# Patient Record
Sex: MALE | Race: WHITE | NOT HISPANIC OR LATINO | Employment: OTHER | URBAN - METROPOLITAN AREA
[De-identification: names, ages, dates, MRNs, and addresses within clinical notes are randomized per-mention and may not be internally consistent; named-entity substitution may affect disease eponyms.]

---

## 2023-12-11 ASSESSMENT — REFRACTION_CURRENTRX
OD_DIAMETER: 11.0
OD_BASECURVE: 6.82

## 2024-01-08 ENCOUNTER — APPOINTMENT (OUTPATIENT)
Dept: CARDIOLOGY | Facility: CLINIC | Age: 84
End: 2024-01-08
Payer: MEDICARE

## 2024-01-12 PROBLEM — I25.810 CORONARY ARTERY DISEASE INVOLVING CORONARY BYPASS GRAFT OF NATIVE HEART: Status: ACTIVE | Noted: 2024-01-12

## 2024-01-12 PROBLEM — H02.002 ENTROPION OF RIGHT LOWER EYELID: Status: ACTIVE | Noted: 2024-01-12

## 2024-01-12 PROBLEM — Z96.1 PSEUDOPHAKIA OF BOTH EYES: Status: ACTIVE | Noted: 2024-01-12

## 2024-01-12 PROBLEM — H40.003 GLAUCOMA SUSPECT, BOTH EYES: Status: ACTIVE | Noted: 2024-01-12

## 2024-01-12 PROBLEM — R60.0 BILATERAL EDEMA OF LOWER EXTREMITY: Status: ACTIVE | Noted: 2024-01-12

## 2024-01-12 PROBLEM — Z94.7 S/P PKP (PENETRATING KERATOPLASTY): Status: ACTIVE | Noted: 2024-01-12

## 2024-01-12 PROBLEM — I12.9 HYPERTENSIVE KIDNEY DISEASE WITH CKD (CHRONIC KIDNEY DISEASE), STAGE 1-4 OR UNSPECIFIED CHRONIC KIDNEY DISEASE: Status: ACTIVE | Noted: 2024-01-12

## 2024-01-12 PROBLEM — E78.5 DYSLIPIDEMIA: Status: ACTIVE | Noted: 2024-01-12

## 2024-01-12 PROBLEM — H04.123 DRY EYES: Status: ACTIVE | Noted: 2024-01-12

## 2024-01-12 PROBLEM — I48.91 ATRIAL FIBRILLATION (MULTI): Status: ACTIVE | Noted: 2024-01-12

## 2024-01-12 PROBLEM — H40.1211 LOW TENSION GLAUCOMA OF RIGHT EYE, MILD STAGE: Status: ACTIVE | Noted: 2024-01-12

## 2024-01-12 PROBLEM — S05.00XA ABRASION OF CONJUNCTIVA: Status: ACTIVE | Noted: 2024-01-12

## 2024-01-12 PROBLEM — M19.90 ARTHRITIS: Status: ACTIVE | Noted: 2024-01-12

## 2024-01-12 PROBLEM — T86.8409 REJECTION OF CORNEAL GRAFT: Status: ACTIVE | Noted: 2024-01-12

## 2024-01-12 PROBLEM — G47.33 OBSTRUCTIVE SLEEP APNEA: Status: ACTIVE | Noted: 2024-01-12

## 2024-01-12 RX ORDER — BISOPROLOL FUMARATE 5 MG/1
5 TABLET, FILM COATED ORAL DAILY
COMMUNITY
Start: 2023-10-06

## 2024-01-12 RX ORDER — GABAPENTIN 100 MG/1
100 CAPSULE ORAL 3 TIMES DAILY
COMMUNITY
Start: 2023-10-26

## 2024-01-12 RX ORDER — METOPROLOL TARTRATE 25 MG/1
25 TABLET, FILM COATED ORAL 2 TIMES DAILY
COMMUNITY
Start: 2022-08-05 | End: 2024-01-15 | Stop reason: WASHOUT

## 2024-01-12 RX ORDER — HYDRALAZINE HYDROCHLORIDE 25 MG/1
25 TABLET, FILM COATED ORAL 2 TIMES DAILY
COMMUNITY
Start: 2016-11-21 | End: 2024-01-15 | Stop reason: ALTCHOICE

## 2024-01-12 RX ORDER — HYDRALAZINE HYDROCHLORIDE 50 MG/1
50 TABLET, FILM COATED ORAL
COMMUNITY
Start: 2023-11-12 | End: 2024-01-15 | Stop reason: DRUGHIGH

## 2024-01-12 RX ORDER — LANCETS 33 GAUGE
EACH MISCELLANEOUS
COMMUNITY
Start: 2023-05-24 | End: 2024-01-21 | Stop reason: ENTERED-IN-ERROR

## 2024-01-12 RX ORDER — GLIMEPIRIDE 4 MG/1
4 TABLET ORAL DAILY
COMMUNITY
Start: 2023-10-24 | End: 2024-01-25 | Stop reason: HOSPADM

## 2024-01-12 RX ORDER — METOLAZONE 5 MG/1
5 TABLET ORAL DAILY PRN
COMMUNITY
Start: 2023-10-03

## 2024-01-12 RX ORDER — GLIMEPIRIDE 2 MG/1
2 TABLET ORAL
COMMUNITY
End: 2024-01-15 | Stop reason: ALTCHOICE

## 2024-01-12 RX ORDER — TORSEMIDE 20 MG/1
20 TABLET ORAL DAILY
COMMUNITY
End: 2024-01-15 | Stop reason: DRUGHIGH

## 2024-01-12 RX ORDER — AMLODIPINE BESYLATE 10 MG/1
10 TABLET ORAL DAILY
COMMUNITY
End: 2024-01-15 | Stop reason: ALTCHOICE

## 2024-01-12 RX ORDER — DIGOXIN 125 MCG
125 TABLET ORAL DAILY
COMMUNITY

## 2024-01-12 RX ORDER — ROSUVASTATIN CALCIUM 20 MG/1
20 TABLET, COATED ORAL NIGHTLY
COMMUNITY
Start: 2023-08-21

## 2024-01-12 RX ORDER — LANCETS 30 GAUGE
EACH MISCELLANEOUS
COMMUNITY
Start: 2023-03-08 | End: 2024-01-21 | Stop reason: ENTERED-IN-ERROR

## 2024-01-12 RX ORDER — EMPAGLIFLOZIN 25 MG/1
25 TABLET, FILM COATED ORAL
COMMUNITY
Start: 2023-08-03 | End: 2024-01-15 | Stop reason: ALTCHOICE

## 2024-01-12 RX ORDER — PANTOPRAZOLE SODIUM 40 MG/1
40 TABLET, DELAYED RELEASE ORAL NIGHTLY
COMMUNITY

## 2024-01-12 RX ORDER — LATANOPROST 50 UG/ML
1 SOLUTION/ DROPS OPHTHALMIC NIGHTLY
COMMUNITY

## 2024-01-12 RX ORDER — SIMVASTATIN 20 MG/1
20 TABLET, FILM COATED ORAL DAILY
COMMUNITY
End: 2024-01-15 | Stop reason: WASHOUT

## 2024-01-12 RX ORDER — DOXYCYCLINE HYCLATE 100 MG
100 TABLET ORAL 2 TIMES DAILY
COMMUNITY
Start: 2023-09-12 | End: 2023-09-22

## 2024-01-12 RX ORDER — MUPIROCIN 20 MG/G
OINTMENT TOPICAL
COMMUNITY
Start: 2023-02-26 | End: 2024-01-15 | Stop reason: ALTCHOICE

## 2024-01-12 RX ORDER — CLINDAMYCIN PHOSPHATE 10 MG/G
GEL TOPICAL
COMMUNITY
Start: 2023-11-16 | End: 2024-01-15 | Stop reason: ALTCHOICE

## 2024-01-12 RX ORDER — BLOOD SUGAR DIAGNOSTIC
STRIP MISCELLANEOUS
COMMUNITY
Start: 2013-10-16 | End: 2024-01-21 | Stop reason: ENTERED-IN-ERROR

## 2024-01-12 RX ORDER — RIVAROXABAN 15 MG/1
15 TABLET, FILM COATED ORAL DAILY
COMMUNITY
End: 2024-01-21 | Stop reason: ENTERED-IN-ERROR

## 2024-01-15 ENCOUNTER — TELEMEDICINE (OUTPATIENT)
Dept: CARDIOLOGY | Facility: CLINIC | Age: 84
End: 2024-01-15
Payer: MEDICARE

## 2024-01-15 VITALS — HEIGHT: 70 IN | BODY MASS INDEX: 25.48 KG/M2 | WEIGHT: 178 LBS

## 2024-01-15 DIAGNOSIS — I48.91 ATRIAL FIBRILLATION, UNSPECIFIED TYPE (MULTI): Primary | ICD-10-CM

## 2024-01-15 DIAGNOSIS — I25.810 CORONARY ARTERY DISEASE INVOLVING CORONARY BYPASS GRAFT OF NATIVE HEART WITHOUT ANGINA PECTORIS: ICD-10-CM

## 2024-01-15 DIAGNOSIS — I10 ESSENTIAL HYPERTENSION, BENIGN: ICD-10-CM

## 2024-01-15 DIAGNOSIS — R06.09 DYSPNEA ON EXERTION: ICD-10-CM

## 2024-01-15 PROBLEM — I50.42 CHRONIC COMBINED SYSTOLIC AND DIASTOLIC HEART FAILURE (MULTI): Status: ACTIVE | Noted: 2024-01-15

## 2024-01-15 PROCEDURE — 99213 OFFICE O/P EST LOW 20 MIN: CPT | Performed by: INTERNAL MEDICINE

## 2024-01-15 RX ORDER — HYDRALAZINE HYDROCHLORIDE 50 MG/1
50 TABLET, FILM COATED ORAL 2 TIMES DAILY
Qty: 60 TABLET | Refills: 11 | Status: SHIPPED
Start: 2024-01-15 | End: 2024-02-19

## 2024-01-15 RX ORDER — TORSEMIDE 20 MG/1
60 TABLET ORAL DAILY
Qty: 90 TABLET | Refills: 11 | Status: SHIPPED
Start: 2024-01-15 | End: 2024-01-25 | Stop reason: HOSPADM

## 2024-01-15 ASSESSMENT — ENCOUNTER SYMPTOMS
OCCASIONAL FEELINGS OF UNSTEADINESS: 0
LOSS OF SENSATION IN FEET: 0
DEPRESSION: 0

## 2024-01-15 NOTE — PROGRESS NOTES
Chief Complaint:   No chief complaint on file.     History Of Present Illness:    Elgin Marin is a 83 y.o. male with history of CAD s/p CABG, chronic systolic heart failure, atrial fibrillation on Xarelto, dyslipidemia, hypertension, and shortness of breath being evaluated for routine follow-up.     Overall has been doing well. His BP's have been well controlled.     His BP's have been well controlled on bisoprolol and hydralazine. Systolics have been 100-120.     Leg swelling has been relatively well controlled. Still having problems with blistering of the skin. Has been using Xeroform patches. Still having seeping of the legs.     No chest pain or SOB.     Echocardiogram 3/18/2023: EF 40 to 45%. Moderate biatrial enlargement. Moderately elevated RVSP at 50 mmHg.     Catheterization 4/1/2022: Patent LIMA to LAD with collaterals to distal RCA. Occluded vein grafts to diagonal/vein graft to OM/vein graft to right PDA. LVEDP 14 mmHg.     Echocardiogram 4/1/2022: EF 50 to 55%. Moderate left atrial enlargement and mild to moderate right atrial enlargement. Aortic valve sclerosis without stenosis. RVSP 21 mmHg.     Lexiscan nuclear stress test 9/14/18 with no evidence of ischemia or scar. EF 62%.     PCI to the vein graft to RCA 9/25/15 with an integrity 2.75 x 26 mm drug-eluting stent.     Echocardiogram 9/17/15 demonstrating normal LV size and function, EF 60-65%. Normal RV size and function. Mild MR and IL. Mildly dilated IVC with normal respirophasic variation.      Past Medical History:  He has a past medical history of Hyperlipidemia, unspecified (11/14/2022), Other forms of dyspnea (08/09/2018), Personal history of other diseases of the circulatory system (12/08/2014), Personal history of other endocrine, nutritional and metabolic disease, Personal history of other specified conditions, Presence of intraocular lens (07/20/2019), Presence of intraocular lens (07/20/2019), and Unspecified atrial fibrillation  (CMS/[facility]) (11/14/2022).    Past Surgical History:  He has a past surgical history that includes Coronary angioplasty with stent (10/06/2015); Coronary artery bypass graft (10/06/2015); Other surgical history (09/09/2015); Cholecystectomy (09/09/2015); and Rotator cuff repair (09/09/2015).      Social History:  He has no history on file for tobacco use, alcohol use, and drug use.    Family History:  No family history on file.     Allergies:  Oxycodone, Oxycodone-aspirin, and Warfarin    Outpatient Medications:  Current Outpatient Medications   Medication Instructions    amLODIPine (NORVASC) 10 mg, oral, Daily    bisoprolol (ZEBETA) 5 mg, oral, Daily    clindamycin (Cleocin T) 1 % gel     Contour Test Strips strip USE AS DIRECTED.    digoxin (LANOXIN) 125 mcg, oral, Daily    gabapentin (NEURONTIN) 100 mg, oral, Nightly    glimepiride (AMARYL) 2 mg, oral, Take with food.    glimepiride (AMARYL) 4 mg, oral, Daily    hydrALAZINE (APRESOLINE) 25 mg, oral, 2 times daily    hydrALAZINE (APRESOLINE) 50 mg    Jardiance 25 mg, oral, Daily with breakfast    latanoprost (Xalatan) 0.005 % ophthalmic solution 1 drop, Both Eyes, Nightly    metOLazone (ZAROXOLYN) 5 mg    metoprolol tartrate (LOPRESSOR) 25 mg, oral, 2 times daily    mupirocin (Bactroban) 2 % ointment APPLY TO AFFECTED AREA 3 TIMES A DAY    OneTouch Delica Plus Lancet 30 gauge misc PLEASE SEE ATTACHED FOR DETAILED DIRECTIONS    OneTouch Ultra2 Meter misc USE TO TEST BLOOD SUGAR 3 TIMES DAILY. PLEASE FILL WITH ONE TOUCH BLUE METER.    pantoprazole (PROTONIX) 40 mg, oral, Daily before breakfast    prednisoLONE acetate (Pred-Forte) 1 % ophthalmic suspension USE ONE DROP ONCE/DAY IN RIGHT EYE. MUST MAKE APPOINTMENT    rosuvastatin (CRESTOR) 20 mg, oral, Nightly    simvastatin (ZOCOR) 20 mg, oral, Daily    torsemide (DEMADEX) 20 mg, oral, Daily    Xarelto 15 mg, oral, Daily       Last Recorded Vitals:  There were no vitals taken for this visit.   LASTWT(3):   Wt Readings  from Last 3 Encounters:   07/10/23 80.7 kg (178 lb)   03/30/23 83.9 kg (185 lb)   11/14/22 83.9 kg (185 lb)       Physical Exam:  Alert and oriented and in no apparent distress.  No obvious agitation.     Last Labs:  CBC -  Recent Labs     03/20/23  0542 03/19/23  0521 03/18/23  0739   WBC 8.3 8.3 8.3   HGB 11.0* 10.7* 11.6*   HCT 35.1* 34.9* 36.8*    177 188   MCV 91 92 91       CMP -  Recent Labs     03/21/23  0541 03/20/23  0542 03/19/23  0521   * 133* 134*   K 4.1 4.1 4.0   CL 94* 96* 99   CO2 29 29 27   ANIONGAP 13 12 12   BUN 61* 50* 43*   CREATININE 2.52* 2.43* 2.10*   MG 2.48* 2.29 2.24     Recent Labs     03/20/23  0542 03/19/23  0521 03/18/23  0739 03/17/23  1716 03/17/23  1147 04/04/22  0616 04/03/22  0735   ALBUMIN 3.6 3.6 4.1   < > 4.2 3.7 3.9   ALKPHOS  --   --   --   --  80 55 51   ALT  --   --   --   --  22 13 13   AST  --   --   --   --  42* 15 20   BILITOT  --   --   --   --  1.3* 1.9* 2.1*    < > = values in this interval not displayed.       LIPID PANEL -   Recent Labs     04/01/22  0516   CHOL 86   LDLF 29   HDL 41.0   TRIG 82       Recent Labs     03/21/23  0541 03/17/23  1147 09/01/22  1306 04/01/22  0516   * 435* 192*  --    HGBA1C  --   --   --  7.1*           Assessment/Plan   1) atrial fibrillation: Rate controlled with digoxin. Continue anticoagulation with Xarelto.     2) Leg swelling: multifactorial. Try using the metolazone 30 min prior to the torsemide on those days.    Told him that we can set up an appointment with him with Dr. Terrell but it is very difficult for the patient to get transportation.  I told him he can call the office and we can always make an appointment if he is interested.     3) hypertension: Continue his current medication.     4) coronary artery disease: No percutaneous nor surgical options for his severe coronary disease. Continue medical therapy.     5) acute on chronic combined systolic and diastolic heart failure: See the note above about  the metolazone.     6) follow-up: 3 to 6 months or sooner if needed    Total time 25 minutes      Lewis Amador MD

## 2024-01-21 ENCOUNTER — APPOINTMENT (OUTPATIENT)
Dept: RADIOLOGY | Facility: HOSPITAL | Age: 84
DRG: 603 | End: 2024-01-21
Payer: MEDICARE

## 2024-01-21 ENCOUNTER — APPOINTMENT (OUTPATIENT)
Dept: CARDIOLOGY | Facility: HOSPITAL | Age: 84
DRG: 603 | End: 2024-01-21
Payer: MEDICARE

## 2024-01-21 ENCOUNTER — HOSPITAL ENCOUNTER (INPATIENT)
Facility: HOSPITAL | Age: 84
LOS: 4 days | Discharge: HOME | DRG: 603 | End: 2024-01-25
Attending: EMERGENCY MEDICINE | Admitting: STUDENT IN AN ORGANIZED HEALTH CARE EDUCATION/TRAINING PROGRAM
Payer: MEDICARE

## 2024-01-21 DIAGNOSIS — L03.90 WOUND CELLULITIS: ICD-10-CM

## 2024-01-21 DIAGNOSIS — R60.0 BILATERAL EDEMA OF LOWER EXTREMITY: ICD-10-CM

## 2024-01-21 DIAGNOSIS — R73.9 HYPERGLYCEMIA: Primary | ICD-10-CM

## 2024-01-21 DIAGNOSIS — I10 ESSENTIAL HYPERTENSION, BENIGN: ICD-10-CM

## 2024-01-21 DIAGNOSIS — L03.119 CELLULITIS OF LOWER EXTREMITY, UNSPECIFIED LATERALITY: ICD-10-CM

## 2024-01-21 DIAGNOSIS — R79.89 TROPONIN LEVEL ELEVATED: ICD-10-CM

## 2024-01-21 DIAGNOSIS — I50.42 CHRONIC COMBINED SYSTOLIC AND DIASTOLIC HEART FAILURE (MULTI): ICD-10-CM

## 2024-01-21 DIAGNOSIS — R06.09 DYSPNEA ON EXERTION: ICD-10-CM

## 2024-01-21 LAB
ALBUMIN SERPL BCP-MCNC: 3.6 G/DL (ref 3.4–5)
ALP SERPL-CCNC: 128 U/L (ref 33–136)
ALT SERPL W P-5'-P-CCNC: 25 U/L (ref 10–52)
ANION GAP BLDV CALCULATED.4IONS-SCNC: 16 MMOL/L (ref 10–25)
ANION GAP SERPL CALC-SCNC: 18 MMOL/L (ref 10–20)
APPEARANCE UR: ABNORMAL
AST SERPL W P-5'-P-CCNC: 30 U/L (ref 9–39)
B-OH-BUTYR SERPL-SCNC: 0.21 MMOL/L (ref 0.02–0.27)
BACTERIA #/AREA URNS AUTO: ABNORMAL /HPF
BASE EXCESS BLDV CALC-SCNC: -4.7 MMOL/L (ref -2–3)
BASOPHILS # BLD AUTO: 0.03 X10*3/UL (ref 0–0.1)
BASOPHILS NFR BLD AUTO: 0.3 %
BILIRUB SERPL-MCNC: 1.2 MG/DL (ref 0–1.2)
BILIRUB UR STRIP.AUTO-MCNC: NEGATIVE MG/DL
BNP SERPL-MCNC: 652 PG/ML (ref 0–99)
BODY TEMPERATURE: ABNORMAL
BUN SERPL-MCNC: 103 MG/DL (ref 6–23)
CA-I BLDV-SCNC: 1.17 MMOL/L (ref 1.1–1.33)
CALCIUM SERPL-MCNC: 8.2 MG/DL (ref 8.6–10.3)
CARDIAC TROPONIN I PNL SERPL HS: 169 NG/L (ref 0–20)
CARDIAC TROPONIN I PNL SERPL HS: 180 NG/L (ref 0–20)
CARDIAC TROPONIN I PNL SERPL HS: 204 NG/L (ref 0–20)
CHLORIDE BLDV-SCNC: 92 MMOL/L (ref 98–107)
CHLORIDE SERPL-SCNC: 90 MMOL/L (ref 98–107)
CK SERPL-CCNC: 467 U/L (ref 0–325)
CO2 SERPL-SCNC: 20 MMOL/L (ref 21–32)
COLOR UR: YELLOW
CREAT SERPL-MCNC: 3.09 MG/DL (ref 0.5–1.3)
CRITICAL CALL TIME: 1328
CRITICAL CALLED BY: ABNORMAL
CRITICAL CALLED TO: ABNORMAL
CRITICAL NOTE: ABNORMAL
CRITICAL READ BACK: ABNORMAL
CRP SERPL-MCNC: 1.82 MG/DL
DIGOXIN SERPL-MCNC: <0.3 NG/ML (ref 0.8–?)
EGFRCR SERPLBLD CKD-EPI 2021: 19 ML/MIN/1.73M*2
EOSINOPHIL # BLD AUTO: 0.04 X10*3/UL (ref 0–0.4)
EOSINOPHIL NFR BLD AUTO: 0.4 %
ERYTHROCYTE [DISTWIDTH] IN BLOOD BY AUTOMATED COUNT: 14.2 % (ref 11.5–14.5)
ERYTHROCYTE [SEDIMENTATION RATE] IN BLOOD BY WESTERGREN METHOD: 33 MM/H (ref 0–20)
GLUCOSE BLD MANUAL STRIP-MCNC: 149 MG/DL (ref 74–99)
GLUCOSE BLD MANUAL STRIP-MCNC: 165 MG/DL (ref 74–99)
GLUCOSE BLD MANUAL STRIP-MCNC: 345 MG/DL (ref 74–99)
GLUCOSE BLDV-MCNC: 553 MG/DL (ref 74–99)
GLUCOSE SERPL-MCNC: 487 MG/DL (ref 74–99)
GLUCOSE UR STRIP.AUTO-MCNC: ABNORMAL MG/DL
HCO3 BLDV-SCNC: 18.8 MMOL/L (ref 22–26)
HCT VFR BLD AUTO: 31.7 % (ref 41–52)
HCT VFR BLD EST: 32 % (ref 41–52)
HGB BLD-MCNC: 10.3 G/DL (ref 13.5–17.5)
HGB BLDV-MCNC: 10.8 G/DL (ref 13.5–17.5)
HOLD SPECIMEN: NORMAL
HYALINE CASTS #/AREA URNS AUTO: ABNORMAL /LPF
IMM GRANULOCYTES # BLD AUTO: 0.05 X10*3/UL (ref 0–0.5)
IMM GRANULOCYTES NFR BLD AUTO: 0.6 % (ref 0–0.9)
INHALED O2 CONCENTRATION: 28 %
INR PPP: 2 (ref 0.9–1.1)
KETONES UR STRIP.AUTO-MCNC: NEGATIVE MG/DL
LACTATE BLDV-SCNC: 2 MMOL/L (ref 0.4–2)
LACTATE SERPL-SCNC: 1.6 MMOL/L (ref 0.4–2)
LEUKOCYTE ESTERASE UR QL STRIP.AUTO: ABNORMAL
LYMPHOCYTES # BLD AUTO: 0.55 X10*3/UL (ref 0.8–3)
LYMPHOCYTES NFR BLD AUTO: 6.2 %
MAGNESIUM SERPL-MCNC: 1.8 MG/DL (ref 1.6–2.4)
MCH RBC QN AUTO: 28 PG (ref 26–34)
MCHC RBC AUTO-ENTMCNC: 32.5 G/DL (ref 32–36)
MCV RBC AUTO: 86 FL (ref 80–100)
MONOCYTES # BLD AUTO: 0.67 X10*3/UL (ref 0.05–0.8)
MONOCYTES NFR BLD AUTO: 7.5 %
NEUTROPHILS # BLD AUTO: 7.59 X10*3/UL (ref 1.6–5.5)
NEUTROPHILS NFR BLD AUTO: 85 %
NITRITE UR QL STRIP.AUTO: NEGATIVE
NRBC BLD-RTO: 0 /100 WBCS (ref 0–0)
OXYHGB MFR BLDV: 68.3 % (ref 45–75)
PCO2 BLDV: 29 MM HG (ref 41–51)
PH BLDV: 7.42 PH (ref 7.33–7.43)
PH UR STRIP.AUTO: 5 [PH]
PLATELET # BLD AUTO: 172 X10*3/UL (ref 150–450)
PO2 BLDV: 43 MM HG (ref 35–45)
POTASSIUM BLDV-SCNC: 3.9 MMOL/L (ref 3.5–5.3)
POTASSIUM SERPL-SCNC: 3.7 MMOL/L (ref 3.5–5.3)
PROT SERPL-MCNC: 6.3 G/DL (ref 6.4–8.2)
PROT UR STRIP.AUTO-MCNC: NEGATIVE MG/DL
PROTHROMBIN TIME: 22.8 SECONDS (ref 9.8–12.8)
RBC # BLD AUTO: 3.68 X10*6/UL (ref 4.5–5.9)
RBC # UR STRIP.AUTO: ABNORMAL /UL
RBC #/AREA URNS AUTO: >20 /HPF
SAO2 % BLDV: 70 % (ref 45–75)
SODIUM BLDV-SCNC: 123 MMOL/L (ref 136–145)
SODIUM SERPL-SCNC: 124 MMOL/L (ref 136–145)
SP GR UR STRIP.AUTO: 1.01
UROBILINOGEN UR STRIP.AUTO-MCNC: <2 MG/DL
WBC # BLD AUTO: 8.9 X10*3/UL (ref 4.4–11.3)
WBC #/AREA URNS AUTO: ABNORMAL /HPF

## 2024-01-21 PROCEDURE — 85025 COMPLETE CBC W/AUTO DIFF WBC: CPT | Performed by: EMERGENCY MEDICINE

## 2024-01-21 PROCEDURE — 84132 ASSAY OF SERUM POTASSIUM: CPT | Performed by: EMERGENCY MEDICINE

## 2024-01-21 PROCEDURE — 85610 PROTHROMBIN TIME: CPT | Performed by: STUDENT IN AN ORGANIZED HEALTH CARE EDUCATION/TRAINING PROGRAM

## 2024-01-21 PROCEDURE — 96365 THER/PROPH/DIAG IV INF INIT: CPT

## 2024-01-21 PROCEDURE — 93005 ELECTROCARDIOGRAM TRACING: CPT

## 2024-01-21 PROCEDURE — 73620 X-RAY EXAM OF FOOT: CPT | Mod: 50

## 2024-01-21 PROCEDURE — 82010 KETONE BODYS QUAN: CPT | Performed by: STUDENT IN AN ORGANIZED HEALTH CARE EDUCATION/TRAINING PROGRAM

## 2024-01-21 PROCEDURE — 86140 C-REACTIVE PROTEIN: CPT

## 2024-01-21 PROCEDURE — 2500000002 HC RX 250 W HCPCS SELF ADMINISTERED DRUGS (ALT 637 FOR MEDICARE OP, ALT 636 FOR OP/ED)

## 2024-01-21 PROCEDURE — 36415 COLL VENOUS BLD VENIPUNCTURE: CPT | Performed by: STUDENT IN AN ORGANIZED HEALTH CARE EDUCATION/TRAINING PROGRAM

## 2024-01-21 PROCEDURE — 84484 ASSAY OF TROPONIN QUANT: CPT | Performed by: EMERGENCY MEDICINE

## 2024-01-21 PROCEDURE — 81001 URINALYSIS AUTO W/SCOPE: CPT | Performed by: EMERGENCY MEDICINE

## 2024-01-21 PROCEDURE — 2500000001 HC RX 250 WO HCPCS SELF ADMINISTERED DRUGS (ALT 637 FOR MEDICARE OP): Performed by: STUDENT IN AN ORGANIZED HEALTH CARE EDUCATION/TRAINING PROGRAM

## 2024-01-21 PROCEDURE — 2500000004 HC RX 250 GENERAL PHARMACY W/ HCPCS (ALT 636 FOR OP/ED): Performed by: STUDENT IN AN ORGANIZED HEALTH CARE EDUCATION/TRAINING PROGRAM

## 2024-01-21 PROCEDURE — 87040 BLOOD CULTURE FOR BACTERIA: CPT | Mod: STJLAB | Performed by: STUDENT IN AN ORGANIZED HEALTH CARE EDUCATION/TRAINING PROGRAM

## 2024-01-21 PROCEDURE — 84156 ASSAY OF PROTEIN URINE: CPT | Performed by: INTERNAL MEDICINE

## 2024-01-21 PROCEDURE — 96375 TX/PRO/DX INJ NEW DRUG ADDON: CPT

## 2024-01-21 PROCEDURE — 82947 ASSAY GLUCOSE BLOOD QUANT: CPT

## 2024-01-21 PROCEDURE — 83880 ASSAY OF NATRIURETIC PEPTIDE: CPT | Performed by: STUDENT IN AN ORGANIZED HEALTH CARE EDUCATION/TRAINING PROGRAM

## 2024-01-21 PROCEDURE — 84484 ASSAY OF TROPONIN QUANT: CPT | Performed by: STUDENT IN AN ORGANIZED HEALTH CARE EDUCATION/TRAINING PROGRAM

## 2024-01-21 PROCEDURE — 73590 X-RAY EXAM OF LOWER LEG: CPT | Mod: 50

## 2024-01-21 PROCEDURE — 87086 URINE CULTURE/COLONY COUNT: CPT | Mod: STJLAB | Performed by: INTERNAL MEDICINE

## 2024-01-21 PROCEDURE — 82570 ASSAY OF URINE CREATININE: CPT | Performed by: INTERNAL MEDICINE

## 2024-01-21 PROCEDURE — 2500000002 HC RX 250 W HCPCS SELF ADMINISTERED DRUGS (ALT 637 FOR MEDICARE OP, ALT 636 FOR OP/ED): Performed by: STUDENT IN AN ORGANIZED HEALTH CARE EDUCATION/TRAINING PROGRAM

## 2024-01-21 PROCEDURE — 73590 X-RAY EXAM OF LOWER LEG: CPT | Mod: BILATERAL PROCEDURE | Performed by: RADIOLOGY

## 2024-01-21 PROCEDURE — 83605 ASSAY OF LACTIC ACID: CPT | Performed by: EMERGENCY MEDICINE

## 2024-01-21 PROCEDURE — 1200000002 HC GENERAL ROOM WITH TELEMETRY DAILY

## 2024-01-21 PROCEDURE — 84132 ASSAY OF SERUM POTASSIUM: CPT | Performed by: STUDENT IN AN ORGANIZED HEALTH CARE EDUCATION/TRAINING PROGRAM

## 2024-01-21 PROCEDURE — 80162 ASSAY OF DIGOXIN TOTAL: CPT | Performed by: STUDENT IN AN ORGANIZED HEALTH CARE EDUCATION/TRAINING PROGRAM

## 2024-01-21 PROCEDURE — 99285 EMERGENCY DEPT VISIT HI MDM: CPT | Performed by: EMERGENCY MEDICINE

## 2024-01-21 PROCEDURE — 85652 RBC SED RATE AUTOMATED: CPT

## 2024-01-21 PROCEDURE — 82550 ASSAY OF CK (CPK): CPT

## 2024-01-21 PROCEDURE — 83735 ASSAY OF MAGNESIUM: CPT | Performed by: EMERGENCY MEDICINE

## 2024-01-21 PROCEDURE — 83605 ASSAY OF LACTIC ACID: CPT | Performed by: STUDENT IN AN ORGANIZED HEALTH CARE EDUCATION/TRAINING PROGRAM

## 2024-01-21 PROCEDURE — 2500000004 HC RX 250 GENERAL PHARMACY W/ HCPCS (ALT 636 FOR OP/ED)

## 2024-01-21 PROCEDURE — 2500000001 HC RX 250 WO HCPCS SELF ADMINISTERED DRUGS (ALT 637 FOR MEDICARE OP)

## 2024-01-21 RX ORDER — DOXYCYCLINE 100 MG/1
100 CAPSULE ORAL EVERY 12 HOURS SCHEDULED
Status: DISCONTINUED | OUTPATIENT
Start: 2024-01-21 | End: 2024-01-25 | Stop reason: HOSPADM

## 2024-01-21 RX ORDER — BISOPROLOL FUMARATE 5 MG/1
5 TABLET, FILM COATED ORAL DAILY
Status: DISCONTINUED | OUTPATIENT
Start: 2024-01-22 | End: 2024-01-25 | Stop reason: HOSPADM

## 2024-01-21 RX ORDER — VANCOMYCIN HYDROCHLORIDE 1 G/20ML
INJECTION, POWDER, LYOPHILIZED, FOR SOLUTION INTRAVENOUS DAILY PRN
Status: DISCONTINUED | OUTPATIENT
Start: 2024-01-21 | End: 2024-01-21

## 2024-01-21 RX ORDER — LATANOPROST 50 UG/ML
1 SOLUTION/ DROPS OPHTHALMIC NIGHTLY
Status: DISCONTINUED | OUTPATIENT
Start: 2024-01-21 | End: 2024-01-25 | Stop reason: HOSPADM

## 2024-01-21 RX ORDER — ROSUVASTATIN CALCIUM 20 MG/1
20 TABLET, COATED ORAL NIGHTLY
Status: DISCONTINUED | OUTPATIENT
Start: 2024-01-21 | End: 2024-01-25 | Stop reason: HOSPADM

## 2024-01-21 RX ORDER — INSULIN LISPRO 100 [IU]/ML
5 INJECTION, SOLUTION INTRAVENOUS; SUBCUTANEOUS ONCE
Status: COMPLETED | OUTPATIENT
Start: 2024-01-21 | End: 2024-01-21

## 2024-01-21 RX ORDER — GABAPENTIN 100 MG/1
100 CAPSULE ORAL ONCE
Status: COMPLETED | OUTPATIENT
Start: 2024-01-21 | End: 2024-01-21

## 2024-01-21 RX ORDER — PANTOPRAZOLE SODIUM 40 MG/1
40 TABLET, DELAYED RELEASE ORAL NIGHTLY
Status: DISCONTINUED | OUTPATIENT
Start: 2024-01-21 | End: 2024-01-25 | Stop reason: HOSPADM

## 2024-01-21 RX ORDER — DEXTROSE 50 % IN WATER (D50W) INTRAVENOUS SYRINGE
25
Status: DISCONTINUED | OUTPATIENT
Start: 2024-01-21 | End: 2024-01-25 | Stop reason: HOSPADM

## 2024-01-21 RX ORDER — DEXTROSE MONOHYDRATE 100 MG/ML
0.3 INJECTION, SOLUTION INTRAVENOUS ONCE AS NEEDED
Status: DISCONTINUED | OUTPATIENT
Start: 2024-01-21 | End: 2024-01-25 | Stop reason: HOSPADM

## 2024-01-21 RX ORDER — INSULIN LISPRO 100 [IU]/ML
0-10 INJECTION, SOLUTION INTRAVENOUS; SUBCUTANEOUS
Status: DISCONTINUED | OUTPATIENT
Start: 2024-01-21 | End: 2024-01-25 | Stop reason: HOSPADM

## 2024-01-21 RX ORDER — PREDNISOLONE ACETATE 10 MG/ML
1 SUSPENSION/ DROPS OPHTHALMIC DAILY
Status: DISCONTINUED | OUTPATIENT
Start: 2024-01-22 | End: 2024-01-25 | Stop reason: HOSPADM

## 2024-01-21 RX ORDER — NAPROXEN SODIUM 220 MG/1
324 TABLET, FILM COATED ORAL ONCE
Status: COMPLETED | OUTPATIENT
Start: 2024-01-21 | End: 2024-01-21

## 2024-01-21 RX ADMIN — LATANOPROST 1 DROP: 50 SOLUTION OPHTHALMIC at 22:28

## 2024-01-21 RX ADMIN — INSULIN LISPRO 5 UNITS: 100 INJECTION, SOLUTION INTRAVENOUS; SUBCUTANEOUS at 18:47

## 2024-01-21 RX ADMIN — ROSUVASTATIN CALCIUM 20 MG: 20 TABLET, FILM COATED ORAL at 22:27

## 2024-01-21 RX ADMIN — PIPERACILLIN SODIUM AND TAZOBACTAM SODIUM 4.5 G: 4; .5 INJECTION, SOLUTION INTRAVENOUS at 13:37

## 2024-01-21 RX ADMIN — PANTOPRAZOLE SODIUM 40 MG: 40 TABLET, DELAYED RELEASE ORAL at 22:27

## 2024-01-21 RX ADMIN — INSULIN HUMAN 10 UNITS: 100 INJECTION, SOLUTION PARENTERAL at 16:40

## 2024-01-21 RX ADMIN — PIPERACILLIN SODIUM AND TAZOBACTAM SODIUM 3.38 G: 3; .375 INJECTION, SOLUTION INTRAVENOUS at 22:28

## 2024-01-21 RX ADMIN — Medication 2 G: at 13:30

## 2024-01-21 RX ADMIN — DOXYCYCLINE HYCLATE 100 MG: 100 CAPSULE ORAL at 20:19

## 2024-01-21 RX ADMIN — GABAPENTIN 100 MG: 100 CAPSULE ORAL at 16:30

## 2024-01-21 RX ADMIN — ASPIRIN 81 MG 324 MG: 81 TABLET ORAL at 16:30

## 2024-01-21 SDOH — SOCIAL STABILITY: SOCIAL INSECURITY: ABUSE: ADULT

## 2024-01-21 SDOH — SOCIAL STABILITY: SOCIAL INSECURITY: ARE YOU OR HAVE YOU BEEN THREATENED OR ABUSED PHYSICALLY, EMOTIONALLY, OR SEXUALLY BY ANYONE?: NO

## 2024-01-21 SDOH — SOCIAL STABILITY: SOCIAL INSECURITY: HAVE YOU HAD THOUGHTS OF HARMING ANYONE ELSE?: NO

## 2024-01-21 SDOH — SOCIAL STABILITY: SOCIAL INSECURITY: DO YOU FEEL ANYONE HAS EXPLOITED OR TAKEN ADVANTAGE OF YOU FINANCIALLY OR OF YOUR PERSONAL PROPERTY?: NO

## 2024-01-21 SDOH — SOCIAL STABILITY: SOCIAL INSECURITY: DOES ANYONE TRY TO KEEP YOU FROM HAVING/CONTACTING OTHER FRIENDS OR DOING THINGS OUTSIDE YOUR HOME?: NO

## 2024-01-21 SDOH — SOCIAL STABILITY: SOCIAL INSECURITY: HAS ANYONE EVER THREATENED TO HURT YOUR FAMILY OR YOUR PETS?: NO

## 2024-01-21 SDOH — SOCIAL STABILITY: SOCIAL INSECURITY: WERE YOU ABLE TO COMPLETE ALL THE BEHAVIORAL HEALTH SCREENINGS?: YES

## 2024-01-21 SDOH — SOCIAL STABILITY: SOCIAL INSECURITY: DO YOU FEEL UNSAFE GOING BACK TO THE PLACE WHERE YOU ARE LIVING?: NO

## 2024-01-21 SDOH — SOCIAL STABILITY: SOCIAL INSECURITY: ARE THERE ANY APPARENT SIGNS OF INJURIES/BEHAVIORS THAT COULD BE RELATED TO ABUSE/NEGLECT?: NO

## 2024-01-21 ASSESSMENT — ACTIVITIES OF DAILY LIVING (ADL)
BATHING: INDEPENDENT
ASSISTIVE_DEVICE: WALKER
LACK_OF_TRANSPORTATION: PATIENT DECLINED
PATIENT'S MEMORY ADEQUATE TO SAFELY COMPLETE DAILY ACTIVITIES?: YES
HEARING - RIGHT EAR: FUNCTIONAL
ADEQUATE_TO_COMPLETE_ADL: YES
DRESSING YOURSELF: INDEPENDENT
JUDGMENT_ADEQUATE_SAFELY_COMPLETE_DAILY_ACTIVITIES: YES
FEEDING YOURSELF: INDEPENDENT
LACK_OF_TRANSPORTATION: PATIENT DECLINED
WALKS IN HOME: NEEDS ASSISTANCE
GROOMING: INDEPENDENT
HEARING - LEFT EAR: FUNCTIONAL
TOILETING: INDEPENDENT

## 2024-01-21 ASSESSMENT — COGNITIVE AND FUNCTIONAL STATUS - GENERAL
DAILY ACTIVITIY SCORE: 24
CLIMB 3 TO 5 STEPS WITH RAILING: A LITTLE
MOVING TO AND FROM BED TO CHAIR: A LITTLE
WALKING IN HOSPITAL ROOM: A LITTLE
MOBILITY SCORE: 20
MOVING TO AND FROM BED TO CHAIR: A LITTLE
PATIENT BASELINE BEDBOUND: NO
STANDING UP FROM CHAIR USING ARMS: A LITTLE
WALKING IN HOSPITAL ROOM: A LITTLE
CLIMB 3 TO 5 STEPS WITH RAILING: A LITTLE
MOBILITY SCORE: 20
STANDING UP FROM CHAIR USING ARMS: A LITTLE
DAILY ACTIVITIY SCORE: 24

## 2024-01-21 ASSESSMENT — COLUMBIA-SUICIDE SEVERITY RATING SCALE - C-SSRS
1. IN THE PAST MONTH, HAVE YOU WISHED YOU WERE DEAD OR WISHED YOU COULD GO TO SLEEP AND NOT WAKE UP?: NO
6. HAVE YOU EVER DONE ANYTHING, STARTED TO DO ANYTHING, OR PREPARED TO DO ANYTHING TO END YOUR LIFE?: NO
2. HAVE YOU ACTUALLY HAD ANY THOUGHTS OF KILLING YOURSELF?: NO

## 2024-01-21 ASSESSMENT — LIFESTYLE VARIABLES
HAVE YOU EVER FELT YOU SHOULD CUT DOWN ON YOUR DRINKING: NO
HAVE PEOPLE ANNOYED YOU BY CRITICIZING YOUR DRINKING: NO
AUDIT-C TOTAL SCORE: 0
HOW OFTEN DO YOU HAVE 6 OR MORE DRINKS ON ONE OCCASION: NEVER
HOW MANY STANDARD DRINKS CONTAINING ALCOHOL DO YOU HAVE ON A TYPICAL DAY: PATIENT DOES NOT DRINK
SKIP TO QUESTIONS 9-10: 1
REASON UNABLE TO ASSESS: NO
EVER HAD A DRINK FIRST THING IN THE MORNING TO STEADY YOUR NERVES TO GET RID OF A HANGOVER: NO
EVER FELT BAD OR GUILTY ABOUT YOUR DRINKING: NO
HOW OFTEN DO YOU HAVE A DRINK CONTAINING ALCOHOL: NEVER
AUDIT-C TOTAL SCORE: 0

## 2024-01-21 ASSESSMENT — PAIN SCALES - GENERAL
PAINLEVEL_OUTOF10: 3
PAINLEVEL_OUTOF10: 10 - WORST POSSIBLE PAIN
PAINLEVEL_OUTOF10: 10 - WORST POSSIBLE PAIN

## 2024-01-21 ASSESSMENT — PATIENT HEALTH QUESTIONNAIRE - PHQ9
2. FEELING DOWN, DEPRESSED OR HOPELESS: NOT AT ALL
SUM OF ALL RESPONSES TO PHQ9 QUESTIONS 1 & 2: 0
1. LITTLE INTEREST OR PLEASURE IN DOING THINGS: NOT AT ALL

## 2024-01-21 ASSESSMENT — PAIN DESCRIPTION - PAIN TYPE: TYPE: CHRONIC PAIN

## 2024-01-21 ASSESSMENT — ENCOUNTER SYMPTOMS
CHILLS: 1
VOMITING: 0
ABDOMINAL DISTENTION: 0
FATIGUE: 1
DIARRHEA: 0
FEVER: 1
ABDOMINAL PAIN: 0

## 2024-01-21 ASSESSMENT — PAIN DESCRIPTION - ONSET: ONSET: UNABLE TO TELL

## 2024-01-21 ASSESSMENT — PAIN DESCRIPTION - FREQUENCY: FREQUENCY: INTERMITTENT

## 2024-01-21 ASSESSMENT — PAIN - FUNCTIONAL ASSESSMENT: PAIN_FUNCTIONAL_ASSESSMENT: 0-10

## 2024-01-21 ASSESSMENT — PAIN DESCRIPTION - ORIENTATION: ORIENTATION: RIGHT;LEFT

## 2024-01-21 ASSESSMENT — PAIN DESCRIPTION - DESCRIPTORS: DESCRIPTORS: SHARP;BURNING

## 2024-01-21 ASSESSMENT — PAIN DESCRIPTION - PROGRESSION: CLINICAL_PROGRESSION: RAPIDLY WORSENING

## 2024-01-21 ASSESSMENT — PAIN DESCRIPTION - LOCATION: LOCATION: FOOT

## 2024-01-21 NOTE — ED NOTES
Pt has bilateral lower leg swelling with open sores with various stages of healing noted.  Open sore to the left heel.  Blood Glucose 429 via squad.      Marianna Seo RN  01/21/24 1400

## 2024-01-21 NOTE — PROGRESS NOTES
"Vancomycin Dosing by Pharmacy- INITIAL    Elgin Marin is a 83 y.o. year old male who Pharmacy has been consulted for vancomycin dosing for cellulitis, skin and soft tissue. Based on the patient's indication and renal status this patient will be dosed based on a goal trough/random level of 10-15.     Renal function is currently stable.    Visit Vitals  /66   Pulse 66   Temp 36.7 °C (98.1 °F) (Temporal)   Resp (!) 25        Lab Results   Component Value Date    CREATININE 3.09 (H) 01/21/2024    CREATININE 2.52 (H) 03/21/2023    CREATININE 2.43 (H) 03/20/2023    CREATININE 2.10 (H) 03/19/2023    CREATININE 1.78 (H) 03/18/2023        Patient weight is No results found for: \"PTWEIGHT\"    No results found for: \"CULTURE\"     No intake/output data recorded.  [unfilled]    Lab Results   Component Value Date    PATIENTTEMP  01/21/2024      Comment:      NOTE: Patient Results are Not Corrected for Temperature    PATIENTTEMP 37.0 04/01/2022          Assessment/Plan     Patient has already been given a loading dose of 2000 mg.  Will dose by levels due to patient's current renal function    Follow-up level will be ordered on 1/22 at 1300 unless clinically indicated sooner.  Will continue to monitor renal function daily while on vancomycin and order serum creatinine at least every 48 hours if not already ordered.  Follow for continued vancomycin needs, clinical response, and signs/symptoms of toxicity.       Kaitlyn Vivas, PharmD       "

## 2024-01-21 NOTE — ED PROVIDER NOTES
HPI   Chief Complaint   Patient presents with    Foot Pain       83-year-old male with past medical history significant for diabetes and hypertension who presents to the emergency department with bilateral lower extremity wounds that have not been healing.  Patient states that he had been getting wound care at home from home health for some time, however they stopped coming and since he ran out of materials he has been attempting to dress the legs himself by taping socks around them.  He states that this helps catch the fluid that is weeping from them.  However he states that his pain has been getting progressively worse to the point where he can now not walk on his left heel.  He is additionally concerned because his primary care provider wanted him to start insulin however the insurance company stated that they would not cover the insulin he was supposed to get.  Because of that he has just not been taking insulin and only taking his oral antihyperglycemic's.  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  Their son lives with them but works full-time and is unable to provide care as well.              Carol Coma Scale Score: 15                  Patient History   Past Medical History:   Diagnosis Date    Hyperlipidemia, unspecified 11/14/2022    Dyslipidemia    Other forms of dyspnea 08/09/2018    Dyspnea on exertion    Personal history of other diseases of the circulatory system 12/08/2014    History of hypertension    Personal history of other endocrine, nutritional and metabolic disease     History of diabetes mellitus    Personal history of other specified conditions     History of syncope    Presence of intraocular lens 07/20/2019    Pseudophakia of right eye    Presence  of intraocular lens 07/20/2019    Pseudophakia of left eye    Unspecified atrial fibrillation (CMS/[facility]) 11/14/2022    Atrial fibrillation     Past Surgical History:   Procedure Laterality Date    CHOLECYSTECTOMY  09/09/2015    Cholecystectomy    CORONARY ANGIOPLASTY WITH STENT PLACEMENT  10/06/2015    Cath Stent Placement    CORONARY ARTERY BYPASS GRAFT  10/06/2015    CABG    OTHER SURGICAL HISTORY  09/09/2015    Wrist Carpectomy    ROTATOR CUFF REPAIR  09/09/2015    Rotator Cuff Repair     No family history on file.  Social History     Tobacco Use    Smoking status: Never    Smokeless tobacco: Never   Substance Use Topics    Alcohol use: Never    Drug use: Never       Physical Exam   ED Triage Vitals [01/21/24 1301]   Temp Heart Rate Respirations BP   36.7 °C (98.1 °F) 81 (!) 22 131/63      Pulse Ox Temp Source Heart Rate Source Patient Position   99 % Temporal Monitor Lying      BP Location FiO2 (%)     Right arm --       Physical Exam  Vitals and nursing note reviewed.   Constitutional:       General: He is not in acute distress.     Appearance: He is well-developed.   HENT:      Head: Normocephalic and atraumatic.   Eyes:      Comments: Bilateral upper lid lag   Cardiovascular:      Rate and Rhythm: Normal rate and regular rhythm.      Heart sounds: No murmur heard.  Pulmonary:      Effort: Pulmonary effort is normal. No respiratory distress.      Breath sounds: Normal breath sounds.   Abdominal:      Palpations: Abdomen is soft.      Tenderness: There is no abdominal tenderness.   Musculoskeletal:         General: No swelling.      Cervical back: Neck supple.      Comments: Bilateral weeping edema with macerated erythema and wounds in various stages of healing extending from knees to ankles.   Skin:     General: Skin is warm and dry.      Capillary Refill: Capillary refill takes less than 2 seconds.   Neurological:      Mental Status: He is alert.   Psychiatric:         Mood and Affect: Mood normal.         ED  Course & MDM   ED Course as of 01/21/24 1903   Sun Jan 21, 2024   1330 Initial glucose is significantly elevated, over 500.  Patient is tachypneic.  Will send sepsis workup, initiate fluid bolus, and obtain VBG and beta hydroxybutyrate [AS]   1400 X-rays of bilateral lower extremities show no signs of gas in the tissues.  Patient covered with Zosyn and vancomycin [AS]   1430 Troponin level elevated x 2, given a dose of aspirin [AS]   1435 No significant acidosis, glucose is elevated, but no notable anion gap.  Beta hydroxybutyrate is within normal limits [AS]   1500 Glucose remains elevated after fluid bolus, above 300, given 10 units regular insulin subcutaneous.  Patient will be admitted at this time for further management of his hyperglycemia, bilateral lower extremity weeping cellulitis, and elevated troponin levels [AS]      ED Course User Index  [AS] Lizbeth Doherty DO         Diagnoses as of 01/21/24 1903   Hyperglycemia   Troponin level elevated   Cellulitis of lower extremity, unspecified laterality       Medical Decision Making  History obtained from: Patient    External records reviewed: I reviewed external records including outpatient, PCP records, and prior discharge summaries  I have reviewed this case with the ED attending physician, and the attending agrees with the plan. Patient or family was counselled regarding labs, imaging, likely diagnosis, and plan. All questions were answered.     Lizbeth Doherty DO  PGY-4, emergency medicine    The above documentation was completed with the use of speech recognition software. It may contain dictation errors secondary to limitations of the software.          Procedure  Procedures     Lizbeth Doherty DO  Resident  01/21/24 1903

## 2024-01-22 ENCOUNTER — APPOINTMENT (OUTPATIENT)
Dept: RADIOLOGY | Facility: HOSPITAL | Age: 84
DRG: 603 | End: 2024-01-22
Payer: MEDICARE

## 2024-01-22 LAB
ALBUMIN SERPL BCP-MCNC: 3.4 G/DL (ref 3.4–5)
ANION GAP SERPL CALC-SCNC: 14 MMOL/L (ref 10–20)
BUN SERPL-MCNC: 91 MG/DL (ref 6–23)
CALCIUM SERPL-MCNC: 8.7 MG/DL (ref 8.6–10.3)
CHLORIDE SERPL-SCNC: 99 MMOL/L (ref 98–107)
CO2 SERPL-SCNC: 22 MMOL/L (ref 21–32)
CREAT SERPL-MCNC: 2.81 MG/DL (ref 0.5–1.3)
CREAT UR-MCNC: 54.5 MG/DL (ref 20–370)
CREAT UR-MCNC: 54.5 MG/DL (ref 20–370)
EGFRCR SERPLBLD CKD-EPI 2021: 21 ML/MIN/1.73M*2
ERYTHROCYTE [DISTWIDTH] IN BLOOD BY AUTOMATED COUNT: 14.1 % (ref 11.5–14.5)
GLUCOSE BLD MANUAL STRIP-MCNC: 121 MG/DL (ref 74–99)
GLUCOSE BLD MANUAL STRIP-MCNC: 142 MG/DL (ref 74–99)
GLUCOSE BLD MANUAL STRIP-MCNC: 186 MG/DL (ref 74–99)
GLUCOSE BLD MANUAL STRIP-MCNC: 193 MG/DL (ref 74–99)
GLUCOSE BLD MANUAL STRIP-MCNC: 223 MG/DL (ref 74–99)
GLUCOSE BLD MANUAL STRIP-MCNC: 228 MG/DL (ref 74–99)
GLUCOSE SERPL-MCNC: 129 MG/DL (ref 74–99)
HCT VFR BLD AUTO: 31.7 % (ref 41–52)
HGB BLD-MCNC: 10.1 G/DL (ref 13.5–17.5)
HOLD SPECIMEN: NORMAL
MAGNESIUM SERPL-MCNC: 2.09 MG/DL (ref 1.6–2.4)
MCH RBC QN AUTO: 27.8 PG (ref 26–34)
MCHC RBC AUTO-ENTMCNC: 31.9 G/DL (ref 32–36)
MCV RBC AUTO: 87 FL (ref 80–100)
NRBC BLD-RTO: 0 /100 WBCS (ref 0–0)
PHOSPHATE SERPL-MCNC: 4.5 MG/DL (ref 2.5–4.9)
PLATELET # BLD AUTO: 160 X10*3/UL (ref 150–450)
POTASSIUM SERPL-SCNC: 3.5 MMOL/L (ref 3.5–5.3)
PROT UR-ACNC: 16 MG/DL (ref 5–25)
PROT/CREAT UR: 0.29 MG/MG CREAT (ref 0–0.17)
RBC # BLD AUTO: 3.63 X10*6/UL (ref 4.5–5.9)
SODIUM SERPL-SCNC: 131 MMOL/L (ref 136–145)
WBC # BLD AUTO: 8.8 X10*3/UL (ref 4.4–11.3)

## 2024-01-22 PROCEDURE — 2500000004 HC RX 250 GENERAL PHARMACY W/ HCPCS (ALT 636 FOR OP/ED): Performed by: STUDENT IN AN ORGANIZED HEALTH CARE EDUCATION/TRAINING PROGRAM

## 2024-01-22 PROCEDURE — 1200000002 HC GENERAL ROOM WITH TELEMETRY DAILY

## 2024-01-22 PROCEDURE — 99223 1ST HOSP IP/OBS HIGH 75: CPT | Performed by: STUDENT IN AN ORGANIZED HEALTH CARE EDUCATION/TRAINING PROGRAM

## 2024-01-22 PROCEDURE — 83735 ASSAY OF MAGNESIUM: CPT

## 2024-01-22 PROCEDURE — 80069 RENAL FUNCTION PANEL: CPT

## 2024-01-22 PROCEDURE — 2500000002 HC RX 250 W HCPCS SELF ADMINISTERED DRUGS (ALT 637 FOR MEDICARE OP, ALT 636 FOR OP/ED)

## 2024-01-22 PROCEDURE — 2500000001 HC RX 250 WO HCPCS SELF ADMINISTERED DRUGS (ALT 637 FOR MEDICARE OP)

## 2024-01-22 PROCEDURE — 82947 ASSAY GLUCOSE BLOOD QUANT: CPT

## 2024-01-22 PROCEDURE — 85027 COMPLETE CBC AUTOMATED: CPT

## 2024-01-22 PROCEDURE — 87205 SMEAR GRAM STAIN: CPT | Mod: STJLAB | Performed by: STUDENT IN AN ORGANIZED HEALTH CARE EDUCATION/TRAINING PROGRAM

## 2024-01-22 PROCEDURE — 71045 X-RAY EXAM CHEST 1 VIEW: CPT | Performed by: RADIOLOGY

## 2024-01-22 PROCEDURE — 36415 COLL VENOUS BLD VENIPUNCTURE: CPT

## 2024-01-22 PROCEDURE — 2500000001 HC RX 250 WO HCPCS SELF ADMINISTERED DRUGS (ALT 637 FOR MEDICARE OP): Performed by: STUDENT IN AN ORGANIZED HEALTH CARE EDUCATION/TRAINING PROGRAM

## 2024-01-22 PROCEDURE — 71045 X-RAY EXAM CHEST 1 VIEW: CPT

## 2024-01-22 PROCEDURE — 2500000004 HC RX 250 GENERAL PHARMACY W/ HCPCS (ALT 636 FOR OP/ED)

## 2024-01-22 RX ORDER — NAPROXEN SODIUM 220 MG/1
81 TABLET, FILM COATED ORAL DAILY
Status: DISCONTINUED | OUTPATIENT
Start: 2024-01-22 | End: 2024-01-25 | Stop reason: HOSPADM

## 2024-01-22 RX ORDER — HYDROMORPHONE HYDROCHLORIDE 1 MG/ML
0.6 INJECTION, SOLUTION INTRAMUSCULAR; INTRAVENOUS; SUBCUTANEOUS ONCE
Status: COMPLETED | OUTPATIENT
Start: 2024-01-22 | End: 2024-01-22

## 2024-01-22 RX ORDER — ASPIRIN 300 MG/1
150 SUPPOSITORY RECTAL DAILY
Status: DISCONTINUED | OUTPATIENT
Start: 2024-01-22 | End: 2024-01-25 | Stop reason: HOSPADM

## 2024-01-22 RX ORDER — HYDROCODONE BITARTRATE AND ACETAMINOPHEN 5; 325 MG/1; MG/1
1 TABLET ORAL EVERY 6 HOURS PRN
Status: DISCONTINUED | OUTPATIENT
Start: 2024-01-22 | End: 2024-01-25 | Stop reason: HOSPADM

## 2024-01-22 RX ORDER — ACETAMINOPHEN 325 MG/1
650 TABLET ORAL ONCE
Status: COMPLETED | OUTPATIENT
Start: 2024-01-22 | End: 2024-01-22

## 2024-01-22 RX ADMIN — HYDROCODONE BITARTRATE AND ACETAMINOPHEN 1 TABLET: 5; 325 TABLET ORAL at 12:03

## 2024-01-22 RX ADMIN — HYDROCODONE BITARTRATE AND ACETAMINOPHEN 1 TABLET: 5; 325 TABLET ORAL at 22:46

## 2024-01-22 RX ADMIN — DOXYCYCLINE HYCLATE 100 MG: 100 CAPSULE ORAL at 09:28

## 2024-01-22 RX ADMIN — DOXYCYCLINE HYCLATE 100 MG: 100 CAPSULE ORAL at 20:32

## 2024-01-22 RX ADMIN — BISOPROLOL FUMARATE 5 MG: 5 TABLET, FILM COATED ORAL at 09:28

## 2024-01-22 RX ADMIN — INSULIN LISPRO 2 UNITS: 100 INJECTION, SOLUTION INTRAVENOUS; SUBCUTANEOUS at 17:11

## 2024-01-22 RX ADMIN — PANTOPRAZOLE SODIUM 40 MG: 40 TABLET, DELAYED RELEASE ORAL at 20:32

## 2024-01-22 RX ADMIN — ASPIRIN 81 MG CHEWABLE TABLET 81 MG: 81 TABLET CHEWABLE at 17:11

## 2024-01-22 RX ADMIN — INSULIN LISPRO 4 UNITS: 100 INJECTION, SOLUTION INTRAVENOUS; SUBCUTANEOUS at 12:04

## 2024-01-22 RX ADMIN — PREDNISOLONE ACETATE 1 DROP: 10 SUSPENSION/ DROPS OPHTHALMIC at 20:33

## 2024-01-22 RX ADMIN — HYDROMORPHONE HYDROCHLORIDE 0.6 MG: 1 INJECTION, SOLUTION INTRAMUSCULAR; INTRAVENOUS; SUBCUTANEOUS at 18:07

## 2024-01-22 RX ADMIN — PIPERACILLIN SODIUM AND TAZOBACTAM SODIUM 3.38 G: 3; .375 INJECTION, SOLUTION INTRAVENOUS at 20:33

## 2024-01-22 RX ADMIN — ACETAMINOPHEN 650 MG: 325 TABLET ORAL at 09:28

## 2024-01-22 RX ADMIN — ROSUVASTATIN CALCIUM 20 MG: 20 TABLET, FILM COATED ORAL at 20:32

## 2024-01-22 RX ADMIN — LATANOPROST 1 DROP: 50 SOLUTION OPHTHALMIC at 20:32

## 2024-01-22 RX ADMIN — PIPERACILLIN SODIUM AND TAZOBACTAM SODIUM 3.38 G: 3; .375 INJECTION, SOLUTION INTRAVENOUS at 09:28

## 2024-01-22 RX ADMIN — RIVAROXABAN 15 MG: 15 TABLET, FILM COATED ORAL at 17:11

## 2024-01-22 ASSESSMENT — PAIN SCALES - GENERAL
PAINLEVEL_OUTOF10: 10 - WORST POSSIBLE PAIN
PAINLEVEL_OUTOF10: 5 - MODERATE PAIN
PAINLEVEL_OUTOF10: 10 - WORST POSSIBLE PAIN
PAINLEVEL_OUTOF10: 8
PAINLEVEL_OUTOF10: 3
PAINLEVEL_OUTOF10: 10 - WORST POSSIBLE PAIN

## 2024-01-22 ASSESSMENT — COGNITIVE AND FUNCTIONAL STATUS - GENERAL
TURNING FROM BACK TO SIDE WHILE IN FLAT BAD: A LITTLE
MOVING FROM LYING ON BACK TO SITTING ON SIDE OF FLAT BED WITH BEDRAILS: A LITTLE
MOVING TO AND FROM BED TO CHAIR: A LOT
MOVING TO AND FROM BED TO CHAIR: A LOT
WALKING IN HOSPITAL ROOM: A LOT
MOVING FROM LYING ON BACK TO SITTING ON SIDE OF FLAT BED WITH BEDRAILS: A LITTLE
MOBILITY SCORE: 14
DRESSING REGULAR UPPER BODY CLOTHING: A LITTLE
DRESSING REGULAR LOWER BODY CLOTHING: A LOT
TURNING FROM BACK TO SIDE WHILE IN FLAT BAD: A LITTLE
DRESSING REGULAR UPPER BODY CLOTHING: A LITTLE
CLIMB 3 TO 5 STEPS WITH RAILING: A LOT
DAILY ACTIVITIY SCORE: 16
STANDING UP FROM CHAIR USING ARMS: A LOT
PERSONAL GROOMING: A LITTLE
WALKING IN HOSPITAL ROOM: A LOT
STANDING UP FROM CHAIR USING ARMS: A LOT
DAILY ACTIVITIY SCORE: 16
HELP NEEDED FOR BATHING: A LOT
DRESSING REGULAR LOWER BODY CLOTHING: A LOT
MOBILITY SCORE: 14
TOILETING: A LOT
TOILETING: A LOT
PERSONAL GROOMING: A LITTLE
CLIMB 3 TO 5 STEPS WITH RAILING: A LOT
HELP NEEDED FOR BATHING: A LOT

## 2024-01-22 ASSESSMENT — ENCOUNTER SYMPTOMS: DIZZINESS: 0

## 2024-01-22 ASSESSMENT — PAIN - FUNCTIONAL ASSESSMENT
PAIN_FUNCTIONAL_ASSESSMENT: 0-10

## 2024-01-22 ASSESSMENT — PAIN DESCRIPTION - LOCATION: LOCATION: FOOT

## 2024-01-22 ASSESSMENT — PAIN DESCRIPTION - ORIENTATION: ORIENTATION: RIGHT

## 2024-01-22 NOTE — PROGRESS NOTES
Elgin Marin is a 83 y.o. male on day 1 of admission presenting with Hyperglycemia.      Subjective   Patient was seen and examined this morning. Patient reports pain in bilateral lower extremity, worse in the right leg. Patient endorses dyspnea with exertion, intermittent cough, dysuria, cramping in the upper and lower extremity. Denies cp, n/v, lightheadedness, HA, fever.        Objective     Last Recorded Vitals  /68 (BP Location: Left arm, Patient Position: Lying)   Pulse 73   Temp 37 °C (98.6 °F) (Temporal)   Resp 14   Wt 87.5 kg (192 lb 14.4 oz)   SpO2 98%   Intake/Output last 3 Shifts:    Intake/Output Summary (Last 24 hours) at 1/22/2024 1302  Last data filed at 1/22/2024 0931  Gross per 24 hour   Intake 400 ml   Output 1700 ml   Net -1300 ml       Admission Weight  Weight: 82.6 kg (182 lb) (01/21/24 1301)    Daily Weight  01/21/24 : 87.5 kg (192 lb 14.4 oz)    Image Results  ECG 12 lead  Atrial fibrillation  Right axis deviation  Septal infarct , age undetermined  ST & T wave abnormality, consider inferolateral ischemia or digitalis effect  Abnormal ECG  When compared with ECG of 19-MAR-2023 07:31,  Septal infarct is now Present  Inverted T waves have replaced nonspecific T wave abnormality in Inferior leads  Inverted T waves have replaced nonspecific T wave abnormality in Lateral leads  QT has lengthened  ECG 12 lead  Atrial fibrillation  Right axis deviation  Septal infarct (cited on or before 21-JAN-2024)  ST & T wave abnormality, consider inferolateral ischemia or digitalis effect  Abnormal ECG  When compared with ECG of 21-JAN-2024 14:44, (unconfirmed)  No significant change was found  ECG 12 lead  Atrial fibrillation  Right axis deviation  Septal infarct (cited on or before 21-JAN-2024)  ST & T wave abnormality, consider inferolateral ischemia or digitalis effect  Abnormal ECG  When compared with ECG of 21-JAN-2024 13:06, (unconfirmed)  No significant change was found  XR chest 1  view  Narrative: Interpreted By:  Toño Torres,   STUDY:  XR CHEST 1 VIEW;  1/22/2024 7:50 am      INDICATION:  Signs/Symptoms:evaluate for fluid overload.      COMPARISON:  Chest radiograph dated 03/17/2023.      ACCESSION NUMBER(S):  [id number]      ORDERING CLINICIAN:  NATHAILA LI      FINDINGS:  AP radiograph of the chest was provided.      DEVICES:  Intact appearing sternotomy wires. Suture anchors within the right  humerus.      CARDIOMEDIASTINAL SILHOUETTE:  Cardiomediastinal silhouette is stable in size and configuration.  Aortic atherosclerotic calcification.      LUNGS:  No focal consolidative airspace opacity. Mild reticular parenchymal  opacity within the periphery of the lower lung zones is nonspecific.  No pneumothorax. Minimal blunting of the costophrenic sulci with no  large pleural effusion.      ABDOMEN:  No remarkable upper abdominal findings.      BONES:  No acute osseous changes.      Impression: Stable cardiac postsurgical change with no focal parenchymal  consolidative opacity. Mild lower lung zone parenchymal opacity could  reflect scarring, atelectasis, or trace edema.      MACRO:  None      Signed by: Toño Torres 1/22/2024 8:00 AM  Dictation workstation:   [id number]      Physical Exam  Constitutional:       Appearance: Normal appearance.   HENT:      Head: Normocephalic and atraumatic.   Eyes:      Extraocular Movements: Extraocular movements intact.      Pupils: Pupils are equal, round, and reactive to light.   Cardiovascular:      Rate and Rhythm: Normal rate and regular rhythm.   Pulmonary:      Effort: Pulmonary effort is normal. No respiratory distress.      Breath sounds: Normal breath sounds. No wheezing.   Chest:      Chest wall: No tenderness.   Abdominal:      General: Abdomen is flat.      Palpations: Abdomen is soft.   Musculoskeletal:         General: Normal range of motion.      Cervical back: Normal range of motion and neck supple.      Right lower leg: Edema  present.      Left lower leg: Edema present.      Comments: Bilateral weeping edema with erythema and wounds in various stages. Lesion extending from knee to ankle looks fibrotic and brown   Skin:     General: Skin is warm and dry.      Findings: Lesion present.      Comments: Lower extremity lesions. Wet, excudative lesion with necrotic eschar   Neurological:      General: No focal deficit present.      Mental Status: He is alert and oriented to person, place, and time.   Psychiatric:         Mood and Affect: Mood normal.         Relevant Results  Scheduled medications  bisoprolol, 5 mg, oral, Daily  doxycylcine, 100 mg, oral, q12h BRE  insulin lispro, 0-10 Units, subcutaneous, TID with meals  latanoprost, 1 drop, Both Eyes, Nightly  pantoprazole, 40 mg, oral, Nightly  piperacillin-tazobactam, 3.375 g, intravenous, q12h  prednisoLONE acetate, 1 drop, Both Eyes, Daily  rivaroxaban, 15 mg, oral, Daily with evening meal  rosuvastatin, 20 mg, oral, Nightly      Continuous medications     PRN medications  PRN medications: dextrose 10 % in water (D10W), dextrose, glucagon, HYDROcodone-acetaminophen    Results for orders placed or performed during the hospital encounter of 01/21/24 (from the past 24 hour(s))   Green Top   Result Value Ref Range    Extra Tube Hold for add-ons.    Lavender Top   Result Value Ref Range    Extra Tube Hold for add-ons.    CBC and Auto Differential   Result Value Ref Range    WBC 8.9 4.4 - 11.3 x10*3/uL    nRBC 0.0 0.0 - 0.0 /100 WBCs    RBC 3.68 (L) 4.50 - 5.90 x10*6/uL    Hemoglobin 10.3 (L) 13.5 - 17.5 g/dL    Hematocrit 31.7 (L) 41.0 - 52.0 %    MCV 86 80 - 100 fL    MCH 28.0 26.0 - 34.0 pg    MCHC 32.5 32.0 - 36.0 g/dL    RDW 14.2 11.5 - 14.5 %    Platelets 172 150 - 450 x10*3/uL    Neutrophils % 85.0 40.0 - 80.0 %    Immature Granulocytes %, Automated 0.6 0.0 - 0.9 %    Lymphocytes % 6.2 13.0 - 44.0 %    Monocytes % 7.5 2.0 - 10.0 %    Eosinophils % 0.4 0.0 - 6.0 %    Basophils % 0.3 0.0  - 2.0 %    Neutrophils Absolute 7.59 (H) 1.60 - 5.50 x10*3/uL    Immature Granulocytes Absolute, Automated 0.05 0.00 - 0.50 x10*3/uL    Lymphocytes Absolute 0.55 (L) 0.80 - 3.00 x10*3/uL    Monocytes Absolute 0.67 0.05 - 0.80 x10*3/uL    Eosinophils Absolute 0.04 0.00 - 0.40 x10*3/uL    Basophils Absolute 0.03 0.00 - 0.10 x10*3/uL   Comprehensive Metabolic Panel   Result Value Ref Range    Glucose 487 (HH) 74 - 99 mg/dL    Sodium 124 (L) 136 - 145 mmol/L    Potassium 3.7 3.5 - 5.3 mmol/L    Chloride 90 (L) 98 - 107 mmol/L    Bicarbonate 20 (L) 21 - 32 mmol/L    Anion Gap 18 10 - 20 mmol/L    Urea Nitrogen 103 (HH) 6 - 23 mg/dL    Creatinine 3.09 (H) 0.50 - 1.30 mg/dL    eGFR 19 (L) >60 mL/min/1.73m*2    Calcium 8.2 (L) 8.6 - 10.3 mg/dL    Albumin 3.6 3.4 - 5.0 g/dL    Alkaline Phosphatase 128 33 - 136 U/L    Total Protein 6.3 (L) 6.4 - 8.2 g/dL    AST 30 9 - 39 U/L    Bilirubin, Total 1.2 0.0 - 1.2 mg/dL    ALT 25 10 - 52 U/L   Magnesium   Result Value Ref Range    Magnesium 1.80 1.60 - 2.40 mg/dL   Troponin I, High Sensitivity, Initial   Result Value Ref Range    Troponin I, High Sensitivity 169 (HH) 0 - 20 ng/L   Lactate   Result Value Ref Range    Lactate 1.6 0.4 - 2.0 mmol/L   Beta Hydroxybutyrate   Result Value Ref Range    Beta-Hydroxybutyrate 0.21 0.02 - 0.27 mmol/L   B-type natriuretic peptide   Result Value Ref Range     (H) 0 - 99 pg/mL   BLOOD GAS VENOUS FULL PANEL   Result Value Ref Range    POCT pH, Venous 7.42 7.33 - 7.43 pH    POCT pCO2, Venous 29 (L) 41 - 51 mm Hg    POCT pO2, Venous 43 35 - 45 mm Hg    POCT SO2, Venous 70 45 - 75 %    POCT Oxy Hemoglobin, Venous 68.3 45.0 - 75.0 %    POCT Hematocrit Calculated, Venous 32.0 (L) 41.0 - 52.0 %    POCT Sodium, Venous 123 (L) 136 - 145 mmol/L    POCT Potassium, Venous 3.9 3.5 - 5.3 mmol/L    POCT Chloride, Venous 92 (L) 98 - 107 mmol/L    POCT Ionized Calicum, Venous 1.17 1.10 - 1.33 mmol/L    POCT Glucose, Venous 553 (HH) 74 - 99 mg/dL    POCT  Lactate, Venous 2.0 0.4 - 2.0 mmol/L    POCT Base Excess, Venous -4.7 (L) -2.0 - 3.0 mmol/L    POCT HCO3 Calculated, Venous 18.8 (L) 22.0 - 26.0 mmol/L    POCT Hemoglobin, Venous 10.8 (L) 13.5 - 17.5 g/dL    POCT Anion Gap, Venous 16.0 10.0 - 25.0 mmol/L    Patient Temperature      FiO2 28 %    Critical Called By DENIS VIVEROS RRT     Critical Called To DR. OWEN     Critical Call Time 1328.0000     Critical Read Back Y     Critical Note CRITICAL    Blood Culture    Specimen: Peripheral Venipuncture; Blood culture   Result Value Ref Range    Blood Culture Loaded on Instrument - Culture in progress    Blood Culture    Specimen: Peripheral Venipuncture; Blood culture   Result Value Ref Range    Blood Culture Loaded on Instrument - Culture in progress    Protime-INR   Result Value Ref Range    Protime 22.8 (H) 9.8 - 12.8 seconds    INR 2.0 (H) 0.9 - 1.1   ECG 12 lead   Result Value Ref Range    Ventricular Rate 84 BPM    Atrial Rate 357 BPM    QRS Duration 102 ms    QT Interval 400 ms    QTC Calculation(Bazett) 472 ms    R Axis 112 degrees    T Axis 253 degrees    QRS Count 13 beats    Q Onset 212 ms    T Offset 412 ms    QTC Fredericia 447 ms   Troponin I, High Sensitivity   Result Value Ref Range    Troponin I, High Sensitivity 180 (HH) 0 - 20 ng/L   ECG 12 lead   Result Value Ref Range    Ventricular Rate 67 BPM    Atrial Rate 75 BPM    QRS Duration 96 ms    QT Interval 404 ms    QTC Calculation(Bazett) 426 ms    R Axis 111 degrees    T Axis 242 degrees    QRS Count 11 beats    Q Onset 224 ms    T Offset 426 ms    QTC Fredericia 419 ms   Troponin I, High Sensitivity   Result Value Ref Range    Troponin I, High Sensitivity 204 (HH) 0 - 20 ng/L   C-reactive protein   Result Value Ref Range    C-Reactive Protein 1.82 (H) <1.00 mg/dL   POCT GLUCOSE   Result Value Ref Range    POCT Glucose 345 (H) 74 - 99 mg/dL   Urine Gray Tube   Result Value Ref Range    Extra Tube Hold for add-ons.    Urinalysis with Reflex Microscopic    Result Value Ref Range    Color, Urine Yellow Straw, Yellow    Appearance, Urine Hazy (N) Clear    Specific Gravity, Urine 1.014 1.005 - 1.035    pH, Urine 5.0 5.0, 5.5, 6.0, 6.5, 7.0, 7.5, 8.0    Protein, Urine NEGATIVE NEGATIVE mg/dL    Glucose, Urine >=500 (3+) (A) NEGATIVE mg/dL    Blood, Urine MODERATE (2+) (A) NEGATIVE    Ketones, Urine NEGATIVE NEGATIVE mg/dL    Bilirubin, Urine NEGATIVE NEGATIVE    Urobilinogen, Urine <2.0 <2.0 mg/dL    Nitrite, Urine NEGATIVE NEGATIVE    Leukocyte Esterase, Urine LARGE (3+) (A) NEGATIVE   Microscopic Only, Urine   Result Value Ref Range    WBC, Urine 21-50 (A) 1-5, NONE /HPF    RBC, Urine >20 (A) NONE, 1-2, 3-5 /HPF    Bacteria, Urine 1+ (A) NONE SEEN /HPF    Hyaline Casts, Urine OCCASIONAL (A) NONE /LPF   ECG 12 lead   Result Value Ref Range    Ventricular Rate 60 BPM    Atrial Rate 63 BPM    QRS Duration 104 ms    QT Interval 434 ms    QTC Calculation(Bazett) 434 ms    R Axis 110 degrees    T Axis 203 degrees    QRS Count 10 beats    Q Onset 225 ms    T Offset 442 ms    QTC Fredericia 434 ms   POCT GLUCOSE   Result Value Ref Range    POCT Glucose 149 (H) 74 - 99 mg/dL   PST Top   Result Value Ref Range    Extra Tube Hold for add-ons.    Digoxin   Result Value Ref Range    Digoxin  <0.30 (L) 0.80 - <2.00 ng/mL   Sedimentation rate, automated   Result Value Ref Range    Sedimentation Rate 33 (H) 0 - 20 mm/h   Creatine Kinase   Result Value Ref Range    Creatine Kinase 467 (H) 0 - 325 U/L   POCT GLUCOSE   Result Value Ref Range    POCT Glucose 165 (H) 74 - 99 mg/dL   POCT GLUCOSE   Result Value Ref Range    POCT Glucose 142 (H) 74 - 99 mg/dL   Magnesium   Result Value Ref Range    Magnesium 2.09 1.60 - 2.40 mg/dL   Renal Function Panel   Result Value Ref Range    Glucose 129 (H) 74 - 99 mg/dL    Sodium 131 (L) 136 - 145 mmol/L    Potassium 3.5 3.5 - 5.3 mmol/L    Chloride 99 98 - 107 mmol/L    Bicarbonate 22 21 - 32 mmol/L    Anion Gap 14 10 - 20 mmol/L    Urea Nitrogen 91  (HH) 6 - 23 mg/dL    Creatinine 2.81 (H) 0.50 - 1.30 mg/dL    eGFR 21 (L) >60 mL/min/1.73m*2    Calcium 8.7 8.6 - 10.3 mg/dL    Phosphorus 4.5 2.5 - 4.9 mg/dL    Albumin 3.4 3.4 - 5.0 g/dL   CBC   Result Value Ref Range    WBC 8.8 4.4 - 11.3 x10*3/uL    nRBC 0.0 0.0 - 0.0 /100 WBCs    RBC 3.63 (L) 4.50 - 5.90 x10*6/uL    Hemoglobin 10.1 (L) 13.5 - 17.5 g/dL    Hematocrit 31.7 (L) 41.0 - 52.0 %    MCV 87 80 - 100 fL    MCH 27.8 26.0 - 34.0 pg    MCHC 31.9 (L) 32.0 - 36.0 g/dL    RDW 14.1 11.5 - 14.5 %    Platelets 160 150 - 450 x10*3/uL   POCT GLUCOSE   Result Value Ref Range    POCT Glucose 121 (H) 74 - 99 mg/dL   POCT GLUCOSE   Result Value Ref Range    POCT Glucose 228 (H) 74 - 99 mg/dL     ECG 12 lead    Result Date: 1/22/2024  Atrial fibrillation Right axis deviation Septal infarct , age undetermined ST & T wave abnormality, consider inferolateral ischemia or digitalis effect Abnormal ECG When compared with ECG of 19-MAR-2023 07:31, Septal infarct is now Present Inverted T waves have replaced nonspecific T wave abnormality in Inferior leads Inverted T waves have replaced nonspecific T wave abnormality in Lateral leads QT has lengthened    ECG 12 lead    Result Date: 1/22/2024  Atrial fibrillation Right axis deviation Septal infarct (cited on or before 21-JAN-2024) ST & T wave abnormality, consider inferolateral ischemia or digitalis effect Abnormal ECG When compared with ECG of 21-JAN-2024 14:44, (unconfirmed) No significant change was found    ECG 12 lead    Result Date: 1/22/2024  Atrial fibrillation Right axis deviation Septal infarct (cited on or before 21-JAN-2024) ST & T wave abnormality, consider inferolateral ischemia or digitalis effect Abnormal ECG When compared with ECG of 21-JAN-2024 13:06, (unconfirmed) No significant change was found    XR chest 1 view    Result Date: 1/22/2024  Interpreted By:  Toño Torres, STUDY: XR CHEST 1 VIEW;  1/22/2024 7:50 am   INDICATION: Signs/Symptoms:evaluate for  fluid overload.   COMPARISON: Chest radiograph dated 03/17/2023.   ACCESSION NUMBER(S): [id number]   ORDERING CLINICIAN: NATHALIA LI   FINDINGS: AP radiograph of the chest was provided.   DEVICES: Intact appearing sternotomy wires. Suture anchors within the right humerus.   CARDIOMEDIASTINAL SILHOUETTE: Cardiomediastinal silhouette is stable in size and configuration. Aortic atherosclerotic calcification.   LUNGS: No focal consolidative airspace opacity. Mild reticular parenchymal opacity within the periphery of the lower lung zones is nonspecific. No pneumothorax. Minimal blunting of the costophrenic sulci with no large pleural effusion.   ABDOMEN: No remarkable upper abdominal findings.   BONES: No acute osseous changes.       Stable cardiac postsurgical change with no focal parenchymal consolidative opacity. Mild lower lung zone parenchymal opacity could reflect scarring, atelectasis, or trace edema.   MACRO: None   Signed by: Toño Torres 1/22/2024 8:00 AM Dictation workstation:   [id number]    XR tibia fibula bilateral 2 views    Result Date: 1/21/2024  Interpreted By:  Liliana Denny, STUDY: XR TIBIA FIBULA BILATERAL 2 VIEWS;  1/21/2024 2:17 pm   INDICATION: Signs/Symptoms:assess for gas in tissues.   COMPARISON: None.   ACCESSION NUMBER(S): [id number]   ORDERING CLINICIAN: YVES HUTCHISON   FINDINGS: Multiple views of the left tibia and fibula are obtained. Degenerative changes of the visualized left knee. Vascular calcification. No definite acute fracture-dislocation. Soft tissue swelling. No definite soft tissue gas..       No acute fracture or dislocation. Soft tissue swelling. No definite soft tissue gas. Follow-up MRI to further evaluate for soft tissue abnormalities or underlying osteomyelitis if these remain clinical concern.     Signed by: Liliana Denny 1/21/2024 3:16 PM Dictation workstation:   [id number]    XR foot 1-2 views bilateral    Result Date: 1/21/2024  Interpreted By:  Liliana Denny,  STUDY: XR FOOT 1-2 VIEWS BILATERAL;  1/21/2024 2:17 pm   INDICATION: Signs/Symptoms:assess for gas in tissues.   COMPARISON: None.   ACCESSION NUMBER(S): [id number]   ORDERING CLINICIAN: YVES HUTCHISON   FINDINGS: Multiple views the right foot are obtained. Apparent osteopenia. Degenerative changes of the 1st MTP joint and multiple D IP and PIP joints. Vascular calcification. No evidence of a soft tissue gas. Posterior calcaneal spur. Soft tissue swelling.       No acute fracture or dislocation. Osteopenia and degenerative changes. No definite soft tissue gas. For better evaluation of soft tissue abnormalities or osteomyelitis further evaluation with MRI is recommended.     Signed by: Liliana Denny 1/21/2024 3:14 PM Dictation workstation:   [id number]       Assessment/Plan      This is a 83M with PMHx of HFrEF, atrial fibrillation on Xarelto, HTN, CAD s/p CABG x6 and PCI with stents x3, chronic venous stasis, CKD stage IV, T2DM who presents with cellulitis and hyperglycemia. HDS. Patient's hyperglycemia has improved s/p 10 units regular insulin in the ED. Podiatry and nephrology has been consulted for cellulitis and ISIAH, respectively. Patient is admitted for management of recurrent lower extremity cellulitis.     Principal Problem:    Hyperglycemia    #LE cellulitis  #Lower extremity ulcer  Possible differential for LE ulcer include venous ulcer and arterial ulcer. Considering patient's history of chronic venous stasis along with the physical exam, venous ulcer is more likely etiology On the physical exam, peripheral pulses were palpated, temperature of the foot was warm. Also skin changes revealing fibrotic brown lower extremity suggests stasis dermatitis with lipodermatosclerosis due to venous stasis. However, concurrent arterial etiology is possible considering ulcer has necrotic eschar. Podiatry is consulted.  - Patient does not meet SIRS criteria nor have systemic symptoms  - s/p Zosyn, vancomycin in ED for  empiric treatment  - Switched abx to to doxycycline and Zosyn x 5d   - Pain magement Norco PRN  - ESR 33, CRP 1.8  - Blood culture  - Wound culture  - Consult podiatry  - Consult wound care      #Troponemia 2/2 to CKD vs NSTEMI vs HFrEF exacerbation  #HFrEF with EF 40-45% (echo 3/18/2023)  #Hx CAD s/p CABG x6 and PCI with stents x3  Patient's volume status does not suggest hypervolemia. On the physical exam, there was no increased JVD or crackles on lung sounds. It is most likely that troponemia is due to protein retention from acute kidney failure.  - Troponin 169 -> 180 -> 204  - x-ray: no focal parenchymal consolidative opacities. Mild lower lung zone parenchymal opacity could reflect scarring, atelectasis, or trace edema  - Continue to trend troponin  - Low sodium diet  - Telemetry    #ISIAH (resolving)  #CKD stage IV  Possible etiology for ISIAH includes dehydration, heart failure exacerbation, vascular occlusion. Intrinsic cause, such as glomerulonephritis and ATN is unlikely without glucose in the UA and no recent Abx and NSAID use. Prerenal cause due to dehydration is more likely etiology considering hyaline cast on UA. Patient's creatinine has been progressively worsening over the past two years, possibly due to CKD. Nephrology is consulted.  - Cr 2.81, eGFR 21  - Continue to trend Cr  - Consult nephrology    #T2DM  - Glucose 129  - On glimepiride at home  - Inpatient insulin sliding scale with hypoglycemia protocol    #Afib  #HTN  - Hold digoxin, torsemide, metolazone  - Continue Xarelto, bisoprolol    Diet: Low-sodium diet  DVT Prophylaxis: Xarelto  Code status: Full code     Mary Pires, M3    Resident addendum:   An 83 y.o. male with past medical history of DMII(A1c of 9 was planned to start insulin but has not started it yet), hypertension, CAD s/p CABG, S/p cardiac cath in4/22 showed patent LIMA to LAD, occluded VG to diagonal occluded VG-OM and VG-or PDA, was medically managed, A-fib on Xarelto,  CKD(baseline creatinine 2.8) chronic lower limb wounds, presented to ED with worsening bilateral lower limb pain that has been worsening over the last 4 days prior to admission, associated with subjective fever and chills associated with general fatigue.   He has long hx of recurrent lower extremity wounds that had previously healed and recurred around 4 months prior to admission   On admission, patient was hemodynamically stable and afebrile,  O2 level was noted to be on low 90s darted on nasal.  WBC normal, CRP and ESR are mildly elevated x-ray showed no evidence of  gas formation, BG was noted to be high with no evidence of acidosis, and was elevated but no evidence of acute EKG changes. cr was increased from his baseline  And was admitted to the floor started on Zosyn and vancomycin subsequently switched to doxycycline given his impaired kidney function.  This morning patient continued to have lower extremity pain, remained afebrile. He denies chest pain shortness of breath orthopnea ,PND's, he started on Norco for pain management.  Nephrology and podiatry are consulted.  Diuretics are being held for the concern of overdiuresis and prerenal ISIAH, which has improved from admission.  Wound care is consulted, wound and blood cultures are pending  Regarding DM, has been started on sliding scale with improvement of BG in 100s .he educated that he will be discharged on insulin.  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    Assessment & plan cast with attending physician

## 2024-01-22 NOTE — CONSULTS
Reason For Consult  ISIAH on CKD     History Of Present Illness  83-year-old male with past medical history significant for diabetes and hypertension who presents to the emergency department with bilateral lower extremity wounds that have not been healing.  Patient states that he had been getting wound care at home from home health for some time, however they stopped coming and since he ran out of materials he has been attempting to dress the legs himself by taping socks around them.  He states that this helps catch the fluid that is weeping from them.  However he states that his pain has been getting progressively worse to the point where he can now not walk on his left heel.  He is additionally concerned because his primary care provider wanted him to start insulin however the insurance company stated that they would not cover the insulin he was supposed to get.  Because of that he has just not been taking insulin and only taking his oral antihyperglycemic's.  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  Their son lives with them but works full-time and is unable to provide care as well.     He notes dysuria but has good UOP.  He is aware of his CKD but does not follow with a nephrologist.  I did see him at [facility] last year.  He notes he does not want dialysis and he said this even though I didn't ask him.  His baseline Creatinine under Care Everywhere has been 2.8-2.9 recently.        Past Medical History  He has a past medical history of Hyperlipidemia, unspecified (11/14/2022), Other forms of dyspnea (08/09/2018), Personal history of other diseases of the circulatory system (12/08/2014), Personal history of other endocrine, nutritional and metabolic disease, Personal history of  "other specified conditions, Presence of intraocular lens (07/20/2019), Presence of intraocular lens (07/20/2019), and Unspecified atrial fibrillation (CMS/HCC) (11/14/2022).    Surgical History  He has a past surgical history that includes Coronary angioplasty with stent (10/06/2015); Coronary artery bypass graft (10/06/2015); Other surgical history (09/09/2015); Cholecystectomy (09/09/2015); and Rotator cuff repair (09/09/2015).     Social History  He reports that he has never smoked. He has never used smokeless tobacco. He reports that he does not drink alcohol and does not use drugs.    Family History  No family history on file.     Allergies  Metoprolol, Oxycodone-aspirin, Sulfa (sulfonamide antibiotics), Warfarin, and Lisinopril    Review of Systems  As per HPI     Physical Exam  NAD  NCAT  MMM  Supple  RRR   CTA B  Soft, nt  +LE edema     Last Recorded Vitals  Blood pressure 116/68, pulse 73, temperature 37 °C (98.6 °F), temperature source Temporal, resp. rate 14, height 1.803 m (5' 11\"), weight 87.5 kg (192 lb 14.4 oz), SpO2 98 %.    Relevant Results  Results for orders placed or performed during the hospital encounter of 01/21/24 (from the past 96 hour(s))   Green Top   Result Value Ref Range    Extra Tube Hold for add-ons.    Lavender Top   Result Value Ref Range    Extra Tube Hold for add-ons.    CBC and Auto Differential   Result Value Ref Range    WBC 8.9 4.4 - 11.3 x10*3/uL    nRBC 0.0 0.0 - 0.0 /100 WBCs    RBC 3.68 (L) 4.50 - 5.90 x10*6/uL    Hemoglobin 10.3 (L) 13.5 - 17.5 g/dL    Hematocrit 31.7 (L) 41.0 - 52.0 %    MCV 86 80 - 100 fL    MCH 28.0 26.0 - 34.0 pg    MCHC 32.5 32.0 - 36.0 g/dL    RDW 14.2 11.5 - 14.5 %    Platelets 172 150 - 450 x10*3/uL    Neutrophils % 85.0 40.0 - 80.0 %    Immature Granulocytes %, Automated 0.6 0.0 - 0.9 %    Lymphocytes % 6.2 13.0 - 44.0 %    Monocytes % 7.5 2.0 - 10.0 %    Eosinophils % 0.4 0.0 - 6.0 %    Basophils % 0.3 0.0 - 2.0 %    Neutrophils Absolute 7.59 " (H) 1.60 - 5.50 x10*3/uL    Immature Granulocytes Absolute, Automated 0.05 0.00 - 0.50 x10*3/uL    Lymphocytes Absolute 0.55 (L) 0.80 - 3.00 x10*3/uL    Monocytes Absolute 0.67 0.05 - 0.80 x10*3/uL    Eosinophils Absolute 0.04 0.00 - 0.40 x10*3/uL    Basophils Absolute 0.03 0.00 - 0.10 x10*3/uL   Comprehensive Metabolic Panel   Result Value Ref Range    Glucose 487 (HH) 74 - 99 mg/dL    Sodium 124 (L) 136 - 145 mmol/L    Potassium 3.7 3.5 - 5.3 mmol/L    Chloride 90 (L) 98 - 107 mmol/L    Bicarbonate 20 (L) 21 - 32 mmol/L    Anion Gap 18 10 - 20 mmol/L    Urea Nitrogen 103 (HH) 6 - 23 mg/dL    Creatinine 3.09 (H) 0.50 - 1.30 mg/dL    eGFR 19 (L) >60 mL/min/1.73m*2    Calcium 8.2 (L) 8.6 - 10.3 mg/dL    Albumin 3.6 3.4 - 5.0 g/dL    Alkaline Phosphatase 128 33 - 136 U/L    Total Protein 6.3 (L) 6.4 - 8.2 g/dL    AST 30 9 - 39 U/L    Bilirubin, Total 1.2 0.0 - 1.2 mg/dL    ALT 25 10 - 52 U/L   Magnesium   Result Value Ref Range    Magnesium 1.80 1.60 - 2.40 mg/dL   Troponin I, High Sensitivity, Initial   Result Value Ref Range    Troponin I, High Sensitivity 169 (HH) 0 - 20 ng/L   Lactate   Result Value Ref Range    Lactate 1.6 0.4 - 2.0 mmol/L   Beta Hydroxybutyrate   Result Value Ref Range    Beta-Hydroxybutyrate 0.21 0.02 - 0.27 mmol/L   B-type natriuretic peptide   Result Value Ref Range     (H) 0 - 99 pg/mL   BLOOD GAS VENOUS FULL PANEL   Result Value Ref Range    POCT pH, Venous 7.42 7.33 - 7.43 pH    POCT pCO2, Venous 29 (L) 41 - 51 mm Hg    POCT pO2, Venous 43 35 - 45 mm Hg    POCT SO2, Venous 70 45 - 75 %    POCT Oxy Hemoglobin, Venous 68.3 45.0 - 75.0 %    POCT Hematocrit Calculated, Venous 32.0 (L) 41.0 - 52.0 %    POCT Sodium, Venous 123 (L) 136 - 145 mmol/L    POCT Potassium, Venous 3.9 3.5 - 5.3 mmol/L    POCT Chloride, Venous 92 (L) 98 - 107 mmol/L    POCT Ionized Calicum, Venous 1.17 1.10 - 1.33 mmol/L    POCT Glucose, Venous 553 (HH) 74 - 99 mg/dL    POCT Lactate, Venous 2.0 0.4 - 2.0 mmol/L     POCT Base Excess, Venous -4.7 (L) -2.0 - 3.0 mmol/L    POCT HCO3 Calculated, Venous 18.8 (L) 22.0 - 26.0 mmol/L    POCT Hemoglobin, Venous 10.8 (L) 13.5 - 17.5 g/dL    POCT Anion Gap, Venous 16.0 10.0 - 25.0 mmol/L    Patient Temperature      FiO2 28 %    Critical Called By DENIS VIVEROS RRT     Critical Called To DR. OWEN     Critical Call Time 1328.0000     Critical Read Back Y     Critical Note CRITICAL    Blood Culture    Specimen: Peripheral Venipuncture; Blood culture   Result Value Ref Range    Blood Culture Loaded on Instrument - Culture in progress    Blood Culture    Specimen: Peripheral Venipuncture; Blood culture   Result Value Ref Range    Blood Culture Loaded on Instrument - Culture in progress    Protime-INR   Result Value Ref Range    Protime 22.8 (H) 9.8 - 12.8 seconds    INR 2.0 (H) 0.9 - 1.1   ECG 12 lead   Result Value Ref Range    Ventricular Rate 84 BPM    Atrial Rate 357 BPM    QRS Duration 102 ms    QT Interval 400 ms    QTC Calculation(Bazett) 472 ms    R Axis 112 degrees    T Axis 253 degrees    QRS Count 13 beats    Q Onset 212 ms    T Offset 412 ms    QTC Fredericia 447 ms   Troponin I, High Sensitivity   Result Value Ref Range    Troponin I, High Sensitivity 180 (HH) 0 - 20 ng/L   ECG 12 lead   Result Value Ref Range    Ventricular Rate 67 BPM    Atrial Rate 75 BPM    QRS Duration 96 ms    QT Interval 404 ms    QTC Calculation(Bazett) 426 ms    R Axis 111 degrees    T Axis 242 degrees    QRS Count 11 beats    Q Onset 224 ms    T Offset 426 ms    QTC Fredericia 419 ms   Troponin I, High Sensitivity   Result Value Ref Range    Troponin I, High Sensitivity 204 (HH) 0 - 20 ng/L   C-reactive protein   Result Value Ref Range    C-Reactive Protein 1.82 (H) <1.00 mg/dL   POCT GLUCOSE   Result Value Ref Range    POCT Glucose 345 (H) 74 - 99 mg/dL   Urine Gray Tube   Result Value Ref Range    Extra Tube Hold for add-ons.    Urinalysis with Reflex Microscopic   Result Value Ref Range    Color,  Urine Yellow Straw, Yellow    Appearance, Urine Hazy (N) Clear    Specific Gravity, Urine 1.014 1.005 - 1.035    pH, Urine 5.0 5.0, 5.5, 6.0, 6.5, 7.0, 7.5, 8.0    Protein, Urine NEGATIVE NEGATIVE mg/dL    Glucose, Urine >=500 (3+) (A) NEGATIVE mg/dL    Blood, Urine MODERATE (2+) (A) NEGATIVE    Ketones, Urine NEGATIVE NEGATIVE mg/dL    Bilirubin, Urine NEGATIVE NEGATIVE    Urobilinogen, Urine <2.0 <2.0 mg/dL    Nitrite, Urine NEGATIVE NEGATIVE    Leukocyte Esterase, Urine LARGE (3+) (A) NEGATIVE   Microscopic Only, Urine   Result Value Ref Range    WBC, Urine 21-50 (A) 1-5, NONE /HPF    RBC, Urine >20 (A) NONE, 1-2, 3-5 /HPF    Bacteria, Urine 1+ (A) NONE SEEN /HPF    Hyaline Casts, Urine OCCASIONAL (A) NONE /LPF   ECG 12 lead   Result Value Ref Range    Ventricular Rate 60 BPM    Atrial Rate 63 BPM    QRS Duration 104 ms    QT Interval 434 ms    QTC Calculation(Bazett) 434 ms    R Axis 110 degrees    T Axis 203 degrees    QRS Count 10 beats    Q Onset 225 ms    T Offset 442 ms    QTC Fredericia 434 ms   POCT GLUCOSE   Result Value Ref Range    POCT Glucose 149 (H) 74 - 99 mg/dL   PST Top   Result Value Ref Range    Extra Tube Hold for add-ons.    Digoxin   Result Value Ref Range    Digoxin  <0.30 (L) 0.80 - <2.00 ng/mL   Sedimentation rate, automated   Result Value Ref Range    Sedimentation Rate 33 (H) 0 - 20 mm/h   Creatine Kinase   Result Value Ref Range    Creatine Kinase 467 (H) 0 - 325 U/L   POCT GLUCOSE   Result Value Ref Range    POCT Glucose 165 (H) 74 - 99 mg/dL   POCT GLUCOSE   Result Value Ref Range    POCT Glucose 142 (H) 74 - 99 mg/dL   Magnesium   Result Value Ref Range    Magnesium 2.09 1.60 - 2.40 mg/dL   Renal Function Panel   Result Value Ref Range    Glucose 129 (H) 74 - 99 mg/dL    Sodium 131 (L) 136 - 145 mmol/L    Potassium 3.5 3.5 - 5.3 mmol/L    Chloride 99 98 - 107 mmol/L    Bicarbonate 22 21 - 32 mmol/L    Anion Gap 14 10 - 20 mmol/L    Urea Nitrogen 91 (HH) 6 - 23 mg/dL    Creatinine  2.81 (H) 0.50 - 1.30 mg/dL    eGFR 21 (L) >60 mL/min/1.73m*2    Calcium 8.7 8.6 - 10.3 mg/dL    Phosphorus 4.5 2.5 - 4.9 mg/dL    Albumin 3.4 3.4 - 5.0 g/dL   CBC   Result Value Ref Range    WBC 8.8 4.4 - 11.3 x10*3/uL    nRBC 0.0 0.0 - 0.0 /100 WBCs    RBC 3.63 (L) 4.50 - 5.90 x10*6/uL    Hemoglobin 10.1 (L) 13.5 - 17.5 g/dL    Hematocrit 31.7 (L) 41.0 - 52.0 %    MCV 87 80 - 100 fL    MCH 27.8 26.0 - 34.0 pg    MCHC 31.9 (L) 32.0 - 36.0 g/dL    RDW 14.1 11.5 - 14.5 %    Platelets 160 150 - 450 x10*3/uL   POCT GLUCOSE   Result Value Ref Range    POCT Glucose 121 (H) 74 - 99 mg/dL   POCT GLUCOSE   Result Value Ref Range    POCT Glucose 228 (H) 74 - 99 mg/dL      Scheduled medications  bisoprolol, 5 mg, oral, Daily  doxycylcine, 100 mg, oral, q12h BRE  insulin lispro, 0-10 Units, subcutaneous, TID with meals  latanoprost, 1 drop, Both Eyes, Nightly  pantoprazole, 40 mg, oral, Nightly  piperacillin-tazobactam, 3.375 g, intravenous, q12h  prednisoLONE acetate, 1 drop, Both Eyes, Daily  rivaroxaban, 15 mg, oral, Daily with evening meal  rosuvastatin, 20 mg, oral, Nightly      Continuous medications     PRN medications  PRN medications: dextrose 10 % in water (D10W), dextrose, glucagon, HYDROcodone-acetaminophen       Assessment/Plan     ISIAH on CKD 4  His renal function is improving today    Renal US under Care Everywhere shows no hydronephrosis    Check spot protein/Cr ratio    Check Vanco levels if he remains on Vanco  He can continue th PPI  BP is well controlled    Clear to restart Torsemide 60mg daily from Nephrology viewpoint    Daily labs    Dysuria  Check urine culture      Will Her MD

## 2024-01-22 NOTE — CONSULTS
Consults   Reason For Consult  BLE wounds    History Of Present Illness  Elgin Marin is a 83 y.o. male who presented to [facility] ED on 1/21/2024 before BLE wounds.  Patient states that he was previously treated by [facility], who would come to the house several times per week.  Patient states that the wounds had healed, so [facility] stopped coming to the house.  Patient states that he was treating the wounds on his own with materials that he had left, but then ran out of dressing supplies.  Patient states that the wounds would typically heal and reopen, but he is having difficulty healing the wounds at this time.  Patient states that the wounds typically start as blisters, which developed into wounds that drained clear/yellow fluid.  Patient states that he noted that the legs were becoming more swollen and red over the last several days.  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  Patient states that he does have help from his son, who lives with him.  Patient denies any current constitutional symptoms, and has no other complaints at this time.    Past Medical History  He has a past medical history of Hyperlipidemia, unspecified (11/14/2022), Other forms of dyspnea (08/09/2018), Personal history of other diseases of the circulatory system (12/08/2014), Personal history of other endocrine, nutritional and metabolic disease, Personal history of other specified conditions, Presence of intraocular lens (07/20/2019), Presence of intraocular lens (07/20/2019), and Unspecified atrial fibrillation (CMS/HCC) (11/14/2022).    Surgical History  He has a past surgical history that includes Coronary angioplasty with stent (10/06/2015); Coronary artery bypass graft (10/06/2015); Other surgical history (09/09/2015); Cholecystectomy (09/09/2015); and Rotator cuff repair (09/09/2015).     Social History  He reports that he has never smoked. He has never used  smokeless tobacco. He reports that he does not drink alcohol and does not use drugs.    Family History  No family history on file.     Allergies  Metoprolol, Oxycodone-aspirin, Sulfa (sulfonamide antibiotics), Warfarin, and Lisinopril    Review of Systems  Review of Systems      Physical Exam  Vascular: DP and PT pulses  palpable 2/4 bilaterally.  CFT under 5 seconds when tested at distal digits.  Skin temp warm to warm from proximal to distal.  +2 pitting edema noted to BLE    Neuro: Protective sensation diminished, light tough sensation intact.  No Tinel's or Valleix's signs elicited.      Derm: Venous stasis dermatitis with hemosiderin depositing noted to BLE.  Full-thickness ulceration noted to right medial and lateral lower leg to subcutaneous tissue.  No exposed fascia or bone noted.  Mild serosanguineous drainage, no maceration.  Medial wound base is nearly 100% fibrotic.  Lateral wound base is 70% granular, 30% fibrotic.  Diffuse stable eschars noted to BLE with minimal drainage.  Erythema noted to BLE, right more pronounced.  No proximal streaking, calor, purulence, undermining, or tunneling noted to any wound at this time.  No subcutaneous nodules.  Interdigital spaces are CDI.    Musc: No gross deformities noted.  POP noted to wounds.  No POP noted to any other area of the foot/ankle.     Medications  Scheduled medications  aspirin, 81 mg, oral, Daily   Or  aspirin, 150 mg, rectal, Daily  bisoprolol, 5 mg, oral, Daily  doxycylcine, 100 mg, oral, q12h BRE  HYDROmorphone, 0.6 mg, intravenous, Once  insulin lispro, 0-10 Units, subcutaneous, TID with meals  latanoprost, 1 drop, Both Eyes, Nightly  pantoprazole, 40 mg, oral, Nightly  piperacillin-tazobactam, 3.375 g, intravenous, q12h  prednisoLONE acetate, 1 drop, Both Eyes, Daily  rivaroxaban, 15 mg, oral, Daily with evening meal  rosuvastatin, 20 mg, oral, Nightly      Continuous medications     PRN medications  PRN medications: dextrose 10 % in water  "(D10W), dextrose, glucagon, HYDROcodone-acetaminophen     Last Recorded Vitals  Blood pressure 112/61, pulse 67, temperature 37.3 °C (99.1 °F), resp. rate 18, height 1.803 m (5' 11\"), weight 87.5 kg (192 lb 14.4 oz), SpO2 95 %.    Relevant Results  Results for orders placed or performed during the hospital encounter of 01/21/24 (from the past 24 hour(s))   POCT GLUCOSE   Result Value Ref Range    POCT Glucose 149 (H) 74 - 99 mg/dL   PST Top   Result Value Ref Range    Extra Tube Hold for add-ons.    Digoxin   Result Value Ref Range    Digoxin  <0.30 (L) 0.80 - <2.00 ng/mL   Sedimentation rate, automated   Result Value Ref Range    Sedimentation Rate 33 (H) 0 - 20 mm/h   Creatine Kinase   Result Value Ref Range    Creatine Kinase 467 (H) 0 - 325 U/L   POCT GLUCOSE   Result Value Ref Range    POCT Glucose 165 (H) 74 - 99 mg/dL   POCT GLUCOSE   Result Value Ref Range    POCT Glucose 142 (H) 74 - 99 mg/dL   Magnesium   Result Value Ref Range    Magnesium 2.09 1.60 - 2.40 mg/dL   Renal Function Panel   Result Value Ref Range    Glucose 129 (H) 74 - 99 mg/dL    Sodium 131 (L) 136 - 145 mmol/L    Potassium 3.5 3.5 - 5.3 mmol/L    Chloride 99 98 - 107 mmol/L    Bicarbonate 22 21 - 32 mmol/L    Anion Gap 14 10 - 20 mmol/L    Urea Nitrogen 91 (HH) 6 - 23 mg/dL    Creatinine 2.81 (H) 0.50 - 1.30 mg/dL    eGFR 21 (L) >60 mL/min/1.73m*2    Calcium 8.7 8.6 - 10.3 mg/dL    Phosphorus 4.5 2.5 - 4.9 mg/dL    Albumin 3.4 3.4 - 5.0 g/dL   CBC   Result Value Ref Range    WBC 8.8 4.4 - 11.3 x10*3/uL    nRBC 0.0 0.0 - 0.0 /100 WBCs    RBC 3.63 (L) 4.50 - 5.90 x10*6/uL    Hemoglobin 10.1 (L) 13.5 - 17.5 g/dL    Hematocrit 31.7 (L) 41.0 - 52.0 %    MCV 87 80 - 100 fL    MCH 27.8 26.0 - 34.0 pg    MCHC 31.9 (L) 32.0 - 36.0 g/dL    RDW 14.1 11.5 - 14.5 %    Platelets 160 150 - 450 x10*3/uL   POCT GLUCOSE   Result Value Ref Range    POCT Glucose 121 (H) 74 - 99 mg/dL   POCT GLUCOSE   Result Value Ref Range    POCT Glucose 228 (H) 74 - 99 " mg/dL   POCT GLUCOSE   Result Value Ref Range    POCT Glucose 193 (H) 74 - 99 mg/dL      ECG 12 lead    Result Date: 1/22/2024  Atrial fibrillation Right axis deviation Septal infarct , age undetermined ST & T wave abnormality, consider inferolateral ischemia or digitalis effect Abnormal ECG When compared with ECG of 19-MAR-2023 07:31, Septal infarct is now Present Inverted T waves have replaced nonspecific T wave abnormality in Inferior leads Inverted T waves have replaced nonspecific T wave abnormality in Lateral leads QT has lengthened    ECG 12 lead    Result Date: 1/22/2024  Atrial fibrillation Right axis deviation Septal infarct (cited on or before 21-JAN-2024) ST & T wave abnormality, consider inferolateral ischemia or digitalis effect Abnormal ECG When compared with ECG of 21-JAN-2024 14:44, (unconfirmed) No significant change was found    ECG 12 lead    Result Date: 1/22/2024  Atrial fibrillation Right axis deviation Septal infarct (cited on or before 21-JAN-2024) ST & T wave abnormality, consider inferolateral ischemia or digitalis effect Abnormal ECG When compared with ECG of 21-JAN-2024 13:06, (unconfirmed) No significant change was found    XR chest 1 view    Result Date: 1/22/2024  Interpreted By:  Toño Torres, STUDY: XR CHEST 1 VIEW;  1/22/2024 7:50 am   INDICATION: Signs/Symptoms:evaluate for fluid overload.   COMPARISON: Chest radiograph dated 03/17/2023.   ACCESSION NUMBER(S): [id number]   ORDERING CLINICIAN: NATHALIA LI   FINDINGS: AP radiograph of the chest was provided.   DEVICES: Intact appearing sternotomy wires. Suture anchors within the right humerus.   CARDIOMEDIASTINAL SILHOUETTE: Cardiomediastinal silhouette is stable in size and configuration. Aortic atherosclerotic calcification.   LUNGS: No focal consolidative airspace opacity. Mild reticular parenchymal opacity within the periphery of the lower lung zones is nonspecific. No pneumothorax. Minimal blunting of the costophrenic sulci  with no large pleural effusion.   ABDOMEN: No remarkable upper abdominal findings.   BONES: No acute osseous changes.       Stable cardiac postsurgical change with no focal parenchymal consolidative opacity. Mild lower lung zone parenchymal opacity could reflect scarring, atelectasis, or trace edema.   MACRO: None   Signed by: Toño Torres 1/22/2024 8:00 AM Dictation workstation:   [id number]    XR tibia fibula bilateral 2 views    Result Date: 1/21/2024  Interpreted By:  Liliana Denny, STUDY: XR TIBIA FIBULA BILATERAL 2 VIEWS;  1/21/2024 2:17 pm   INDICATION: Signs/Symptoms:assess for gas in tissues.   COMPARISON: None.   ACCESSION NUMBER(S): [id number]   ORDERING CLINICIAN: YVES HUTCHISON   FINDINGS: Multiple views of the left tibia and fibula are obtained. Degenerative changes of the visualized left knee. Vascular calcification. No definite acute fracture-dislocation. Soft tissue swelling. No definite soft tissue gas..       No acute fracture or dislocation. Soft tissue swelling. No definite soft tissue gas. Follow-up MRI to further evaluate for soft tissue abnormalities or underlying osteomyelitis if these remain clinical concern.     Signed by: Liliana Denny 1/21/2024 3:16 PM Dictation workstation:   [id number]    XR foot 1-2 views bilateral    Result Date: 1/21/2024  Interpreted By:  Liliana Denny, STUDY: XR FOOT 1-2 VIEWS BILATERAL;  1/21/2024 2:17 pm   INDICATION: Signs/Symptoms:assess for gas in tissues.   COMPARISON: None.   ACCESSION NUMBER(S): [id number]   ORDERING CLINICIAN: YVES HUTCHISON   FINDINGS: Multiple views the right foot are obtained. Apparent osteopenia. Degenerative changes of the 1st MTP joint and multiple D IP and PIP joints. Vascular calcification. No evidence of a soft tissue gas. Posterior calcaneal spur. Soft tissue swelling.       No acute fracture or dislocation. Osteopenia and degenerative changes. No definite soft tissue gas. For better evaluation of soft tissue abnormalities or  osteomyelitis further evaluation with MRI is recommended.     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  All findings discussed to patient to their satisfaction and understanding.  - Reviewed labs and chart.  Wound culture obtained from right lateral lower leg wound, pending.  No gas or OM on radiographs, low suspicion of abscess or OM.  No surgical intervention planned at this time.  - Systemic conditions managed by primary team.  -Erythema to BLE likely a component of cellulitis with venous stasis dermatitis.  - Performed dressing change consisting of Xeroform to subcutaneous wounds, Betadine paint, 4 x 4 gauze, Kerlix, and 6 inch Ace to BLE.  Dressings to be changed every 1 to 2 days by podiatry.  - HgA1c noted to be 9.7% yesterday.  - Will continue to follow inpatient.  Please contact podiatry with any acute questions/concerns.    - Thank you for the consult.     Cheikh Hidalgo DPM  Podiatric Surgery  Doc Halo/Corona DOMINGUEZ spent >30 minutes in the professional and overall care of this patient.      Cheikh Hidalgo DPM

## 2024-01-22 NOTE — CONSULTS
Wound Care Consult     Visit Date: 1/22/2024      Patient Name: Elgin Marin         MRN: [id number]           YOB: 1940     Reason for Consult: Venous ulcer RLE        Wound History: Present on admission     Pertinent Labs:   Albumin   Date Value Ref Range Status   01/22/2024 3.4 3.4 - 5.0 g/dL Final       Wound Assessment: See below       Wound Team Summary Assessment: Saw patient earlier. He was in extreme pain from both legs. The pain medication he was given had not yet taken effect. I did not hemosiderin staining both lower extremities, plus redness and warmth. LLE has several scabbed areas. RLE had a wound that extended from medial aspect around calf to lateral aspect, measured 9.0 x 16.5 x 0.1 cm. Medial aspect of wound, dry,with a few very small opened areas on edge that oozed serosanguinous fluid. Back of wound moist, opened. Was planning to wrap RLE but patient's lunch arrived before I returned from getting supplies. Patient asked me to wait. By the time I returned, Dr Hidalgo had seen patient.     Wound Team Plan: Will defer treatment to Dr Hidalgo.     Kim Caro, HALI  1/22/2024  3:12 PM

## 2024-01-22 NOTE — CARE PLAN
The patient's goals for the shift include  remaining comfortable throughout shift.    The clinical goals for the shift include See POC      Problem: Pain  Goal: Takes deep breaths with improved pain control throughout the shift  Outcome: Progressing  Goal: Turns in bed with improved pain control throughout the shift  Outcome: Progressing  Goal: Walks with improved pain control throughout the shift  Outcome: Progressing  Goal: Performs ADL's with improved pain control throughout shift  Outcome: Progressing  Goal: Participates in PT with improved pain control throughout the shift  Outcome: Progressing  Goal: Free from opioid side effects throughout the shift  Outcome: Progressing  Goal: Free from acute confusion related to pain meds throughout the shift  Outcome: Progressing     Problem: Pain - Adult  Goal: Verbalizes/displays adequate comfort level or baseline comfort level  Outcome: Progressing     Problem: Safety - Adult  Goal: Free from fall injury  Outcome: Progressing     Problem: Discharge Planning  Goal: Discharge to home or other facility with appropriate resources  Outcome: Progressing     Problem: Chronic Conditions and Co-morbidities  Goal: Patient's chronic conditions and co-morbidity symptoms are monitored and maintained or improved  Outcome: Progressing     Problem: Skin  Goal: Decreased wound size/increased tissue granulation at next dressing change  Outcome: Progressing  Flowsheets (Taken 1/22/2024 0449)  Decreased wound size/increased tissue granulation at next dressing change: Promote sleep for wound healing  Goal: Participates in plan/prevention/treatment measures  Outcome: Progressing  Flowsheets (Taken 1/22/2024 0449)  Participates in plan/prevention/treatment measures: Elevate heels  Goal: Prevent/manage excess moisture  Outcome: Progressing  Flowsheets (Taken 1/22/2024 0449)  Prevent/manage excess moisture: Moisturize dry skin  Goal: Prevent/minimize sheer/friction injuries  Outcome:  Progressing  Flowsheets (Taken 1/22/2024 0449)  Prevent/minimize sheer/friction injuries: Use pull sheet  Goal: Promote/optimize nutrition  Outcome: Progressing  Flowsheets (Taken 1/22/2024 0449)  Promote/optimize nutrition: Consume > 50% meals/supplements  Goal: Promote skin healing  Outcome: Progressing  Flowsheets (Taken 1/22/2024 0449)  Promote skin healing: Turn/reposition every 2 hours/use positioning/transfer devices

## 2024-01-22 NOTE — PROGRESS NOTES
Vancomycin Dosing by Pharmacy- Cessation of Therapy    Consult to pharmacy for vancomycin dosing has been discontinued by the prescriber, pharmacy will sign off at this time.    Please call pharmacy if there are further questions or re-enter a consult if vancomycin is resumed.     Kaitlyn Vivas, FadumoD

## 2024-01-22 NOTE — NURSING NOTE
Pt had uneventful night with no acute changes. This nurse talked to lab and RT about venous blood gas and they both said they do not draw them. Notified provider who said he would talk to the day team. Pt is in bed with call light in reach and alarm on.

## 2024-01-22 NOTE — NURSING NOTE
0945- Patient is having a lot of pain and agreed to take the tylenol but says that its doesn't do anything. Dr Cardenas notified.    1045- Patient still complaining of 11/10 pain and Dr Cardenas notified again.    1245- Inquired about patient needing PT/OT.    1700- Patient complaining of 10/10 pain again. He says the norco didn't really help. Dr Cardenas notified.     EOS- Bilateral legs wounds dressed by Dr Hidalgo. Pain medication given throughout the shift with attempt to help the pain. IV antibiotics continued. Safety maintained and call light within reach.

## 2024-01-23 LAB
ALBUMIN SERPL BCP-MCNC: 3.4 G/DL (ref 3.4–5)
ANION GAP SERPL CALC-SCNC: 12 MMOL/L (ref 10–20)
BUN SERPL-MCNC: 87 MG/DL (ref 6–23)
CALCIUM SERPL-MCNC: 8.5 MG/DL (ref 8.6–10.3)
CARDIAC TROPONIN I PNL SERPL HS: 142 NG/L (ref 0–20)
CHLORIDE SERPL-SCNC: 96 MMOL/L (ref 98–107)
CO2 SERPL-SCNC: 24 MMOL/L (ref 21–32)
CREAT SERPL-MCNC: 2.93 MG/DL (ref 0.5–1.3)
EGFRCR SERPLBLD CKD-EPI 2021: 20 ML/MIN/1.73M*2
ERYTHROCYTE [DISTWIDTH] IN BLOOD BY AUTOMATED COUNT: 14.1 % (ref 11.5–14.5)
GLUCOSE BLD MANUAL STRIP-MCNC: 179 MG/DL (ref 74–99)
GLUCOSE BLD MANUAL STRIP-MCNC: 189 MG/DL (ref 74–99)
GLUCOSE BLD MANUAL STRIP-MCNC: 229 MG/DL (ref 74–99)
GLUCOSE BLD MANUAL STRIP-MCNC: 248 MG/DL (ref 74–99)
GLUCOSE BLD MANUAL STRIP-MCNC: 260 MG/DL (ref 74–99)
GLUCOSE BLD MANUAL STRIP-MCNC: 288 MG/DL (ref 74–99)
GLUCOSE SERPL-MCNC: 185 MG/DL (ref 74–99)
HCT VFR BLD AUTO: 32.1 % (ref 41–52)
HGB BLD-MCNC: 10 G/DL (ref 13.5–17.5)
MAGNESIUM SERPL-MCNC: 2.08 MG/DL (ref 1.6–2.4)
MCH RBC QN AUTO: 27.6 PG (ref 26–34)
MCHC RBC AUTO-ENTMCNC: 31.2 G/DL (ref 32–36)
MCV RBC AUTO: 89 FL (ref 80–100)
NRBC BLD-RTO: 0 /100 WBCS (ref 0–0)
PHOSPHATE SERPL-MCNC: 4 MG/DL (ref 2.5–4.9)
PLATELET # BLD AUTO: 159 X10*3/UL (ref 150–450)
POTASSIUM SERPL-SCNC: 4 MMOL/L (ref 3.5–5.3)
RBC # BLD AUTO: 3.62 X10*6/UL (ref 4.5–5.9)
SODIUM SERPL-SCNC: 128 MMOL/L (ref 136–145)
WBC # BLD AUTO: 9.3 X10*3/UL (ref 4.4–11.3)

## 2024-01-23 PROCEDURE — 97530 THERAPEUTIC ACTIVITIES: CPT | Mod: GO

## 2024-01-23 PROCEDURE — 2500000002 HC RX 250 W HCPCS SELF ADMINISTERED DRUGS (ALT 637 FOR MEDICARE OP, ALT 636 FOR OP/ED)

## 2024-01-23 PROCEDURE — 2500000004 HC RX 250 GENERAL PHARMACY W/ HCPCS (ALT 636 FOR OP/ED)

## 2024-01-23 PROCEDURE — 97161 PT EVAL LOW COMPLEX 20 MIN: CPT | Mod: GP

## 2024-01-23 PROCEDURE — 84484 ASSAY OF TROPONIN QUANT: CPT

## 2024-01-23 PROCEDURE — 83735 ASSAY OF MAGNESIUM: CPT

## 2024-01-23 PROCEDURE — 2500000001 HC RX 250 WO HCPCS SELF ADMINISTERED DRUGS (ALT 637 FOR MEDICARE OP): Performed by: INTERNAL MEDICINE

## 2024-01-23 PROCEDURE — 1200000002 HC GENERAL ROOM WITH TELEMETRY DAILY

## 2024-01-23 PROCEDURE — 36415 COLL VENOUS BLD VENIPUNCTURE: CPT

## 2024-01-23 PROCEDURE — 85027 COMPLETE CBC AUTOMATED: CPT

## 2024-01-23 PROCEDURE — 2500000001 HC RX 250 WO HCPCS SELF ADMINISTERED DRUGS (ALT 637 FOR MEDICARE OP)

## 2024-01-23 PROCEDURE — 97165 OT EVAL LOW COMPLEX 30 MIN: CPT | Mod: GO

## 2024-01-23 PROCEDURE — 82947 ASSAY GLUCOSE BLOOD QUANT: CPT

## 2024-01-23 PROCEDURE — 97116 GAIT TRAINING THERAPY: CPT | Mod: GP

## 2024-01-23 PROCEDURE — 80069 RENAL FUNCTION PANEL: CPT

## 2024-01-23 PROCEDURE — 2500000001 HC RX 250 WO HCPCS SELF ADMINISTERED DRUGS (ALT 637 FOR MEDICARE OP): Performed by: STUDENT IN AN ORGANIZED HEALTH CARE EDUCATION/TRAINING PROGRAM

## 2024-01-23 PROCEDURE — 99233 SBSQ HOSP IP/OBS HIGH 50: CPT | Performed by: STUDENT IN AN ORGANIZED HEALTH CARE EDUCATION/TRAINING PROGRAM

## 2024-01-23 RX ORDER — TORSEMIDE 20 MG/1
60 TABLET ORAL DAILY
Status: DISCONTINUED | OUTPATIENT
Start: 2024-01-23 | End: 2024-01-23

## 2024-01-23 RX ORDER — DOXYCYCLINE 100 MG/1
100 CAPSULE ORAL EVERY 12 HOURS SCHEDULED
Qty: 14 CAPSULE | Refills: 0 | Status: SHIPPED | OUTPATIENT
Start: 2024-01-23 | End: 2024-01-30

## 2024-01-23 RX ORDER — TORSEMIDE 20 MG/1
40 TABLET ORAL DAILY
Status: DISCONTINUED | OUTPATIENT
Start: 2024-01-24 | End: 2024-01-25 | Stop reason: HOSPADM

## 2024-01-23 RX ADMIN — RIVAROXABAN 15 MG: 15 TABLET, FILM COATED ORAL at 17:27

## 2024-01-23 RX ADMIN — INSULIN LISPRO 4 UNITS: 100 INJECTION, SOLUTION INTRAVENOUS; SUBCUTANEOUS at 17:28

## 2024-01-23 RX ADMIN — PANTOPRAZOLE SODIUM 40 MG: 40 TABLET, DELAYED RELEASE ORAL at 21:07

## 2024-01-23 RX ADMIN — DOXYCYCLINE HYCLATE 100 MG: 100 CAPSULE ORAL at 08:33

## 2024-01-23 RX ADMIN — PREDNISOLONE ACETATE 1 DROP: 10 SUSPENSION/ DROPS OPHTHALMIC at 21:07

## 2024-01-23 RX ADMIN — PIPERACILLIN SODIUM AND TAZOBACTAM SODIUM 3.38 G: 3; .375 INJECTION, SOLUTION INTRAVENOUS at 08:36

## 2024-01-23 RX ADMIN — TORSEMIDE 60 MG: 20 TABLET ORAL at 10:59

## 2024-01-23 RX ADMIN — ROSUVASTATIN CALCIUM 20 MG: 20 TABLET, FILM COATED ORAL at 21:07

## 2024-01-23 RX ADMIN — DOXYCYCLINE HYCLATE 100 MG: 100 CAPSULE ORAL at 21:07

## 2024-01-23 RX ADMIN — PIPERACILLIN SODIUM AND TAZOBACTAM SODIUM 3.38 G: 3; .375 INJECTION, SOLUTION INTRAVENOUS at 21:07

## 2024-01-23 RX ADMIN — BISOPROLOL FUMARATE 5 MG: 5 TABLET, FILM COATED ORAL at 08:33

## 2024-01-23 RX ADMIN — ASPIRIN 81 MG CHEWABLE TABLET 81 MG: 81 TABLET CHEWABLE at 08:33

## 2024-01-23 RX ADMIN — INSULIN LISPRO 4 UNITS: 100 INJECTION, SOLUTION INTRAVENOUS; SUBCUTANEOUS at 08:34

## 2024-01-23 RX ADMIN — LATANOPROST 1 DROP: 50 SOLUTION OPHTHALMIC at 21:07

## 2024-01-23 SDOH — ECONOMIC STABILITY: INCOME INSECURITY: IN THE PAST 12 MONTHS, HAS THE ELECTRIC, GAS, OIL, OR WATER COMPANY THREATENED TO SHUT OFF SERVICE IN YOUR HOME?: NO

## 2024-01-23 SDOH — ECONOMIC STABILITY: INCOME INSECURITY: HOW HARD IS IT FOR YOU TO PAY FOR THE VERY BASICS LIKE FOOD, HOUSING, MEDICAL CARE, AND HEATING?: SOMEWHAT HARD

## 2024-01-23 SDOH — ECONOMIC STABILITY: FOOD INSECURITY: WITHIN THE PAST 12 MONTHS, YOU WORRIED THAT YOUR FOOD WOULD RUN OUT BEFORE YOU GOT MONEY TO BUY MORE.: SOMETIMES TRUE

## 2024-01-23 SDOH — ECONOMIC STABILITY: TRANSPORTATION INSECURITY
IN THE PAST 12 MONTHS, HAS THE LACK OF TRANSPORTATION KEPT YOU FROM MEDICAL APPOINTMENTS OR FROM GETTING MEDICATIONS?: YES

## 2024-01-23 ASSESSMENT — COGNITIVE AND FUNCTIONAL STATUS - GENERAL
TURNING FROM BACK TO SIDE WHILE IN FLAT BAD: A LITTLE
STANDING UP FROM CHAIR USING ARMS: A LOT
DAILY ACTIVITIY SCORE: 13
PERSONAL GROOMING: A LITTLE
MOVING TO AND FROM BED TO CHAIR: A LOT
MOVING TO AND FROM BED TO CHAIR: A LOT
MOVING FROM LYING ON BACK TO SITTING ON SIDE OF FLAT BED WITH BEDRAILS: A LITTLE
WALKING IN HOSPITAL ROOM: A LOT
DAILY ACTIVITIY SCORE: 13
STANDING UP FROM CHAIR USING ARMS: A LOT
STANDING UP FROM CHAIR USING ARMS: A LOT
MOVING TO AND FROM BED TO CHAIR: A LITTLE
DRESSING REGULAR LOWER BODY CLOTHING: TOTAL
DRESSING REGULAR LOWER BODY CLOTHING: TOTAL
TURNING FROM BACK TO SIDE WHILE IN FLAT BAD: A LOT
PERSONAL GROOMING: A LITTLE
CLIMB 3 TO 5 STEPS WITH RAILING: A LOT
WALKING IN HOSPITAL ROOM: A LOT
TOILETING: TOTAL
TURNING FROM BACK TO SIDE WHILE IN FLAT BAD: A LOT
MOBILITY SCORE: 13
CLIMB 3 TO 5 STEPS WITH RAILING: A LOT
DRESSING REGULAR UPPER BODY CLOTHING: A LOT
DRESSING REGULAR UPPER BODY CLOTHING: A LOT
MOBILITY SCORE: 13
MOVING FROM LYING ON BACK TO SITTING ON SIDE OF FLAT BED WITH BEDRAILS: A LITTLE
HELP NEEDED FOR BATHING: A LOT
HELP NEEDED FOR BATHING: A LOT
CLIMB 3 TO 5 STEPS WITH RAILING: A LOT
MOBILITY SCORE: 15
MOVING FROM LYING ON BACK TO SITTING ON SIDE OF FLAT BED WITH BEDRAILS: A LITTLE
WALKING IN HOSPITAL ROOM: A LOT
TOILETING: TOTAL

## 2024-01-23 ASSESSMENT — PAIN SCALES - GENERAL
PAINLEVEL_OUTOF10: 7
PAINLEVEL_OUTOF10: 5 - MODERATE PAIN
PAINLEVEL_OUTOF10: 6
PAINLEVEL_OUTOF10: 7

## 2024-01-23 ASSESSMENT — PAIN - FUNCTIONAL ASSESSMENT
PAIN_FUNCTIONAL_ASSESSMENT: 0-10

## 2024-01-23 ASSESSMENT — ACTIVITIES OF DAILY LIVING (ADL)
ADL_ASSISTANCE: INDEPENDENT
BATHING_ASSISTANCE: MODERATE

## 2024-01-23 NOTE — PROGRESS NOTES
Occupational Therapy    Evaluation    Patient Name: Elgin Marin  MRN: [id number]  Today's Date: 1/23/2024  Time Calculation  Start Time: 1339  Stop Time: 1401  Time Calculation (min): 22 min    Assessment  IP OT Assessment  End of Session Communication: Bedside nurse  End of Session Patient Position: Up in chair, Alarm on (all needs in reach)  Plan:  Treatment Interventions: ADL retraining, Functional transfer training, UE strengthening/ROM, Endurance training, Cognitive reorientation, Patient/family training, Equipment evaluation/education, Compensatory technique education  OT Frequency: 3 times per week  OT Discharge Recommendations: Moderate intensity level of continued care  OT - OK to Discharge: Yes (to next level of care when medically appropriate)    Subjective   Current Problem:  1. Hyperglycemia        2. Troponin level elevated        3. Cellulitis of lower extremity, unspecified laterality  doxycycline (Vibramycin) 100 mg capsule      4. Essential hypertension, benign  torsemide 40 mg tablet      5. Dyspnea on exertion  doxycycline (Vibramycin) 100 mg capsule    Referral to Home Health      6. Wound cellulitis  Referral to Home Health        General:  General  Reason for Referral: ADL  Referred By: Ronald  Past Medical History Relevant to Rehab: Includes: Chronic bilat. LE wounds, dyslipidemia, HTN, CKD, DM, CAD, Afib, AMBRIZ, syncope, cholecystectomy, angioplasty and stent, CABG, wrist carpectomy, RCR, on Xarelto, pulmonary HTN  General Comment: Pt admitted 1/21/24 for worsening bilateral lower limb pain, associated with subjective fever and chills, and difficulty with ambulation.  Precautions:  Hearing/Visual Limitations: Hard of hearing  Medical Precautions: Fall precautions (bilat. LE wounds)  Vital Signs:     Pain:  Pain Assessment  Pain Assessment: 0-10  Pain Score: 7  Pain Type:  (Bilat. LEs, RLE worse than LLE and chronic back pain)    Objective   Cognition:  Overall Cognitive Status:   (Difficult to keep on task, decreased insight, states that he cannot manage stairs, but that he plans to go home.)  Problem Solving: Exceptions to WFL  Safety/Judgement: Exceptions to WFL           Home Living:  Home Living Comments: Lives in an apartment with his wife and son.  Pt. and son are caretakers for patient's wife who is bedbound. Pt. states that son also assists to care for him.  13 steps with handrail to enter, then 1 floor set up.  Ambulates with a rollator. Independent with ADLs with increasing difficulty.  Pt. and son share IADLs and caretaking for pt. wife.  ~ 6 falls over past year.  No car.  Relies on senior transportation for appointments, but they often do not show up, and pt. ends up missing appointments.  Tub/shower with grab bar and seat.   Prior Function:     IADL History:     ADL:  Eating Assistance: Independent  Grooming Assistance: Minimal  Bathing Assistance: Moderate  UE Dressing Assistance: Moderate  LE Dressing Assistance: Total  Toileting Assistance with Device: Total  Activity Tolerance:     Bed Mobility/Transfers:      Transfers  Transfer:  (2 max assist sit to stand at chair and stand to sit.  FWW.  Verbal and tactile cues for hand placements as well as trunk and body mechanics.  Assist with boost, steadying and descent.  Cues for placing hand properly on FWW once up.)      Ambulation/Gait Training:  Ambulation/Gait Training  Ambulation/Gait Training Performed:  (~ 6 steps forward and 6 steps back with FWW and 2 mod assist.  Assist for balance, cues for safety.  Pt. states that he falls over backwards anytime he backs up at home.)  Sitting Balance:  Static Sitting Balance  Static Sitting-Comment/Number of Minutes: Fair  Dynamic Sitting Balance  Dynamic Sitting-Comments: Fair (-)  Standing Balance:  Static Standing Balance  Static Standing-Comment/Number of Minutes: Poor  Dynamic Standing Balance  Dynamic Standing-Comments: Poor       Extremities: RUE   RUE :  (R shoulder active  flexion ~ 120 degrees, distal RUE AROM WFL.  Strength 4/5 within available AROM.) and LUE   LUE:  (L shoulder active flexion ~ 60 degrees, distal LUE AROM WFL.  Strength 4/5 within available AROM.)      Outcome Measures: [facility] Daily Activity  Putting on and taking off regular lower body clothing: Total  Bathing (including washing, rinsing, drying): A lot  Putting on and taking off regular upper body clothing: A lot  Toileting, which includes using toilet, bedpan or urinal: Total  Taking care of personal grooming such as brushing teeth: A little  Eating Meals: None  Daily Activity - Total Score: 13      Education Documentation  ADL Training, taught by Dede Garcia OT at 1/23/2024  5:57 PM.  Learner: Patient  Readiness: Acceptance  Method: Explanation  Response: Verbalizes Understanding    Education Comments  No comments found.      Goals:   Encounter Problems       Encounter Problems (Active)       OT Goals       Min assist sit/stand, bed/chair/commode with FWW.         Start:  01/23/24    Expected End:  02/06/24            Fair (-) dynamic standing balance for ADL.        Start:  01/23/24    Expected End:  02/06/24            Min assist ADL functional mobility with FWW.        Start:  01/23/24    Expected End:  02/06/24            Mod assist LB dressing.        Start:  01/23/24    Expected End:  02/06/24            Min assist toileting.        Start:  01/23/24    Expected End:  02/06/24

## 2024-01-23 NOTE — DISCHARGE INSTRUCTIONS
Please call within 1 to 2 days of discharge to schedule a follow-up appointment with your primary care physician to discuss this hospital visit.     You have a change to one of your medications.  Your torsemide has been increased to 40 mg per day.      You have also been prescribed an antibiotic, doxycycline.  You had 3 days of this medication in the hospital and you can  the remaining 7 days from your pharmacy.    You have been referred to home health and wound care.    Thank you for allowing us to participate in your care.  Dede Macdonald D.O.  PGY-1, Internal Medicine   - [facility] [place]

## 2024-01-23 NOTE — PROGRESS NOTES
01/23/24 0926   Discharge Planning   Living Arrangements Spouse/significant other;Children   Support Systems Children   Assistance Needed Walker; wound care; financial resources; transportation   Type of Residence Private residence   Number of Stairs Within Residence 13   Home or Post Acute Services In home services   Type of Home Care Services Home nursing visits;Home OT;Home PT   Patient expects to be discharged to: Home with [facility]   Does the patient need discharge transport arranged? Yes   RoundTrip coordination needed? Yes   Financial Resource Strain   How hard is it for you to pay for the very basics like food, housing, medical care, and heating? Somewhat   Transportation Needs   In the past 12 months, has lack of transportation kept you from medical appointments or from getting medications? yes   Patient Choice   Patient / Family choosing to utilize agency / facility established prior to hospitalization Yes     Met with patient at bedside. Patient lives at home with his wife and son in an apartment in [place]. Patient states that his son is the caretaker for him and his wife. Patient uses a walker at all times and is independent with ADLs, but has been having difficulty getting his foot over the edge of the bathtub and states his landlord refuses to put in a walk-in shower. Pt states there are 13 stairs up to his apartment and no elevator. Pt's son helps him on the stairs. Patient was active with [facility] in the past and prefers the same agency again. Patient declines going anywhere for rehab if recommended by therapy and states he needs to get home when he is medically ready for d/c. If [facility] declines referral, patient is agreeable to have referral sent to other [facility] agencies. PCP is Olman Nugent with [facility]. Patient states since [facility] agency stopped coming, he has run out of wound care supplies and he has difficulty finding them at [facility]. Patient states he sometimes orders his wound care supplies  online but that they are expensive. Patient states he had to get rid of the family car because they could not afford it, so transportation to doctor's appointments is difficult for him. He says he gets transport through his insurance company, but often times the ride never shows up so he has many missed appointments. Patient is concerned about his living situation as his rent increased from $600/month to $900 a month and his landlord is selling the property in May and patient will not have anywhere to live. Patient also expresses concern regarding the cost of food. Patient agreeable to having SW see him to provide resources for his financial, living, and transportation concerns.  notified.     1005- [facility] unable to accept. Additional [facility] referrals sent. Pending acceptance. Notified physician that pt will need [facility] orders at d/c and that patient is agreeable to the  [facility] program.     1123- [facility] agency is able to accept. Will need to send final orders.     4220- Notified by therapy team that they recommend moderate intensity rehab for patient at d/c. This TCC did discuss possible placement with patient earlier today and pt adamantly declined and stated that he prefers to return home. Updated  who is planning to see patient tomorrow to also discuss SNF with patient again, as therapy is concerned about how patient will get up the 13 steps to his apartment.

## 2024-01-23 NOTE — NURSING NOTE
Pt had uneventful night with no acute changes in condition. Pt is in bed with alarm on and call light in reach.

## 2024-01-23 NOTE — PROGRESS NOTES
Physical Therapy    Physical Therapy Evaluation    Patient Name: Elgin Marin  MRN: [id number]  Today's Date: 1/23/2024   Time Calculation  Start Time: 1338  Stop Time: 1400  Time Calculation (min): 22 min    Assessment/Plan   PT Assessment  PT Assessment Results: Decreased strength, Decreased range of motion, Decreased endurance, Impaired balance, Decreased mobility, Decreased safety awareness, Pain, Impaired judgement, Impaired hearing  Rehab Prognosis: Good  Evaluation/Treatment Tolerance: Patient limited by fatigue  Medical Staff Made Aware: Yes  End of Session Communication: Bedside nurse  Assessment Comment: Pt presents today below baseline level of function and requires continued PT during hospital stay. Pt requires 24 hour physical assist for all mobility to prevent falls. Pt is unsafe to navigate home environment at this time with available support and assist. Pt requires PT at a moderate intensity level at discharge to maximize functional mobility and safety.    End of Session Patient Position: Up in chair, Alarm on  IP OR SWING BED PT PLAN  Inpatient or Swing Bed: Inpatient  PT Plan  Treatment/Interventions: Bed mobility, Transfer training, Gait training, Balance training, Neuromuscular re-education, Strengthening, Endurance training, Range of motion, Therapeutic exercise, Therapeutic activity, Home exercise program  PT Plan: Skilled PT  PT Frequency: 3 times per week  PT Discharge Recommendations: Moderate intensity level of continued care  Equipment Recommended upon Discharge: Wheeled walker  PT Recommended Transfer Status: Assist x2 (FWW, gait belt)  PT - OK to Discharge: Yes (To next level of care when cleared by medical team   )    Subjective         General Visit Information:  General  Reason for Referral: impaired mobility  Referred By: Brad Cardenas DO  Past Medical History Relevant to Rehab: Pt admitted 1/21/24 for worsening bilateral lower limb pain associated with subjective fever and  ajda. PMH: DMII, HTN, CAD s/p CABG, s/p cardiac cath 4/22, Afib on Xarelto, CKD, chronic lower limb wounds  Family/Caregiver Present: No  Co-Treatment: OT  Co-Treatment Reason: for safety  Prior to Session Communication: Bedside nurse  Patient Position Received: Bed, 3 rail up, Alarm on  Preferred Learning Style: verbal  General Comment: Pt agreeable to PT, nursing cleared for treatment.    Home Living:  Home Living  Type of Home: Apartment  Lives With: Spouse (son)  Home Adaptive Equipment: Wheelchair-manual (rollator)  Home Layout: One level  Home Access: Stairs to enter with rails  Entrance Stairs-Number of Steps: 13  Bathroom Shower/Tub: Tub/shower unit  Bathroom Equipment: Shower chair with back  Home Living Comments: wife is bedridden, son takes care of wife, pt assists wife with changing depends    Prior Level of Function:  Prior Function Per Pt/Caregiver Report  ADL Assistance: Independent  Homemaking Assistance:  (son completes)  Ambulatory Assistance:  (mod I with rollator, son assists pt with stair climbing)  Prior Function Comments: reports about 6 falls in the past 6 months    Precautions:  Precautions  Hearing/Visual Limitations: [place]  Medical Precautions: Fall precautions    Vital Signs:     Objective     Pain:  Pain Assessment  Pain Assessment: 0-10  Pain Score: 7  Pain Type: Acute pain (chronic back pain)  Pain Location:  (right LE and back)    Cognition:  Cognition  Safety/Judgement:  (impaired)  Insight: Moderate    General Assessments:      Activity Tolerance  Endurance: Decreased tolerance for upright activites                 Static Sitting Balance  Static Sitting-Comment/Number of Minutes: good  Dynamic Sitting Balance  Dynamic Sitting-Comments: good  Static Standing Balance  Static Standing-Comment/Number of Minutes: fair  Dynamic Standing Balance  Dynamic Standing-Comments: poor    Functional Assessments:     Bed Mobility  Bed Mobility: No  Transfers  Transfer: Yes  Transfer 1  Transfer  From 1: Sit to  Transfer to 1: Stand  Transfer Device 1: Walker  Transfer Level of Assistance 1: Maximum assistance (x 2)  Trials/Comments 1: Pt with kyphotic posture in standing, retropulsive  Transfers 2  Transfer From 2: Stand to  Transfer to 2: Sit  Transfer Device 2: Walker  Transfer Level of Assistance 2: Maximum assistance (x 2)  Ambulation/Gait Training  Ambulation/Gait Training Performed: Yes  Ambulation/Gait Training 1  Device 1: Rolling walker  Gait Support Devices: Gait belt  Assistance 1: Moderate assistance (x 2)  Quality of Gait 1: Diminished heel strike, Decreased step length, Inconsistent stride length, Shuffling gait, Forward flexed posture (increased bilateral knee flexion throughout)  Comments/Distance (ft) 1: 5 feet of forward and retro steps        Treatment    Cues for safe hand placement with use of FWW for sit<>stand. Cues for anterior weight shift and upright gaze in standing. Instruction in safe use of FWW for ambulation.     Extremity/Trunk Assessments:        RLE   RLE : Exceptions to WFL  AROM RLE (degrees)  RLE AROM Comment: 75%  Strength RLE  RLE Overall Strength: Greater than or equal to 3/5 as evidenced by functional mobility  LLE   LLE : Exceptions to WFL  AROM LLE (degrees)  LLE AROM Comment: 75%  Strength LLE  LLE Overall Strength: Greater than or equal to 3/5 as evidenced by functional mobility    Outcome Measures:  [facility] Basic Mobility  Turning from your back to your side while in a flat bed without using bedrails: A little  Moving from lying on your back to sitting on the side of a flat bed without using bedrails: A lot  Moving to and from bed to chair (including a wheelchair): A lot  Standing up from a chair using your arms (e.g. wheelchair or bedside chair): A lot  To walk in hospital room: A lot  Climbing 3-5 steps with railing: A lot  Basic Mobility - Total Score: 13                            Goals:  Encounter Problems       Encounter Problems (Active)       PT Problem        Pt will perform bed mobility with min A.         Start:  01/23/24    Expected End:  02/06/24            Pt will complete sit <> stand and bed <> chair transfers with mod A.         Start:  01/23/24    Expected End:  02/06/24            Pt will ambulate 50 feet min A using FWW with no significant gait deviations.         Start:  01/23/24    Expected End:  02/06/24            Pt will progress to completing 3 x 20 supine/seated exercises in order to increase strength and improve gait mechanics.         Start:  01/23/24    Expected End:  02/06/24                 Education Documentation  Precautions, taught by Estela Contreras, PT at 1/23/2024  4:03 PM.  Learner: Patient  Readiness: Acceptance  Method: Explanation  Response: Needs Reinforcement    Body Mechanics, taught by Estela Contreras PT at 1/23/2024  4:03 PM.  Learner: Patient  Readiness: Acceptance  Method: Explanation  Response: Needs Reinforcement    Mobility Training, taught by Estela Contreras PT at 1/23/2024  4:03 PM.  Learner: Patient  Readiness: Acceptance  Method: Explanation  Response: Needs Reinforcement    Education Comments  No comments found.

## 2024-01-23 NOTE — PROGRESS NOTES
Elgin Marin is a 83 y.o. male on day 2 of admission presenting with Hyperglycemia.      Subjective   He had a nosebleed overnight       Objective     Last Recorded Vitals  /57 (BP Location: Left arm, Patient Position: Lying)   Pulse 70   Temp 37 °C (98.6 °F) (Tympanic)   Resp 16   Wt 87.5 kg (192 lb 14.4 oz)   SpO2 98%   Intake/Output last 3 Shifts:    Intake/Output Summary (Last 24 hours) at 1/23/2024 1014  Last data filed at 1/23/2024 0900  Gross per 24 hour   Intake 1080 ml   Output 701 ml   Net 379 ml       Admission Weight  Weight: 82.6 kg (182 lb) (01/21/24 1301)    Daily Weight  01/21/24 : 87.5 kg (192 lb 14.4 oz)    NAD  CTA B  + LE edema    Image Results  ECG 12 lead  Atrial fibrillation  Right axis deviation  Septal infarct , age undetermined  ST & T wave abnormality, consider inferolateral ischemia or digitalis effect  Abnormal ECG  When compared with ECG of 19-MAR-2023 07:31,  Septal infarct is now Present  Inverted T waves have replaced nonspecific T wave abnormality in Inferior leads  Inverted T waves have replaced nonspecific T wave abnormality in Lateral leads  QT has lengthened  ECG 12 lead  Atrial fibrillation  Right axis deviation  Septal infarct (cited on or before 21-JAN-2024)  ST & T wave abnormality, consider inferolateral ischemia or digitalis effect  Abnormal ECG  When compared with ECG of 21-JAN-2024 14:44, (unconfirmed)  No significant change was found  ECG 12 lead  Atrial fibrillation  Right axis deviation  Septal infarct (cited on or before 21-JAN-2024)  ST & T wave abnormality, consider inferolateral ischemia or digitalis effect  Abnormal ECG  When compared with ECG of 21-JAN-2024 13:06, (unconfirmed)  No significant change was found  XR chest 1 view  Narrative: Interpreted By:  Toño Torres,   STUDY:  XR CHEST 1 VIEW;  1/22/2024 7:50 am      INDICATION:  Signs/Symptoms:evaluate for fluid overload.      COMPARISON:  Chest radiograph dated 03/17/2023.      ACCESSION  NUMBER(S):  [id number]      ORDERING CLINICIAN:  NATHALIA LI      FINDINGS:  AP radiograph of the chest was provided.      DEVICES:  Intact appearing sternotomy wires. Suture anchors within the right  humerus.      CARDIOMEDIASTINAL SILHOUETTE:  Cardiomediastinal silhouette is stable in size and configuration.  Aortic atherosclerotic calcification.      LUNGS:  No focal consolidative airspace opacity. Mild reticular parenchymal  opacity within the periphery of the lower lung zones is nonspecific.  No pneumothorax. Minimal blunting of the costophrenic sulci with no  large pleural effusion.      ABDOMEN:  No remarkable upper abdominal findings.      BONES:  No acute osseous changes.      Impression: Stable cardiac postsurgical change with no focal parenchymal  consolidative opacity. Mild lower lung zone parenchymal opacity could  reflect scarring, atelectasis, or trace edema.      MACRO:  None      Signed by: Toño Torres 1/22/2024 8:00 AM  Dictation workstation:   [id number]      Physical Exam    Relevant Results  Scheduled medications  aspirin, 81 mg, oral, Daily   Or  aspirin, 150 mg, rectal, Daily  bisoprolol, 5 mg, oral, Daily  doxycylcine, 100 mg, oral, q12h BRE  insulin lispro, 0-10 Units, subcutaneous, TID with meals  latanoprost, 1 drop, Both Eyes, Nightly  pantoprazole, 40 mg, oral, Nightly  piperacillin-tazobactam, 3.375 g, intravenous, q12h  prednisoLONE acetate, 1 drop, Both Eyes, Daily  rivaroxaban, 15 mg, oral, Daily with evening meal  rosuvastatin, 20 mg, oral, Nightly      Continuous medications     PRN medications  PRN medications: dextrose 10 % in water (D10W), dextrose, glucagon, HYDROcodone-acetaminophen  Results for orders placed or performed during the hospital encounter of 01/21/24 (from the past 24 hour(s))   POCT GLUCOSE   Result Value Ref Range    POCT Glucose 228 (H) 74 - 99 mg/dL   Tissue/Wound Culture/Smear    Specimen: Wound/Tissue; Tissue/Biopsy   Result Value Ref Range     Tissue/Wound Culture/Smear No growth to date    POCT GLUCOSE   Result Value Ref Range    POCT Glucose 193 (H) 74 - 99 mg/dL   POCT GLUCOSE   Result Value Ref Range    POCT Glucose 223 (H) 74 - 99 mg/dL   POCT GLUCOSE   Result Value Ref Range    POCT Glucose 186 (H) 74 - 99 mg/dL   POCT GLUCOSE   Result Value Ref Range    POCT Glucose 189 (H) 74 - 99 mg/dL   CBC   Result Value Ref Range    WBC 9.3 4.4 - 11.3 x10*3/uL    nRBC 0.0 0.0 - 0.0 /100 WBCs    RBC 3.62 (L) 4.50 - 5.90 x10*6/uL    Hemoglobin 10.0 (L) 13.5 - 17.5 g/dL    Hematocrit 32.1 (L) 41.0 - 52.0 %    MCV 89 80 - 100 fL    MCH 27.6 26.0 - 34.0 pg    MCHC 31.2 (L) 32.0 - 36.0 g/dL    RDW 14.1 11.5 - 14.5 %    Platelets 159 150 - 450 x10*3/uL   Magnesium   Result Value Ref Range    Magnesium 2.08 1.60 - 2.40 mg/dL   Renal Function Panel   Result Value Ref Range    Glucose 185 (H) 74 - 99 mg/dL    Sodium 128 (L) 136 - 145 mmol/L    Potassium 4.0 3.5 - 5.3 mmol/L    Chloride 96 (L) 98 - 107 mmol/L    Bicarbonate 24 21 - 32 mmol/L    Anion Gap 12 10 - 20 mmol/L    Urea Nitrogen 87 (H) 6 - 23 mg/dL    Creatinine 2.93 (H) 0.50 - 1.30 mg/dL    eGFR 20 (L) >60 mL/min/1.73m*2    Calcium 8.5 (L) 8.6 - 10.3 mg/dL    Phosphorus 4.0 2.5 - 4.9 mg/dL    Albumin 3.4 3.4 - 5.0 g/dL   Troponin I, High Sensitivity   Result Value Ref Range    Troponin I, High Sensitivity 142 (HH) 0 - 20 ng/L   POCT GLUCOSE   Result Value Ref Range    POCT Glucose 229 (H) 74 - 99 mg/dL         Assessment/Plan                  Principal Problem:    Hyperglycemia    ISIAH on CKD 4  His renal function is stable overall     Renal US under Care Everywhere shows no hydronephrosis     He has minimal proteinuria     Check Vanco levels if he remains on Vanco  He can continue the PPI  BP is well controlled     Will restart Torsemide 60mg daily from Nephrology viewpoint     Daily labs     Dysuria  Check urine culture - pending              Will Her MD

## 2024-01-23 NOTE — NURSING NOTE
End of shift note -   Pt stable this shift. He was able to sit in the chair for most of the day. Pt seen by podiatry and leg wounds redressed by doctor. Pt tolerated well. Pt with decent appetite and able to voice needs / wants. Discharge order is in. Pt is unable to safely get home today d/t stairs etc. Will pass along to next shift to give planners a heads up tomorrow.

## 2024-01-23 NOTE — PROGRESS NOTES
"Elgin Marin is a 83 y.o. male on day 2 of admission presenting with Hyperglycemia.    Subjective   Pt examined and evaluated at bedside, found resting comfortably.  Pt states that their pain is well controlled, denies any constitutional symptoms, and has no other complaints at this time.        Objective     Vascular: DP and PT pulses  palpable 2/4 bilaterally.  CFT under 5 seconds when tested at distal digits.  Skin temp warm to warm from proximal to distal.  +2 pitting edema noted to BLE     Neuro: Protective sensation diminished, light tough sensation intact.  No Tinel's or Valleix's signs elicited.       Derm: Venous stasis dermatitis with hemosiderin depositing noted to BLE.  Full-thickness ulceration noted to right medial and lateral lower leg to subcutaneous tissue.  No exposed fascia or bone noted.  Mild serosanguineous drainage, no maceration.  Medial wound base is nearly 70% granular, 30% fibrotic.  Lateral wound base is 80% granular, 20% fibrotic.  Diffuse stable eschars noted to BLE with minimal drainage.  Erythema noted to BLE, right more pronounced.  No proximal streaking, calor, purulence, undermining, or tunneling noted to any wound at this time.  No subcutaneous nodules.  Interdigital spaces are CDI.     Musc: No gross deformities noted.  POP noted to wounds.  No POP noted to any other area of the foot/ankle.       Last Recorded Vitals  Blood pressure 115/57, pulse 70, temperature 37 °C (98.6 °F), temperature source Tympanic, resp. rate 16, height 1.803 m (5' 11\"), weight 87.5 kg (192 lb 14.4 oz), SpO2 98 %.  Intake/Output last 3 Shifts:  I/O last 3 completed shifts:  In: 1310 (15 mL/kg) [P.O.:1260; IV Piggyback:50]  Out: 2401 (27.4 mL/kg) [Urine:2401 (0.8 mL/kg/hr)]  Weight: 87.5 kg     Relevant Results  Results for orders placed or performed during the hospital encounter of 01/21/24 (from the past 24 hour(s))   Tissue/Wound Culture/Smear    Specimen: Wound/Tissue; Tissue/Biopsy   Result Value " Ref Range    Tissue/Wound Culture/Smear No growth to date    POCT GLUCOSE   Result Value Ref Range    POCT Glucose 193 (H) 74 - 99 mg/dL   POCT GLUCOSE   Result Value Ref Range    POCT Glucose 223 (H) 74 - 99 mg/dL   POCT GLUCOSE   Result Value Ref Range    POCT Glucose 186 (H) 74 - 99 mg/dL   POCT GLUCOSE   Result Value Ref Range    POCT Glucose 189 (H) 74 - 99 mg/dL   CBC   Result Value Ref Range    WBC 9.3 4.4 - 11.3 x10*3/uL    nRBC 0.0 0.0 - 0.0 /100 WBCs    RBC 3.62 (L) 4.50 - 5.90 x10*6/uL    Hemoglobin 10.0 (L) 13.5 - 17.5 g/dL    Hematocrit 32.1 (L) 41.0 - 52.0 %    MCV 89 80 - 100 fL    MCH 27.6 26.0 - 34.0 pg    MCHC 31.2 (L) 32.0 - 36.0 g/dL    RDW 14.1 11.5 - 14.5 %    Platelets 159 150 - 450 x10*3/uL   Magnesium   Result Value Ref Range    Magnesium 2.08 1.60 - 2.40 mg/dL   Renal Function Panel   Result Value Ref Range    Glucose 185 (H) 74 - 99 mg/dL    Sodium 128 (L) 136 - 145 mmol/L    Potassium 4.0 3.5 - 5.3 mmol/L    Chloride 96 (L) 98 - 107 mmol/L    Bicarbonate 24 21 - 32 mmol/L    Anion Gap 12 10 - 20 mmol/L    Urea Nitrogen 87 (H) 6 - 23 mg/dL    Creatinine 2.93 (H) 0.50 - 1.30 mg/dL    eGFR 20 (L) >60 mL/min/1.73m*2    Calcium 8.5 (L) 8.6 - 10.3 mg/dL    Phosphorus 4.0 2.5 - 4.9 mg/dL    Albumin 3.4 3.4 - 5.0 g/dL   Troponin I, High Sensitivity   Result Value Ref Range    Troponin I, High Sensitivity 142 (HH) 0 - 20 ng/L   POCT GLUCOSE   Result Value Ref Range    POCT Glucose 229 (H) 74 - 99 mg/dL   POCT GLUCOSE   Result Value Ref Range    POCT Glucose 179 (H) 74 - 99 mg/dL                            Assessment/Plan   Assessment:  - Cellulitis, BLE  - 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  All findings discussed to patient to their satisfaction and understanding.  - Reviewed labs and chart.  Wound culture obtained from right lateral lower leg wound, pending.  No gas or OM on  radiographs, low suspicion of abscess or OM.  No surgical intervention planned at this time.  - Systemic conditions managed by primary team.  -Erythema to BLE likely a component of cellulitis with venous stasis dermatitis.  - Patient recently incontinent with urine noted to proximal aspect of BLE dressings.  Purewick external catheter in place, however, it is leaking.  Nursing staff notified.  - Performed dressing change consisting of Aquacel Ag to subcutaneous wounds, Betadine paint, 4 x 4 gauze, Kerlix, and 6 inch Ace to BLE.  Dressings to be changed every 1 to 2 days by podiatry.  - HgA1c noted to be 9.7% on 1/21/24.  - No contraindication to discharge from podiatry standpoint.  Pt to follow up with established wound care provider (PCP, Dr. Nugent) upon discharge per patient request.  Will continue to follow inpatient.  Please contact podiatry with any acute questions/concerns.         Cheikh Hidalgo DPM  Podiatric Surgery  Doc Halo/Corona DOMINGUEZ spent >30 minutes in the professional and overall care of this patient.        Cheikh Hidalgo DPM

## 2024-01-23 NOTE — CARE PLAN
The patient's goals for the shift include  remaining comfortable throughout shift.    The clinical goals for the shift include See POC      Problem: Pain  Goal: Takes deep breaths with improved pain control throughout the shift  Outcome: Progressing  Goal: Turns in bed with improved pain control throughout the shift  Outcome: Progressing  Goal: Walks with improved pain control throughout the shift  Outcome: Progressing  Goal: Performs ADL's with improved pain control throughout shift  Outcome: Progressing  Goal: Participates in PT with improved pain control throughout the shift  Outcome: Progressing  Goal: Free from opioid side effects throughout the shift  Outcome: Progressing  Goal: Free from acute confusion related to pain meds throughout the shift  Outcome: Progressing     Problem: Pain - Adult  Goal: Verbalizes/displays adequate comfort level or baseline comfort level  Outcome: Progressing     Problem: Safety - Adult  Goal: Free from fall injury  Outcome: Progressing     Problem: Discharge Planning  Goal: Discharge to home or other facility with appropriate resources  Outcome: Progressing     Problem: Chronic Conditions and Co-morbidities  Goal: Patient's chronic conditions and co-morbidity symptoms are monitored and maintained or improved  Outcome: Progressing     Problem: Skin  Goal: Decreased wound size/increased tissue granulation at next dressing change  Outcome: Progressing  Flowsheets (Taken 1/22/2024 2225)  Decreased wound size/increased tissue granulation at next dressing change: Promote sleep for wound healing  Goal: Participates in plan/prevention/treatment measures  Outcome: Progressing  Flowsheets (Taken 1/22/2024 2225)  Participates in plan/prevention/treatment measures: Elevate heels  Goal: Prevent/manage excess moisture  Outcome: Progressing  Flowsheets (Taken 1/22/2024 2225)  Prevent/manage excess moisture:   Moisturize dry skin   Cleanse incontinence/protect with barrier cream  Goal:  Prevent/minimize sheer/friction injuries  Outcome: Progressing  Flowsheets (Taken 1/22/2024 2225)  Prevent/minimize sheer/friction injuries: Use pull sheet  Goal: Promote/optimize nutrition  Outcome: Progressing  Flowsheets (Taken 1/22/2024 2225)  Promote/optimize nutrition: Consume > 50% meals/supplements  Goal: Promote skin healing  Outcome: Progressing  Flowsheets (Taken 1/22/2024 2225)  Promote skin healing: Turn/reposition every 2 hours/use positioning/transfer devices

## 2024-01-23 NOTE — CARE PLAN
The patient's goals for the shift include      The clinical goals for the shift include Pt will receive IV ATB per orders thru end of shift 1/23/24    Pt met goals    No

## 2024-01-23 NOTE — HOSPITAL COURSE
Elgin Marin is a 83 y.o. male with PMHx of HFrEF, Afib on Xarelto, HTN, CAD, chronic venous stasis, CKD stage IV, T2DM, who presents from home with worsening bilateral lower extremity edema over last 4 days and hyperglycemia.     At presentation to the ED, he was hemodynamically stable with elevated creatinine of 3.09 (baseline 1.9), CK of 467, glucose of 487, elevated troponin ~200, EKG showed A fib with a rate of 60.  Urinalysis showed 3+ glucose, 2+ blood, 3+ leukocyte esterase. X-ray of bilateral lower extremity showed no gas or osteomyelitis. He was admitted to the hospital for treatment of cellulitis secondary to chronic venous stasis, management of hyperglycemia and to trend troponin.     During admission, patient's blood glucose improved after 10 units of regular insulin in ED. Patient was placed on Zosyn and doxycycline for treatment of cellulitis. Troponin downtrended and the patient had no complaints of chest pain. Podiatry was consulted for cellulitis and wound culture was obtained, no surgical intervention was recommended.  Nephrology was consulted for ISIAH on CKD stage IV. Renal function is stable overall and renal US showed no hydronephrosis. Urine culture shows no growth at 24 hours and can be followed outpatient with his PCP. Creatinine continued to improve over the course of admission.     Patient will be discharged with doxycycline x 10 days and adjusted diuretic regimen of torsemide 40mg with home health and wound care.

## 2024-01-23 NOTE — DISCHARGE SUMMARY
"Discharge Diagnosis  Hyperglycemia    Issues Requiring Follow-Up  PCP follow-up for posthospital visit.    Medication change: Increase losartan to 40 mg daily    Complete a 10-day course of doxycycline, 3 days completed inpatient 7-day course to be picked up from pharmacy on file    A referral to home health/wound care has been placed.    Discharge Meds     Your medication list        START taking these medications        Instructions Last Dose Given Next Dose Due   doxycycline 100 mg capsule  Commonly known as: Vibramycin      Take 1 capsule (100 mg) by mouth every 12 hours for 7 days. Take with at least 8 ounces (large glass) of water, do not lie down for 30 minutes after. This will complete a 10 day course. You received 3 days of this antibiotic while in the hospital.             HumuLIN N NPH Insulin KwikPen 100 unit/mL (3 mL) injection  Generic drug: insulin NPH (Isophane)      Inject 4 Units under the skin 2 times a day. Inject 1-2 times per day as directed.       pen needle, diabetic 31 gauge x 5/16\" needle      Use to inject insulin 1 to 4 times daily as directed              CHANGE how you take these medications        Instructions Last Dose Given Next Dose Due   HYDROcodone-acetaminophen 5-325 mg tablet  Commonly known as: Norco      Take 1 tablet by mouth every 6 hours if needed for moderate pain (4 - 6) for up to 5 days.       torsemide 40 mg tablet  What changed:   medication strength  how much to take      Take 40 mg by mouth once daily. Do not start before January 24, 2024.              CONTINUE taking these medications        Instructions Last Dose Given Next Dose Due   bisoprolol 5 mg tablet  Commonly known as: Zebeta           digoxin 125 MCG tablet  Commonly known as: Lanoxin           gabapentin 100 mg capsule  Commonly known as: Neurontin           hydrALAZINE 50 mg tablet  Commonly known as: Apresoline      Take 1 tablet (50 mg) by mouth 2 times a day.       latanoprost 0.005 % ophthalmic " "solution  Commonly known as: Xalatan           metOLazone 5 mg tablet  Commonly known as: Zaroxolyn           pantoprazole 40 mg EC tablet  Commonly known as: ProtoNix           prednisoLONE acetate 1 % ophthalmic suspension  Commonly known as: Pred-Forte      USE ONE DROP ONCE/DAY IN RIGHT EYE. MUST MAKE APPOINTMENT       rosuvastatin 20 mg tablet  Commonly known as: Crestor           rivaroxaban 15 mg tablet  Commonly known as: Xarelto                  STOP taking these medications      glimepiride 4 mg tablet  Commonly known as: Amaryl                  Where to Get Your Medications        These medications were sent to [facility] [address] [facility] - [place], [place] - [address]  [address], [address] [facility] [address]      Phone: [phone]   doxycycline 100 mg capsule  torsemide 40 mg tablet       These medications were sent to [facility]  [address], [address], [place] [facility] [address]      Hours: 8:30 AM to 5 PM Mon-Fri, 9 AM to 1 PM Sat Phone: [phone]   doxycycline 100 mg capsule  HumuLIN N NPH Insulin KwikPen 100 unit/mL (3 mL) injection  HYDROcodone-acetaminophen 5-325 mg tablet  pen needle, diabetic 31 gauge x 5/16\" needle         Test Results Pending At Discharge  Pending Labs       Order Current Status    Blood Culture Preliminary result    Blood Culture Preliminary result    Urine culture Preliminary result            Hospital Course  Elgin Marin is a 83 y.o. male with PMHx of HFrEF, Afib on Xarelto, HTN, CAD, chronic venous stasis, CKD stage IV, T2DM, who presents from home with worsening bilateral lower extremity edema over last 4 days and hyperglycemia.     At presentation to the ED, he was hemodynamically stable with elevated creatinine of 3.09 (baseline 1.9), CK of 467, glucose of 487, elevated troponin ~200, EKG showed A fib with a rate of 60.  Urinalysis showed 3+ glucose, 2+ blood, 3+ leukocyte esterase. X-ray of bilateral lower extremity showed no gas or osteomyelitis. He " was admitted to the hospital for treatment of cellulitis secondary to chronic venous stasis, management of hyperglycemia and to trend troponin.     During admission, patient's blood glucose improved after 10 units of regular insulin in ED. Patient was placed on Zosyn and doxycycline for treatment of cellulitis. Troponin downtrended and the patient had no complaints of chest pain. Podiatry was consulted for cellulitis and wound culture was obtained, no surgical intervention was recommended.  Nephrology was consulted for ISIAH on CKD stage IV. Renal function is stable overall and renal US showed no hydronephrosis. Creatinine continued to improve over the course of admission after holding home diuretic for 48 hours.    Hyperglycemia improved with insulin while he was in inpatient, due to poor control, CKD and the fact that he is taking a sulfonylurea he is at high risk for swings in his blood glucose and he was started on NPH insulin which was covered by his insurance and his home sulfonylurea was discontinued on discharge.    Discharged with doxycycline x 10 days and adjusted diuretic regimen of torsemide 40mg decreased from 60 daily to a SNF for rehab and wound care.    Pertinent Physical Exam At Time of Discharge  Physical Exam  Constitutional:       General: He is not in acute distress.     Appearance: He is normal weight. He is not ill-appearing or toxic-appearing.   HENT:      Mouth/Throat:      Mouth: Mucous membranes are moist.      Pharynx: Oropharynx is clear.   Eyes:      Conjunctiva/sclera: Conjunctivae normal.   Cardiovascular:      Rate and Rhythm: Normal rate. Rhythm irregular.   Pulmonary:      Effort: Pulmonary effort is normal.      Breath sounds: Normal breath sounds.   Abdominal:      General: Bowel sounds are normal. There is no distension.      Tenderness: There is no abdominal tenderness. There is no guarding.   Skin:     General: Skin is warm and dry.      Comments: Lower extremities were bandaged  and clean dry dressing during exam.   Neurological:      General: No focal deficit present.      Mental Status: He is alert.   Psychiatric:         Mood and Affect: Mood normal.         Behavior: Behavior normal.         Thought Content: Thought content normal.         Judgment: Judgment normal.         Outpatient Follow-Up  No future appointments.      Brad Cardenas DO    -------------------------  Attending Addendum  -------------------------    Seen and examined with resident physicians. Labs and imaging personally reviewed. Pt medically/clinically stable for discharge SNF. My updates to plan made directly to above resident documentation. Case discussed with case management, residents and patient.    DC Time: > 30 minutes    I saw and evaluated the patient. I personally obtained the key and critical portions of the history and physical exam or was physically present for key and critical portions performed by the resident/fellow. I reviewed the resident/fellow's documentation and discussed the patient with the resident/fellow. I agree with the resident/fellow's medical decision making as documented in the note.    Abdirahman Cardenas DO

## 2024-01-24 LAB
ALBUMIN SERPL BCP-MCNC: 3.5 G/DL (ref 3.4–5)
ANION GAP SERPL CALC-SCNC: 14 MMOL/L (ref 10–20)
BACTERIA SPEC CULT: NORMAL
BUN SERPL-MCNC: 81 MG/DL (ref 6–23)
CALCIUM SERPL-MCNC: 9 MG/DL (ref 8.6–10.3)
CHLORIDE SERPL-SCNC: 96 MMOL/L (ref 98–107)
CO2 SERPL-SCNC: 24 MMOL/L (ref 21–32)
CREAT SERPL-MCNC: 2.64 MG/DL (ref 0.5–1.3)
EGFRCR SERPLBLD CKD-EPI 2021: 23 ML/MIN/1.73M*2
GLUCOSE BLD MANUAL STRIP-MCNC: 175 MG/DL (ref 74–99)
GLUCOSE BLD MANUAL STRIP-MCNC: 249 MG/DL (ref 74–99)
GLUCOSE BLD MANUAL STRIP-MCNC: 270 MG/DL (ref 74–99)
GLUCOSE BLD MANUAL STRIP-MCNC: 301 MG/DL (ref 74–99)
GLUCOSE BLD MANUAL STRIP-MCNC: 317 MG/DL (ref 74–99)
GLUCOSE SERPL-MCNC: 232 MG/DL (ref 74–99)
GRAM STN SPEC: NORMAL
GRAM STN SPEC: NORMAL
HOLD SPECIMEN: NORMAL
MAGNESIUM SERPL-MCNC: 1.92 MG/DL (ref 1.6–2.4)
PHOSPHATE SERPL-MCNC: 3.9 MG/DL (ref 2.5–4.9)
POTASSIUM SERPL-SCNC: 3.7 MMOL/L (ref 3.5–5.3)
SODIUM SERPL-SCNC: 130 MMOL/L (ref 136–145)

## 2024-01-24 PROCEDURE — 83735 ASSAY OF MAGNESIUM: CPT | Performed by: STUDENT IN AN ORGANIZED HEALTH CARE EDUCATION/TRAINING PROGRAM

## 2024-01-24 PROCEDURE — 1200000002 HC GENERAL ROOM WITH TELEMETRY DAILY

## 2024-01-24 PROCEDURE — 2500000001 HC RX 250 WO HCPCS SELF ADMINISTERED DRUGS (ALT 637 FOR MEDICARE OP)

## 2024-01-24 PROCEDURE — 80069 RENAL FUNCTION PANEL: CPT | Performed by: STUDENT IN AN ORGANIZED HEALTH CARE EDUCATION/TRAINING PROGRAM

## 2024-01-24 PROCEDURE — 82947 ASSAY GLUCOSE BLOOD QUANT: CPT

## 2024-01-24 PROCEDURE — 36415 COLL VENOUS BLD VENIPUNCTURE: CPT | Performed by: STUDENT IN AN ORGANIZED HEALTH CARE EDUCATION/TRAINING PROGRAM

## 2024-01-24 PROCEDURE — 2500000002 HC RX 250 W HCPCS SELF ADMINISTERED DRUGS (ALT 637 FOR MEDICARE OP, ALT 636 FOR OP/ED)

## 2024-01-24 PROCEDURE — 2500000004 HC RX 250 GENERAL PHARMACY W/ HCPCS (ALT 636 FOR OP/ED)

## 2024-01-24 PROCEDURE — 99239 HOSP IP/OBS DSCHRG MGMT >30: CPT | Performed by: STUDENT IN AN ORGANIZED HEALTH CARE EDUCATION/TRAINING PROGRAM

## 2024-01-24 PROCEDURE — 2500000001 HC RX 250 WO HCPCS SELF ADMINISTERED DRUGS (ALT 637 FOR MEDICARE OP): Performed by: STUDENT IN AN ORGANIZED HEALTH CARE EDUCATION/TRAINING PROGRAM

## 2024-01-24 RX ORDER — HYDROCODONE BITARTRATE AND ACETAMINOPHEN 5; 325 MG/1; MG/1
1 TABLET ORAL EVERY 6 HOURS PRN
Qty: 20 TABLET | Refills: 0 | Status: SHIPPED | OUTPATIENT
Start: 2024-01-24 | End: 2024-01-29

## 2024-01-24 RX ORDER — PEN NEEDLE, DIABETIC 30 GX3/16"
NEEDLE, DISPOSABLE MISCELLANEOUS
Qty: 100 EACH | Refills: 1 | Status: SHIPPED | OUTPATIENT
Start: 2024-01-24

## 2024-01-24 RX ORDER — HYDROCODONE BITARTRATE AND ACETAMINOPHEN 5; 325 MG/1; MG/1
1 TABLET ORAL ONCE
Status: COMPLETED | OUTPATIENT
Start: 2024-01-24 | End: 2024-01-24

## 2024-01-24 RX ORDER — DOXYCYCLINE 100 MG/1
100 CAPSULE ORAL 2 TIMES DAILY
Qty: 14 CAPSULE | Refills: 0 | Status: SHIPPED | OUTPATIENT
Start: 2024-01-24 | End: 2024-01-31

## 2024-01-24 RX ORDER — HUMAN INSULIN 100 [IU]/ML
4 INJECTION, SUSPENSION SUBCUTANEOUS 2 TIMES DAILY
Qty: 15 ML | Refills: 1 | Status: SHIPPED | OUTPATIENT
Start: 2024-01-24

## 2024-01-24 RX ADMIN — PIPERACILLIN SODIUM AND TAZOBACTAM SODIUM 3.38 G: 3; .375 INJECTION, SOLUTION INTRAVENOUS at 09:22

## 2024-01-24 RX ADMIN — HYDROCODONE BITARTRATE AND ACETAMINOPHEN 1 TABLET: 5; 325 TABLET ORAL at 02:27

## 2024-01-24 RX ADMIN — PANTOPRAZOLE SODIUM 40 MG: 40 TABLET, DELAYED RELEASE ORAL at 21:33

## 2024-01-24 RX ADMIN — DOXYCYCLINE HYCLATE 100 MG: 100 CAPSULE ORAL at 21:33

## 2024-01-24 RX ADMIN — ASPIRIN 81 MG CHEWABLE TABLET 81 MG: 81 TABLET CHEWABLE at 09:21

## 2024-01-24 RX ADMIN — RIVAROXABAN 15 MG: 15 TABLET, FILM COATED ORAL at 16:48

## 2024-01-24 RX ADMIN — LATANOPROST 1 DROP: 50 SOLUTION OPHTHALMIC at 21:34

## 2024-01-24 RX ADMIN — TORSEMIDE 40 MG: 20 TABLET ORAL at 09:21

## 2024-01-24 RX ADMIN — INSULIN LISPRO 2 UNITS: 100 INJECTION, SOLUTION INTRAVENOUS; SUBCUTANEOUS at 16:48

## 2024-01-24 RX ADMIN — HYDROCODONE BITARTRATE AND ACETAMINOPHEN 1 TABLET: 5; 325 TABLET ORAL at 09:21

## 2024-01-24 RX ADMIN — PREDNISOLONE ACETATE 1 DROP: 10 SUSPENSION/ DROPS OPHTHALMIC at 21:34

## 2024-01-24 RX ADMIN — PIPERACILLIN SODIUM AND TAZOBACTAM SODIUM 3.38 G: 3; .375 INJECTION, SOLUTION INTRAVENOUS at 21:33

## 2024-01-24 RX ADMIN — INSULIN LISPRO 4 UNITS: 100 INJECTION, SOLUTION INTRAVENOUS; SUBCUTANEOUS at 09:22

## 2024-01-24 RX ADMIN — DOXYCYCLINE HYCLATE 100 MG: 100 CAPSULE ORAL at 09:21

## 2024-01-24 RX ADMIN — ROSUVASTATIN CALCIUM 20 MG: 20 TABLET, FILM COATED ORAL at 21:33

## 2024-01-24 RX ADMIN — INSULIN LISPRO 8 UNITS: 100 INJECTION, SOLUTION INTRAVENOUS; SUBCUTANEOUS at 12:23

## 2024-01-24 RX ADMIN — BISOPROLOL FUMARATE 5 MG: 5 TABLET, FILM COATED ORAL at 09:21

## 2024-01-24 ASSESSMENT — COGNITIVE AND FUNCTIONAL STATUS - GENERAL
CLIMB 3 TO 5 STEPS WITH RAILING: A LOT
STANDING UP FROM CHAIR USING ARMS: A LOT
WALKING IN HOSPITAL ROOM: A LOT
MOVING FROM LYING ON BACK TO SITTING ON SIDE OF FLAT BED WITH BEDRAILS: A LITTLE
DRESSING REGULAR UPPER BODY CLOTHING: A LITTLE
MOVING TO AND FROM BED TO CHAIR: A LOT
TOILETING: A LOT
DAILY ACTIVITIY SCORE: 14
STANDING UP FROM CHAIR USING ARMS: A LOT
WALKING IN HOSPITAL ROOM: A LOT
HELP NEEDED FOR BATHING: A LOT
PERSONAL GROOMING: A LITTLE
MOBILITY SCORE: 13
EATING MEALS: A LITTLE
MOVING FROM LYING ON BACK TO SITTING ON SIDE OF FLAT BED WITH BEDRAILS: A LITTLE
TOILETING: A LOT
HELP NEEDED FOR BATHING: A LOT
PERSONAL GROOMING: A LOT
DRESSING REGULAR UPPER BODY CLOTHING: A LITTLE
MOBILITY SCORE: 13
DRESSING REGULAR LOWER BODY CLOTHING: A LOT
MOVING TO AND FROM BED TO CHAIR: A LOT
DAILY ACTIVITIY SCORE: 16
TURNING FROM BACK TO SIDE WHILE IN FLAT BAD: A LOT
CLIMB 3 TO 5 STEPS WITH RAILING: A LOT
DRESSING REGULAR LOWER BODY CLOTHING: A LOT
TURNING FROM BACK TO SIDE WHILE IN FLAT BAD: A LOT

## 2024-01-24 ASSESSMENT — PAIN - FUNCTIONAL ASSESSMENT
PAIN_FUNCTIONAL_ASSESSMENT: 0-10

## 2024-01-24 ASSESSMENT — PAIN SCALES - GENERAL
PAINLEVEL_OUTOF10: 6
PAINLEVEL_OUTOF10: 4
PAINLEVEL_OUTOF10: 8
PAINLEVEL_OUTOF10: 6

## 2024-01-24 ASSESSMENT — PAIN DESCRIPTION - ORIENTATION: ORIENTATION: RIGHT

## 2024-01-24 ASSESSMENT — PAIN DESCRIPTION - LOCATION: LOCATION: FOOT

## 2024-01-24 ASSESSMENT — PAIN DESCRIPTION - DESCRIPTORS: DESCRIPTORS: CRAMPING;SHARP

## 2024-01-24 NOTE — PROGRESS NOTES
Spoke with the pt at length regarding home situation and services available. Provided phone numbers for [employer], [employer], [employer] and the [facility] . Also, gave the pt a list of subsidized housing apartment buildings. He states that he has that list but the waiting list for that housing is years long. Discussed snf placement for short term rehab. He will consider it and [facility] and [facility]. Referrals sent. [facility] declined. [facility] accepted and [facility] can accept but is trying to confirm his insurance. Discussed this with the pt and he chose [facility] as it is closer to home. [facility] for [facility].     [facility] and can accept. [facility] was started and received. Arrangements have been made for pt transfer today at 6:30pm. Advised the pt and he agrees. [facility] was sent and 7000 completed by the DSC.    Jaxon Banks

## 2024-01-24 NOTE — PROGRESS NOTES
Elgin Marin is a 83 y.o. male on day 2 of admission presenting with Hyperglycemia.      Subjective     This is a 83-year-old male with poorly controlled insulin-dependent diabetes on sulfonylurea, primary hypertension, CAD status post CABG and cardiac cath, atrial fibrillation on Xarelto, CKD with a baseline creatinine around 1.9, chronic venous stasis with chronic lower extremity wounds who presents from home with worsening lower extremity wounds over the last 4 days.  In the emergency room he was hemodynamically stable and afebrile, no leukocytosis on laboratory analysis, elevated creatinine of 3.09 above his baseline of around 1.9 as well as hyperglycemic with a blood sugar of 487, slightly elevated troponins around 200 and flat without ischemic EKG changes consistent with his renal failure rather than myocardial infarction as the patient is denying chest pain.  Urinalysis with 3+ glucose, 2+ blood, 3+ leukocyte Estrace without protein and occasional hyaline casts.  ESR and CRP slightly elevated, CK slightly elevated, tib-fib x-rays as well as foot x-rays was shot soft tissue swelling but no evidence of gas in the tissue.  He was admitted from the emergency room in stable condition on vancomycin and Zosyn for treatment of cellulitis and renal failure.     No acute events overnight, remained hemodynamically stable and afebrile.  No complaints this morning, he reports his lower extremity pain has markedly improved.  On my exam  in the morning the patient is lying comfortably in bed, on exam he does have lipodermatosclerosis, wounds are now covered with bandages placed by podiatry. He is on room air, pedal pulses are palpable on exam, he does have significant edema in his feet, he is warm to the touch and well-perfused.     Lower extremity edema/chronic venous stasis/lipodermatosclerosis  Lower extremity wounds with concern for infection/soft tissue infection  Acute kidney injury on CKD 3  Initially on  vancomycin and Zosyn which has been switched to doxycycline and Zosyn considering his renal failure and high risk for renal dysfunction to cover for both MRSA as well as Pseudomonas considering his immunocompromise state secondary to poorly controlled diabetes.  Podiatry consulted for management of wounds as well as wound care nurse.  Will defer to podiatry need for vascular studies, does not appear he has significant arterial disease as his exam is more consistent with venous insufficiency and podiatry has determined there is no need for further imaging/studies nor need for surgical intervention rather simply wound care and antibiotics.  Creatinine at baseline this morning, evaluated by nephrology, will begin home torsemide at a slightly lower dose of 40 mg daily from 60.  May need adjustment of his outpatient diuretic regimen by his PCP as I believe his increasing doses of diuretics have been made in an effort to control his lower extremity edema which has been unsuccessful and only leading to continued elevations in his creatinine.     Poorly controlled diabetes complicated by hyperglycemia  A1c 9.7%.  Currently on sulfonylureas at home, will discuss with pharmacy what his insurance may cover as far as insulin and possibly other adjunctive medications given his medical comorbidities.  Currently on insulin sliding scale and Lantus 10 units at night, will follow his blood sugars closely and adjust medications as needed.      Nonischemic troponin elevation  Troponin slightly elevated however flat without evidence of ischemia on EKG patient was denying chest pain.  Likely slight elevation in troponins due to metabolic demands none setting of known CAD in conjunction with renal failure which decreases clearance.  Since admission he has continued to deny chest pain and has remained hemodynamically stable.    #T2DM  - Glucose 129  - On glimepiride at home  - Inpatient insulin sliding scale with hypoglycemia protocol      #Afib  #HTN  - Hold digoxin, metolazone  - Continue Xarelto, bisoprolol    Disposition: Initially plan for discharge home today with home health care however provide safe transportation, expect the patient to be discharged early tomorrow morning at this renal function and hemodynamics remained stable to continue home health care with wound care.     Case discussed with case management, residents and patient.     Complexity: High     Total visit time = > 50 minutes; more than 50% spent counseling/coordinating care     I saw and evaluated the patient. I personally obtained the key and critical portions of the history and physical exam or was physically present for key and critical portions performed by the resident/fellow. I reviewed the resident/fellow's documentation and discussed the patient with the resident/fellow. I agree with the resident/fellow's medical decision making as documented in the note.     Abdirahman Cardenas, DO       Objective     Last Recorded Vitals  /69   Pulse 66   Temp 36.7 °C (98.1 °F) (Temporal)   Resp 20   Wt 87.5 kg (192 lb 14.4 oz)   SpO2 100%   Intake/Output last 3 Shifts:    Intake/Output Summary (Last 24 hours) at 1/23/2024 2047  Last data filed at 1/23/2024 1700  Gross per 24 hour   Intake 720 ml   Output 2400 ml   Net -1680 ml       Admission Weight  Weight: 82.6 kg (182 lb) (01/21/24 1301)    Daily Weight  01/21/24 : 87.5 kg (192 lb 14.4 oz)    Image Results  ECG 12 lead  Atrial fibrillation  Right axis deviation  Septal infarct , age undetermined  ST & T wave abnormality, consider inferolateral ischemia or digitalis effect  Abnormal ECG  When compared with ECG of 19-MAR-2023 07:31,  Septal infarct is now Present  Inverted T waves have replaced nonspecific T wave abnormality in Inferior leads  Inverted T waves have replaced nonspecific T wave abnormality in Lateral leads  QT has lengthened  ECG 12 lead  Atrial fibrillation  Right axis deviation  Septal infarct (cited on or  before 21-JAN-2024)  ST & T wave abnormality, consider inferolateral ischemia or digitalis effect  Abnormal ECG  When compared with ECG of 21-JAN-2024 14:44, (unconfirmed)  No significant change was found  ECG 12 lead  Atrial fibrillation  Right axis deviation  Septal infarct (cited on or before 21-JAN-2024)  ST & T wave abnormality, consider inferolateral ischemia or digitalis effect  Abnormal ECG  When compared with ECG of 21-JAN-2024 13:06, (unconfirmed)  No significant change was found  XR chest 1 view  Narrative: Interpreted By:  Toño Torres,   STUDY:  XR CHEST 1 VIEW;  1/22/2024 7:50 am      INDICATION:  Signs/Symptoms:evaluate for fluid overload.      COMPARISON:  Chest radiograph dated 03/17/2023.      ACCESSION NUMBER(S):  [id number]      ORDERING CLINICIAN:  NATHALIA LI      FINDINGS:  AP radiograph of the chest was provided.      DEVICES:  Intact appearing sternotomy wires. Suture anchors within the right  humerus.      CARDIOMEDIASTINAL SILHOUETTE:  Cardiomediastinal silhouette is stable in size and configuration.  Aortic atherosclerotic calcification.      LUNGS:  No focal consolidative airspace opacity. Mild reticular parenchymal  opacity within the periphery of the lower lung zones is nonspecific.  No pneumothorax. Minimal blunting of the costophrenic sulci with no  large pleural effusion.      ABDOMEN:  No remarkable upper abdominal findings.      BONES:  No acute osseous changes.      Impression: Stable cardiac postsurgical change with no focal parenchymal  consolidative opacity. Mild lower lung zone parenchymal opacity could  reflect scarring, atelectasis, or trace edema.      MACRO:  None      Signed by: Toño Torres 1/22/2024 8:00 AM  Dictation workstation:   [id number]      Physical Exam    Relevant Results                 Brad Cardenas DO

## 2024-01-24 NOTE — CARE PLAN
The patient's goals for the shift include  remaining comfortable throughout shift.    The clinical goals for the shift include See POC      Problem: Pain  Goal: Takes deep breaths with improved pain control throughout the shift  Outcome: Progressing  Goal: Turns in bed with improved pain control throughout the shift  Outcome: Progressing  Goal: Walks with improved pain control throughout the shift  Outcome: Progressing  Goal: Performs ADL's with improved pain control throughout shift  Outcome: Progressing  Goal: Participates in PT with improved pain control throughout the shift  Outcome: Progressing  Goal: Free from opioid side effects throughout the shift  Outcome: Progressing  Goal: Free from acute confusion related to pain meds throughout the shift  Outcome: Progressing     Problem: Safety - Adult  Goal: Free from fall injury  Outcome: Progressing     Problem: Discharge Planning  Goal: Discharge to home or other facility with appropriate resources  Outcome: Progressing     Problem: Chronic Conditions and Co-morbidities  Goal: Patient's chronic conditions and co-morbidity symptoms are monitored and maintained or improved  Outcome: Progressing     Problem: Skin  Goal: Decreased wound size/increased tissue granulation at next dressing change  Outcome: Progressing  Goal: Participates in plan/prevention/treatment measures  Outcome: Progressing  Goal: Prevent/manage excess moisture  Outcome: Progressing  Goal: Prevent/minimize sheer/friction injuries  Outcome: Progressing  Goal: Promote/optimize nutrition  Outcome: Progressing  Goal: Promote skin healing  Outcome: Progressing

## 2024-01-24 NOTE — NURSING NOTE
1600- Patient was cleaning lens when dinner tray was brought in. Patient said okay for dietary to set dinner tray on tray table and the patient lost his lens on the floor/blanket/tray table. 5 different nurses went in and look on table/blankets/floor/under chair/on patient and were unable to find it.    1835- No acute changes throughout the shift. Pain medication given once throughout the shift with decrease in pain. Patient up in chair throughout the shift. IV antibiotics given. Report called to Isabelle at [facility]. Safety maintained and call light within reach.

## 2024-01-24 NOTE — PROGRESS NOTES
Elgin Marin is a 83 y.o. male on day 3 of admission presenting with Hyperglycemia.      Subjective   Still has dysuria       Objective     Last Recorded Vitals  /66   Pulse 77   Temp 36.4 °C (97.5 °F)   Resp 18   Wt 87.5 kg (192 lb 14.4 oz)   SpO2 100%   Intake/Output last 3 Shifts:    Intake/Output Summary (Last 24 hours) at 1/24/2024 1340  Last data filed at 1/24/2024 0826  Gross per 24 hour   Intake 480 ml   Output 2650 ml   Net -2170 ml       Admission Weight  Weight: 82.6 kg (182 lb) (01/21/24 1301)    Daily Weight  01/21/24 : 87.5 kg (192 lb 14.4 oz)    NAD  CTA B  + LE edema    Image Results  ECG 12 lead  Atrial fibrillation  Right axis deviation  Septal infarct , age undetermined  ST & T wave abnormality, consider inferolateral ischemia or digitalis effect  Abnormal ECG  When compared with ECG of 19-MAR-2023 07:31,  Septal infarct is now Present  Inverted T waves have replaced nonspecific T wave abnormality in Inferior leads  Inverted T waves have replaced nonspecific T wave abnormality in Lateral leads  QT has lengthened  ECG 12 lead  Atrial fibrillation  Right axis deviation  Septal infarct (cited on or before 21-JAN-2024)  ST & T wave abnormality, consider inferolateral ischemia or digitalis effect  Abnormal ECG  When compared with ECG of 21-JAN-2024 14:44, (unconfirmed)  No significant change was found  ECG 12 lead  Atrial fibrillation  Right axis deviation  Septal infarct (cited on or before 21-JAN-2024)  ST & T wave abnormality, consider inferolateral ischemia or digitalis effect  Abnormal ECG  When compared with ECG of 21-JAN-2024 13:06, (unconfirmed)  No significant change was found  XR chest 1 view  Narrative: Interpreted By:  Toño Torres,   STUDY:  XR CHEST 1 VIEW;  1/22/2024 7:50 am      INDICATION:  Signs/Symptoms:evaluate for fluid overload.      COMPARISON:  Chest radiograph dated 03/17/2023.      ACCESSION NUMBER(S):  [id number]      ORDERING CLINICIAN:  NATHALIA LI       FINDINGS:  AP radiograph of the chest was provided.      DEVICES:  Intact appearing sternotomy wires. Suture anchors within the right  humerus.      CARDIOMEDIASTINAL SILHOUETTE:  Cardiomediastinal silhouette is stable in size and configuration.  Aortic atherosclerotic calcification.      LUNGS:  No focal consolidative airspace opacity. Mild reticular parenchymal  opacity within the periphery of the lower lung zones is nonspecific.  No pneumothorax. Minimal blunting of the costophrenic sulci with no  large pleural effusion.      ABDOMEN:  No remarkable upper abdominal findings.      BONES:  No acute osseous changes.      Impression: Stable cardiac postsurgical change with no focal parenchymal  consolidative opacity. Mild lower lung zone parenchymal opacity could  reflect scarring, atelectasis, or trace edema.      MACRO:  None      Signed by: Toño Torres 1/22/2024 8:00 AM  Dictation workstation:   [id number]      Physical Exam    Relevant Results             Scheduled medications  aspirin, 81 mg, oral, Daily   Or  aspirin, 150 mg, rectal, Daily  bisoprolol, 5 mg, oral, Daily  doxycylcine, 100 mg, oral, q12h BRE  insulin lispro, 0-10 Units, subcutaneous, TID with meals  latanoprost, 1 drop, Both Eyes, Nightly  pantoprazole, 40 mg, oral, Nightly  piperacillin-tazobactam, 3.375 g, intravenous, q12h  prednisoLONE acetate, 1 drop, Both Eyes, Daily  rivaroxaban, 15 mg, oral, Daily with evening meal  rosuvastatin, 20 mg, oral, Nightly  torsemide, 40 mg, oral, Daily      Continuous medications     PRN medications  PRN medications: dextrose 10 % in water (D10W), dextrose, glucagon, HYDROcodone-acetaminophen  Results for orders placed or performed during the hospital encounter of 01/21/24 (from the past 24 hour(s))   POCT GLUCOSE   Result Value Ref Range    POCT Glucose 248 (H) 74 - 99 mg/dL   POCT GLUCOSE   Result Value Ref Range    POCT Glucose 288 (H) 74 - 99 mg/dL   POCT GLUCOSE   Result Value Ref Range    POCT  Glucose 260 (H) 74 - 99 mg/dL   POCT GLUCOSE   Result Value Ref Range    POCT Glucose 270 (H) 74 - 99 mg/dL   Renal Function Panel   Result Value Ref Range    Glucose 232 (H) 74 - 99 mg/dL    Sodium 130 (L) 136 - 145 mmol/L    Potassium 3.7 3.5 - 5.3 mmol/L    Chloride 96 (L) 98 - 107 mmol/L    Bicarbonate 24 21 - 32 mmol/L    Anion Gap 14 10 - 20 mmol/L    Urea Nitrogen 81 (H) 6 - 23 mg/dL    Creatinine 2.64 (H) 0.50 - 1.30 mg/dL    eGFR 23 (L) >60 mL/min/1.73m*2    Calcium 9.0 8.6 - 10.3 mg/dL    Phosphorus 3.9 2.5 - 4.9 mg/dL    Albumin 3.5 3.4 - 5.0 g/dL   Magnesium   Result Value Ref Range    Magnesium 1.92 1.60 - 2.40 mg/dL   Lavender Top   Result Value Ref Range    Extra Tube Hold for add-ons.    POCT GLUCOSE   Result Value Ref Range    POCT Glucose 249 (H) 74 - 99 mg/dL   POCT GLUCOSE   Result Value Ref Range    POCT Glucose 317 (H) 74 - 99 mg/dL         Assessment/Plan                  Principal Problem:    Hyperglycemia    ISIAH on CKD 4  His renal function is stable overall     Renal US under Care Everywhere shows no hydronephrosis     He has minimal proteinuria     Check Vanco levels if he remains on Vanco  He can continue the PPI  BP is well controlled     Continue Torsemide     Daily labs     Dysuria  Has Enterococcus faecalis.  Await sensitivities  He is on Zosyn and Doxycycline currently.                Will Her MD

## 2024-01-24 NOTE — PROGRESS NOTES
Patient has a d/c order. Home care orders sent to [facility]. SW still to see patient today. Per resident, team is working with pharmacy to get patient an insulin he can afford.     1209- [facility] confirmed SOC for tomorrow, 1/25. Awaiting final decision from patient for d/c plan as [facility] has been assisting with possible SNF placement. Will need to notify [facility] if patient discharges to SNF.     1316- Notified by  that patient prefers to go to SNF at d/c and [facility] is [facility]. Precert was started today for SNF. Physician aware.     1340- Patient has precert for SNF. Will need a gold form, 7000, and transport. Notified [facility] that patient to d/c to SNF.     1441- Request sent to the [facility] to complete the 7000. Gold form sent to SNF.       Dede Montenegro RN

## 2024-01-24 NOTE — ED NOTES
Pt had no acute changes in condition throughout shift. Pt had complaints of pain throughout shift. Pt is in bed with call light in reach.

## 2024-01-24 NOTE — PROGRESS NOTES
"Elgin Marin is a 83 y.o. male on day 3 of admission presenting with Hyperglycemia, lower limb cellulitis    Subjective   Patient continues to have lower extremity pain, he denies chest pain, rest SOB, nausea or vomiting, no issues overnight.       Objective   Constitutional: Frail, awake/alert/oriented x3, no distress, alert and cooperative, inpain but not in resp  distress   ENMT: mucous membranes moist   Head/Neck: Neck supple, no bruit   Respiratory/Thorax: Patent airways, CTAB, normal breath sounds with good chest expansion, thorax symmetric   Cardiovascular: irregular, rate and rhythm, no murmurs, 2+ equal pulses of the extremities, normal S 1and S 2   Gastrointestinal: Nondistended, soft, non-tender, no rebound tenderness or guarding, no masses palpable, no organomegaly, +BS, no bruits   Musculoskeletal: ROM intact, no joint swelling, normal strength   Extremities: Bilateral lower extremity swelling, with erythematous warm skin, large open grade II circumferential ulcer on the  right shin, with poorly healing granulation tissue and purulent discharge, multiple smaller wounds with necrotic surfaces, left leg multiple small wounds with variable sizes and ages, draining purulent discharge, no crepitus,   Neurological: alert and oriented x3, intact senses, motor, response and reflexes, normal strength, impaired pain sensation and proprioception noted more over the right side       Last Recorded Vitals  Blood pressure 119/66, pulse 77, temperature 36.4 °C (97.5 °F), resp. rate 18, height 1.803 m (5' 11\"), weight 87.5 kg (192 lb 14.4 oz), SpO2 100 %.  Intake/Output last 3 Shifts:  I/O last 3 completed shifts:  In: 720 (8.2 mL/kg) [P.O.:720]  Out: 3350 (38.3 mL/kg) [Urine:3350 (1.1 mL/kg/hr)]  Weight: 87.5 kg     Relevant Results  Scheduled medications  aspirin, 81 mg, oral, Daily   Or  aspirin, 150 mg, rectal, Daily  bisoprolol, 5 mg, oral, Daily  doxycylcine, 100 mg, oral, q12h BRE  insulin lispro, 0-10 Units, " subcutaneous, TID with meals  latanoprost, 1 drop, Both Eyes, Nightly  pantoprazole, 40 mg, oral, Nightly  piperacillin-tazobactam, 3.375 g, intravenous, q12h  prednisoLONE acetate, 1 drop, Both Eyes, Daily  rivaroxaban, 15 mg, oral, Daily with evening meal  rosuvastatin, 20 mg, oral, Nightly  torsemide, 40 mg, oral, Daily      Continuous medications     PRN medications  PRN medications: dextrose 10 % in water (D10W), dextrose, glucagon, HYDROcodone-acetaminophen  Results from last 7 days   Lab Units 01/23/24  0643 01/22/24  0540 01/21/24  1321   WBC AUTO x10*3/uL 9.3 8.8 8.9   RBC AUTO x10*6/uL 3.62* 3.63* 3.68*   HEMOGLOBIN g/dL 10.0* 10.1* 10.3*     Results from last 7 days   Lab Units 01/24/24  0724 01/23/24  0643 01/22/24  0539 01/21/24  1321   SODIUM mmol/L 130* 128* 131* 124*   POTASSIUM mmol/L 3.7 4.0 3.5 3.7   CHLORIDE mmol/L 96* 96* 99 90*   CO2 mmol/L 24 24 22 20*   BUN mg/dL 81* 87* 91* 103*   CREATININE mg/dL 2.64* 2.93* 2.81* 3.09*   CALCIUM mg/dL 9.0 8.5* 8.7 8.2*   PHOSPHORUS mg/dL 3.9 4.0 4.5  --    MAGNESIUM mg/dL 1.92 2.08 2.09 1.80   BILIRUBIN TOTAL mg/dL  --   --   --  1.2   ALT U/L  --   --   --  25   AST U/L  --   --   --  30       No results found.     Assessment/Plan   Principal Problem:    Hyperglycemia  Lower extremity venous stasis with wounds  Lower extremity cellulitis  ISIAH/CKD stage IV resolved  An 83 y.o. male with past medical history of DMII(A1c of 9 was planned to start insulin but has not started it yet), hypertension, CAD s/p CABG, S/p cardiac cath in4/22 showed patent LIMA to LAD, occluded VG to diagonal occluded VG-OM and VG-or PDA, was medically managed, A-fib on Xarelto, CKD(baseline creatinine 2.8) chronic lower limb wounds, presented to ED with worsening bilateral lower limb pain that has been worsening over the last 4 days prior to admission, associated with subjective fever and chills associated with general fatigue.   He has long hx of recurrent lower extremity wounds  that had previously healed and recurred around 4 months prior to admission   On admission, patient was hemodynamically stable and afebrile,  O2 level was noted to be on low 90s started on NC,  WBC normal, CRP and ESR are mildly elevated x-ray showed no evidence of  gas formation, BG was noted to be high with no evidence of acidosis, and was elevated but no evidence of acute EKG changes. cr was increased from his baseline  And was admitted to the floor started on Zosyn and vancomycin subsequently switched to doxycycline given his impaired kidney function. Patient will be discharged on Doxycycline for 7 more days  Nephrology and podiatry are consulted.  Diuretics will initially held for the concern of overdiuresis and prerenal ISIAH, which has improved from admission.  Torsemide 40 mg is resumed on discharge  Wound care is consulted, wound and blood cultures are negative thus far    DM II  -Patient has long standing hx of DM II, insulin has not been started yet  -On admission BG was  487, with no evidence of acidosis  -BG improved, patient will be discharged on insulin, decided to start him on NPH as its more affordable    Troponemia 2/2 to CKD vs NSTEMI vs HFrEF exacerbation  #HFrEF with EF 40-45% (echo 3/18/2023)  #Hx CAD s/p CABG x6 and PCI with stents x3  -Chronic Afib  -He reports exertional SOB, no chest pain, Troponin 169 -> 180 -> 204to 142, EKG no evdince of new ST T changes  - x-ray: no focal parenchymal consolidative opacities. Mild lower lung zone parenchymal opacity could reflect scarring, atelectasis, or trace edema  -He is on medical tx  -Digoxin is stopped  -Continue xarelto and Bisoprolol     Disposition: Patient agreed to go to SNF.    Assessment & plan discussed with attending physician               Lacey Salgado MD

## 2024-01-24 NOTE — PROGRESS NOTES
"Elgin Marin is a 83 y.o. male on day 3 of admission presenting with Hyperglycemia.    Subjective   Pt examined and evaluated at bedside, found resting comfortably.  Pt states that their pain is well controlled, denies any constitutional symptoms, and has no other complaints at this time.        Objective     Vascular: DP and PT pulses  palpable 2/4 bilaterally.  CFT under 5 seconds when tested at distal digits.  Skin temp warm to warm from proximal to distal.  +2 pitting edema noted to BLE     Neuro: Protective sensation diminished, light tough sensation intact.  No Tinel's or Valleix's signs elicited.       Derm: Venous stasis dermatitis with hemosiderin depositing noted to BLE.  Full-thickness ulceration noted to right medial and lateral lower leg to subcutaneous tissue.  No exposed fascia or bone noted.  Mild serosanguineous drainage, no maceration.  Medial wound base is nearly 70% granular, 30% fibrotic.  Lateral wound base is 80% granular, 20% fibrotic.  Diffuse stable eschars noted to BLE with minimal drainage.  Erythema noted to BLE, right more pronounced.  No proximal streaking, calor, purulence, undermining, or tunneling noted to any wound at this time.  No subcutaneous nodules.  Interdigital spaces are CDI.     Musc: No gross deformities noted.  POP noted to wounds.  No POP noted to any other area of the foot/ankle.       Last Recorded Vitals  Blood pressure 119/66, pulse 77, temperature 36.4 °C (97.5 °F), resp. rate 18, height 1.803 m (5' 11\"), weight 87.5 kg (192 lb 14.4 oz), SpO2 100 %.  Intake/Output last 3 Shifts:  I/O last 3 completed shifts:  In: 720 (8.2 mL/kg) [P.O.:720]  Out: 3350 (38.3 mL/kg) [Urine:3350 (1.1 mL/kg/hr)]  Weight: 87.5 kg     Relevant Results  Results for orders placed or performed during the hospital encounter of 01/21/24 (from the past 24 hour(s))   POCT GLUCOSE   Result Value Ref Range    POCT Glucose 248 (H) 74 - 99 mg/dL   POCT GLUCOSE   Result Value Ref Range    POCT " Glucose 288 (H) 74 - 99 mg/dL   POCT GLUCOSE   Result Value Ref Range    POCT Glucose 260 (H) 74 - 99 mg/dL   POCT GLUCOSE   Result Value Ref Range    POCT Glucose 270 (H) 74 - 99 mg/dL   Renal Function Panel   Result Value Ref Range    Glucose 232 (H) 74 - 99 mg/dL    Sodium 130 (L) 136 - 145 mmol/L    Potassium 3.7 3.5 - 5.3 mmol/L    Chloride 96 (L) 98 - 107 mmol/L    Bicarbonate 24 21 - 32 mmol/L    Anion Gap 14 10 - 20 mmol/L    Urea Nitrogen 81 (H) 6 - 23 mg/dL    Creatinine 2.64 (H) 0.50 - 1.30 mg/dL    eGFR 23 (L) >60 mL/min/1.73m*2    Calcium 9.0 8.6 - 10.3 mg/dL    Phosphorus 3.9 2.5 - 4.9 mg/dL    Albumin 3.5 3.4 - 5.0 g/dL   Magnesium   Result Value Ref Range    Magnesium 1.92 1.60 - 2.40 mg/dL   Lavender Top   Result Value Ref Range    Extra Tube Hold for add-ons.    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  All findings discussed to patient to their satisfaction and understanding.  - Reviewed labs and chart.  Wound culture obtained from right lateral lower leg wound, pending.  No gas or OM on radiographs, low suspicion of abscess or OM.  No surgical intervention planned at this time.  - Systemic conditions managed by primary team.  -Erythema to BLE likely a component of cellulitis with venous stasis dermatitis.  - Performed dressing change consisting of Aquacel Ag to subcutaneous wounds, Betadine paint, 4 x 4 gauze, Kerlix, and 6 inch Ace to BLE.  Dressings to be changed every 1 to 2 days by podiatry.  - HgA1c noted to be 9.7% on 1/21/24.  - No contraindication to discharge from podiatry standpoint.  Pt to follow up with established wound care provider (PCP,   Youseoli) upon discharge per patient request.  Will continue to follow inpatient.  Please contact podiatry with any acute questions/concerns.         Cheikh Hidalgo DPM  Podiatric Surgery  [facility]      I spent >30 minutes in the professional and overall care of this patient.        Cheikh Hidalgo DPM

## 2024-01-25 VITALS
TEMPERATURE: 98.1 F | OXYGEN SATURATION: 97 % | HEIGHT: 71 IN | HEART RATE: 82 BPM | DIASTOLIC BLOOD PRESSURE: 87 MMHG | RESPIRATION RATE: 18 BRPM | WEIGHT: 192.9 LBS | BODY MASS INDEX: 27.01 KG/M2 | SYSTOLIC BLOOD PRESSURE: 149 MMHG

## 2024-01-25 LAB
BACTERIA BLD CULT: NORMAL
BACTERIA BLD CULT: NORMAL
BACTERIA UR CULT: ABNORMAL
GLUCOSE BLD MANUAL STRIP-MCNC: 289 MG/DL (ref 74–99)
GLUCOSE BLD MANUAL STRIP-MCNC: 351 MG/DL (ref 74–99)

## 2024-01-25 PROCEDURE — 82947 ASSAY GLUCOSE BLOOD QUANT: CPT

## 2024-01-25 PROCEDURE — 2500000001 HC RX 250 WO HCPCS SELF ADMINISTERED DRUGS (ALT 637 FOR MEDICARE OP): Performed by: STUDENT IN AN ORGANIZED HEALTH CARE EDUCATION/TRAINING PROGRAM

## 2024-01-25 PROCEDURE — 2500000001 HC RX 250 WO HCPCS SELF ADMINISTERED DRUGS (ALT 637 FOR MEDICARE OP)

## 2024-01-25 PROCEDURE — 2500000002 HC RX 250 W HCPCS SELF ADMINISTERED DRUGS (ALT 637 FOR MEDICARE OP, ALT 636 FOR OP/ED)

## 2024-01-25 RX ADMIN — INSULIN LISPRO 10 UNITS: 100 INJECTION, SOLUTION INTRAVENOUS; SUBCUTANEOUS at 09:04

## 2024-01-25 RX ADMIN — ASPIRIN 81 MG CHEWABLE TABLET 81 MG: 81 TABLET CHEWABLE at 09:05

## 2024-01-25 RX ADMIN — DOXYCYCLINE HYCLATE 100 MG: 100 CAPSULE ORAL at 09:04

## 2024-01-25 RX ADMIN — TORSEMIDE 40 MG: 20 TABLET ORAL at 09:04

## 2024-01-25 RX ADMIN — BISOPROLOL FUMARATE 5 MG: 5 TABLET, FILM COATED ORAL at 09:04

## 2024-01-25 ASSESSMENT — PAIN SCALES - GENERAL: PAINLEVEL_OUTOF10: 0 - NO PAIN

## 2024-01-25 ASSESSMENT — COGNITIVE AND FUNCTIONAL STATUS - GENERAL
WALKING IN HOSPITAL ROOM: A LOT
MOVING FROM LYING ON BACK TO SITTING ON SIDE OF FLAT BED WITH BEDRAILS: A LITTLE
MOVING TO AND FROM BED TO CHAIR: A LOT
MOBILITY SCORE: 13
STANDING UP FROM CHAIR USING ARMS: A LOT
CLIMB 3 TO 5 STEPS WITH RAILING: A LOT
TURNING FROM BACK TO SIDE WHILE IN FLAT BAD: A LOT

## 2024-01-25 NOTE — NURSING NOTE
1900    Patient upset d/t losing contact lens. Patient reporting set down contact lens on table and can not find lens. Staff searched room several times and unable to find lens    2300    Patient DC postponed d/t transport availability. Patient refusing to be Dc'd tonight and went to bed for the even. DC re-sched 9am 1/25/24 per Patient request. Receiving facility updated of new DC time and date    0321    Ambulance service reports availability for DC now and patient refusing to DC to facility at this time and would like to go later in the morning

## 2024-01-25 NOTE — NURSING NOTE
Patient alert and oriented x3. Patient ambulatory in room with staff assistance and walker. Patient upset this shift as he misplaced his contact lens and not able to locate it. Patient continent at times but has trouble urinating in urinal or making it to the bedside commode before being incontinent. Bilateral legs dressing remain clean dry and intact. Patient spent time in the chair this even with legs elevated and spent rest of the evening in bed. Transport re-sched to DC facility 9am 1/25/24 per patient request. Patient decline to be DC'd when ambulance service available during the night

## 2024-01-25 NOTE — PROGRESS NOTES
Transport was delayed yesterday evening and is rescheduled for 9am today. SW met with patient at bedside and patient is still agreeable to d/c to [facility].       Dede Montenegro RN

## 2024-01-25 NOTE — NURSING NOTE
Pt refusing to go to facility at this time and wants to go home. Teaching service and Dr Hidalgo both have talked to patient to try and educate on importance of going to facility but he still is wanting to go home. Jaxon Collins is to come see patient to discuss dc planning as well.

## 2024-01-25 NOTE — PROGRESS NOTES
"Elgin Marin is a 83 y.o. male on day 4 of admission presenting with Hyperglycemia.    Subjective   Pt examined and evaluated at bedside, found resting comfortably.  Pt states that he lost his custom contact lens yesterday, and could not find it.  Pt states that he is going to go home instead of SNF, since he can't see.  Pt states that their pain is well controlled, denies any constitutional symptoms, and has no other complaints at this time.        Objective     Vascular: DP and PT pulses  palpable 2/4 bilaterally.  CFT under 5 seconds when tested at distal digits.  Skin temp warm to warm from proximal to distal.  +2 pitting edema noted to BLE     Neuro: Protective sensation diminished, light tough sensation intact.  No Tinel's or Valleix's signs elicited.       Derm: Venous stasis dermatitis with hemosiderin depositing noted to BLE.  Full-thickness ulceration noted to right medial and lateral lower leg to subcutaneous tissue.  No exposed fascia or bone noted.  Mild serosanguineous drainage, no maceration.  Medial wound base is nearly 70% granular, 30% fibrotic.  Lateral wound base is 80% granular, 20% fibrotic.  Diffuse stable eschars noted to BLE with minimal drainage.  Erythema noted to BLE, right more pronounced.  No proximal streaking, calor, purulence, undermining, or tunneling noted to any wound at this time.  No subcutaneous nodules.  Interdigital spaces are CDI.     Musc: No gross deformities noted.  POP noted to wounds.  No POP noted to any other area of the foot/ankle.       Last Recorded Vitals  Blood pressure 147/66, pulse 63, temperature 36.9 °C (98.4 °F), temperature source Temporal, resp. rate 18, height 1.803 m (5' 11\"), weight 87.5 kg (192 lb 14.4 oz), SpO2 98 %.  Intake/Output last 3 Shifts:  I/O last 3 completed shifts:  In: 1320 (15.1 mL/kg) [P.O.:1320]  Out: 1525 (17.4 mL/kg) [Urine:1525 (0.5 mL/kg/hr)]  Weight: 87.5 kg     Relevant Results  Results for orders placed or performed during " the hospital encounter of 01/21/24 (from the past 24 hour(s))   POCT GLUCOSE   Result Value Ref Range    POCT Glucose 317 (H) 74 - 99 mg/dL   POCT GLUCOSE   Result Value Ref Range    POCT Glucose 175 (H) 74 - 99 mg/dL   POCT GLUCOSE   Result Value Ref Range    POCT Glucose 301 (H) 74 - 99 mg/dL   POCT GLUCOSE   Result Value Ref Range    POCT Glucose 289 (H) 74 - 99 mg/dL                            Assessment/Plan   Assessment:  - Cellulitis, BLE  - 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  All findings discussed to patient to their satisfaction and understanding.  - Reviewed labs and chart.  Wound culture shows rare mixed microorganisms.  No gas or OM on radiographs, low suspicion of abscess or OM.  No surgical intervention planned at this time.  - Discussed importance of SNF, especially after losing contact lens.  Pt will likely require more help with decreased vision, and would have help available at SNF that may not be readily available at home.  Pt to consider.  - Systemic conditions managed by primary team.  - Erythema to BLE likely a component of cellulitis with venous stasis dermatitis.  - Performed dressing change consisting of Aquacel Ag to subcutaneous wounds, Betadine paint, 4 x 4 gauze, Kerlix, and 6 inch Ace to BLE.  Dressings to be changed every 1 to 2 days by podiatry.  - HgA1c noted to be 9.7% on 1/21/24.  - No contraindication to discharge from podiatry standpoint.  Pt to follow up with established wound care provider (PCP, Dr. Nugent) upon discharge per patient request.  Will continue to follow inpatient.  Please contact podiatry with any acute questions/concerns.         Cheikh Hidalgo DPM  Podiatric Surgery  [facility] DOMINGUEZ spent >30 minutes in the professional and overall care of this patient.        Cheikh Hidalgo DPM

## 2024-01-25 NOTE — PROGRESS NOTES
Met with the pt and he is still agreeable to go to [facility] and nursing today. He states that he lost his specialty contact yesterday and cannot see and he does not remember the name of the physician who is located at [facility] office.  Called  ophthalmology. The pt saw Dr. Elgin Calixto but it was more than 2 years ago so he will need to make an appointment to see the Dr. His office number is [phone] and the scheduling line is [phone]. Provided this info to the pt. Also, asked [facility] to assist the pt with making the appointment.       Jaxon Banks

## 2024-01-25 NOTE — CARE PLAN
The patient's goals for the shift include      The clinical goals for the shift include see plan of care    Problem: Pain  Goal: Takes deep breaths with improved pain control throughout the shift  Outcome: Met  Goal: Turns in bed with improved pain control throughout the shift  Outcome: Met  Goal: Walks with improved pain control throughout the shift  Outcome: Met  Goal: Performs ADL's with improved pain control throughout shift  Outcome: Met  Goal: Participates in PT with improved pain control throughout the shift  Outcome: Met  Goal: Free from opioid side effects throughout the shift  Outcome: Met  Goal: Free from acute confusion related to pain meds throughout the shift  Outcome: Met     Problem: Safety - Adult  Goal: Free from fall injury  Outcome: Progressing     Problem: Discharge Planning  Goal: Discharge to home or other facility with appropriate resources  Outcome: Progressing     Problem: Chronic Conditions and Co-morbidities  Goal: Patient's chronic conditions and co-morbidity symptoms are monitored and maintained or improved  Outcome: Progressing     Problem: Skin  Goal: Decreased wound size/increased tissue granulation at next dressing change  Outcome: Progressing  Goal: Participates in plan/prevention/treatment measures  Outcome: Progressing  Goal: Prevent/manage excess moisture  Outcome: Progressing  Goal: Prevent/minimize sheer/friction injuries  Outcome: Progressing  Goal: Promote/optimize nutrition  Outcome: Progressing  Goal: Promote skin healing  Outcome: Progressing

## 2024-01-31 LAB
ATRIAL RATE: 357 BPM
ATRIAL RATE: 63 BPM
ATRIAL RATE: 75 BPM
Q ONSET: 212 MS
Q ONSET: 224 MS
Q ONSET: 225 MS
QRS COUNT: 10 BEATS
QRS COUNT: 11 BEATS
QRS COUNT: 13 BEATS
QRS DURATION: 102 MS
QRS DURATION: 104 MS
QRS DURATION: 96 MS
QT INTERVAL: 400 MS
QT INTERVAL: 404 MS
QT INTERVAL: 434 MS
QTC CALCULATION(BAZETT): 426 MS
QTC CALCULATION(BAZETT): 434 MS
QTC CALCULATION(BAZETT): 472 MS
QTC FREDERICIA: 419 MS
QTC FREDERICIA: 434 MS
QTC FREDERICIA: 447 MS
R AXIS: 110 DEGREES
R AXIS: 111 DEGREES
R AXIS: 112 DEGREES
T AXIS: 203 DEGREES
T AXIS: 242 DEGREES
T AXIS: 253 DEGREES
T OFFSET: 412 MS
T OFFSET: 426 MS
T OFFSET: 442 MS
VENTRICULAR RATE: 60 BPM
VENTRICULAR RATE: 67 BPM
VENTRICULAR RATE: 84 BPM

## 2024-02-17 DIAGNOSIS — Z00.00 ENCOUNTER FOR GENERAL ADULT MEDICAL EXAMINATION WITHOUT ABNORMAL FINDINGS: ICD-10-CM

## 2024-02-19 ENCOUNTER — TELEPHONE (OUTPATIENT)
Dept: OPHTHALMOLOGY | Facility: CLINIC | Age: 84
End: 2024-02-19
Payer: MEDICARE

## 2024-02-19 RX ORDER — HYDRALAZINE HYDROCHLORIDE 50 MG/1
50 TABLET, FILM COATED ORAL 2 TIMES DAILY
Qty: 180 TABLET | Refills: 3 | Status: SHIPPED | OUTPATIENT
Start: 2024-02-19

## 2024-03-07 ENCOUNTER — TELEPHONE (OUTPATIENT)
Dept: OPHTHALMOLOGY | Facility: CLINIC | Age: 84
End: 2024-03-07
Payer: MEDICARE

## 2024-03-07 NOTE — TELEPHONE ENCOUNTER
Left a detailed VM for Duane.  Elgin needs to be seen in order for the CL to be covered by insurance.  Last exam was 2019.  I messaged the  to get him in ASAP.

## 2024-03-18 ENCOUNTER — APPOINTMENT (OUTPATIENT)
Dept: OPHTHALMOLOGY | Facility: CLINIC | Age: 84
End: 2024-03-18
Payer: MEDICARE

## 2024-04-09 ENCOUNTER — TELEPHONE (OUTPATIENT)
Dept: CARDIOLOGY | Facility: CLINIC | Age: 84
End: 2024-04-09
Payer: MEDICARE

## 2024-04-09 NOTE — TELEPHONE ENCOUNTER
Elgin called in and was telling me when he was in the ED they told him to stop taking the Xarelto and started taking Eliquis  but didn't see it the notes or discharge summary.

## 2024-04-09 NOTE — TELEPHONE ENCOUNTER
I have called the patient and Elgin also his son Duane  has verbalized under standing of instructions

## 2024-04-09 NOTE — TELEPHONE ENCOUNTER
I Have scanned in discharge summary that the [facility] has fax over saying that he should be on 2.5mg eliquis from [facility].

## 2024-04-12 NOTE — TELEPHONE ENCOUNTER
I left a message for Duane stating that we only have 5 mg tablets of Eliquis.  [facility] will have to cut them in half if he's able, if not  I asked Duane to call me back.

## 2024-05-01 ENCOUNTER — TELEPHONE (OUTPATIENT)
Dept: CARDIOLOGY | Facility: CLINIC | Age: 84
End: 2024-05-01
Payer: MEDICARE

## 2024-05-01 NOTE — TELEPHONE ENCOUNTER
Mr. Marin is asking if Eliquis could give him a rash.  It started on his hands & wrist and now on Chest and stomach.  It started after he began the Eliquis.

## 2024-05-03 NOTE — TELEPHONE ENCOUNTER
I spoke with Duane (pt's son) and he tells me that a nurse was there to see Mr. Marin because of the rash.  Was prescribed a low dose steroid and a cream.  Rash is better than a week ago and seems to come and go.  Was also told to take OTC Benadryl.      Insulin dose was changed as well.

## 2024-05-29 ENCOUNTER — APPOINTMENT (OUTPATIENT)
Dept: OPHTHALMOLOGY | Facility: CLINIC | Age: 84
End: 2024-05-29
Payer: MEDICARE

## 2024-06-07 RX ORDER — ACETAMINOPHEN 500 MG
1000 TABLET ORAL EVERY 12 HOURS PRN
COMMUNITY

## 2024-09-18 ENCOUNTER — APPOINTMENT (OUTPATIENT)
Dept: OPHTHALMOLOGY | Facility: CLINIC | Age: 84
End: 2024-09-18
Payer: COMMERCIAL

## 2024-10-09 ENCOUNTER — TELEPHONE (OUTPATIENT)
Dept: CARDIOLOGY | Facility: CLINIC | Age: 84
End: 2024-10-09
Payer: MEDICARE

## 2024-10-09 DIAGNOSIS — I48.91 ATRIAL FIBRILLATION, UNSPECIFIED TYPE (MULTI): Primary | ICD-10-CM

## 2024-10-09 NOTE — TELEPHONE ENCOUNTER
Patient son called requesting:    Eliquis 2.5 mg, 1 tablet twice daily.     Patient has # 20 remaining.     LOV 1-15-24.    No future visits scheduled.    [facility] pharmacy.     Please advise.

## 2024-10-17 ENCOUNTER — TELEPHONE (OUTPATIENT)
Dept: CARDIOLOGY | Facility: CLINIC | Age: 84
End: 2024-10-17
Payer: MEDICARE

## 2024-10-17 DIAGNOSIS — I48.91 ATRIAL FIBRILLATION, UNSPECIFIED TYPE (MULTI): ICD-10-CM

## 2024-10-17 NOTE — TELEPHONE ENCOUNTER
apixaban (Eliquis) 2.5 mg tablet  was called in but he needs 30 day supply instead of 90 day supply to [facility] in [employer] [address].

## 2024-11-10 ENCOUNTER — APPOINTMENT (OUTPATIENT)
Dept: CARDIOLOGY | Facility: HOSPITAL | Age: 84
DRG: 280 | End: 2024-11-10
Payer: MEDICARE

## 2024-11-10 ENCOUNTER — APPOINTMENT (OUTPATIENT)
Dept: RADIOLOGY | Facility: HOSPITAL | Age: 84
DRG: 280 | End: 2024-11-10
Payer: MEDICARE

## 2024-11-10 ENCOUNTER — HOSPITAL ENCOUNTER (OUTPATIENT)
Facility: HOSPITAL | Age: 84
Setting detail: OBSERVATION
DRG: 280 | End: 2024-11-10
Attending: EMERGENCY MEDICINE | Admitting: INTERNAL MEDICINE
Payer: MEDICARE

## 2024-11-10 VITALS
HEIGHT: 70 IN | BODY MASS INDEX: 30.3 KG/M2 | SYSTOLIC BLOOD PRESSURE: 163 MMHG | WEIGHT: 211.64 LBS | OXYGEN SATURATION: 95 % | HEART RATE: 54 BPM | DIASTOLIC BLOOD PRESSURE: 77 MMHG | RESPIRATION RATE: 32 BRPM | TEMPERATURE: 97.5 F

## 2024-11-10 DIAGNOSIS — N18.30 TYPE 2 DIABETES MELLITUS WITH STAGE 3 CHRONIC KIDNEY DISEASE, WITH LONG-TERM CURRENT USE OF INSULIN, UNSPECIFIED WHETHER STAGE 3A OR 3B CKD (MULTI): ICD-10-CM

## 2024-11-10 DIAGNOSIS — R06.09 DYSPNEA ON EXERTION: ICD-10-CM

## 2024-11-10 DIAGNOSIS — S91.309A WOUND OF FOOT: ICD-10-CM

## 2024-11-10 DIAGNOSIS — E11.22 TYPE 2 DIABETES MELLITUS WITH STAGE 3 CHRONIC KIDNEY DISEASE, WITH LONG-TERM CURRENT USE OF INSULIN, UNSPECIFIED WHETHER STAGE 3A OR 3B CKD (MULTI): ICD-10-CM

## 2024-11-10 DIAGNOSIS — I50.42 CHRONIC COMBINED SYSTOLIC AND DIASTOLIC HEART FAILURE: ICD-10-CM

## 2024-11-10 DIAGNOSIS — Z79.4 TYPE 2 DIABETES MELLITUS WITH STAGE 3 CHRONIC KIDNEY DISEASE, WITH LONG-TERM CURRENT USE OF INSULIN, UNSPECIFIED WHETHER STAGE 3A OR 3B CKD (MULTI): ICD-10-CM
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
## 2024-11-10 DIAGNOSIS — I10 ESSENTIAL HYPERTENSION, BENIGN: ICD-10-CM

## 2024-11-10 PROBLEM — J81.0 ACUTE PULMONARY EDEMA: Status: ACTIVE | Noted: 2024-11-10

## 2024-11-10 PROBLEM — R79.89 ELEVATED TROPONIN: Status: ACTIVE | Noted: 2024-11-10

## 2024-11-10 PROBLEM — R26.2 UNABLE TO WALK: Status: ACTIVE | Noted: 2024-11-10

## 2024-11-10 LAB
ALBUMIN SERPL BCP-MCNC: 3 G/DL (ref 3.4–5)
ALP SERPL-CCNC: 102 U/L (ref 33–136)
ALT SERPL W P-5'-P-CCNC: 11 U/L (ref 10–52)
ANION GAP SERPL CALC-SCNC: 11 MMOL/L (ref 10–20)
APPEARANCE UR: CLEAR
AST SERPL W P-5'-P-CCNC: 23 U/L (ref 9–39)
BASOPHILS # BLD AUTO: 0.05 X10*3/UL (ref 0–0.1)
BASOPHILS NFR BLD AUTO: 0.8 %
BILIRUB SERPL-MCNC: 1.3 MG/DL (ref 0–1.2)
BILIRUB UR STRIP.AUTO-MCNC: NEGATIVE MG/DL
BNP SERPL-MCNC: 711 PG/ML (ref 0–99)
BUN SERPL-MCNC: 46 MG/DL (ref 6–23)
CALCIUM SERPL-MCNC: 8.4 MG/DL (ref 8.6–10.3)
CARDIAC TROPONIN I PNL SERPL HS: 161 NG/L (ref 0–20)
CARDIAC TROPONIN I PNL SERPL HS: 162 NG/L (ref 0–20)
CHLORIDE SERPL-SCNC: 101 MMOL/L (ref 98–107)
CO2 SERPL-SCNC: 30 MMOL/L (ref 21–32)
COLOR UR: NORMAL
CREAT SERPL-MCNC: 2.08 MG/DL (ref 0.5–1.3)
EGFRCR SERPLBLD CKD-EPI 2021: 30 ML/MIN/1.73M*2
EOSINOPHIL # BLD AUTO: 0.07 X10*3/UL (ref 0–0.4)
EOSINOPHIL NFR BLD AUTO: 1.1 %
ERYTHROCYTE [DISTWIDTH] IN BLOOD BY AUTOMATED COUNT: 16.3 % (ref 11.5–14.5)
GLUCOSE SERPL-MCNC: 63 MG/DL (ref 74–99)
GLUCOSE UR STRIP.AUTO-MCNC: NORMAL MG/DL
HCT VFR BLD AUTO: 39.5 % (ref 41–52)
HGB BLD-MCNC: 11.9 G/DL (ref 13.5–17.5)
IMM GRANULOCYTES # BLD AUTO: 0.03 X10*3/UL (ref 0–0.5)
IMM GRANULOCYTES NFR BLD AUTO: 0.5 % (ref 0–0.9)
KETONES UR STRIP.AUTO-MCNC: NEGATIVE MG/DL
LEUKOCYTE ESTERASE UR QL STRIP.AUTO: NEGATIVE
LYMPHOCYTES # BLD AUTO: 0.78 X10*3/UL (ref 0.8–3)
LYMPHOCYTES NFR BLD AUTO: 12.2 %
MCH RBC QN AUTO: 27.7 PG (ref 26–34)
MCHC RBC AUTO-ENTMCNC: 30.1 G/DL (ref 32–36)
MCV RBC AUTO: 92 FL (ref 80–100)
MONOCYTES # BLD AUTO: 0.44 X10*3/UL (ref 0.05–0.8)
MONOCYTES NFR BLD AUTO: 6.9 %
NEUTROPHILS # BLD AUTO: 5.04 X10*3/UL (ref 1.6–5.5)
NEUTROPHILS NFR BLD AUTO: 78.5 %
NITRITE UR QL STRIP.AUTO: NEGATIVE
NRBC BLD-RTO: 0 /100 WBCS (ref 0–0)
PH UR STRIP.AUTO: 5 [PH]
PLATELET # BLD AUTO: 169 X10*3/UL (ref 150–450)
POTASSIUM SERPL-SCNC: 3.9 MMOL/L (ref 3.5–5.3)
PROT SERPL-MCNC: 6.2 G/DL (ref 6.4–8.2)
PROT UR STRIP.AUTO-MCNC: NEGATIVE MG/DL
RBC # BLD AUTO: 4.3 X10*6/UL (ref 4.5–5.9)
RBC # UR STRIP.AUTO: NEGATIVE /UL
SODIUM SERPL-SCNC: 138 MMOL/L (ref 136–145)
SP GR UR STRIP.AUTO: 1.01
UROBILINOGEN UR STRIP.AUTO-MCNC: NORMAL MG/DL
WBC # BLD AUTO: 6.4 X10*3/UL (ref 4.4–11.3)

## 2024-11-10 PROCEDURE — 93010 ELECTROCARDIOGRAM REPORT: CPT | Performed by: INTERNAL MEDICINE

## 2024-11-10 PROCEDURE — 99223 1ST HOSP IP/OBS HIGH 75: CPT | Performed by: INTERNAL MEDICINE

## 2024-11-10 PROCEDURE — G0378 HOSPITAL OBSERVATION PER HR: HCPCS

## 2024-11-10 PROCEDURE — 70450 CT HEAD/BRAIN W/O DYE: CPT | Performed by: RADIOLOGY

## 2024-11-10 PROCEDURE — 2500000004 HC RX 250 GENERAL PHARMACY W/ HCPCS (ALT 636 FOR OP/ED)

## 2024-11-10 PROCEDURE — 99285 EMERGENCY DEPT VISIT HI MDM: CPT | Mod: 25

## 2024-11-10 PROCEDURE — 70450 CT HEAD/BRAIN W/O DYE: CPT

## 2024-11-10 PROCEDURE — 99285 EMERGENCY DEPT VISIT HI MDM: CPT | Performed by: EMERGENCY MEDICINE

## 2024-11-10 PROCEDURE — 93010 ELECTROCARDIOGRAM REPORT: CPT | Performed by: EMERGENCY MEDICINE

## 2024-11-10 PROCEDURE — 83880 ASSAY OF NATRIURETIC PEPTIDE: CPT | Performed by: EMERGENCY MEDICINE

## 2024-11-10 PROCEDURE — 96374 THER/PROPH/DIAG INJ IV PUSH: CPT

## 2024-11-10 PROCEDURE — 36415 COLL VENOUS BLD VENIPUNCTURE: CPT

## 2024-11-10 PROCEDURE — 84484 ASSAY OF TROPONIN QUANT: CPT

## 2024-11-10 PROCEDURE — 72125 CT NECK SPINE W/O DYE: CPT

## 2024-11-10 PROCEDURE — G0390 TRAUMA RESPONS W/HOSP CRITI: HCPCS

## 2024-11-10 PROCEDURE — 2500000002 HC RX 250 W HCPCS SELF ADMINISTERED DRUGS (ALT 637 FOR MEDICARE OP, ALT 636 FOR OP/ED): Performed by: INTERNAL MEDICINE

## 2024-11-10 PROCEDURE — 81003 URINALYSIS AUTO W/O SCOPE: CPT

## 2024-11-10 PROCEDURE — 80053 COMPREHEN METABOLIC PANEL: CPT

## 2024-11-10 PROCEDURE — 2500000001 HC RX 250 WO HCPCS SELF ADMINISTERED DRUGS (ALT 637 FOR MEDICARE OP): Performed by: INTERNAL MEDICINE

## 2024-11-10 PROCEDURE — 72125 CT NECK SPINE W/O DYE: CPT | Performed by: RADIOLOGY

## 2024-11-10 PROCEDURE — 93005 ELECTROCARDIOGRAM TRACING: CPT

## 2024-11-10 PROCEDURE — 85025 COMPLETE CBC W/AUTO DIFF WBC: CPT

## 2024-11-10 PROCEDURE — 2500000002 HC RX 250 W HCPCS SELF ADMINISTERED DRUGS (ALT 637 FOR MEDICARE OP, ALT 636 FOR OP/ED)

## 2024-11-10 PROCEDURE — 71045 X-RAY EXAM CHEST 1 VIEW: CPT

## 2024-11-10 PROCEDURE — 71045 X-RAY EXAM CHEST 1 VIEW: CPT | Performed by: RADIOLOGY

## 2024-11-10 RX ORDER — ONDANSETRON HYDROCHLORIDE 2 MG/ML
4 INJECTION, SOLUTION INTRAVENOUS EVERY 6 HOURS PRN
Status: ACTIVE | OUTPATIENT
Start: 2024-11-10

## 2024-11-10 RX ORDER — DEXTROSE 50 % IN WATER (D50W) INTRAVENOUS SYRINGE
25
Status: ACTIVE | OUTPATIENT
Start: 2024-11-10

## 2024-11-10 RX ORDER — DEXTROSE 50 % IN WATER (D50W) INTRAVENOUS SYRINGE
12.5
Status: ACTIVE | OUTPATIENT
Start: 2024-11-10

## 2024-11-10 RX ORDER — PANTOPRAZOLE SODIUM 40 MG/1
40 TABLET, DELAYED RELEASE ORAL NIGHTLY
Status: DISPENSED | OUTPATIENT
Start: 2024-11-10

## 2024-11-10 RX ORDER — POTASSIUM CHLORIDE 20 MEQ/1
40 TABLET, EXTENDED RELEASE ORAL ONCE
Status: COMPLETED | OUTPATIENT
Start: 2024-11-10 | End: 2024-11-10

## 2024-11-10 RX ORDER — FUROSEMIDE 10 MG/ML
80 INJECTION INTRAMUSCULAR; INTRAVENOUS ONCE
Status: COMPLETED | OUTPATIENT
Start: 2024-11-10 | End: 2024-11-10

## 2024-11-10 RX ORDER — ACETAMINOPHEN 325 MG/1
975 TABLET ORAL EVERY 8 HOURS PRN
Status: ACTIVE | OUTPATIENT
Start: 2024-11-10

## 2024-11-10 RX ORDER — TORSEMIDE 20 MG/1
80 TABLET ORAL DAILY
Status: ACTIVE | OUTPATIENT
Start: 2024-11-11

## 2024-11-10 RX ORDER — TALC
9 POWDER (GRAM) TOPICAL NIGHTLY PRN
Status: ACTIVE | OUTPATIENT
Start: 2024-11-10

## 2024-11-10 RX ORDER — INSULIN LISPRO 100 [IU]/ML
0-10 INJECTION, SOLUTION INTRAVENOUS; SUBCUTANEOUS
Status: ACTIVE | OUTPATIENT
Start: 2024-11-10

## 2024-11-10 RX ORDER — TORSEMIDE 20 MG/1
50 TABLET ORAL DAILY
Status: ON HOLD | COMMUNITY

## 2024-11-10 RX ORDER — ROSUVASTATIN CALCIUM 20 MG/1
20 TABLET, COATED ORAL NIGHTLY
Status: DISPENSED | OUTPATIENT
Start: 2024-11-10

## 2024-11-10 RX ORDER — INSULIN GLARGINE 100 [IU]/ML
15 INJECTION, SOLUTION SUBCUTANEOUS 2 TIMES DAILY
Status: ON HOLD | COMMUNITY

## 2024-11-10 RX ORDER — BISOPROLOL FUMARATE 5 MG/1
5 TABLET, FILM COATED ORAL DAILY
Status: DISPENSED | OUTPATIENT
Start: 2024-11-10

## 2024-11-10 RX ORDER — INSULIN LISPRO 100 [IU]/ML
5 INJECTION, SOLUTION INTRAVENOUS; SUBCUTANEOUS
Status: ON HOLD | COMMUNITY

## 2024-11-10 RX ORDER — INSULIN GLARGINE 100 [IU]/ML
8 INJECTION, SOLUTION SUBCUTANEOUS 2 TIMES DAILY
Status: DISPENSED | OUTPATIENT
Start: 2024-11-10

## 2024-11-10 RX ADMIN — POTASSIUM CHLORIDE 40 MEQ: 1500 TABLET, EXTENDED RELEASE ORAL at 16:24

## 2024-11-10 RX ADMIN — FUROSEMIDE 80 MG: 10 INJECTION, SOLUTION INTRAMUSCULAR; INTRAVENOUS at 16:24

## 2024-11-10 RX ADMIN — BISOPROLOL FUMARATE 5 MG: 5 TABLET, FILM COATED ORAL at 20:30

## 2024-11-10 RX ADMIN — PANTOPRAZOLE SODIUM 40 MG: 40 TABLET, DELAYED RELEASE ORAL at 20:28

## 2024-11-10 RX ADMIN — ROSUVASTATIN CALCIUM 20 MG: 20 TABLET, FILM COATED ORAL at 20:28

## 2024-11-10 RX ADMIN — INSULIN GLARGINE 8 UNITS: 100 INJECTION, SOLUTION SUBCUTANEOUS at 20:28

## 2024-11-10 RX ADMIN — APIXABAN 2.5 MG: 2.5 TABLET, FILM COATED ORAL at 20:28

## 2024-11-10 SDOH — SOCIAL STABILITY: SOCIAL INSECURITY: ARE YOU MARRIED, WIDOWED, DIVORCED, SEPARATED, NEVER MARRIED, OR LIVING WITH A PARTNER?: MARRIED

## 2024-11-10 SDOH — SOCIAL STABILITY: SOCIAL INSECURITY
WITHIN THE LAST YEAR, HAVE YOU BEEN RAPED OR FORCED TO HAVE ANY KIND OF SEXUAL ACTIVITY BY YOUR PARTNER OR EX-PARTNER?: NO

## 2024-11-10 SDOH — HEALTH STABILITY: PHYSICAL HEALTH: ON AVERAGE, HOW MANY DAYS PER WEEK DO YOU ENGAGE IN MODERATE TO STRENUOUS EXERCISE (LIKE A BRISK WALK)?: 0 DAYS

## 2024-11-10 SDOH — SOCIAL STABILITY: SOCIAL INSECURITY
WITHIN THE LAST YEAR, HAVE YOU BEEN KICKED, HIT, SLAPPED, OR OTHERWISE PHYSICALLY HURT BY YOUR PARTNER OR EX-PARTNER?: NO

## 2024-11-10 SDOH — SOCIAL STABILITY: SOCIAL INSECURITY: DOES ANYONE TRY TO KEEP YOU FROM HAVING/CONTACTING OTHER FRIENDS OR DOING THINGS OUTSIDE YOUR HOME?: NO

## 2024-11-10 SDOH — SOCIAL STABILITY: SOCIAL NETWORK
DO YOU BELONG TO ANY CLUBS OR ORGANIZATIONS SUCH AS CHURCH GROUPS, UNIONS, FRATERNAL OR ATHLETIC GROUPS, OR SCHOOL GROUPS?: NO

## 2024-11-10 SDOH — HEALTH STABILITY: PHYSICAL HEALTH
HOW OFTEN DO YOU NEED TO HAVE SOMEONE HELP YOU WHEN YOU READ INSTRUCTIONS, PAMPHLETS, OR OTHER WRITTEN MATERIAL FROM YOUR DOCTOR OR PHARMACY?: SOMETIMES

## 2024-11-10 SDOH — HEALTH STABILITY: MENTAL HEALTH
DO YOU FEEL STRESS - TENSE, RESTLESS, NERVOUS, OR ANXIOUS, OR UNABLE TO SLEEP AT NIGHT BECAUSE YOUR MIND IS TROUBLED ALL THE TIME - THESE DAYS?: NOT AT ALL

## 2024-11-10 SDOH — SOCIAL STABILITY: SOCIAL INSECURITY: DO YOU FEEL UNSAFE GOING BACK TO THE PLACE WHERE YOU ARE LIVING?: NO

## 2024-11-10 SDOH — SOCIAL STABILITY: SOCIAL INSECURITY: ARE THERE ANY APPARENT SIGNS OF INJURIES/BEHAVIORS THAT COULD BE RELATED TO ABUSE/NEGLECT?: NO

## 2024-11-10 SDOH — ECONOMIC STABILITY: HOUSING INSECURITY: IN THE LAST 12 MONTHS, WAS THERE A TIME WHEN YOU WERE NOT ABLE TO PAY THE MORTGAGE OR RENT ON TIME?: NO

## 2024-11-10 SDOH — ECONOMIC STABILITY: FOOD INSECURITY: WITHIN THE PAST 12 MONTHS, THE FOOD YOU BOUGHT JUST DIDN'T LAST AND YOU DIDN'T HAVE MONEY TO GET MORE.: NEVER TRUE

## 2024-11-10 SDOH — SOCIAL STABILITY: SOCIAL INSECURITY: WERE YOU ABLE TO COMPLETE ALL THE BEHAVIORAL HEALTH SCREENINGS?: YES

## 2024-11-10 SDOH — SOCIAL STABILITY: SOCIAL NETWORK: HOW OFTEN DO YOU ATTEND CHURCH OR RELIGIOUS SERVICES?: NEVER

## 2024-11-10 SDOH — ECONOMIC STABILITY: INCOME INSECURITY: IN THE PAST 12 MONTHS HAS THE ELECTRIC, GAS, OIL, OR WATER COMPANY THREATENED TO SHUT OFF SERVICES IN YOUR HOME?: NO

## 2024-11-10 SDOH — HEALTH STABILITY: PHYSICAL HEALTH: ON AVERAGE, HOW MANY MINUTES DO YOU ENGAGE IN EXERCISE AT THIS LEVEL?: 0 MIN

## 2024-11-10 SDOH — SOCIAL STABILITY: SOCIAL INSECURITY: WITHIN THE LAST YEAR, HAVE YOU BEEN HUMILIATED OR EMOTIONALLY ABUSED IN OTHER WAYS BY YOUR PARTNER OR EX-PARTNER?: NO

## 2024-11-10 SDOH — SOCIAL STABILITY: SOCIAL NETWORK: HOW OFTEN DO YOU GET TOGETHER WITH FRIENDS OR RELATIVES?: ONCE A WEEK

## 2024-11-10 SDOH — ECONOMIC STABILITY: FOOD INSECURITY: HOW HARD IS IT FOR YOU TO PAY FOR THE VERY BASICS LIKE FOOD, HOUSING, MEDICAL CARE, AND HEATING?: NOT HARD AT ALL

## 2024-11-10 SDOH — SOCIAL STABILITY: SOCIAL INSECURITY: WITHIN THE LAST YEAR, HAVE YOU BEEN AFRAID OF YOUR PARTNER OR EX-PARTNER?: NO

## 2024-11-10 SDOH — SOCIAL STABILITY: SOCIAL INSECURITY: DO YOU FEEL ANYONE HAS EXPLOITED OR TAKEN ADVANTAGE OF YOU FINANCIALLY OR OF YOUR PERSONAL PROPERTY?: NO

## 2024-11-10 SDOH — ECONOMIC STABILITY: FOOD INSECURITY: WITHIN THE PAST 12 MONTHS, YOU WORRIED THAT YOUR FOOD WOULD RUN OUT BEFORE YOU GOT THE MONEY TO BUY MORE.: NEVER TRUE

## 2024-11-10 SDOH — SOCIAL STABILITY: SOCIAL INSECURITY: HAVE YOU HAD THOUGHTS OF HARMING ANYONE ELSE?: NO

## 2024-11-10 SDOH — SOCIAL STABILITY: SOCIAL NETWORK
IN A TYPICAL WEEK, HOW MANY TIMES DO YOU TALK ON THE PHONE WITH FAMILY, FRIENDS, OR NEIGHBORS?: MORE THAN THREE TIMES A WEEK

## 2024-11-10 SDOH — ECONOMIC STABILITY: HOUSING INSECURITY: IN THE PAST 12 MONTHS, HOW MANY TIMES HAVE YOU MOVED WHERE YOU WERE LIVING?: 0

## 2024-11-10 SDOH — ECONOMIC STABILITY: HOUSING INSECURITY: AT ANY TIME IN THE PAST 12 MONTHS, WERE YOU HOMELESS OR LIVING IN A SHELTER (INCLUDING NOW)?: NO

## 2024-11-10 SDOH — SOCIAL STABILITY: SOCIAL INSECURITY: ABUSE: ADULT

## 2024-11-10 SDOH — SOCIAL STABILITY: SOCIAL INSECURITY: ARE YOU OR HAVE YOU BEEN THREATENED OR ABUSED PHYSICALLY, EMOTIONALLY, OR SEXUALLY BY ANYONE?: NO

## 2024-11-10 SDOH — ECONOMIC STABILITY: TRANSPORTATION INSECURITY: IN THE PAST 12 MONTHS, HAS LACK OF TRANSPORTATION KEPT YOU FROM MEDICAL APPOINTMENTS OR FROM GETTING MEDICATIONS?: NO

## 2024-11-10 SDOH — SOCIAL STABILITY: SOCIAL INSECURITY: HAS ANYONE EVER THREATENED TO HURT YOUR FAMILY OR YOUR PETS?: NO

## 2024-11-10 SDOH — SOCIAL STABILITY: SOCIAL NETWORK: HOW OFTEN DO YOU ATTEND MEETINGS OF THE CLUBS OR ORGANIZATIONS YOU BELONG TO?: NEVER

## 2024-11-10 SDOH — SOCIAL STABILITY: SOCIAL INSECURITY: HAVE YOU HAD ANY THOUGHTS OF HARMING ANYONE ELSE?: NO

## 2024-11-10 ASSESSMENT — PAIN SCALES - GENERAL
PAINLEVEL_OUTOF10: 0 - NO PAIN
PAINLEVEL_OUTOF10: 0 - NO PAIN
PAINLEVEL_OUTOF10: 8
PAINLEVEL_OUTOF10: 0 - NO PAIN
PAINLEVEL_OUTOF10: 0 - NO PAIN
PAINLEVEL_OUTOF10: 8
PAINLEVEL_OUTOF10: 8
PAINLEVEL_OUTOF10: 0 - NO PAIN
PAINLEVEL_OUTOF10: 0 - NO PAIN

## 2024-11-10 ASSESSMENT — LIFESTYLE VARIABLES
HOW MANY STANDARD DRINKS CONTAINING ALCOHOL DO YOU HAVE ON A TYPICAL DAY: PATIENT DOES NOT DRINK
AUDIT-C TOTAL SCORE: 0
HOW OFTEN DO YOU HAVE 6 OR MORE DRINKS ON ONE OCCASION: NEVER
EVER HAD A DRINK FIRST THING IN THE MORNING TO STEADY YOUR NERVES TO GET RID OF A HANGOVER: NO
TOTAL SCORE: 0
HAVE PEOPLE ANNOYED YOU BY CRITICIZING YOUR DRINKING: NO
HOW OFTEN DO YOU HAVE A DRINK CONTAINING ALCOHOL: NEVER
AUDIT-C TOTAL SCORE: 0
PRESCIPTION_ABUSE_PAST_12_MONTHS: NO
SKIP TO QUESTIONS 9-10: 1
HAVE YOU EVER FELT YOU SHOULD CUT DOWN ON YOUR DRINKING: NO
EVER FELT BAD OR GUILTY ABOUT YOUR DRINKING: NO
SUBSTANCE_ABUSE_PAST_12_MONTHS: NO

## 2024-11-10 ASSESSMENT — ACTIVITIES OF DAILY LIVING (ADL)
FEEDING YOURSELF: NEEDS ASSISTANCE
HEARING - RIGHT EAR: DIFFICULTY WITH NOISE
TOILETING: NEEDS ASSISTANCE
PATIENT'S MEMORY ADEQUATE TO SAFELY COMPLETE DAILY ACTIVITIES?: YES
BATHING: NEEDS ASSISTANCE
HEARING - LEFT EAR: DIFFICULTY WITH NOISE
WALKS IN HOME: NEEDS ASSISTANCE
LACK_OF_TRANSPORTATION: NO
ASSISTIVE_DEVICE: WALKER;DENTURES UPPER
DRESSING YOURSELF: NEEDS ASSISTANCE
GROOMING: NEEDS ASSISTANCE
ADEQUATE_TO_COMPLETE_ADL: NO
JUDGMENT_ADEQUATE_SAFELY_COMPLETE_DAILY_ACTIVITIES: YES
LACK_OF_TRANSPORTATION: NO

## 2024-11-10 ASSESSMENT — COGNITIVE AND FUNCTIONAL STATUS - GENERAL
EATING MEALS: A LITTLE
MOVING FROM LYING ON BACK TO SITTING ON SIDE OF FLAT BED WITH BEDRAILS: A LITTLE
STANDING UP FROM CHAIR USING ARMS: A LITTLE
DRESSING REGULAR UPPER BODY CLOTHING: A LITTLE
WALKING IN HOSPITAL ROOM: A LITTLE
TURNING FROM BACK TO SIDE WHILE IN FLAT BAD: A LITTLE
MOBILITY SCORE: 18
HELP NEEDED FOR BATHING: A LITTLE
DAILY ACTIVITIY SCORE: 18
MOVING TO AND FROM BED TO CHAIR: A LITTLE
PERSONAL GROOMING: A LITTLE
TOILETING: A LITTLE
DRESSING REGULAR LOWER BODY CLOTHING: A LITTLE
CLIMB 3 TO 5 STEPS WITH RAILING: A LITTLE
PATIENT BASELINE BEDBOUND: NO

## 2024-11-10 ASSESSMENT — COLUMBIA-SUICIDE SEVERITY RATING SCALE - C-SSRS
6. HAVE YOU EVER DONE ANYTHING, STARTED TO DO ANYTHING, OR PREPARED TO DO ANYTHING TO END YOUR LIFE?: NO
1. IN THE PAST MONTH, HAVE YOU WISHED YOU WERE DEAD OR WISHED YOU COULD GO TO SLEEP AND NOT WAKE UP?: NO
2. HAVE YOU ACTUALLY HAD ANY THOUGHTS OF KILLING YOURSELF?: NO

## 2024-11-10 ASSESSMENT — PAIN - FUNCTIONAL ASSESSMENT: PAIN_FUNCTIONAL_ASSESSMENT: 0-10

## 2024-11-10 ASSESSMENT — PATIENT HEALTH QUESTIONNAIRE - PHQ9
SUM OF ALL RESPONSES TO PHQ9 QUESTIONS 1 & 2: 0
2. FEELING DOWN, DEPRESSED OR HOPELESS: NOT AT ALL
1. LITTLE INTEREST OR PLEASURE IN DOING THINGS: NOT AT ALL

## 2024-11-10 NOTE — ED PROVIDER NOTES
Emergency Department Provider Note        History of Present Illness     History provided by: Patient  Limitations to History: None  External Records Reviewed with Brief Summary: None    HPI:  Elgin Marin is a 84 y.o. male with a history of atrial fibrillation, hypertension, and diabetes presents to the emergency department by EMS after a fall in his bedroom.  The patient states that he fell about 1.5 hours prior to presenting to the emergency department.  The patient states that he was walking with his walker when his knees gave out and he fell backwards.  The patient states that he lacerated the back of his head during the fall and could not get up.  The patient denies the loss of consciousness.  The patient is on Eliquis.  The patient's cardiologist is Dr. Lewis Amador.  The patient states that he has chronic kidney disease.  The patient is oriented x 3.  The patient denies any dizziness or lightheadedness prior to the fall.    Physical Exam   Triage vitals:  T 36.4 °C (97.5 °F)  HR 73  /56  RR 12  O2 97 % None (Room air)    General: Awake, alert, in no acute distress  Eyes: Gaze conjugate.  No scleral icterus or injection  HENT: Normo-cephalic.  Right posterolateral abrasion of the scalp.  No laceration or bleeding.  No stridor  CV: Regular rate, regular rhythm. Radial pulses 2+ bilaterally  Resp: Breathing non-labored, speaking in full sentences.  Clear to auscultation bilaterally  GI: Soft, non-distended, non-tender. No rebound or guarding.  MSK/Extremities: No gross bony deformities. Moving all extremities  Skin: Warm. Appropriate color  Neuro: Alert. Oriented. Face symmetric. Speech is fluent.  Gross strength and sensation intact in b/l UE and LEs  Psych: Appropriate mood and affect    Medical Decision Making & ED Course   Medical Decision Makin y.o. male with a history of atrial fibrillation on Eliquis who presents to the emergency department by EMS after a fall at home.  The patient  received a CT of the head and C-spine without IV contrast due to hitting his head.  The patient's CT scans did not reveal any significant acute abnormalities.  The patient received a urinalysis which did not reveal any abnormalities.  The patient received a cardiac workup due to the possibility of a cardiogenic cause of his fall.  The patient's cardiac workup did not reveal any significant acute abnormalities related to his fall.  However, the patient's troponins appear to be chronically elevated.  The patient's chest x-ray also appeared to reveal new pulmonary edema that was not present in past chest x-rays.  The patient's BMP is elevated.  Previous records indicate that the patient's BNP has been progressively worsening in the past 2 years.  Dr. Amador was consulted about the patient's troponins.  Dr. Amador believes that the chronically elevated troponins are due to distal coronary artery stenosis that could not be reached with coronary stenting.  After a discussion with the patient, the patient agrees to be admitted to the hospital due to the inability for the patient to take care of himself at home.  The patient was admitted to the hospital due to the inability to pass a walking test and for discussion of nursing home placement.  ----      Differential diagnoses considered include but are not limited to: Mechanical fall, syncopal episode, congestive heart failure, acute myocardial infarction.     Social Determinants of Health which Significantly Impact Care: None identified     Independent Result Review and Interpretation: Relevant laboratory and radiographic results were reviewed and independently interpreted by myself.  As necessary, they are commented on in the ED Course.    Chronic conditions affecting the patient's care: As documented above in MDM    The patient was discussed with the following consultants/services: Dr. Lewis Amador, the cardiologist.    Care Considerations: As documented above in  [facility]    ED Course:  Diagnoses as of 11/11/24 0117   Fall, initial encounter     Disposition   As a result of their workup, the patient will require admission to the hospital.  The patient was informed of his diagnosis.  The patient was given the opportunity to ask questions and I answered them. The patient agreed to be admitted to the hospital.    Procedures   Procedures    Patient seen and discussed with ED attending physician.    Mario Macias DO  Emergency Medicine      =================Attending note===============    The patient was seen by the resident/fellow.  I have personally performed a substantive portion of the encounter.  I have seen and examined the patient; agree with the workup, evaluation, MDM,   management and diagnosis.  The care plan has been discussed with the resident; I have reviewed the resident’s note and agree with the documented findings.      This is a 84 y.o. male who presents to ER after a fall.  Patient states he was using his walker and he turned and he lost his balance and fell.  He did hit his head.  Mild headache.  No loss of conscious.  No nausea or vomiting.  He is on Eliquis due to A-fib.  He is complaining of some bilateral knee pain.  He has chronic knee pain.  Patient does have a history of A-fib and he is on Eliquis for that.  He does have hyperlipidemia, GERD, hypertension, diabetes.    EMS tried to get the patient up and he could not ambulate.    Heart is irregular.  Lungs are clear.  Abdomen soft and nontender.  There is minor discomfort in the knee.  No gross deformities.  No laxity.  No large contusions or lacerations or abrasions to the head.  Patient is awake and alert and conversant.    EKG:  Atrial fibrillation with a rate of 66.  QTc 457.  No ST changes.  Occasional PVCs.    Glucose very mildly low.  There is renal failure which is at baseline.  Bilirubin minimally elevated.  No leukocytosis.  Mild anemia with a hemoglobin of  11.9.        ==========================================       Marty Solis MD  Resident  11/11/24 0229       Marty Solis MD  Resident  11/11/24 0230       Marty Solis MD  Resident  11/11/24 0231       Marty Solis MD  Resident  11/11/24 0232

## 2024-11-10 NOTE — H&P
History Of Present Illness  Elgin Marin is a 84 y.o. male presenting after a fall.  Patient has history of recurrent falls.  Patient gets around the house/apartment with a walker.  Earlier today patient states that he was walking with his walker and then lost balance and fell backward.  Patient did hit his head.  He did not lose consciousness.  He was unable to get up and his spouse called EMS to help get him up.  When he was helped up patient was barely able to stand and relying on furniture to maintain his posture.  Given patient was unable to walk he was brought to [facility] for further evaluation.  Patient otherwise has been feeling fairly well and denies any chest pain shortness of breath nausea vomiting diarrhea constipation fever chills dysuria or headache.    In the emergency department his vital signs are stable.  Labs showed creatinine of 2.08 which is consistent with his chronic kidney disease baseline.  He also had a troponin of 161 followed by 162.  His troponins are also chronically elevated and thought to be chronically elevated secondary to distal coronary artery disease not amenable to stenting.  His BNP is slightly elevated 711.  CBC was unremarkable.  CT of the head and C-spine were done which did not reveal any acute pathology.  Chest x-ray was done which does show pulmonary edema with possible left pleural effusion.  Case was discussed with his cardiologist Dr. Amador who recommended doubling patient's dose of torsemide.  Patient being observed in the hospital due to ambulatory dysfunction and need for physical therapy and Occupational Therapy evaluation and likely placement in skilled nursing facility versus rehab.     Past Medical History  He has a past medical history of Hyperlipidemia, unspecified (11/14/2022), Other forms of dyspnea (08/09/2018), Personal history of other diseases of the circulatory system (12/08/2014), Personal history of other endocrine, nutritional  and metabolic disease, Personal history of other specified conditions, Presence of intraocular lens (07/20/2019), Presence of intraocular lens (07/20/2019), and Unspecified atrial fibrillation (Multi) (11/14/2022).  Atrial fibrillation on anticoagulation    Surgical History  He has a past surgical history that includes Coronary angioplasty with stent (10/06/2015); Coronary artery bypass graft (10/06/2015); Other surgical history (09/09/2015); Cholecystectomy (09/09/2015); and Rotator cuff repair (09/09/2015).     Social History  He reports that he has never smoked. He has never used smokeless tobacco. He reports that he does not drink alcohol and does not use drugs.  Denies tobacco, EtOH or illicit drug use    Family History  No family history on file.  Reviewed and noncontributory to this hospitalization    Allergies  Metoprolol, Oxycodone-aspirin, Sulfa (sulfonamide antibiotics), Warfarin, and Lisinopril    Review of Systems   All other systems reviewed and are negative.       Physical Exam  Constitutional:       General: He is not in acute distress.     Appearance: Normal appearance.   HENT:      Head: Normocephalic and atraumatic.      Mouth/Throat:      Mouth: Mucous membranes are moist.   Eyes:      Extraocular Movements: Extraocular movements intact.      Conjunctiva/sclera: Conjunctivae normal.   Cardiovascular:      Rate and Rhythm: Normal rate. Rhythm irregular.      Heart sounds: Normal heart sounds.   Pulmonary:      Effort: Pulmonary effort is normal.      Breath sounds: Normal breath sounds. No wheezing, rhonchi or rales.   Abdominal:      General: Abdomen is flat. Bowel sounds are normal.      Palpations: Abdomen is soft.   Musculoskeletal:         General: No swelling or tenderness. Normal range of motion.      Cervical back: Normal range of motion and neck supple.   Skin:     General: Skin is warm and dry.      Findings: No rash.   Neurological:      General: No focal deficit present.      Mental  Status: He is alert and oriented to person, place, and time.      Cranial Nerves: No cranial nerve deficit.      Sensory: No sensory deficit.      Motor: Weakness (generalized) present.   Psychiatric:         Mood and Affect: Mood normal.         Behavior: Behavior normal.          Last Recorded Vitals  /75   Pulse 71   Temp 36.5 °C (97.7 °F)   Resp 16   Wt 96 kg (211 lb 10.3 oz)   SpO2 96%     Relevant Results  Scheduled medications    Continuous medications    PRN medications    Results for orders placed or performed during the hospital encounter of 11/10/24 (from the past 24 hours)   CBC and Auto Differential   Result Value Ref Range    WBC 6.4 4.4 - 11.3 x10*3/uL    nRBC 0.0 0.0 - 0.0 /100 WBCs    RBC 4.30 (L) 4.50 - 5.90 x10*6/uL    Hemoglobin 11.9 (L) 13.5 - 17.5 g/dL    Hematocrit 39.5 (L) 41.0 - 52.0 %    MCV 92 80 - 100 fL    MCH 27.7 26.0 - 34.0 pg    MCHC 30.1 (L) 32.0 - 36.0 g/dL    RDW 16.3 (H) 11.5 - 14.5 %    Platelets 169 150 - 450 x10*3/uL    Neutrophils % 78.5 40.0 - 80.0 %    Immature Granulocytes %, Automated 0.5 0.0 - 0.9 %    Lymphocytes % 12.2 13.0 - 44.0 %    Monocytes % 6.9 2.0 - 10.0 %    Eosinophils % 1.1 0.0 - 6.0 %    Basophils % 0.8 0.0 - 2.0 %    Neutrophils Absolute 5.04 1.60 - 5.50 x10*3/uL    Immature Granulocytes Absolute, Automated 0.03 0.00 - 0.50 x10*3/uL    Lymphocytes Absolute 0.78 (L) 0.80 - 3.00 x10*3/uL    Monocytes Absolute 0.44 0.05 - 0.80 x10*3/uL    Eosinophils Absolute 0.07 0.00 - 0.40 x10*3/uL    Basophils Absolute 0.05 0.00 - 0.10 x10*3/uL   Comprehensive metabolic panel   Result Value Ref Range    Glucose 63 (L) 74 - 99 mg/dL    Sodium 138 136 - 145 mmol/L    Potassium 3.9 3.5 - 5.3 mmol/L    Chloride 101 98 - 107 mmol/L    Bicarbonate 30 21 - 32 mmol/L    Anion Gap 11 10 - 20 mmol/L    Urea Nitrogen 46 (H) 6 - 23 mg/dL    Creatinine 2.08 (H) 0.50 - 1.30 mg/dL    eGFR 30 (L) >60 mL/min/1.73m*2    Calcium 8.4 (L) 8.6 - 10.3 mg/dL    Albumin 3.0 (L) 3.4 -  5.0 g/dL    Alkaline Phosphatase 102 33 - 136 U/L    Total Protein 6.2 (L) 6.4 - 8.2 g/dL    AST 23 9 - 39 U/L    Bilirubin, Total 1.3 (H) 0.0 - 1.2 mg/dL    ALT 11 10 - 52 U/L   Troponin I, High Sensitivity, Initial   Result Value Ref Range    Troponin I, High Sensitivity 161 (HH) 0 - 20 ng/L   B-type natriuretic peptide   Result Value Ref Range     (H) 0 - 99 pg/mL   Troponin, High Sensitivity, 1 Hour   Result Value Ref Range    Troponin I, High Sensitivity 162 (HH) 0 - 20 ng/L       XR chest 1 view    Result Date: 11/10/2024  Interpreted By:  Ekta Ardon, STUDY: XR CHEST 1 VIEW;  11/10/2024 2:13 pm   INDICATION: Signs/Symptoms:HIA.   COMPARISON: 01/22/2024   ACCESSION NUMBER(S): [id number]   ORDERING CLINICIAN: RL OWEN   FINDINGS: The heart is enlarged. There is diffuse bilateral interstitial prominence and  prominence of central pulmonary vessels. Blunting of the left costophrenic angle is noted, which most likely is related to a pleural effusion. No consolidation,  or pneumothorax is noted.       Cardiomegaly pulmonary edema and probable left pleural effusion.       MACRO: None   Signed by: Ekta Ardon 11/10/2024 3:17 PM Dictation workstation:   [id number]    CT cervical spine wo IV contrast    Result Date: 11/10/2024  Interpreted By:  Ekta Ardon, STUDY: CT CERVICAL SPINE WO IV CONTRAST;  11/10/2024 1:39 pm   INDICATION: Signs/Symptoms:HIA.     COMPARISON: 03/17/2023   ACCESSION NUMBER(S): [id number]   ORDERING CLINICIAN: RL OWEN   TECHNIQUE: Axial CT images of the cervical spine are obtained. Axial, coronal and sagittal reconstructions are provided for review.   FINDINGS:       Fractures: There is no evidence for an acute fracture of the cervical spine.   Vertebral Alignment: Reversal normal cervical lordosis is seen.   Craniocervical Junction: The odontoid process and craniocervical junction are intact.   Vertebrae/Disc Spaces: The cervical vertebral body heights are intact.  Endplate sclerosis and spur formation is seen throughout the cervical spine. Uncovertebral joint hypertrophy is seen. Severe narrowing of C3-4, C4-5 C5-6 and C6-7 disc spaces noted. Neural foramina narrowing seen at multiple levels   Prevertebral/Paraspinal Soft Tissues: The prevertebral and paraspinal soft tissues are unremarkable.   The vascular calcifications are identified.         No evidence for an acute fracture or subluxation of the cervical spine.   Reversal normal cervical lordosis and degenerative changes   MACRO: None   Signed by: Ekta Ardon 11/10/2024 3:15 PM Dictation workstation:   [id number]    CT head wo IV contrast    Result Date: 11/10/2024  Interpreted By:  Ekta Ardon, STUDY: CT HEAD WO IV CONTRAST;  11/10/2024 1:39 pm   INDICATION: Signs/Symptoms:HIA.   COMPARISON: 03/17/2020   ACCESSION NUMBER(S): [id number]   ORDERING CLINICIAN: RL OWEN   TECHNIQUE: Axial images of 5 mm thickness were obtained through the head without intravenous contrast sagittal and coronal reconstructions were acquired.   FINDINGS: BRAIN PARENCHYMA: Periventricular and subcortical white matter changes are present.  No masses are seen. No mass effect.  No acute cortical infarct or mass effect is seen.   HEMORRHAGE: There is no evidence for hemorrhage.   VENTRICLES and EXTRA-AXIAL SPACES: The ventricles, sulci and cisterns are prominent suggesting volume loss.   INTRACRANIAL VESSELS:  atherosclerotic calcification.   EXTRACRANIAL SOFT TISSUES: Within normal limits.   PARANASAL SINUSES/MASTOIDS: Nasal septal deviation to the left is seen.   CALVARIUM: No destructive lesion or depressed skull fracture.       Diffuse volume loss and periventricular white matter changes, which given patient's age likely represent small vessel ischemic disease.   No CT evidence of acute hemorrhage or acute cortical infarct.   No evidence of displaced skull fracture.     MACRO: None   Signed by: Ekta Ardon 11/10/2024 3:04 PM  Dictation workstation:   [id number]        Assessment/Plan   Assessment & Plan  Unable to walk    Elevated troponin    Acute pulmonary edema    Atrial fibrillation (Multi)    Coronary artery disease involving coronary bypass graft of native heart    Dyslipidemia    Glaucoma suspect, both eyes    Hypertensive kidney disease with CKD (chronic kidney disease), stage 1-4 or unspecified chronic kidney disease    Obstructive sleep apnea    Essential hypertension, benign    Chronic combined systolic and diastolic heart failure      84-year-old male with history of atrial fibrillation on anticoagulation presents after a fall with head trauma but no loss of consciousness with negative head CT and C-spine found to have stable CKD, stable troponin elevation due to chronic distal CAD, and elevated BNP with pulmonary edema but is an overall hemodynamically stable condition largely here for placement in skilled nursing facility versus inpatient rehab.    -Mechanical fall with head trauma and no loss of consciousness on blood thinner  -CT head and C-spine unremarkable  -Chest x-ray showing pulmonary edema with possible small pleural effusion  -CKD stable  -BNP is slightly elevated  -Case discussed with cardiology who recommended doubling torsemide to 80 mg daily from his baseline of 40 mg daily  -High-sensitivity troponin elevated but trend flat and entire year has shown stable elevated troponins and likely is due to chronic distal CAD  -Remainder of labs and vital stable  -Physical therapy/Occupational Therapy for evaluation for need for placement  -Remainder of home medications to be resumed as appropriate    Code: Full  DVT prophylaxis: Continue DOAC  GI prophylaxis: Not indicated  Diet: Cardiac    Dispo: Patient likely medically stable for discharge tomorrow pending PT/OT evaluation for placement.    Robert Diaz MD

## 2024-11-11 LAB
ANION GAP SERPL CALC-SCNC: 11 MMOL/L (ref 10–20)
BUN SERPL-MCNC: 45 MG/DL (ref 6–23)
CALCIUM SERPL-MCNC: 8.1 MG/DL (ref 8.6–10.3)
CHLORIDE SERPL-SCNC: 100 MMOL/L (ref 98–107)
CO2 SERPL-SCNC: 32 MMOL/L (ref 21–32)
CREAT SERPL-MCNC: 2.06 MG/DL (ref 0.5–1.3)
EGFRCR SERPLBLD CKD-EPI 2021: 31 ML/MIN/1.73M*2
ERYTHROCYTE [DISTWIDTH] IN BLOOD BY AUTOMATED COUNT: 16.3 % (ref 11.5–14.5)
GLUCOSE BLD MANUAL STRIP-MCNC: 110 MG/DL (ref 74–99)
GLUCOSE BLD MANUAL STRIP-MCNC: 138 MG/DL (ref 74–99)
GLUCOSE SERPL-MCNC: 68 MG/DL (ref 74–99)
HCT VFR BLD AUTO: 38.4 % (ref 41–52)
HGB BLD-MCNC: 11.2 G/DL (ref 13.5–17.5)
HOLD SPECIMEN: NORMAL
MAGNESIUM SERPL-MCNC: 1.83 MG/DL (ref 1.6–2.4)
MCH RBC QN AUTO: 27.2 PG (ref 26–34)
MCHC RBC AUTO-ENTMCNC: 29.2 G/DL (ref 32–36)
MCV RBC AUTO: 93 FL (ref 80–100)
NRBC BLD-RTO: 0 /100 WBCS (ref 0–0)
PHOSPHATE SERPL-MCNC: 4.1 MG/DL (ref 2.5–4.9)
PLATELET # BLD AUTO: 181 X10*3/UL (ref 150–450)
POTASSIUM SERPL-SCNC: 4.6 MMOL/L (ref 3.5–5.3)
RBC # BLD AUTO: 4.12 X10*6/UL (ref 4.5–5.9)
SODIUM SERPL-SCNC: 138 MMOL/L (ref 136–145)
WBC # BLD AUTO: 6.7 X10*3/UL (ref 4.4–11.3)

## 2024-11-11 PROCEDURE — 82947 ASSAY GLUCOSE BLOOD QUANT: CPT | Mod: 59

## 2024-11-11 PROCEDURE — 2500000001 HC RX 250 WO HCPCS SELF ADMINISTERED DRUGS (ALT 637 FOR MEDICARE OP): Performed by: INTERNAL MEDICINE

## 2024-11-11 PROCEDURE — 84100 ASSAY OF PHOSPHORUS: CPT | Performed by: INTERNAL MEDICINE

## 2024-11-11 PROCEDURE — 2500000002 HC RX 250 W HCPCS SELF ADMINISTERED DRUGS (ALT 637 FOR MEDICARE OP, ALT 636 FOR OP/ED): Mod: MUE | Performed by: INTERNAL MEDICINE

## 2024-11-11 PROCEDURE — 36415 COLL VENOUS BLD VENIPUNCTURE: CPT | Performed by: INTERNAL MEDICINE

## 2024-11-11 PROCEDURE — 85027 COMPLETE CBC AUTOMATED: CPT | Performed by: INTERNAL MEDICINE

## 2024-11-11 PROCEDURE — G0378 HOSPITAL OBSERVATION PER HR: HCPCS

## 2024-11-11 PROCEDURE — 97161 PT EVAL LOW COMPLEX 20 MIN: CPT | Mod: GP

## 2024-11-11 PROCEDURE — 99232 SBSQ HOSP IP/OBS MODERATE 35: CPT | Performed by: INTERNAL MEDICINE

## 2024-11-11 PROCEDURE — 97530 THERAPEUTIC ACTIVITIES: CPT | Mod: GP

## 2024-11-11 PROCEDURE — 97165 OT EVAL LOW COMPLEX 30 MIN: CPT | Mod: GO

## 2024-11-11 PROCEDURE — 83735 ASSAY OF MAGNESIUM: CPT | Performed by: INTERNAL MEDICINE

## 2024-11-11 PROCEDURE — 80048 BASIC METABOLIC PNL TOTAL CA: CPT | Performed by: INTERNAL MEDICINE

## 2024-11-11 RX ADMIN — EMPAGLIFLOZIN 10 MG: 10 TABLET, FILM COATED ORAL at 13:26

## 2024-11-11 RX ADMIN — APIXABAN 2.5 MG: 2.5 TABLET, FILM COATED ORAL at 20:13

## 2024-11-11 RX ADMIN — ACETAMINOPHEN 325MG 975 MG: 325 TABLET ORAL at 08:10

## 2024-11-11 RX ADMIN — BISOPROLOL FUMARATE 5 MG: 5 TABLET, FILM COATED ORAL at 12:38

## 2024-11-11 RX ADMIN — ACETAMINOPHEN 325MG 975 MG: 325 TABLET ORAL at 23:12

## 2024-11-11 RX ADMIN — TORSEMIDE 80 MG: 20 TABLET ORAL at 08:10

## 2024-11-11 RX ADMIN — APIXABAN 2.5 MG: 2.5 TABLET, FILM COATED ORAL at 08:09

## 2024-11-11 RX ADMIN — PANTOPRAZOLE SODIUM 40 MG: 40 TABLET, DELAYED RELEASE ORAL at 20:13

## 2024-11-11 RX ADMIN — ACETAMINOPHEN 325MG 975 MG: 325 TABLET ORAL at 00:50

## 2024-11-11 RX ADMIN — ROSUVASTATIN CALCIUM 20 MG: 20 TABLET, FILM COATED ORAL at 20:13

## 2024-11-11 ASSESSMENT — PAIN DESCRIPTION - ORIENTATION
ORIENTATION: RIGHT;LEFT
ORIENTATION: RIGHT;LEFT

## 2024-11-11 ASSESSMENT — COGNITIVE AND FUNCTIONAL STATUS - GENERAL
DRESSING REGULAR LOWER BODY CLOTHING: TOTAL
MOVING FROM LYING ON BACK TO SITTING ON SIDE OF FLAT BED WITH BEDRAILS: A LOT
CLIMB 3 TO 5 STEPS WITH RAILING: TOTAL
STANDING UP FROM CHAIR USING ARMS: A LITTLE
DRESSING REGULAR UPPER BODY CLOTHING: A LITTLE
DAILY ACTIVITIY SCORE: 19
PERSONAL GROOMING: A LITTLE
TOILETING: TOTAL
MOBILITY SCORE: 9
TURNING FROM BACK TO SIDE WHILE IN FLAT BAD: A LOT
STANDING UP FROM CHAIR USING ARMS: A LOT
MOVING TO AND FROM BED TO CHAIR: TOTAL
MOVING TO AND FROM BED TO CHAIR: A LITTLE
MOBILITY SCORE: 18
TOILETING: A LITTLE
HELP NEEDED FOR BATHING: TOTAL
DAILY ACTIVITIY SCORE: 11
DRESSING REGULAR UPPER BODY CLOTHING: A LOT
TURNING FROM BACK TO SIDE WHILE IN FLAT BAD: A LITTLE
WALKING IN HOSPITAL ROOM: TOTAL
DRESSING REGULAR LOWER BODY CLOTHING: A LITTLE
EATING MEALS: A LITTLE
HELP NEEDED FOR BATHING: A LITTLE
CLIMB 3 TO 5 STEPS WITH RAILING: A LOT
PERSONAL GROOMING: A LITTLE
WALKING IN HOSPITAL ROOM: A LITTLE

## 2024-11-11 ASSESSMENT — PAIN - FUNCTIONAL ASSESSMENT
PAIN_FUNCTIONAL_ASSESSMENT: 0-10

## 2024-11-11 ASSESSMENT — PAIN DESCRIPTION - LOCATION
LOCATION: LEG
LOCATION: LEG

## 2024-11-11 ASSESSMENT — PAIN SCALES - GENERAL
PAINLEVEL_OUTOF10: 0 - NO PAIN
PAINLEVEL_OUTOF10: 8
PAINLEVEL_OUTOF10: 4
PAINLEVEL_OUTOF10: 0 - NO PAIN
PAINLEVEL_OUTOF10: 3
PAINLEVEL_OUTOF10: 8

## 2024-11-11 ASSESSMENT — ACTIVITIES OF DAILY LIVING (ADL): ADL_ASSISTANCE: INDEPENDENT

## 2024-11-11 NOTE — CONSULTS
Wound Care Consult     Visit Date: 11/11/2024      Patient Name: Elgin Marin         MRN: [id number]           YOB: 1940     Reason for Consult: Multiple wounds        Wound History: Present on admission     Pertinent Labs:   Albumin   Date Value Ref Range Status   11/10/2024 3.0 (L) 3.4 - 5.0 g/dL Final       Wound Assessment:  Wound 01/22/24 Diabetic Ulcer Pretibial Right (Active)   Wound Image     11/11/24 1443   Dressing Status Clean;Dry 11/11/24 0900       Wound 01/22/24 Diabetic Ulcer Pretibial Left (Active)   Wound Image    11/11/24 1449   Dressing Status Clean;Dry 11/11/24 0900       Wound 11/11/24 Foot Dorsal foot;Left (Active)   Wound Image   11/11/24 1452       Wound 11/11/24 Dorsal foot;Right (Active)   Wound Image   11/11/24 1453       Wound Team Summary Assessment: Dressings showed strike thru of drainage, appeared to have been in place for a while, portions well adhered to wounds. Toes on both feet have superficial wounds with nail bed visible on great toe and 2nd toe rt foot. Multiple partial thickness wounds scattered over both LE. Some areas noted to be macerated. Both legs have patches of thick yellowish brown dry skin, some came off easily when legs were cleansed. Pt slept thru dressing procedure.     Wound Team Plan: Xeroform gauze, ABD, Kerlix and Ace wraps to all opened areas, daily. Heel protectors to both feet. Recommendation to podiatry.     Kim Caro RN  11/11/2024  5:07 PM

## 2024-11-11 NOTE — CONSULTS
Heart Failure Nurse Navigator    The role of the HF nurse navigator is to (1) characterize risk profiles of patients with heart failure transitioning from [id number] after hospitalization, (2) recommend interventions to improve care and reduce risks of worse post-hospitalization outcomes. Potential recommendations may touch base on patient/family education, additional consults that may bring value, and appointment scheduling.    Assessment    Patient auto-populated on the HF inpatient list, record reviewed. Pt arrived via EMS yesterday for a mechanical fall and generalized weakness to the point he was unable to stand. . Trop 161, 162. BUN/creat 46/2.08 (eGFR 30) - slightly improved from baseline. CXR shows cardiomegaly, pulmonary edema, and probable left pleural effusion. He is being admitted for SNF placement. Dr. Diaz discussed case with pt's cardiologist Dr. Amador who recommended increasing home Torsemide to 80 mg oral daily for the management of his chronic combined systolic/diastolic HF. Additionally, he has several occluded grafts from his original CABG and is being medically managed. This is complicated with his Metoprolol and Lisinopril allergies and impaired renal function. His last echo reveals EF 40-45% with moderately dilated LA and RA, moderately elevated RVSP, and global LV hypokinesis. He had a negative nuclear stress test 4/18/23.  In review of the record, I noted Dr. Amador's note from a virtual office visit in July 2023 that indicated that pt could not afford Jardiance and this was discontinued. I requested a new price check through [facility] and the cost has come down to $47/month. Dr. Amador and Dr. Diaz notified. Dr. Amador d/w son who states this is affordable. Dr. Diaz placed order to start today.    Patients Cardiologist(s): Dr. Lewis Amador    Patients Primary Care Provider: Dr. Olman Nugent    1. Medical Domain    Echo 3/18/2023:  PHYSICIAN  INTERPRETATION:  Left Ventricle: Left ventricular systolic function is mildly decreased, with an estimated ejection fraction of 40-45%. The patient is in atrial fibrillation which may influence the estimate of left ventricular function and transvalvular flows. There is global hypokinesis of the left ventricle with minor regional variations. The left ventricular cavity size is upper limits of normal. The left ventricular septal wall thickness is mildly increased. There is mildly increased left ventricular posterior wall thickness. There is mild concentric left ventricular hypertrophy. Left ventricular diastolic filling was indeterminate.  Left Atrium: The left atrium is moderately dilated.  Right Ventricle: The right ventricle is normal in size. There is normal right ventricular global systolic function.  Right Atrium: The right atrium is moderately dilated.  Aortic Valve: The aortic valve is trileaflet. There is mild aortic valve regurgitation. The peak instantaneous gradient of the aortic valve is 9.5 mmHg.  Mitral Valve: The mitral valve is mildly thickened. There is trace mitral valve regurgitation.  Tricuspid Valve: The tricuspid valve is structurally normal. There is mild tricuspid regurgitation. The Doppler estimated RVSP is moderately elevated at 50.2 mmHg.  Pulmonic Valve: The pulmonic valve is structurally normal. There is trace to mild pulmonic valve regurgitation.  Pericardium: There is no pericardial effusion noted.  Aorta: The aortic root is normal.        CONCLUSIONS:  1. Left ventricular systolic function is mildly decreased with a 40-45% estimated ejection fraction.  2. The left atrium is moderately dilated.  3. The right atrium is moderately dilated.  4. Moderately elevated right ventricular systolic pressure.  5. Mild aortic valve regurgitation.  6. The patient is in atrial fibrillation which may influence the estimate of left ventricular function and transvalvular flows.  7. There is global  "hypokinesis of the left ventricle with minor regional variations.    What is the patient's most recent LVEF? 40-45%  HFrEF (LVEF <= 40%) Quadruple therapy recommended  HFmrEF (LVEF 41-49%) Quadruple therapy recommended  HFpEF (LVEF >= 50%) Minimum recommendations: SGLT2i and MRA  Is the patient on GDMT for their condition?   ARNI/ACEI/ARB: N/A None due to Lisinopril allergy and impaired renal function.  BB: Yes Bisoprolol 5 mg daily  MRA: No None  SGLT2i: Yes empagliflozin 10 mg daily - restarted today from stop in July 2023 due to cost, see above.  Is the patient prescribed a diuretic? Yes  Torsemide 80 mg daily.  Does this patient have an implanted device (ie cardioMEMS, ICD, CRT-D)?  Device type: N/A  Could this patient have advanced heart failure (Stage D heart failure)?: No   If yes, the potential markers of advanced heart failure include: N/A    REFERENCE: Potential markers of advanced HF   Inotrope (dobutamine or milrinone) used during this admission?   LVEF<=25%?   >=2 hospitalizations for ADHF in the last year?   Severe symptoms of HF (fatigue, dyspnea, confusion, edema) despite medical therapy?   Downtitration of GDMT as compared to home medications?   Discontinuation of GDMT because of hypotension or renal intolerance?   Recurrent arrhythmias (AF, VT with ICD shocks)?   Cardiac cachexia (i.e., unintentional weight loss due to HF)?   High-risk biomarker profile (e.g., hyponatremia [Na<135], very elevated BNP, worsening kidney function)   Escalating doses of diuretics or persistent edema despite escalation     2. Mind and Emotion  Does this patient have possible cognitive impairment?: No (The Mini-Cog score 3/5)  Ask the patient to memorize these 3 words: banana, sunrise, chair  Ask the patient to draw a clock with hands pointing at \"20 minutes after 8\"  Ask the patient to recall the 3 words  Score: Add number of words recalled + clock drawing (0 points for any errors, 2 points if correct)  Interpretation: " A score of 0-2 suggests cognitive impairment is present, a score of 3-5 suggests cognitive impairment is absent  Does this patient have major depression?: No (PH-Q2 score 0/6)  Over the last 2 weeks: Little interest or pleasure in doing things? (Not at all +0, Several days +1, More than half the days +2, Nearly every day +3)  Over the last 2 weeks: Feeling down, depressed or hopeless? (Not at all +0, Several days +1, More than half the days +2, Nearly every day +3)  Score: Add points  Interpretation: A score of 3 or more suggests that major depression is likely.     3. Physical Function  Could this patient be frail?: Yes   Defined by presence of all of these: slowness, weakness, shrinking, inactivity, exhaustion  Is this patient at risk for falling?: Yes   Defined by having experienced a fall in the last 12 months.    4. Social Determinants of Health  Transportation deficits?: No   Lack of insurance?: No   Poor financial resources?: No but had prior med cost issues, seems to have improved.  Living conditions (homelessness, unstable home)?: No   Poor family/social support?: No   Poor personal care?: No   Food insecurity?: No   Lack of HCPOA?: No    Summary of Assessment  A-fib on Eliquis  CAD with CABG and PCI of VG to RCA 2015 and known occlusions to VG to OM, diagonal, and rPDA - negative Lexiscan 4/18/23.  DM2  CKD  HTN      Recommendations  No additional recommendations at this time.    Heart Failure Education   I met with pt this afternoon at the bedside and he aroused but had difficulty staying aroused. He was oriented but admits to feeling very sleepy due to being up late last night when admitted. Nonetheless, I informed his bedside nurse to monitor him closely due to his fall at home and is on Eliquis.  I introduced myself and informed him of the medication changes with the re-introduction of Jardiance and the Torsemide increase. Retention is unlikely, therefore I did not go into detail at this time. As he is  waiting for SNF placement, I will round tomorrow and attempt a full session.

## 2024-11-11 NOTE — PROGRESS NOTES
Occupational Therapy    Occupational Therapy    Evaluation    Patient Name: Elgin Marin  MRN: [id number]  Today's Date: 11/11/2024  Time Calculation  Start Time: 0849  Stop Time: 0909  Time Calculation (min): 20 min  3108/3108-A    Assessment  IP OT Assessment  OT Assessment:  (Pt. presents with decreased balance and endurance impacting pt. ability to complete ADLs and mobility independently)  Prognosis: Fair  Evaluation/Treatment Tolerance: Patient limited by fatigue, Patient limited by pain  End of Session Communication: Bedside nurse  End of Session Patient Position: Bed, 3 rail up, Alarm on    Plan:  Treatment Interventions: ADL retraining, Functional transfer training, Endurance training  OT Frequency: 3 times per week  OT Discharge Recommendations: Moderate intensity level of continued care  Equipment Recommended upon Discharge: Wheeled walker  OT Recommended Transfer Status: Assist of 2  OT - OK to Discharge: Yes (Next level of care when cleared by medical team)    Subjective   Per EMR: Elgin Marin is a 84 y.o. male presenting after a fall.  Patient has history of recurrent falls.  Patient gets around the house/apartment with a walker.  Earlier today patient states that he was walking with his walker and then lost balance and fell backward.  Patient did hit his head.  He did not lose consciousness.  He was unable to get up and his spouse called EMS to help get him up.  When he was helped up patient was barely able to stand and relying on furniture to maintain his posture.  Given patient was unable to walk he was brought to [facility] for further evaluation.  Patient otherwise has been feeling fairly well and denies any chest pain shortness of breath nausea vomiting diarrhea constipation fever chills dysuria or headache.     In the emergency department his vital signs are stable.  Labs showed creatinine of 2.08 which is consistent with his chronic kidney disease baseline.  He also had a  troponin of 161 followed by 162.  His troponins are also chronically elevated and thought to be chronically elevated secondary to distal coronary artery disease not amenable to stenting.  His BNP is slightly elevated 711.  CBC was unremarkable.  CT of the head and C-spine were done which did not reveal any acute pathology.  Chest x-ray was done which does show pulmonary edema with possible left pleural effusion.  Case was discussed with his cardiologist Dr. Amador who recommended doubling patient's dose of torsemide.  Patient being observed in the hospital due to ambulatory dysfunction and need for physical therapy and Occupational Therapy evaluation and likely placement in skilled nursing facility versus rehab.     Past Medical History  He has a past medical history of Hyperlipidemia, unspecified (11/14/2022), Other forms of dyspnea (08/09/2018), Personal history of other diseases of the circulatory system (12/08/2014), Personal history of other endocrine, nutritional and metabolic disease, Personal history of other specified conditions, Presence of intraocular lens (07/20/2019), Presence of intraocular lens (07/20/2019), and Unspecified atrial fibrillation (Multi) (11/14/2022).  Atrial fibrillation on anticoagulation     Surgical History  He has a past surgical history that includes Coronary angioplasty with stent (10/06/2015); Coronary artery bypass graft (10/06/2015); Other surgical history (09/09/2015); Cholecystectomy (09/09/2015); and Rotator cuff repair (09/09/2015).  Current Problem:  1. Fall, initial encounter            General:  General  Reason for Referral: ADLs, discharge planning  Referred By: Dr. Diaz  Missed Visit Reason: No show  Co-Treatment: PT  Co-Treatment Reason: Safety  Prior to Session Communication: Bedside nurse  Patient Position Received: Bed, 3 rail up, Alarm on (sitting at EOB)  General Comment:  (Very talkative)    Precautions:  Medical Precautions: Fall  precautions        Pain:  Pain Assessment  Pain Assessment: 0-10  0-10 (Numeric) Pain Score:  (Does not rate)  Pain Location: Generalized    Objective     Cognition:  Overall Cognitive Status: Within Functional Limits  Orientation Level: Oriented X4  Sustained Attention: Impaired (difficulty staying on task durin conversation)             Home Living:  Home Living Comments:  (Pt. lives with spouse and son in home. PLOF sponge bathes and uses rollator, states he and his son take turns taking care of his spouse that is bedbound.)     Prior Function:  Level of [place]: Independent with ADLs and functional transfers  Receives Help From: Family  ADL Assistance: Independent  Homemaking Assistance: Independent  Ambulatory Assistance: Independent         ADL:  Grooming Assistance: Moderate  LE Dressing Assistance: Total    Activity Tolerance:  Endurance: Decreased tolerance for upright activites    Bed Mobility/Transfers:   Bed Mobility  Bed Mobility:  (sit<>supine max assist x 2)  Transfers  Transfer:  (sit<.> stand max assist x 2 with gait belt and ww,)    Ambulation/Gait Training:  Functional Mobility  Functional Mobility Performed:  (Unable to take any steps)    Sitting Balance:  Static Sitting Balance  Static Sitting-Balance Support: Bilateral upper extremity supported  Static Sitting-Level of Assistance: Close supervision    Standing Balance:  Static Standing Balance  Static Standing-Balance Support: Bilateral upper extremity supported  Static Standing-Level of Assistance: Maximum assistance      Sensation:  Sensation Comment: Numbness to bilateral feet        Perception:  Perseveration: Perseverates during ADLs        Hand Function:  Hand Function  Gross Grasp: Functional  Coordination: Functional    Extremities: RUE   RUE :  (~90 shoudler flexion) and LUE   LUE:  (~shoulder flexion)    Outcome Measures: [facility] Daily Activity  Putting on and taking off regular lower body clothing: Total  Bathing (including  washing, rinsing, drying): Total  Putting on and taking off regular upper body clothing: A lot  Toileting, which includes using toilet, bedpan or urinal: Total  Taking care of personal grooming such as brushing teeth: A little  Eating Meals: A little  Daily Activity - Total Score: 11             EDUCATION:  Education  Individual(s) Educated: Patient  Education Provided: Fall precautons, Risk and benefits of OT discussed with patient or other, POC discussed and agreed upon  Patient Response to Education: Patient/Caregiver Verbalized Understanding of Information      Goals:   Encounter Problems       Encounter Problems (Active)       Dressings Lower Extremities       STG - Patient to complete lower body dressing min assist       Start:  11/11/24    Expected End:  11/25/24               Functional Balance       LTG - Patient will maintain standing and sitting balance to allow for completion of daily activities       Start:  11/11/24    Expected End:  11/25/24               Grooming       STG - Patient completes grooming SUP       Start:  11/11/24    Expected End:  11/25/24               OT Transfers       STG - Patient will perform toilet transfer mod assist       Start:  11/11/24    Expected End:  11/25/24

## 2024-11-11 NOTE — PROGRESS NOTES
Physical Therapy    Physical Therapy Evaluation    Patient Name: Elgin Marin  MRN: [id number]  Today's Date: 11/11/2024   Time Calculation  Start Time: 0850  Stop Time: 0909  Time Calculation (min): 19 min  3108/3108-A    Assessment/Plan   PT Assessment: Pt demonstrates decreased strength, decreased ROM, decreased activity tolerance, decreased endurance, increased pain, and impaired balance/mobility.  Pt appears below baseline level of function and based on current level of function, pt would benefit from continued skilled therapy while in the hospital to ensure safety, decrease risk of falls, and regain strength/mobility back to baseline.  Once stable enough for discharge, pt would benefit from moderate intensity therapy prior to returning home.     PT Assessment Results: Decreased strength, Decreased range of motion, Decreased endurance, Impaired balance, Decreased mobility, Impaired judgement, Decreased safety awareness, Pain  Rehab Prognosis: Good  Evaluation/Treatment Tolerance: Patient limited by fatigue, Patient limited by pain  Medical Staff Made Aware: Yes  End of Session Communication: Bedside nurse  End of Session Patient Position: Bed, 3 rail up, Alarm on  IP OR SWING BED PT PLAN  Inpatient or Swing Bed: Inpatient  PT Plan  Treatment/Interventions: Bed mobility, Transfer training, Gait training, Stair training, Balance training, Neuromuscular re-education, Strengthening, Endurance training, Range of motion, Therapeutic exercise, Therapeutic activity, Home exercise program  PT Plan: Ongoing PT  PT Frequency: 3 times per week  PT Discharge Recommendations: Moderate intensity level of continued care  Equipment Recommended upon Discharge: Wheeled walker  PT Recommended Transfer Status: Assist x2 (Max A)  PT - OK to Discharge: Yes - To next level of care when cleared by medical team    Subjective     Current Problem:  1. Fall, initial encounter            Past Medical History:  Patient Active Problem List    Diagnosis    Abrasion of conjunctiva    Arthritis    Atrial fibrillation (Multi)    Bilateral edema of lower extremity    Coronary artery disease involving coronary bypass graft of native heart    Dry eyes    Dyslipidemia    Entropion of right lower eyelid    Glaucoma suspect, both eyes    Hypertensive kidney disease with CKD (chronic kidney disease), stage 1-4 or unspecified chronic kidney disease    Low tension glaucoma of right eye, mild stage    Obstructive sleep apnea    Pseudophakia of both eyes    Rejection of corneal graft    S/P PKP (penetrating keratoplasty)    Dyspnea on exertion    Essential hypertension, benign    Chronic combined systolic and diastolic heart failure    Hyperglycemia    Unable to walk    Elevated troponin    Acute pulmonary edema       General Visit Information:  Per EMR: pt presenting after a fall.  Patient has history of recurrent falls.  Patient gets around the house/apartment with a walker.  Earlier today patient states that he was walking with his walker and then lost balance and fell backward.  Patient did hit his head.  He did not lose consciousness.  He was unable to get up and his spouse called EMS to help get him up.  When he was helped up patient was barely able to stand and relying on furniture to maintain his posture.  Given patient was unable to walk he was brought to [facility] [place] [place] for further evaluation.  Patient otherwise has been feeling fairly well and denies any chest pain shortness of breath nausea vomiting diarrhea constipation fever chills dysuria or headache.     In the emergency department his vital signs are stable.  Labs showed creatinine of 2.08 which is consistent with his chronic kidney disease baseline.  He also had a troponin of 161 followed by 162.  His troponins are also chronically elevated and thought to be chronically elevated secondary to distal coronary artery disease not amenable to stenting.  His BNP is slightly elevated 711.  CBC  "was unremarkable.  CT of the head and C-spine were done which did not reveal any acute pathology.  Chest x-ray was done which does show pulmonary edema with possible left pleural effusion.  Case was discussed with his cardiologist Dr. Amador who recommended doubling patient's dose of torsemide.  Patient being observed in the hospital due to ambulatory dysfunction and need for physical therapy and Occupational Therapy evaluation and likely placement in skilled nursing facility versus rehab.    On arrival, pt sitting EOB.  Pt in no apparent distress and agreeable to therapy.    General  Reason for Referral: impaired mobility  Referred By: Robert Diaz  Co-Treatment: OT  Prior to Session Communication: Bedside nurse  Patient Position Received: Alarm on (sitting EOB)    Home Living/PLOF:  Difficult to obtain entire home set up 2/2 pt going off on tangents.  Pt lives in house with wife and son.  States he has 13 RIOS with rail but states he and his wife do not leave the house because of this.  He states his wife is bed/WC bound and he and his son assist her.  Has tub shower but states he only sponge bathes because he cannot get in/out.  Ambulates using rollator.  Pt states he is unable to drive.     Precautions:  Precautions  Medical Precautions: Fall precautions    Vital Signs:  Vital Signs  SpO2: 96 % (once 3LNC reapplied; pt wheezing and SOB after transfer back to supine stating \"I canot breathe\"; O2 set at 3L on wall but NC not applied on arrival)  Objective     Pain:  Pain Assessment  Pain Assessment: 0-10  0-10 (Numeric) Pain Score:  (pt reports BLE pain and pain in B hands; does not rate either)  Pain Interventions: Repositioned    Cognition:  Cognition  Overall Cognitive Status: Within Functional Limits (pt tangential which makes obtaining info quite difficult)  Orientation Level: Oriented X4    General Assessments:      Activity Tolerance  Endurance: Decreased tolerance for upright activites    Static " Sitting Balance  Static Sitting-Comment/Number of Minutes: fair plus  Dynamic Sitting Balance  Dynamic Sitting-Comments: fair  Static Standing Balance  Static Standing-Comment/Number of Minutes: fair minus  Dynamic Standing Balance  Dynamic Standing-Comments: poor    Extremity/Trunk Assessments:  RLE  Strength (pt states he has nerve issues in RLE stemming from his back)  R knee ext: 2+/5  R DF: 1/5  LLE  Strength  L knee ext: 3/5  L DF: 3+/5    Functional Mobility:  Bed mobility  Supine to sit: NT 2/2 to sitting EOB on arrival   Sit to supine: Max A x2 using draw sheet     Transfers  Sit to stand: Max A x2; Vcs for hand placement   Stand to sit: Max A x2; Vcs for hand placement     Ambulation/Stairs  Pt took 1 side step along HOB with Max A x2 with Max Vcs for weight shifting; pt with increased difficulty weight shifting onto LLE and advancing RLE 2/2 pain     Outcome Measures:  [facility] Basic Mobility  Turning from your back to your side while in a flat bed without using bedrails: A lot  Moving from lying on your back to sitting on the side of a flat bed without using bedrails: A lot  Moving to and from bed to chair (including a wheelchair): Total  Standing up from a chair using your arms (e.g. wheelchair or bedside chair): A lot  To walk in hospital room: Total  Climbing 3-5 steps with railing: Total  Basic Mobility - Total Score: 9    Goals:  Encounter Problems       Encounter Problems (Active)       PT Problem       Pt will be able to perform all bed mobility tasks with Min A.  (Progressing)       Start:  11/11/24    Expected End:  11/25/24            Pt will perform all transfers with Min A and FWW with proper safety mechanics.   (Progressing)       Start:  11/11/24    Expected End:  11/25/24            Pt will ambulate 50 ft with Min A using FWW for improved functional independence.  (Progressing)       Start:  11/11/24    Expected End:  11/25/24            Pt will be able to negotiate 13 steps with 1 HR with  Mod A.  (Progressing)       Start:  11/11/24    Expected End:  11/25/24            Pt will be able to ambulate at least 50 ft with < or equal to 1 rest break while maintaining SpO2 > 90%. . (Progressing)       Start:  11/11/24    Expected End:  11/25/24                 Education Documentation  Home Exercise Program, taught by Shea Perez, PT at 11/11/2024  9:42 AM.  Learner: Patient  Readiness: Acceptance  Method: Explanation  Response: Verbalizes Understanding    Body Mechanics, taught by Shea Perez, PT at 11/11/2024  9:42 AM.  Learner: Patient  Readiness: Acceptance  Method: Explanation  Response: Verbalizes Understanding    Mobility Training, taught by Shea Perez, PT at 11/11/2024  9:42 AM.  Learner: Patient  Readiness: Acceptance  Method: Explanation  Response: Verbalizes Understanding    Education Comments  No comments found.

## 2024-11-11 NOTE — PROGRESS NOTES
"Elgin Marin is a 84 y.o. male on day 0 of admission presenting with Unable to walk.      Subjective   Patient doing well. Feels about the same as yesterday. Understands he is going to be placed in SNF for rehab. Patient noted to be hypoglycemic but largely asymptomatic. Called son and updated him. States patient has had insulin increased recently but felt it was too much. Discussed holding it altogether to see if he is really requiring any and was agreeable to this.    Objective     Last Recorded Vitals  /64 (BP Location: Left arm, Patient Position: Lying)   Pulse 79   Temp 36.3 °C (97.3 °F) (Temporal)   Resp 19   Wt 96 kg (211 lb 10.3 oz)   SpO2 96% Comment: once 3LNC reapplied; pt wheezing and SOB after transfer back to supine stating \"I canot breathe\"; O2 set at 3L on wall but NC not applied on arrival  Intake/Output last 3 Shifts:    Intake/Output Summary (Last 24 hours) at 11/11/2024 1107  Last data filed at 11/11/2024 0535  Gross per 24 hour   Intake --   Output 1725 ml   Net -1725 ml       Admission Weight  Weight: 96 kg (211 lb 10.3 oz) (11/10/24 1302)    Daily Weight  11/10/24 : 96 kg (211 lb 10.3 oz)    Image Results  ECG 12 Lead  Atrial fibrillation with premature ventricular or aberrantly conducted complexes  Right axis deviation  Septal infarct (cited on or before 21-JAN-2024)  Abnormal ECG  When compared with ECG of 21-JAN-2024 16:33,  T wave inversion less evident in Lateral leads      Physical Exam  Constitutional:       General: He is not in acute distress.     Appearance: Normal appearance.   HENT:      Head: Normocephalic and atraumatic.      Ears:      Comments: [place]     Mouth/Throat:      Mouth: Mucous membranes are moist.   Eyes:      Extraocular Movements: Extraocular movements intact.      Conjunctiva/sclera: Conjunctivae normal.   Cardiovascular:      Rate and Rhythm: Normal rate and regular rhythm.      Heart sounds: Normal heart sounds.   Pulmonary:      Effort: Pulmonary " effort is normal.      Breath sounds: Normal breath sounds. No wheezing, rhonchi or rales.   Abdominal:      General: Abdomen is flat. Bowel sounds are normal.      Palpations: Abdomen is soft.   Musculoskeletal:         General: No swelling or tenderness.      Cervical back: Normal range of motion and neck supple.      Comments: ROM reduced 2/2 pain in multiple joints; was able to stand with PT while I was in the room   Skin:     General: Skin is warm and dry.      Findings: No rash.   Neurological:      General: No focal deficit present.      Mental Status: He is alert and oriented to person, place, and time. Mental status is at baseline.      Cranial Nerves: No cranial nerve deficit.      Sensory: No sensory deficit.      Motor: Weakness (generalized) present.   Psychiatric:         Mood and Affect: Mood normal.         Behavior: Behavior normal.         Relevant Results  Scheduled medications  apixaban, 2.5 mg, oral, BID  bisoprolol, 5 mg, oral, Daily  [Held by provider] insulin glargine, 8 Units, subcutaneous, BID  insulin lispro, 0-10 Units, subcutaneous, TID  pantoprazole, 40 mg, oral, Nightly  rosuvastatin, 20 mg, oral, Nightly  torsemide, 80 mg, oral, Daily      Continuous medications     PRN medications  PRN medications: acetaminophen, dextrose, dextrose, melatonin, ondansetron, oxygen    Results for orders placed or performed during the hospital encounter of 11/10/24 (from the past 24 hours)   ECG 12 Lead   Result Value Ref Range    Ventricular Rate 66 BPM    Atrial Rate 227 BPM    QRS Duration 98 ms    QT Interval 436 ms    QTC Calculation(Bazett) 457 ms    R Axis 133 degrees    T Axis 266 degrees    QRS Count 11 beats    Q Onset 211 ms    T Offset 429 ms    QTC Fredericia 450 ms   CBC and Auto Differential   Result Value Ref Range    WBC 6.4 4.4 - 11.3 x10*3/uL    nRBC 0.0 0.0 - 0.0 /100 WBCs    RBC 4.30 (L) 4.50 - 5.90 x10*6/uL    Hemoglobin 11.9 (L) 13.5 - 17.5 g/dL    Hematocrit 39.5 (L) 41.0 - 52.0  %    MCV 92 80 - 100 fL    MCH 27.7 26.0 - 34.0 pg    MCHC 30.1 (L) 32.0 - 36.0 g/dL    RDW 16.3 (H) 11.5 - 14.5 %    Platelets 169 150 - 450 x10*3/uL    Neutrophils % 78.5 40.0 - 80.0 %    Immature Granulocytes %, Automated 0.5 0.0 - 0.9 %    Lymphocytes % 12.2 13.0 - 44.0 %    Monocytes % 6.9 2.0 - 10.0 %    Eosinophils % 1.1 0.0 - 6.0 %    Basophils % 0.8 0.0 - 2.0 %    Neutrophils Absolute 5.04 1.60 - 5.50 x10*3/uL    Immature Granulocytes Absolute, Automated 0.03 0.00 - 0.50 x10*3/uL    Lymphocytes Absolute 0.78 (L) 0.80 - 3.00 x10*3/uL    Monocytes Absolute 0.44 0.05 - 0.80 x10*3/uL    Eosinophils Absolute 0.07 0.00 - 0.40 x10*3/uL    Basophils Absolute 0.05 0.00 - 0.10 x10*3/uL   Comprehensive metabolic panel   Result Value Ref Range    Glucose 63 (L) 74 - 99 mg/dL    Sodium 138 136 - 145 mmol/L    Potassium 3.9 3.5 - 5.3 mmol/L    Chloride 101 98 - 107 mmol/L    Bicarbonate 30 21 - 32 mmol/L    Anion Gap 11 10 - 20 mmol/L    Urea Nitrogen 46 (H) 6 - 23 mg/dL    Creatinine 2.08 (H) 0.50 - 1.30 mg/dL    eGFR 30 (L) >60 mL/min/1.73m*2    Calcium 8.4 (L) 8.6 - 10.3 mg/dL    Albumin 3.0 (L) 3.4 - 5.0 g/dL    Alkaline Phosphatase 102 33 - 136 U/L    Total Protein 6.2 (L) 6.4 - 8.2 g/dL    AST 23 9 - 39 U/L    Bilirubin, Total 1.3 (H) 0.0 - 1.2 mg/dL    ALT 11 10 - 52 U/L   Troponin I, High Sensitivity, Initial   Result Value Ref Range    Troponin I, High Sensitivity 161 (HH) 0 - 20 ng/L   B-type natriuretic peptide   Result Value Ref Range     (H) 0 - 99 pg/mL   Troponin, High Sensitivity, 1 Hour   Result Value Ref Range    Troponin I, High Sensitivity 162 (HH) 0 - 20 ng/L   Urinalysis with Reflex Culture and Microscopic   Result Value Ref Range    Color, Urine Light-Yellow Light-Yellow, Yellow, Dark-Yellow    Appearance, Urine Clear Clear    Specific Gravity, Urine 1.014 1.005 - 1.035    pH, Urine 5.0 5.0, 5.5, 6.0, 6.5, 7.0, 7.5, 8.0    Protein, Urine NEGATIVE NEGATIVE, 10 (TRACE), 20 (TRACE) mg/dL     Glucose, Urine Normal Normal mg/dL    Blood, Urine NEGATIVE NEGATIVE    Ketones, Urine NEGATIVE NEGATIVE mg/dL    Bilirubin, Urine NEGATIVE NEGATIVE    Urobilinogen, Urine Normal Normal mg/dL    Nitrite, Urine NEGATIVE NEGATIVE    Leukocyte Esterase, Urine NEGATIVE NEGATIVE   Extra Urine Gray Tube   Result Value Ref Range    Extra Tube Hold for add-ons.    Basic Metabolic Panel   Result Value Ref Range    Glucose 68 (L) 74 - 99 mg/dL    Sodium 138 136 - 145 mmol/L    Potassium 4.6 3.5 - 5.3 mmol/L    Chloride 100 98 - 107 mmol/L    Bicarbonate 32 21 - 32 mmol/L    Anion Gap 11 10 - 20 mmol/L    Urea Nitrogen 45 (H) 6 - 23 mg/dL    Creatinine 2.06 (H) 0.50 - 1.30 mg/dL    eGFR 31 (L) >60 mL/min/1.73m*2    Calcium 8.1 (L) 8.6 - 10.3 mg/dL   CBC   Result Value Ref Range    WBC 6.7 4.4 - 11.3 x10*3/uL    nRBC 0.0 0.0 - 0.0 /100 WBCs    RBC 4.12 (L) 4.50 - 5.90 x10*6/uL    Hemoglobin 11.2 (L) 13.5 - 17.5 g/dL    Hematocrit 38.4 (L) 41.0 - 52.0 %    MCV 93 80 - 100 fL    MCH 27.2 26.0 - 34.0 pg    MCHC 29.2 (L) 32.0 - 36.0 g/dL    RDW 16.3 (H) 11.5 - 14.5 %    Platelets 181 150 - 450 x10*3/uL   Magnesium   Result Value Ref Range    Magnesium 1.83 1.60 - 2.40 mg/dL   Phosphorus   Result Value Ref Range    Phosphorus 4.1 2.5 - 4.9 mg/dL       ECG 12 Lead    Result Date: 11/11/2024  Atrial fibrillation with premature ventricular or aberrantly conducted complexes Right axis deviation Septal infarct (cited on or before 21-JAN-2024) Abnormal ECG When compared with ECG of 21-JAN-2024 16:33, T wave inversion less evident in Lateral leads    XR chest 1 view    Result Date: 11/10/2024  Interpreted By:  Ekta Ardon, STUDY: XR CHEST 1 VIEW;  11/10/2024 2:13 pm   INDICATION: Signs/Symptoms:HIA.   COMPARISON: 01/22/2024   ACCESSION NUMBER(S): [id number]   ORDERING CLINICIAN: RL OWEN   FINDINGS: The heart is enlarged. There is diffuse bilateral interstitial prominence and  prominence of central pulmonary vessels. Blunting of  the left costophrenic angle is noted, which most likely is related to a pleural effusion. No consolidation,  or pneumothorax is noted.       Cardiomegaly pulmonary edema and probable left pleural effusion.       MACRO: None   Signed by: Ekta Ardon 11/10/2024 3:17 PM Dictation workstation:   [id number]    CT cervical spine wo IV contrast    Result Date: 11/10/2024  Interpreted By:  Ekta Ardon, STUDY: CT CERVICAL SPINE WO IV CONTRAST;  11/10/2024 1:39 pm   INDICATION: Signs/Symptoms:HIA.     COMPARISON: 03/17/2023   ACCESSION NUMBER(S): [id number]   ORDERING CLINICIAN: RL OWEN   TECHNIQUE: Axial CT images of the cervical spine are obtained. Axial, coronal and sagittal reconstructions are provided for review.   FINDINGS:       Fractures: There is no evidence for an acute fracture of the cervical spine.   Vertebral Alignment: Reversal normal cervical lordosis is seen.   Craniocervical Junction: The odontoid process and craniocervical junction are intact.   Vertebrae/Disc Spaces: The cervical vertebral body heights are intact. Endplate sclerosis and spur formation is seen throughout the cervical spine. Uncovertebral joint hypertrophy is seen. Severe narrowing of C3-4, C4-5 C5-6 and C6-7 disc spaces noted. Neural foramina narrowing seen at multiple levels   Prevertebral/Paraspinal Soft Tissues: The prevertebral and paraspinal soft tissues are unremarkable.   The vascular calcifications are identified.         No evidence for an acute fracture or subluxation of the cervical spine.   Reversal normal cervical lordosis and degenerative changes   MACRO: None   Signed by: Ekta Ardon 11/10/2024 3:15 PM Dictation workstation:   [id number]    CT head wo IV contrast    Result Date: 11/10/2024  Interpreted By:  Ekta Ardon, STUDY: CT HEAD WO IV CONTRAST;  11/10/2024 1:39 pm   INDICATION: Signs/Symptoms:HIA.   COMPARISON: 03/17/2020   ACCESSION NUMBER(S): [id number]   ORDERING CLINICIAN: RL OWEN    TECHNIQUE: Axial images of 5 mm thickness were obtained through the head without intravenous contrast sagittal and coronal reconstructions were acquired.   FINDINGS: BRAIN PARENCHYMA: Periventricular and subcortical white matter changes are present.  No masses are seen. No mass effect.  No acute cortical infarct or mass effect is seen.   HEMORRHAGE: There is no evidence for hemorrhage.   VENTRICLES and EXTRA-AXIAL SPACES: The ventricles, sulci and cisterns are prominent suggesting volume loss.   INTRACRANIAL VESSELS:  atherosclerotic calcification.   EXTRACRANIAL SOFT TISSUES: Within normal limits.   PARANASAL SINUSES/MASTOIDS: Nasal septal deviation to the left is seen.   CALVARIUM: No destructive lesion or depressed skull fracture.       Diffuse volume loss and periventricular white matter changes, which given patient's age likely represent small vessel ischemic disease.   No CT evidence of acute hemorrhage or acute cortical infarct.   No evidence of displaced skull fracture.     MACRO: None   Signed by: Ekta Ardon 11/10/2024 3:04 PM Dictation workstation:   [id number]       Assessment/Plan   Assessment & Plan  Unable to walk    Elevated troponin    Acute pulmonary edema    Atrial fibrillation (Multi)    Coronary artery disease involving coronary bypass graft of native heart    Dyslipidemia    Glaucoma suspect, both eyes    Hypertensive kidney disease with CKD (chronic kidney disease), stage 1-4 or unspecified chronic kidney disease    Obstructive sleep apnea    Essential hypertension, benign    Chronic combined systolic and diastolic heart failure    84-year-old male with history of atrial fibrillation on anticoagulation presents after a fall with head trauma but no loss of consciousness with negative head CT and C-spine found to have stable CKD, stable troponin elevation due to chronic distal CAD, and elevated BNP with pulmonary edema but is an overall hemodynamically stable condition largely here for  placement in skilled nursing facility versus inpatient rehab.     -Mechanical fall with head trauma and no loss of consciousness on blood thinner  -CT head and C-spine unremarkable  -Chest x-ray showing pulmonary edema with possible small pleural effusion  -CKD stable  -BNP is slightly elevated  -Case reportedly discussed by ED with cardiology who recommended doubling torsemide to 80 mg daily from his baseline of 40 mg daily; was already on 60mg before  -High-sensitivity troponin elevated but trend flat and entire year has shown stable elevated troponins and likely is due to chronic distal CAD  -holding his insulin altogether given hypoglycemia; will continue SSI only for now to see if he is actually requiring any SSI  -PT/OT: AMPAC = 9; CM aware of need for placement  -Remainder of home medications to be resumed as appropriate     Code: Full  DVT prophylaxis: Continue DOAC  GI prophylaxis: Not indicated  Diet: Cardiac     Dispo: Patient medically stable for discharge to SNF at this time.    Robert Diaz MD

## 2024-11-11 NOTE — CARE PLAN
Problem: Pain - Adult  Goal: Verbalizes/displays adequate comfort level or baseline comfort level  Outcome: Progressing     Problem: Safety - Adult  Goal: Free from fall injury  Outcome: Progressing     Problem: Discharge Planning  Goal: Discharge to home or other facility with appropriate resources  Outcome: Progressing     Problem: Chronic Conditions and Co-morbidities  Goal: Patient's chronic conditions and co-morbidity symptoms are monitored and maintained or improved  Outcome: Progressing     Problem: Skin  Goal: Decreased wound size/increased tissue granulation at next dressing change  Outcome: Progressing  Goal: Participates in plan/prevention/treatment measures  Outcome: Progressing  Goal: Prevent/manage excess moisture  Outcome: Progressing  Goal: Prevent/minimize sheer/friction injuries  Outcome: Progressing  Goal: Promote/optimize nutrition  Outcome: Progressing  Goal: Promote skin healing  Outcome: Progressing     Problem: Fall/Injury  Goal: Not fall by end of shift  Outcome: Progressing  Goal: Be free from injury by end of the shift  Outcome: Progressing  Goal: Verbalize understanding of personal risk factors for fall in the hospital  Outcome: Progressing  Goal: Verbalize understanding of risk factor reduction measures to prevent injury from fall in the home  Outcome: Progressing  Goal: Use assistive devices by end of the shift  Outcome: Progressing  Goal: Pace activities to prevent fatigue by end of the shift  Outcome: Progressing     Problem: Respiratory  Goal: Clear secretions with interventions this shift  Outcome: Progressing  Goal: Minimize anxiety/maximize coping throughout shift  Outcome: Progressing  Goal: Minimal/no exertional discomfort or dyspnea this shift  Outcome: Progressing  Goal: No signs of respiratory distress (eg. Use of accessory muscles. Peds grunting)  Outcome: Progressing  Goal: Patent airway maintained this shift  Outcome: Progressing  Goal: Tolerate mechanical ventilation  evidenced by VS/agitation level this shift  Outcome: Progressing  Goal: Tolerate pulmonary toileting this shift  Outcome: Progressing  Goal: Verbalize decreased shortness of breath this shift  Outcome: Progressing  Goal: Wean oxygen to maintain O2 saturation per order/standard this shift  Outcome: Progressing  Goal: Increase self care and/or family involvement in next 24 hours  Outcome: Progressing     Problem: Dressings Lower Extremities  Goal: STG - Patient to complete lower body dressing min assist  Outcome: Progressing     Problem: Grooming  Goal: STG - Patient completes grooming SUP  Outcome: Progressing   The patient's goals for the shift include comfort and sleep    The clinical goals for the shift include pt will be free from falls/injury t/o the shift

## 2024-11-11 NOTE — CARE PLAN
The patient's goals for the shift include comfort and sleep    The clinical goals for the shift include POX remain above 92%    Problem: Pain - Adult  Goal: Verbalizes/displays adequate comfort level or baseline comfort level  Outcome: Progressing     Problem: Safety - Adult  Goal: Free from fall injury  Outcome: Progressing     Problem: Discharge Planning  Goal: Discharge to home or other facility with appropriate resources  Outcome: Progressing     Problem: Chronic Conditions and Co-morbidities  Goal: Patient's chronic conditions and co-morbidity symptoms are monitored and maintained or improved  Outcome: Progressing     Problem: Skin  Goal: Decreased wound size/increased tissue granulation at next dressing change  Outcome: Progressing  Goal: Participates in plan/prevention/treatment measures  Outcome: Progressing  Goal: Prevent/manage excess moisture  Outcome: Progressing  Goal: Prevent/minimize sheer/friction injuries  Outcome: Progressing  Goal: Promote/optimize nutrition  Outcome: Progressing  Goal: Promote skin healing  Outcome: Progressing     Problem: Fall/Injury  Goal: Not fall by end of shift  Outcome: Progressing  Goal: Be free from injury by end of the shift  Outcome: Progressing  Goal: Verbalize understanding of personal risk factors for fall in the hospital  Outcome: Progressing  Goal: Verbalize understanding of risk factor reduction measures to prevent injury from fall in the home  Outcome: Progressing  Goal: Use assistive devices by end of the shift  Outcome: Progressing  Goal: Pace activities to prevent fatigue by end of the shift  Outcome: Progressing     Problem: Respiratory  Goal: Clear secretions with interventions this shift  Outcome: Progressing  Goal: Minimize anxiety/maximize coping throughout shift  Outcome: Progressing  Goal: Minimal/no exertional discomfort or dyspnea this shift  Outcome: Progressing  Goal: No signs of respiratory distress (eg. Use of accessory muscles. Peds  grunting)  Outcome: Progressing  Goal: Patent airway maintained this shift  Outcome: Progressing  Goal: Tolerate mechanical ventilation evidenced by VS/agitation level this shift  Outcome: Progressing  Goal: Tolerate pulmonary toileting this shift  Outcome: Progressing  Goal: Verbalize decreased shortness of breath this shift  Outcome: Progressing  Goal: Wean oxygen to maintain O2 saturation per order/standard this shift  Outcome: Progressing  Goal: Increase self care and/or family involvement in next 24 hours  Outcome: Progressing

## 2024-11-11 NOTE — PROGRESS NOTES
11/11/24 1057   Discharge Planning   Home or Post Acute Services In home services  (Current with [facility] services)   Patient Choice   Provider Choice list and CMS website (https://medicare.gov/care-compare#search) for post-acute Quality and Resource Measure Data were provided and reviewed with: Family     Introduced myself and my role .  States had been at SNF in the past and will not go back there. Would consider another place. [facility] 9 . Permission to call and speak with his son Duane.   1130 Called and s polien wit son Duane. Yes plan is for a SNF.  Emailed choice list to [email or web address].  copy also left inpatients room.   1509 Son returned choices through care \[facility] [place] [facility]\"". [facility] ; [facility] and [facility]..

## 2024-11-12 ENCOUNTER — APPOINTMENT (OUTPATIENT)
Dept: RADIOLOGY | Facility: HOSPITAL | Age: 84
DRG: 280 | End: 2024-11-12
Payer: MEDICARE

## 2024-11-12 PROBLEM — R79.89 ELEVATED TROPONIN: Status: RESOLVED | Noted: 2024-11-10 | Resolved: 2024-11-12

## 2024-11-12 PROBLEM — J81.0 ACUTE PULMONARY EDEMA: Status: RESOLVED | Noted: 2024-11-10 | Resolved: 2024-11-12

## 2024-11-12 LAB
GLUCOSE BLD MANUAL STRIP-MCNC: 120 MG/DL (ref 74–99)
GLUCOSE BLD MANUAL STRIP-MCNC: 156 MG/DL (ref 74–99)
GLUCOSE BLD MANUAL STRIP-MCNC: 166 MG/DL (ref 74–99)
GLUCOSE BLD MANUAL STRIP-MCNC: 170 MG/DL (ref 74–99)

## 2024-11-12 PROCEDURE — 2500000001 HC RX 250 WO HCPCS SELF ADMINISTERED DRUGS (ALT 637 FOR MEDICARE OP): Performed by: INTERNAL MEDICINE

## 2024-11-12 PROCEDURE — G0378 HOSPITAL OBSERVATION PER HR: HCPCS

## 2024-11-12 PROCEDURE — 82947 ASSAY GLUCOSE BLOOD QUANT: CPT

## 2024-11-12 PROCEDURE — 73630 X-RAY EXAM OF FOOT: CPT | Mod: RT

## 2024-11-12 PROCEDURE — 2500000002 HC RX 250 W HCPCS SELF ADMINISTERED DRUGS (ALT 637 FOR MEDICARE OP, ALT 636 FOR OP/ED): Performed by: INTERNAL MEDICINE

## 2024-11-12 PROCEDURE — 73630 X-RAY EXAM OF FOOT: CPT | Mod: RIGHT SIDE | Performed by: RADIOLOGY

## 2024-11-12 PROCEDURE — 87077 CULTURE AEROBIC IDENTIFY: CPT | Mod: STJLAB | Performed by: PODIATRIST

## 2024-11-12 PROCEDURE — 99238 HOSP IP/OBS DSCHRG MGMT 30/<: CPT | Performed by: INTERNAL MEDICINE

## 2024-11-12 PROCEDURE — 99221 1ST HOSP IP/OBS SF/LOW 40: CPT | Performed by: PODIATRIST

## 2024-11-12 RX ORDER — NIFEDIPINE 60 MG/1
60 TABLET, FILM COATED, EXTENDED RELEASE ORAL ONCE
Status: COMPLETED | OUTPATIENT
Start: 2024-11-12 | End: 2024-11-12

## 2024-11-12 RX ORDER — INSULIN GLARGINE 100 [IU]/ML
10 INJECTION, SOLUTION SUBCUTANEOUS EVERY MORNING
Qty: 3 ML | Refills: 0 | Status: SHIPPED | OUTPATIENT
Start: 2024-11-12

## 2024-11-12 RX ORDER — NIFEDIPINE 60 MG/1
60 TABLET, FILM COATED, EXTENDED RELEASE ORAL
Status: DISCONTINUED | OUTPATIENT
Start: 2024-11-13 | End: 2024-11-13

## 2024-11-12 RX ORDER — AMOXICILLIN AND CLAVULANATE POTASSIUM 875; 125 MG/1; MG/1
1 TABLET, FILM COATED ORAL 2 TIMES DAILY
Qty: 10 TABLET | Refills: 0 | Status: SHIPPED | OUTPATIENT
Start: 2024-11-12 | End: 2024-11-21 | Stop reason: HOSPADM

## 2024-11-12 RX ORDER — ACETAMINOPHEN 325 MG/1
975 TABLET ORAL EVERY 8 HOURS
Status: DISCONTINUED | OUTPATIENT
Start: 2024-11-12 | End: 2024-11-13 | Stop reason: HOSPADM

## 2024-11-12 RX ORDER — TRAMADOL HYDROCHLORIDE 50 MG/1
25 TABLET ORAL ONCE
Status: COMPLETED | OUTPATIENT
Start: 2024-11-12 | End: 2024-11-12

## 2024-11-12 RX ORDER — AMOXICILLIN AND CLAVULANATE POTASSIUM 875; 125 MG/1; MG/1
1 TABLET, FILM COATED ORAL 2 TIMES DAILY
Status: DISCONTINUED | OUTPATIENT
Start: 2024-11-12 | End: 2024-11-13 | Stop reason: HOSPADM

## 2024-11-12 RX ADMIN — PANTOPRAZOLE SODIUM 40 MG: 40 TABLET, DELAYED RELEASE ORAL at 21:01

## 2024-11-12 RX ADMIN — TRAMADOL HYDROCHLORIDE 25 MG: 50 TABLET, COATED ORAL at 01:16

## 2024-11-12 RX ADMIN — ACETAMINOPHEN 325MG 975 MG: 325 TABLET ORAL at 21:01

## 2024-11-12 RX ADMIN — ACETAMINOPHEN 325MG 975 MG: 325 TABLET ORAL at 11:23

## 2024-11-12 RX ADMIN — INSULIN LISPRO 2 UNITS: 100 INJECTION, SOLUTION INTRAVENOUS; SUBCUTANEOUS at 08:39

## 2024-11-12 RX ADMIN — INSULIN LISPRO 2 UNITS: 100 INJECTION, SOLUTION INTRAVENOUS; SUBCUTANEOUS at 11:24

## 2024-11-12 RX ADMIN — APIXABAN 2.5 MG: 2.5 TABLET, FILM COATED ORAL at 08:39

## 2024-11-12 RX ADMIN — TORSEMIDE 80 MG: 20 TABLET ORAL at 08:39

## 2024-11-12 RX ADMIN — BISOPROLOL FUMARATE 5 MG: 5 TABLET, FILM COATED ORAL at 08:39

## 2024-11-12 RX ADMIN — EMPAGLIFLOZIN 10 MG: 10 TABLET, FILM COATED ORAL at 08:39

## 2024-11-12 RX ADMIN — NIFEDIPINE 60 MG: 60 TABLET, FILM COATED, EXTENDED RELEASE ORAL at 18:22

## 2024-11-12 RX ADMIN — ROSUVASTATIN CALCIUM 20 MG: 20 TABLET, FILM COATED ORAL at 21:01

## 2024-11-12 RX ADMIN — AMOXICILLIN AND CLAVULANATE POTASSIUM 1 TABLET: 875; 125 TABLET, FILM COATED ORAL at 21:01

## 2024-11-12 RX ADMIN — APIXABAN 2.5 MG: 2.5 TABLET, FILM COATED ORAL at 21:02

## 2024-11-12 RX ADMIN — AMOXICILLIN AND CLAVULANATE POTASSIUM 1 TABLET: 875; 125 TABLET, FILM COATED ORAL at 14:28

## 2024-11-12 ASSESSMENT — COGNITIVE AND FUNCTIONAL STATUS - GENERAL
TOILETING: A LOT
MOBILITY SCORE: 12
DRESSING REGULAR LOWER BODY CLOTHING: A LOT
DAILY ACTIVITIY SCORE: 15
DRESSING REGULAR UPPER BODY CLOTHING: A LITTLE
MOVING FROM LYING ON BACK TO SITTING ON SIDE OF FLAT BED WITH BEDRAILS: A LOT
WALKING IN HOSPITAL ROOM: A LOT
HELP NEEDED FOR BATHING: A LOT
EATING MEALS: A LITTLE
CLIMB 3 TO 5 STEPS WITH RAILING: A LOT
TURNING FROM BACK TO SIDE WHILE IN FLAT BAD: A LOT
STANDING UP FROM CHAIR USING ARMS: A LOT
PERSONAL GROOMING: A LITTLE
MOVING TO AND FROM BED TO CHAIR: A LOT

## 2024-11-12 ASSESSMENT — PAIN SCALES - GENERAL
PAINLEVEL_OUTOF10: 0 - NO PAIN
PAINLEVEL_OUTOF10: 8
PAINLEVEL_OUTOF10: 0 - NO PAIN

## 2024-11-12 ASSESSMENT — PAIN - FUNCTIONAL ASSESSMENT
PAIN_FUNCTIONAL_ASSESSMENT: 0-10

## 2024-11-12 NOTE — PROGRESS NOTES
Left message for pt's son, Duane, regarding snf responses.  Awaiting returned call and FOC.    11:31am; Pt's son returned call, and [facility] is FOC.  Facility updated.  Pt will need precert.

## 2024-11-12 NOTE — CONSULTS
Consults    Reason For Consult  BiLateral lower extremity wounds  History Of Present Illness  Elgin Marin is a 84 y.o. male presenting with lower extremity wounds.  Patient is quite confused but states he has had home health care by the [facility] for years.  He states his wounds are very painful..     Past Medical History  He has a past medical history of Hyperlipidemia, unspecified (11/14/2022), Other forms of dyspnea (08/09/2018), Personal history of other diseases of the circulatory system (12/08/2014), Personal history of other endocrine, nutritional and metabolic disease, Personal history of other specified conditions, Presence of intraocular lens (07/20/2019), Presence of intraocular lens (07/20/2019), and Unspecified atrial fibrillation (Multi) (11/14/2022).    Surgical History  He has a past surgical history that includes Coronary angioplasty with stent (10/06/2015); Coronary artery bypass graft (10/06/2015); Other surgical history (09/09/2015); Cholecystectomy (09/09/2015); and Rotator cuff repair (09/09/2015).     Social History  He reports that he has never smoked. He has never used smokeless tobacco. He reports that he does not drink alcohol and does not use drugs.    Family History  No family history on file.     Allergies  Metoprolol, Oxycodone-aspirin, Sulfa (sulfonamide antibiotics), Warfarin, and Lisinopril    Review of Systems    REVIEW OF SYSTEMS  GENERAL:  Negative for malaise, significant weight loss, fever  HEENT:  No changes in hearing or vision, no nose bleeds or other nasal problems and Negative for frequent or significant headaches  NECK:  Negative for lumps, goiter, pain and significant neck swelling  RESPIRATORY:  Negative for cough, wheezing and shortness of breath  CARDIOVASCULAR:  Negative for chest pain, leg swelling and palpitations  GI:  Negative for abdominal discomfort, blood in stools or black stools and change in bowel habits  :  Negative for dysuria, frequency and  "incontinence  MUSCULOSKELETAL:  Negative for joint pain or swelling, back pain, and muscle pain.  SKIN:  Negative for lesions, rash, and itching  PSYCH:  Negative for sleep disturbance, mood disorder and recent psychosocial stressors  HEMATOLOGY/LYMPHOLOGY:  Negative for prolonged bleeding, bruising easily, and swollen nodes.  ENDOCRINE:  Negative for cold or heat intolerance, polyuria, polydipsia and goiter  NEURO: Negative, denies any burning, tingling or numbness     Objective:   Vasc: DP and PT pulses are barely palpable bilateral.  CFT is less than 3 seconds bilateral.  Skin temperature is warm to cool proximal to distal bilateral.  Patient does have brawny edema.    Neuro:  Light touch is intact to the foot bilateral.   There is no clonus noted.  The hallux is downgoing bilateral.      Derm: Patient has wounds on the right first and second toes with exposed nailbed and some thicker drainage.  No exposed bone is palpable.  Patient has abrasions present on the toes 1 2 and 3 on the left foot.  Patient does have multiple partial-thickness wounds scattered over bilateral lower extremities.    Ortho: Patient is quite weak.  Physical Exam     Last Recorded Vitals  Blood pressure 126/61, pulse 77, temperature 36.7 °C (98.1 °F), temperature source Temporal, resp. rate 21, height 1.778 m (5' 10\"), weight 96 kg (211 lb 10.3 oz), SpO2 95%.    Relevant Results  Results for orders placed or performed during the hospital encounter of 11/10/24 (from the past 24 hours)   POCT GLUCOSE   Result Value Ref Range    POCT Glucose 110 (H) 74 - 99 mg/dL   POCT GLUCOSE   Result Value Ref Range    POCT Glucose 138 (H) 74 - 99 mg/dL   POCT GLUCOSE   Result Value Ref Range    POCT Glucose 166 (H) 74 - 99 mg/dL         Assessment/Plan     Patient is multiple chronic wounds bilateral lower extremities.  I am slightly concerned about the first and second toes for infection.  Deep culture is done.  X-rays have been ordered.  Continue with " local care.  No surgery is planned at this time.  Legs are rewrapped.    I spent 35 minutes in the professional and overall care of this patient.

## 2024-11-12 NOTE — DISCHARGE SUMMARY
"New discharge date: 11/13/24  Patient remained in hospital awaiting placement in SNF. Acceptance and precertification obtained and patient discharged in stable condition with no other changes to DC summary below.    Discharge Diagnosis  Unable to walk    Issues Requiring Follow-Up  Follow up with PCP; discuss insulin regimen  Follow up with wound care clinic    Discharge Meds     Medication List      START taking these medications     amoxicillin-pot clavulanate 875-125 mg tablet; Commonly known as:   Augmentin; Take 1 tablet by mouth 2 times a day for 10 doses.   empagliflozin 10 mg; Commonly known as: Jardiance; Take 1 tablet (10 mg)   by mouth once daily.; Start taking on: November 13, 2024     CHANGE how you take these medications     Lantus U-100 Insulin 100 unit/mL injection; Generic drug: insulin   glargine; Inject 10 Units under the skin once daily in the morning.; What   changed: how much to take, when to take this   torsemide 40 mg tablet; Take 80 mg by mouth once daily.; Start taking   on: November 13, 2024; What changed: medication strength, how much to   take, Another medication with the same name was removed. Continue taking   this medication, and follow the directions you see here.     CONTINUE taking these medications     acetaminophen 500 mg tablet; Commonly known as: Tylenol   apixaban 2.5 mg tablet; Commonly known as: Eliquis; Take 1 tablet (2.5   mg) by mouth 2 times a day.   bisoprolol 5 mg tablet; Commonly known as: Zebeta   pantoprazole 40 mg EC tablet; Commonly known as: ProtoNix   pen needle, diabetic 31 gauge x 5/16\" needle; Use to inject insulin 1 to   4 times daily as directed   rosuvastatin 20 mg tablet; Commonly known as: Crestor     STOP taking these medications     HumaLOG U-100 Insulin 100 unit/mL injection; Generic drug: insulin   lispro       Test Results Pending At Discharge  Pending Labs       Order Current Status    Tissue/Wound Culture/Smear In [facility]            Hospital " Course   History Of Present Illness  Elgin Marin is a 84 y.o. male presenting after a fall.  Patient has history of recurrent falls.  Patient gets around the house/apartment with a walker.  Earlier today patient states that he was walking with his walker and then lost balance and fell backward.  Patient did hit his head.  He did not lose consciousness.  He was unable to get up and his spouse called EMS to help get him up.  When he was helped up patient was barely able to stand and relying on furniture to maintain his posture.  Given patient was unable to walk he was brought to [facility] for further evaluation.  Patient otherwise has been feeling fairly well and denies any chest pain shortness of breath nausea vomiting diarrhea constipation fever chills dysuria or headache.     In the emergency department his vital signs are stable.  Labs showed creatinine of 2.08 which is consistent with his chronic kidney disease baseline.  He also had a troponin of 161 followed by 162.  His troponins are also chronically elevated and thought to be chronically elevated secondary to distal coronary artery disease not amenable to stenting.  His BNP is slightly elevated 711.  CBC was unremarkable.  CT of the head and C-spine were done which did not reveal any acute pathology.  Chest x-ray was done which does show pulmonary edema with possible left pleural effusion.  Case was discussed with his cardiologist Dr. Amador who recommended doubling patient's dose of torsemide.  Patient being observed in the hospital due to ambulatory dysfunction and need for physical therapy and Occupational Therapy evaluation and likely placement in skilled nursing facility versus rehab.    Hospital course: Patient was observed in the hospital largely for placement in skilled nursing facility.  He worked with physical therapy and Occupational Therapy and plan is for patient to be discharged to skilled nursing facility when this has  been arranged.  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  Previously there were affordability issues and for this reason patient was not on it.  Patient was seen by wound care nurse who recommended podiatry consult out of concern for infection.  Patient was started on Augmentin and is being discharged on this as well.  He follows with wound clinic and will continue to do so in the outpatient setting.  An x-ray of the foot was done which did not reveal any concern for osteomyelitis.  Finally patient was on 15 units of insulin twice a day.  While here patient was on the hypoglycemic side while on 8 units twice daily.  Insulin was held and patient only received 4 units thus far today.  For this reason his insulin dose is being reduced to 10 units every morning and can be further adjusted by his primary care physician.  He is medically stable for discharge at this time.      Pertinent Physical Exam At Time of Discharge  Physical Exam  Constitutional:       General: He is not in acute distress.     Appearance: Normal appearance.   HENT:      Head: Normocephalic and atraumatic.      Mouth/Throat:      Mouth: Mucous membranes are moist.   Eyes:      Extraocular Movements: Extraocular movements intact.      Conjunctiva/sclera: Conjunctivae normal.   Cardiovascular:      Rate and Rhythm: Normal rate and regular rhythm.      Heart sounds: Normal heart sounds.   Pulmonary:      Effort: Pulmonary effort is normal.      Breath sounds: Normal breath sounds. No wheezing, rhonchi or rales.   Abdominal:      General: Abdomen is flat. Bowel sounds are normal.      Palpations: Abdomen is soft.   Musculoskeletal:         General: No swelling or tenderness. Normal range of motion.      Cervical back: Normal range of motion and neck supple.   Skin:     General: Skin is warm and dry.      Findings: No rash.      Comments: Bilateral foot wounds; one  concerning for infection   Neurological:      General: No focal deficit present.      Mental Status: He is alert. Mental status is at baseline.      Cranial Nerves: No cranial nerve deficit.      Sensory: No sensory deficit.      Motor: Weakness (generalized) present.   Psychiatric:         Mood and Affect: Mood normal.         Behavior: Behavior normal.         Outpatient Follow-Up  Future Appointments   Date Time Provider Department Center   11/25/2024  1:30 PM Sissy Connolly OD [id number] [place]         Robert Diaz MD

## 2024-11-12 NOTE — CARE PLAN
The patient's goals for the shift include comfort and sleep    The clinical goals for the shift include to remain pain free overnight    Over the shift, the patient did not make progress toward the following goals. Barriers to progression include legs on wounds, pt complains that he doesn't want the ACE wraps on his legs, only the gauze can stay dspite the ace wraps being loose on his legs.

## 2024-11-13 VITALS
SYSTOLIC BLOOD PRESSURE: 131 MMHG | BODY MASS INDEX: 30.3 KG/M2 | DIASTOLIC BLOOD PRESSURE: 66 MMHG | TEMPERATURE: 96.8 F | RESPIRATION RATE: 18 BRPM | WEIGHT: 211.64 LBS | HEIGHT: 70 IN | HEART RATE: 74 BPM | OXYGEN SATURATION: 94 %

## 2024-11-13 LAB
GLUCOSE BLD MANUAL STRIP-MCNC: 154 MG/DL (ref 74–99)
GLUCOSE BLD MANUAL STRIP-MCNC: 164 MG/DL (ref 74–99)

## 2024-11-13 PROCEDURE — 97530 THERAPEUTIC ACTIVITIES: CPT | Mod: GO,CO

## 2024-11-13 PROCEDURE — 2500000005 HC RX 250 GENERAL PHARMACY W/O HCPCS: Performed by: INTERNAL MEDICINE

## 2024-11-13 PROCEDURE — 2500000001 HC RX 250 WO HCPCS SELF ADMINISTERED DRUGS (ALT 637 FOR MEDICARE OP): Performed by: INTERNAL MEDICINE

## 2024-11-13 PROCEDURE — 94640 AIRWAY INHALATION TREATMENT: CPT

## 2024-11-13 PROCEDURE — 82947 ASSAY GLUCOSE BLOOD QUANT: CPT

## 2024-11-13 PROCEDURE — G0378 HOSPITAL OBSERVATION PER HR: HCPCS

## 2024-11-13 PROCEDURE — 97530 THERAPEUTIC ACTIVITIES: CPT | Mod: GP,CQ

## 2024-11-13 PROCEDURE — 2500000002 HC RX 250 W HCPCS SELF ADMINISTERED DRUGS (ALT 637 FOR MEDICARE OP, ALT 636 FOR OP/ED): Mod: MUE | Performed by: INTERNAL MEDICINE

## 2024-11-13 PROCEDURE — 2500000002 HC RX 250 W HCPCS SELF ADMINISTERED DRUGS (ALT 637 FOR MEDICARE OP, ALT 636 FOR OP/ED): Performed by: INTERNAL MEDICINE

## 2024-11-13 RX ORDER — NIFEDIPINE 30 MG/1
30 TABLET, FILM COATED, EXTENDED RELEASE ORAL
Status: DISCONTINUED | OUTPATIENT
Start: 2024-11-13 | End: 2024-11-13 | Stop reason: HOSPADM

## 2024-11-13 RX ORDER — TRAMADOL HYDROCHLORIDE 50 MG/1
25 TABLET ORAL ONCE
Status: COMPLETED | OUTPATIENT
Start: 2024-11-13 | End: 2024-11-13

## 2024-11-13 RX ORDER — NIFEDIPINE 30 MG/1
30 TABLET, FILM COATED, EXTENDED RELEASE ORAL
Qty: 30 TABLET | Refills: 0 | Status: ON HOLD | OUTPATIENT
Start: 2024-11-13 | End: 2024-11-21

## 2024-11-13 RX ORDER — IPRATROPIUM BROMIDE AND ALBUTEROL SULFATE 2.5; .5 MG/3ML; MG/3ML
3 SOLUTION RESPIRATORY (INHALATION) 3 TIMES DAILY
Status: DISCONTINUED | OUTPATIENT
Start: 2024-11-13 | End: 2024-11-13 | Stop reason: HOSPADM

## 2024-11-13 RX ORDER — IPRATROPIUM BROMIDE AND ALBUTEROL SULFATE 2.5; .5 MG/3ML; MG/3ML
3 SOLUTION RESPIRATORY (INHALATION)
Status: DISCONTINUED | OUTPATIENT
Start: 2024-11-13 | End: 2024-11-13

## 2024-11-13 RX ORDER — IPRATROPIUM BROMIDE AND ALBUTEROL SULFATE 2.5; .5 MG/3ML; MG/3ML
3 SOLUTION RESPIRATORY (INHALATION) 3 TIMES DAILY
Qty: 300 ML | Refills: 0 | Status: SHIPPED | OUTPATIENT
Start: 2024-11-13

## 2024-11-13 RX ADMIN — Medication 9 MG: at 00:09

## 2024-11-13 RX ADMIN — Medication 2 L/MIN: at 03:09

## 2024-11-13 RX ADMIN — IPRATROPIUM BROMIDE AND ALBUTEROL SULFATE 3 ML: 2.5; .5 SOLUTION RESPIRATORY (INHALATION) at 03:09

## 2024-11-13 RX ADMIN — IPRATROPIUM BROMIDE AND ALBUTEROL SULFATE 3 ML: 2.5; .5 SOLUTION RESPIRATORY (INHALATION) at 13:03

## 2024-11-13 RX ADMIN — NIFEDIPINE 30 MG: 30 TABLET, FILM COATED, EXTENDED RELEASE ORAL at 08:40

## 2024-11-13 RX ADMIN — BISOPROLOL FUMARATE 5 MG: 5 TABLET, FILM COATED ORAL at 08:36

## 2024-11-13 RX ADMIN — ACETAMINOPHEN 325MG 975 MG: 325 TABLET ORAL at 05:54

## 2024-11-13 RX ADMIN — TRAMADOL HYDROCHLORIDE 25 MG: 50 TABLET, COATED ORAL at 01:46

## 2024-11-13 RX ADMIN — TORSEMIDE 80 MG: 20 TABLET ORAL at 08:36

## 2024-11-13 RX ADMIN — APIXABAN 2.5 MG: 2.5 TABLET, FILM COATED ORAL at 08:36

## 2024-11-13 RX ADMIN — EMPAGLIFLOZIN 10 MG: 10 TABLET, FILM COATED ORAL at 08:36

## 2024-11-13 RX ADMIN — AMOXICILLIN AND CLAVULANATE POTASSIUM 1 TABLET: 875; 125 TABLET, FILM COATED ORAL at 08:36

## 2024-11-13 RX ADMIN — ACETAMINOPHEN 325MG 975 MG: 325 TABLET ORAL at 13:10

## 2024-11-13 RX ADMIN — IPRATROPIUM BROMIDE AND ALBUTEROL SULFATE 3 ML: 2.5; .5 SOLUTION RESPIRATORY (INHALATION) at 09:02

## 2024-11-13 RX ADMIN — INSULIN LISPRO 2 UNITS: 100 INJECTION, SOLUTION INTRAVENOUS; SUBCUTANEOUS at 08:38

## 2024-11-13 ASSESSMENT — COGNITIVE AND FUNCTIONAL STATUS - GENERAL
EATING MEALS: A LITTLE
TOILETING: TOTAL
STANDING UP FROM CHAIR USING ARMS: A LOT
TURNING FROM BACK TO SIDE WHILE IN FLAT BAD: A LOT
HELP NEEDED FOR BATHING: TOTAL
WALKING IN HOSPITAL ROOM: TOTAL
DRESSING REGULAR LOWER BODY CLOTHING: TOTAL
DRESSING REGULAR UPPER BODY CLOTHING: A LOT
DAILY ACTIVITIY SCORE: 11
PERSONAL GROOMING: A LITTLE
CLIMB 3 TO 5 STEPS WITH RAILING: TOTAL
MOVING FROM LYING ON BACK TO SITTING ON SIDE OF FLAT BED WITH BEDRAILS: A LOT
MOBILITY SCORE: 10
MOVING TO AND FROM BED TO CHAIR: A LOT

## 2024-11-13 ASSESSMENT — PAIN - FUNCTIONAL ASSESSMENT
PAIN_FUNCTIONAL_ASSESSMENT: 0-10

## 2024-11-13 ASSESSMENT — PAIN DESCRIPTION - DESCRIPTORS: DESCRIPTORS: ACHING

## 2024-11-13 ASSESSMENT — PAIN SCALES - GENERAL
PAINLEVEL_OUTOF10: 4
PAINLEVEL_OUTOF10: 4
PAINLEVEL_OUTOF10: 9

## 2024-11-13 ASSESSMENT — ACTIVITIES OF DAILY LIVING (ADL): EFFECT OF PAIN ON DAILY ACTIVITIES: .

## 2024-11-13 NOTE — SIGNIFICANT EVENT
Patient seen and examined.  Patient continues to do well and is medically stable for discharge at this time.  Patient accepted to [facility] and precertification is underway.  No other changes to discharge summary on 11/12/2024.

## 2024-11-13 NOTE — PROGRESS NOTES
Physical Therapy    Physical Therapy    Physical Therapy Treatment    Patient Name: Elgin Marin  MRN: [id number]  Today's Date: 11/13/2024  Time Calculation  Start Time: 1108  Stop Time: 1146  Time Calculation (min): 38 min     3108/3108-A       11/13/24 1108   PT  Visit   PT Received On 11/13/24   Response to Previous Treatment Patient with no complaints from previous session.   General   Reason for Referral impaired mobility   Referred By Robert Diaz   Prior to Session Communication Bedside nurse   Patient Position Received Up in chair;Alarm on   General Comment Pt agreeable to therapy and cleared for participation. Pt is very talkative and hard to redirect to stay on task.   Precautions   Medical Precautions Fall precautions   Vital Signs   SpO2 94 %   Pain Assessment   Pain Assessment 0-10   Pain Type Chronic pain   Pain Location Generalized   Pain Orientation Right;Left   Effect of Pain on Daily Activities .   Pain Interventions Repositioned   Cognition   Overall Cognitive Status WFL   Orientation Level Oriented X4   Therapeutic Exercise   Therapeutic Exercise Performed Yes   Therapeutic Exercise Activity 1 AP, LAQ x10   Balance/Neuromuscular Re-Education   Balance/Neuromuscular Re-Education Activity Performed Yes   Balance/Neuromuscular Re-Education Activity 1 x3 static standing trials up to 2 minutes each. Flexed posture stating he is unable to stand erect despite max v/t cues. Minimal reaching outside IVAN with unilateral UE support and Dipak   Transfers   Transfer Yes   Transfer 1   Transfer From 1 Chair with arms to   Transfer to 1 Stand;Sit   Technique 1 Sit to stand;Stand to sit   Transfer Device 1 Gait belt;Walker   Transfer Level of Assistance 1 Maximum assistance;Moderate assistance;Maximum verbal cues;Maximum tactile cues;+2   Trials/Comments 1 MaxAx2 for initial stand, progressing to modAx2 on subsequent stands. Max v/t cues to scoot hips toward EOC and for UE placement and sequencing, fair  follow jamir. Fatigued after 3 STS. Seated rest breaks as needed.   Activity Tolerance   Endurance Tolerates 10 - 20 min exercise with multiple rests   PT Assessment   PT Assessment Results Decreased strength;Decreased endurance;Impaired balance;Decreased mobility   Rehab Prognosis Good   Evaluation/Treatment Tolerance Patient limited by fatigue;Patient limited by pain   End of Session Communication Bedside nurse   Assessment Comment Pt requires frequent redirecting to stay on task, easily distracted. MaxA>modAx2 for transfers and minAx1-2 for standing. Pt refused B LE's elevated at end of session despite encouragement.   End of Session Patient Position Up in chair;Alarm on     Outcome Measures:  [facility] Basic Mobility  Turning from your back to your side while in a flat bed without using bedrails: A lot  Moving from lying on your back to sitting on the side of a flat bed without using bedrails: A lot  Moving to and from bed to chair (including a wheelchair): A lot  Standing up from a chair using your arms (e.g. wheelchair or bedside chair): A lot  To walk in hospital room: Total  Climbing 3-5 steps with railing: Total  Basic Mobility - Total Score: 10                             EDUCATION:  Outpatient Education  Individual(s) Educated: Patient  Education Provided: Fall Risk  Education Documentation  Home Exercise Program, taught by Isabelle Julian PTA at 11/13/2024 12:27 PM.  Learner: Patient  Readiness: Acceptance  Method: Explanation, Demonstration  Response: Verbalizes Understanding, Needs Reinforcement    Body Mechanics, taught by Isabelle Julian PTA at 11/13/2024 12:27 PM.  Learner: Patient  Readiness: Acceptance  Method: Explanation, Demonstration  Response: Verbalizes Understanding, Needs Reinforcement    Mobility Training, taught by Isabelle Julian PTA at 11/13/2024 12:27 PM.  Learner: Patient  Readiness: Acceptance  Method: Explanation, Demonstration  Response: Verbalizes Understanding, Needs  Reinforcement    Education Comments  No comments found.        GOALS:  Encounter Problems       Encounter Problems (Active)       PT Problem       Pt will be able to perform all bed mobility tasks with Min A.  (Progressing)       Start:  11/11/24    Expected End:  11/25/24            Pt will perform all transfers with Min A and FWW with proper safety mechanics.   (Progressing)       Start:  11/11/24    Expected End:  11/25/24            Pt will ambulate 50 ft with Min A using FWW for improved functional independence.  (Progressing)       Start:  11/11/24    Expected End:  11/25/24            Pt will be able to negotiate 13 steps with 1 HR with Mod A.  (Progressing)       Start:  11/11/24    Expected End:  11/25/24            Pt will be able to ambulate at least 50 ft with < or equal to 1 rest break while maintaining SpO2 > 90%. . (Progressing)       Start:  11/11/24    Expected End:  11/25/24               Pain - Adult          Safety       LTG - Patient will adhere to hip precautions during ADL's and transfers       Start:  11/10/24            LTG - Patient will demonstrate safety requirements appropriate to situation/environment       Start:  11/10/24            LTG - Patient will utilize safety techniques       Start:  11/10/24            STG - Patient locks brakes on wheelchair       Start:  11/10/24            STG - Patient uses call light consistently to request assistance with transfers       Start:  11/10/24            STG - Patient uses gait belt during all transfers       Start:  11/10/24            Goal 1       Start:  11/10/24            Goal 2       Start:  11/10/24            Goal 3       Start:  11/10/24

## 2024-11-13 NOTE — PROGRESS NOTES
The [facility] has accepted. Direct auth team tasked to submit for auth. Pt has a dc order.     1300 SW had requested AVS, PASR, GF and transport. Transport scheduled for 2pm. Floor and facility aware. TC to son Duane to update, pt's spouse is with him and also aware.

## 2024-11-13 NOTE — CARE PLAN
The patient's goals for the shift include comfort and sleep    The clinical goals for the shift include free of falls      Problem: Pain - Adult  Goal: Verbalizes/displays adequate comfort level or baseline comfort level  Outcome: Progressing     Problem: Safety - Adult  Goal: Free from fall injury  Outcome: Progressing     Problem: Discharge Planning  Goal: Discharge to home or other facility with appropriate resources  Outcome: Progressing     Problem: Chronic Conditions and Co-morbidities  Goal: Patient's chronic conditions and co-morbidity symptoms are monitored and maintained or improved  Outcome: Progressing     Problem: Skin  Goal: Decreased wound size/increased tissue granulation at next dressing change  Outcome: Progressing  Goal: Participates in plan/prevention/treatment measures  Outcome: Progressing  Goal: Prevent/manage excess moisture  Outcome: Progressing  Goal: Prevent/minimize sheer/friction injuries  Outcome: Progressing     Problem: Fall/Injury  Goal: Not fall by end of shift  Outcome: Progressing  Goal: Be free from injury by end of the shift  Outcome: Progressing  Goal: Verbalize understanding of personal risk factors for fall in the hospital  Outcome: Progressing  Goal: Verbalize understanding of risk factor reduction measures to prevent injury from fall in the home  Outcome: Progressing  Goal: Use assistive devices by end of the shift  Outcome: Progressing  Goal: Pace activities to prevent fatigue by end of the shift  Outcome: Progressing     Problem: Respiratory  Goal: Minimal/no exertional discomfort or dyspnea this shift  Outcome: Progressing  Goal: No signs of respiratory distress (eg. Use of accessory muscles. Peds grunting)  Outcome: Progressing  Goal: Verbalize decreased shortness of breath this shift  Outcome: Progressing  Goal: Wean oxygen to maintain O2 saturation per order/standard this shift  Outcome: Progressing     Problem: Dressings Lower Extremities  Goal: STG - Patient to  complete lower body dressing min assist  Outcome: Progressing     Problem: Grooming  Goal: STG - Patient completes grooming SUP  Outcome: Progressing

## 2024-11-13 NOTE — DISCHARGE INSTR - AVS FIRST PAGE
Heart Failure Discharge Instructions - SNF/ECF    1. Weigh patient daily and record.  2. If patient gains more than 2 or 3 pounds overnight or 5 pounds in 5 days, call the cardiologist or facility provider.  3. Follow a low sodium diet. No more than 2000 mg in one day, or more than 700 mg per meal.  4. Limit total fluids to no more than 8 cups (or 2 liters) per day - this includes all fluids (water, coffee, juice, milk, tea, etc.)  5. Monitor blood pressure and heart rate before every cardiac/blood pressure medication and record.  6. Be sure to have patient see cardiologist in one week after discharge. Call to schedule follow-up appointments.  7. Keep follow-up appointments. Send the weight and vital signs record with patient to the appointments so the doctors can see the weight trend and blood pressure readings for tighter heart failure management.  8. Activity as tolerated, advance based on tolerance.  9. If you notice subtle change of symptoms (slight increase in swelling, slight shortness of breath, a new intolerance to lying flat, a new cough), be sure to call the cardiologist or facility provider.  10. You have been started on a new medicine called Jardiance. To minimize side effect of urinary tract or fungal infections, be sure to keep samaria area very clean and wipe front to back.

## 2024-11-13 NOTE — PROGRESS NOTES
Occupational Therapy    OT Treatment    Patient Name: Elgin Marin  MRN: [id number]  Today's Date: 11/13/2024  Time Calculation  Start Time: 1109  Stop Time: 1141  Time Calculation (min): 32 min       3108/3108-A    Assessment:  End of Session Communication: Bedside nurse  End of Session Patient Position: Up in chair, Alarm on (pt refused to elevate LE's)       Plan:  OT Frequency: 3 times per week  OT Discharge Recommendations: Moderate intensity level of continued care  Equipment Recommended upon Discharge: Wheeled walker     Subjective      11/13/24 1109   OT Last Visit   OT Received On 11/13/24   General   Reason for Referral impaired mobility   Referred By Robert Diaz   Prior to Session Communication Bedside nurse   Patient Position Received Up in chair;Alarm on   General Comment Pt is agreeable to tx; cleared for participation.   Precautions   Medical Precautions Fall precautions   Vital Signs   Heart Rate 74   Heart Rate Source Monitor   SpO2 94 %  (RA)   Vital Signs Comment pt is easily fatigued, demo's labored breathing following exertion, reports has been occuring at home with exertion   Pain Assessment   Pain Assessment 0-10   Pain Type Chronic pain   Pain Location Generalized  (B LE's, hands- voices is arthritic pain)   Pain Interventions Repositioned   Cognition   Orientation Level Oriented X4   Sustained Attention Impaired   Insight Mild   Transfer 1   Technique 1 Sit to stand;Stand to sit   Transfer Device 1 Gait belt;Walker   Transfer Level of Assistance 1 Maximum assistance;Moderate assistance;+2;Maximum verbal cues;Maximum tactile cues   Trials/Comments 1 STS from EOB to fww level- first trial with Max A x2; second and third trial with Mod Ax2- pt requires max v/t cues throughout for proper transition; pt is retropulsive, demo's flexed posture through and attempts to place hands on front bar of fww as pt reports this is what he does at home; educated in decreased safety of this with  understanding.   Balance/Neuromuscular Re-Education   Balance/Neuromuscular Re-Education Activity 1 Pt engaged in static standing trials x2 minutes standing tolerance; pt engaged in minimal dynamic standing incorporating UE functional reaching through various planes, across midline, outisde IVAN with Min A x2 to maintain standing balance and safety   IP OT Assessment   End of Session Communication Bedside nurse   End of Session Patient Position Up in chair;Alarm on  (pt refused to elevate LE's)   Inpatient Plan   OT Frequency 3 times per week   OT Discharge Recommendations Moderate intensity level of continued care   Equipment Recommended upon Discharge Wheeled walker     Outcome Measures:[facility] Daily Activity  Putting on and taking off regular lower body clothing: Total  Bathing (including washing, rinsing, drying): Total  Putting on and taking off regular upper body clothing: A lot  Toileting, which includes using toilet, bedpan or urinal: Total  Taking care of personal grooming such as brushing teeth: A little  Eating Meals: A little  Daily Activity - Total Score: 11  Education Documentation  No documentation found.  Education Comments  No comments found.            Goals:  Encounter Problems       Encounter Problems (Active)       Dressings Lower Extremities       STG - Patient to complete lower body dressing min assist (Progressing)       Start:  11/11/24    Expected End:  11/15/24               Functional Balance       LTG - Patient will maintain standing and sitting balance to allow for completion of daily activities (Progressing)       Start:  11/11/24    Expected End:  11/25/24               Grooming       STG - Patient completes grooming SUP (Progressing)       Start:  11/11/24    Expected End:  11/15/24               OT Transfers       STG - Patient will perform toilet transfer mod assist (Progressing)       Start:  11/11/24    Expected End:  11/25/24

## 2024-11-14 ENCOUNTER — NURSING HOME VISIT (OUTPATIENT)
Dept: POST ACUTE CARE | Facility: EXTERNAL LOCATION | Age: 84
End: 2024-11-14
Payer: MEDICARE

## 2024-11-14 DIAGNOSIS — I10 ESSENTIAL HYPERTENSION, BENIGN: ICD-10-CM

## 2024-11-14 DIAGNOSIS — R60.0 BILATERAL EDEMA OF LOWER EXTREMITY: ICD-10-CM

## 2024-11-14 DIAGNOSIS — I48.91 ATRIAL FIBRILLATION, UNSPECIFIED TYPE (MULTI): Primary | ICD-10-CM

## 2024-11-14 DIAGNOSIS — R26.2 UNABLE TO WALK: ICD-10-CM

## 2024-11-14 DIAGNOSIS — I50.42 CHRONIC COMBINED SYSTOLIC AND DIASTOLIC HEART FAILURE: ICD-10-CM

## 2024-11-14 LAB
ATRIAL RATE: 227 BPM
Q ONSET: 211 MS
QRS COUNT: 11 BEATS
QRS DURATION: 98 MS
QT INTERVAL: 436 MS
QTC CALCULATION(BAZETT): 457 MS
QTC FREDERICIA: 450 MS
R AXIS: 133 DEGREES
T AXIS: 266 DEGREES
T OFFSET: 429 MS
VENTRICULAR RATE: 66 BPM

## 2024-11-14 PROCEDURE — 99306 1ST NF CARE HIGH MDM 50: CPT | Performed by: STUDENT IN AN ORGANIZED HEALTH CARE EDUCATION/TRAINING PROGRAM

## 2024-11-14 NOTE — LETTER
Patient: Elgin Marin  : 1940    Encounter Date: 2024    Subjective  Patient ID: Elgin Marin is a 84 y.o. male.    84 y.o. male presenting after a fall. Patient has history of recurrent falls. Patient gets around the house/apartment with a walker. Earlier today patient states that he was walking with his walker and then lost balance and fell backward. Patient did hit his head. He did not lose consciousness. He was unable to get up and his spouse called EMS to help get him up. When he was helped up patient was barely able to stand and relying on furniture to maintain his posture. Given patient was unable to walk he was brought to [facility] for further evaluation. Patient otherwise has been feeling fairly well and denies any chest pain shortness of breath nausea vomiting diarrhea constipation fever chills dysuria or headache.   Patient was observed in the hospital largely for placement in skilled nursing facility. He worked with physical therapy and Occupational Therapy and plan is for patient to be discharged to skilled nursing facility when this has been arranged. 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 Previously there were affordability issues and for this reason patient was not on it. Patient was seen by wound care nurse who recommended podiatry consult out of concern for infection. Patient was started on Augmentin and is being discharged on this as well. He follows with wound clinic and will continue to do so in the outpatient setting. An x-ray of the foot was done which did not reveal any concern for osteomyelitis. Finally patient was on 15 units of insulin twice a day. While here patient was on the hypoglycemic side while on 8 units twice daily. Insulin was held and patient only received 4 units thus far today. For this reason his insulin dose is being reduced to 10 units every morning and  can be further adjusted by his primary care physician. He is medically stable for discharge at this time. Patient seen and examined at rehab facility.  He regards that he is overall doing well, however does feel slightly fluid overloaded in the lower extremities.  Otherwise, states breathing is overall stable, otherwise, still feels weak in general.  States no additional issues or concerns at this time.          Review of Systems   Constitutional:  Positive for fatigue.   HENT: Negative.     Respiratory: Negative.     Cardiovascular:  Positive for leg swelling.   Gastrointestinal: Negative.    Musculoskeletal: Negative.    Skin: Negative.    Neurological:  Positive for weakness.   Psychiatric/Behavioral: Negative.         ObjectiveVitals Reviewed via facility EMR   Physical Exam  Constitutional:       General: He is not in acute distress.     Appearance: He is not ill-appearing.      Comments: In bedside chair, elderly male   Eyes:      Pupils: Pupils are equal, round, and reactive to light.   Cardiovascular:      Rate and Rhythm: Normal rate and regular rhythm.      Pulses: Normal pulses.      Heart sounds: No murmur heard.  Pulmonary:      Effort: No respiratory distress.      Breath sounds: No wheezing.   Abdominal:      General: Abdomen is flat. Bowel sounds are normal. There is no distension.   Musculoskeletal:      Right lower leg: Edema present.      Left lower leg: Edema present.      Comments: LE edema wrapped, slight swelling 2+ edema   Skin:     General: Skin is warm and dry.   Neurological:      Mental Status: He is alert. Mental status is at baseline.      Cranial Nerves: No cranial nerve deficit.      Motor: Weakness present.   Psychiatric:         Mood and Affect: Mood normal.         Behavior: Behavior normal.         Assessment/Plan  Diagnoses and all orders for this visit:  Atrial fibrillation, unspecified type (Multi)  Chronic combined systolic and diastolic heart failure  Essential hypertension,  benign  Unable to walk  Bilateral edema of lower extremity    Patient seen and examined at bedside.  Hospital course reviewed and medications reviewed and reconciled.  Will need to enroll patient into heart failure protocol at the facility, encourage low-salt diet, encouraged optimization with physical therapy.  Otherwise, no change in medications at this time.  Will closely labs encouraged optimization of potassium and magnesium.  Otherwise no additional issues.    Reviewed and approved by JAIR STEWART on 11/16/24 at 1:47 PM.       Electronically Signed By: Jair Stewart DO   11/16/24  1:47 PM

## 2024-11-15 ENCOUNTER — APPOINTMENT (OUTPATIENT)
Dept: RADIOLOGY | Facility: HOSPITAL | Age: 84
DRG: 280 | End: 2024-11-15
Payer: MEDICARE

## 2024-11-15 ENCOUNTER — APPOINTMENT (OUTPATIENT)
Dept: CARDIOLOGY | Facility: HOSPITAL | Age: 84
DRG: 280 | End: 2024-11-15
Payer: MEDICARE

## 2024-11-15 ENCOUNTER — HOSPITAL ENCOUNTER (INPATIENT)
Facility: HOSPITAL | Age: 84
DRG: 280 | End: 2024-11-15
Attending: STUDENT IN AN ORGANIZED HEALTH CARE EDUCATION/TRAINING PROGRAM | Admitting: STUDENT IN AN ORGANIZED HEALTH CARE EDUCATION/TRAINING PROGRAM
Payer: MEDICARE

## 2024-11-15 DIAGNOSIS — E11.9 TYPE 2 DIABETES MELLITUS WITHOUT COMPLICATION, WITH LONG-TERM CURRENT USE OF INSULIN (MULTI): ICD-10-CM

## 2024-11-15 DIAGNOSIS — I50.42 CHRONIC COMBINED SYSTOLIC AND DIASTOLIC HEART FAILURE: ICD-10-CM

## 2024-11-15 DIAGNOSIS — J44.9 CHRONIC OBSTRUCTIVE PULMONARY DISEASE, UNSPECIFIED COPD TYPE (MULTI): ICD-10-CM

## 2024-11-15 DIAGNOSIS — L85.3 DRY SKIN: ICD-10-CM

## 2024-11-15 DIAGNOSIS — M19.90 ARTHRITIS: ICD-10-CM

## 2024-11-15 DIAGNOSIS — R79.89 TROPONIN LEVEL ELEVATED: ICD-10-CM

## 2024-11-15 DIAGNOSIS — Z79.4 TYPE 2 DIABETES MELLITUS WITHOUT COMPLICATION, WITH LONG-TERM CURRENT USE OF INSULIN (MULTI): ICD-10-CM

## 2024-11-15 DIAGNOSIS — I10 ESSENTIAL HYPERTENSION, BENIGN: ICD-10-CM

## 2024-11-15 DIAGNOSIS — R60.0 BILATERAL EDEMA OF LOWER EXTREMITY: ICD-10-CM

## 2024-11-15 DIAGNOSIS — R06.02 SHORTNESS OF BREATH: Primary | ICD-10-CM

## 2024-11-15 LAB
ALBUMIN SERPL BCP-MCNC: 3.1 G/DL (ref 3.4–5)
ALP SERPL-CCNC: 107 U/L (ref 33–136)
ALT SERPL W P-5'-P-CCNC: 10 U/L (ref 10–52)
ANION GAP BLDV CALCULATED.4IONS-SCNC: 6 MMOL/L (ref 10–25)
ANION GAP SERPL CALC-SCNC: 15 MMOL/L (ref 10–20)
APPEARANCE UR: CLEAR
APTT PPP: 38 SECONDS (ref 27–38)
AST SERPL W P-5'-P-CCNC: 24 U/L (ref 9–39)
ATRIAL RATE: 63 BPM
ATRIAL RATE: 77 BPM
BASE EXCESS BLDV CALC-SCNC: 7.1 MMOL/L (ref -2–3)
BASOPHILS # BLD AUTO: 0.06 X10*3/UL (ref 0–0.1)
BASOPHILS NFR BLD AUTO: 0.7 %
BILIRUB SERPL-MCNC: 1.3 MG/DL (ref 0–1.2)
BILIRUB UR STRIP.AUTO-MCNC: NEGATIVE MG/DL
BNP SERPL-MCNC: 568 PG/ML (ref 0–99)
BODY TEMPERATURE: ABNORMAL
BUN SERPL-MCNC: 51 MG/DL (ref 6–23)
CA-I BLDV-SCNC: 1.08 MMOL/L (ref 1.1–1.33)
CALCIUM SERPL-MCNC: 8.6 MG/DL (ref 8.6–10.3)
CARDIAC TROPONIN I PNL SERPL HS: 193 NG/L (ref 0–20)
CARDIAC TROPONIN I PNL SERPL HS: 219 NG/L (ref 0–20)
CHLORIDE BLDV-SCNC: 93 MMOL/L (ref 98–107)
CHLORIDE SERPL-SCNC: 93 MMOL/L (ref 98–107)
CO2 SERPL-SCNC: 32 MMOL/L (ref 21–32)
COLOR UR: ABNORMAL
CREAT SERPL-MCNC: 2.07 MG/DL (ref 0.5–1.3)
EGFRCR SERPLBLD CKD-EPI 2021: 31 ML/MIN/1.73M*2
EOSINOPHIL # BLD AUTO: 0.07 X10*3/UL (ref 0–0.4)
EOSINOPHIL NFR BLD AUTO: 0.9 %
ERYTHROCYTE [DISTWIDTH] IN BLOOD BY AUTOMATED COUNT: 16.7 % (ref 11.5–14.5)
FLUAV RNA RESP QL NAA+PROBE: NOT DETECTED
FLUBV RNA RESP QL NAA+PROBE: NOT DETECTED
GLUCOSE BLD MANUAL STRIP-MCNC: 212 MG/DL (ref 74–99)
GLUCOSE BLD MANUAL STRIP-MCNC: 283 MG/DL (ref 74–99)
GLUCOSE BLD MANUAL STRIP-MCNC: 320 MG/DL (ref 74–99)
GLUCOSE BLD MANUAL STRIP-MCNC: 321 MG/DL (ref 74–99)
GLUCOSE BLD MANUAL STRIP-MCNC: 387 MG/DL (ref 74–99)
GLUCOSE BLDV-MCNC: 210 MG/DL (ref 74–99)
GLUCOSE SERPL-MCNC: 198 MG/DL (ref 74–99)
GLUCOSE UR STRIP.AUTO-MCNC: ABNORMAL MG/DL
HCO3 BLDV-SCNC: 33.9 MMOL/L (ref 22–26)
HCT VFR BLD AUTO: 38.7 % (ref 41–52)
HCT VFR BLD EST: 42 % (ref 41–52)
HGB BLD-MCNC: 11.9 G/DL (ref 13.5–17.5)
HGB BLDV-MCNC: 13.9 G/DL (ref 13.5–17.5)
HOLD SPECIMEN: NORMAL
IMM GRANULOCYTES # BLD AUTO: 0.02 X10*3/UL (ref 0–0.5)
IMM GRANULOCYTES NFR BLD AUTO: 0.2 % (ref 0–0.9)
INHALED O2 CONCENTRATION: 4 %
INR PPP: 1.4 (ref 0.9–1.1)
KETONES UR STRIP.AUTO-MCNC: NEGATIVE MG/DL
LACTATE BLDV-SCNC: 1.3 MMOL/L (ref 0.4–2)
LACTATE SERPL-SCNC: 1 MMOL/L (ref 0.4–2)
LEUKOCYTE ESTERASE UR QL STRIP.AUTO: NEGATIVE
LYMPHOCYTES # BLD AUTO: 0.63 X10*3/UL (ref 0.8–3)
LYMPHOCYTES NFR BLD AUTO: 7.7 %
MAGNESIUM SERPL-MCNC: 2 MG/DL (ref 1.6–2.4)
MCH RBC QN AUTO: 27.7 PG (ref 26–34)
MCHC RBC AUTO-ENTMCNC: 30.7 G/DL (ref 32–36)
MCV RBC AUTO: 90 FL (ref 80–100)
MONOCYTES # BLD AUTO: 0.92 X10*3/UL (ref 0.05–0.8)
MONOCYTES NFR BLD AUTO: 11.2 %
NEUTROPHILS # BLD AUTO: 6.48 X10*3/UL (ref 1.6–5.5)
NEUTROPHILS NFR BLD AUTO: 79.3 %
NITRITE UR QL STRIP.AUTO: NEGATIVE
NRBC BLD-RTO: 0 /100 WBCS (ref 0–0)
OXYHGB MFR BLDV: 71.2 % (ref 45–75)
PCO2 BLDV: 56 MM HG (ref 41–51)
PH BLDV: 7.39 PH (ref 7.33–7.43)
PH UR STRIP.AUTO: 6 [PH]
PLATELET # BLD AUTO: 194 X10*3/UL (ref 150–450)
PO2 BLDV: 45 MM HG (ref 35–45)
POTASSIUM BLDV-SCNC: 4.1 MMOL/L (ref 3.5–5.3)
POTASSIUM SERPL-SCNC: 4.3 MMOL/L (ref 3.5–5.3)
PROT SERPL-MCNC: 6.3 G/DL (ref 6.4–8.2)
PROT UR STRIP.AUTO-MCNC: NEGATIVE MG/DL
PROTHROMBIN TIME: 15.9 SECONDS (ref 9.8–12.8)
Q ONSET: 210 MS
Q ONSET: 213 MS
QRS COUNT: 13 BEATS
QRS COUNT: 14 BEATS
QRS DURATION: 102 MS
QRS DURATION: 88 MS
QT INTERVAL: 378 MS
QT INTERVAL: 396 MS
QTC CALCULATION(BAZETT): 422 MS
QTC CALCULATION(BAZETT): 462 MS
QTC FREDERICIA: 406 MS
QTC FREDERICIA: 439 MS
R AXIS: 108 DEGREES
R AXIS: 108 DEGREES
RBC # BLD AUTO: 4.29 X10*6/UL (ref 4.5–5.9)
RBC # UR STRIP.AUTO: NEGATIVE /UL
SAO2 % BLDV: 73 % (ref 45–75)
SARS-COV-2 RNA RESP QL NAA+PROBE: NOT DETECTED
SODIUM BLDV-SCNC: 129 MMOL/L (ref 136–145)
SODIUM SERPL-SCNC: 136 MMOL/L (ref 136–145)
SP GR UR STRIP.AUTO: 1.01
T AXIS: 229 DEGREES
T AXIS: 237 DEGREES
T OFFSET: 402 MS
T OFFSET: 408 MS
UROBILINOGEN UR STRIP.AUTO-MCNC: ABNORMAL MG/DL
VENTRICULAR RATE: 75 BPM
VENTRICULAR RATE: 82 BPM
WBC # BLD AUTO: 8.2 X10*3/UL (ref 4.4–11.3)

## 2024-11-15 PROCEDURE — 84132 ASSAY OF SERUM POTASSIUM: CPT | Performed by: STUDENT IN AN ORGANIZED HEALTH CARE EDUCATION/TRAINING PROGRAM

## 2024-11-15 PROCEDURE — 81003 URINALYSIS AUTO W/O SCOPE: CPT | Performed by: STUDENT IN AN ORGANIZED HEALTH CARE EDUCATION/TRAINING PROGRAM

## 2024-11-15 PROCEDURE — 2500000004 HC RX 250 GENERAL PHARMACY W/ HCPCS (ALT 636 FOR OP/ED)

## 2024-11-15 PROCEDURE — 85730 THROMBOPLASTIN TIME PARTIAL: CPT | Performed by: STUDENT IN AN ORGANIZED HEALTH CARE EDUCATION/TRAINING PROGRAM

## 2024-11-15 PROCEDURE — 36415 COLL VENOUS BLD VENIPUNCTURE: CPT | Performed by: STUDENT IN AN ORGANIZED HEALTH CARE EDUCATION/TRAINING PROGRAM

## 2024-11-15 PROCEDURE — 96365 THER/PROPH/DIAG IV INF INIT: CPT

## 2024-11-15 PROCEDURE — 2500000004 HC RX 250 GENERAL PHARMACY W/ HCPCS (ALT 636 FOR OP/ED): Performed by: STUDENT IN AN ORGANIZED HEALTH CARE EDUCATION/TRAINING PROGRAM

## 2024-11-15 PROCEDURE — 71045 X-RAY EXAM CHEST 1 VIEW: CPT | Performed by: STUDENT IN AN ORGANIZED HEALTH CARE EDUCATION/TRAINING PROGRAM

## 2024-11-15 PROCEDURE — 85025 COMPLETE CBC W/AUTO DIFF WBC: CPT | Performed by: STUDENT IN AN ORGANIZED HEALTH CARE EDUCATION/TRAINING PROGRAM

## 2024-11-15 PROCEDURE — 2500000005 HC RX 250 GENERAL PHARMACY W/O HCPCS: Performed by: STUDENT IN AN ORGANIZED HEALTH CARE EDUCATION/TRAINING PROGRAM

## 2024-11-15 PROCEDURE — 96375 TX/PRO/DX INJ NEW DRUG ADDON: CPT

## 2024-11-15 PROCEDURE — 2500000001 HC RX 250 WO HCPCS SELF ADMINISTERED DRUGS (ALT 637 FOR MEDICARE OP): Performed by: STUDENT IN AN ORGANIZED HEALTH CARE EDUCATION/TRAINING PROGRAM

## 2024-11-15 PROCEDURE — 93005 ELECTROCARDIOGRAM TRACING: CPT

## 2024-11-15 PROCEDURE — 83880 ASSAY OF NATRIURETIC PEPTIDE: CPT | Performed by: STUDENT IN AN ORGANIZED HEALTH CARE EDUCATION/TRAINING PROGRAM

## 2024-11-15 PROCEDURE — 2500000002 HC RX 250 W HCPCS SELF ADMINISTERED DRUGS (ALT 637 FOR MEDICARE OP, ALT 636 FOR OP/ED): Performed by: STUDENT IN AN ORGANIZED HEALTH CARE EDUCATION/TRAINING PROGRAM

## 2024-11-15 PROCEDURE — 83735 ASSAY OF MAGNESIUM: CPT | Performed by: STUDENT IN AN ORGANIZED HEALTH CARE EDUCATION/TRAINING PROGRAM

## 2024-11-15 PROCEDURE — 99285 EMERGENCY DEPT VISIT HI MDM: CPT | Mod: 25

## 2024-11-15 PROCEDURE — 94640 AIRWAY INHALATION TREATMENT: CPT

## 2024-11-15 PROCEDURE — 84484 ASSAY OF TROPONIN QUANT: CPT | Performed by: STUDENT IN AN ORGANIZED HEALTH CARE EDUCATION/TRAINING PROGRAM

## 2024-11-15 PROCEDURE — 83605 ASSAY OF LACTIC ACID: CPT | Performed by: STUDENT IN AN ORGANIZED HEALTH CARE EDUCATION/TRAINING PROGRAM

## 2024-11-15 PROCEDURE — 1200000002 HC GENERAL ROOM WITH TELEMETRY DAILY

## 2024-11-15 PROCEDURE — 82947 ASSAY GLUCOSE BLOOD QUANT: CPT

## 2024-11-15 PROCEDURE — 99223 1ST HOSP IP/OBS HIGH 75: CPT | Performed by: STUDENT IN AN ORGANIZED HEALTH CARE EDUCATION/TRAINING PROGRAM

## 2024-11-15 PROCEDURE — 85610 PROTHROMBIN TIME: CPT | Performed by: STUDENT IN AN ORGANIZED HEALTH CARE EDUCATION/TRAINING PROGRAM

## 2024-11-15 PROCEDURE — 71045 X-RAY EXAM CHEST 1 VIEW: CPT

## 2024-11-15 PROCEDURE — 99223 1ST HOSP IP/OBS HIGH 75: CPT

## 2024-11-15 PROCEDURE — 87636 SARSCOV2 & INF A&B AMP PRB: CPT | Performed by: STUDENT IN AN ORGANIZED HEALTH CARE EDUCATION/TRAINING PROGRAM

## 2024-11-15 RX ORDER — CEFTRIAXONE 1 G/50ML
1 INJECTION, SOLUTION INTRAVENOUS ONCE
Status: COMPLETED | OUTPATIENT
Start: 2024-11-15 | End: 2024-11-15

## 2024-11-15 RX ORDER — ALUMINUM HYDROXIDE, MAGNESIUM HYDROXIDE, AND SIMETHICONE 1200; 120; 1200 MG/30ML; MG/30ML; MG/30ML
20 SUSPENSION ORAL EVERY 4 HOURS PRN
Status: DISCONTINUED | OUTPATIENT
Start: 2024-11-15 | End: 2024-11-21 | Stop reason: HOSPADM

## 2024-11-15 RX ORDER — ONDANSETRON HYDROCHLORIDE 2 MG/ML
4 INJECTION, SOLUTION INTRAVENOUS EVERY 8 HOURS PRN
Status: DISCONTINUED | OUTPATIENT
Start: 2024-11-15 | End: 2024-11-21 | Stop reason: HOSPADM

## 2024-11-15 RX ORDER — PREDNISONE 20 MG/1
40 TABLET ORAL DAILY
Status: DISCONTINUED | OUTPATIENT
Start: 2024-11-16 | End: 2024-11-16

## 2024-11-15 RX ORDER — AZITHROMYCIN 500 MG/1
500 TABLET, FILM COATED ORAL
Status: COMPLETED | OUTPATIENT
Start: 2024-11-16 | End: 2024-11-18

## 2024-11-15 RX ORDER — ACETAMINOPHEN 325 MG/1
650 TABLET ORAL EVERY 4 HOURS PRN
Status: DISCONTINUED | OUTPATIENT
Start: 2024-11-15 | End: 2024-11-21 | Stop reason: HOSPADM

## 2024-11-15 RX ORDER — IPRATROPIUM BROMIDE AND ALBUTEROL SULFATE 2.5; .5 MG/3ML; MG/3ML
3 SOLUTION RESPIRATORY (INHALATION) ONCE
Status: COMPLETED | OUTPATIENT
Start: 2024-11-15 | End: 2024-11-15

## 2024-11-15 RX ORDER — INSULIN LISPRO 100 [IU]/ML
0-10 INJECTION, SOLUTION INTRAVENOUS; SUBCUTANEOUS
Status: DISCONTINUED | OUTPATIENT
Start: 2024-11-15 | End: 2024-11-21 | Stop reason: HOSPADM

## 2024-11-15 RX ORDER — ADHESIVE BANDAGE
30 BANDAGE TOPICAL DAILY PRN
COMMUNITY

## 2024-11-15 RX ORDER — CHOLECALCIFEROL (VITAMIN D3) 50 MCG
50 TABLET ORAL DAILY
COMMUNITY

## 2024-11-15 RX ORDER — IPRATROPIUM BROMIDE AND ALBUTEROL SULFATE 2.5; .5 MG/3ML; MG/3ML
3 SOLUTION RESPIRATORY (INHALATION) EVERY 2 HOUR PRN
Status: DISCONTINUED | OUTPATIENT
Start: 2024-11-15 | End: 2024-11-21 | Stop reason: HOSPADM

## 2024-11-15 RX ORDER — CEFEPIME 1 G/50ML
1 INJECTION, SOLUTION INTRAVENOUS EVERY 12 HOURS
Status: COMPLETED | OUTPATIENT
Start: 2024-11-15 | End: 2024-11-19

## 2024-11-15 RX ORDER — PANTOPRAZOLE SODIUM 40 MG/1
40 TABLET, DELAYED RELEASE ORAL NIGHTLY
Status: DISCONTINUED | OUTPATIENT
Start: 2024-11-15 | End: 2024-11-21 | Stop reason: HOSPADM

## 2024-11-15 RX ORDER — BUDESONIDE 0.25 MG/2ML
0.25 INHALANT ORAL
Status: DISCONTINUED | OUTPATIENT
Start: 2024-11-15 | End: 2024-11-21 | Stop reason: HOSPADM

## 2024-11-15 RX ORDER — NIFEDIPINE 30 MG/1
30 TABLET, FILM COATED, EXTENDED RELEASE ORAL
Status: DISCONTINUED | OUTPATIENT
Start: 2024-11-16 | End: 2024-11-21 | Stop reason: HOSPADM

## 2024-11-15 RX ORDER — IPRATROPIUM BROMIDE AND ALBUTEROL SULFATE 2.5; .5 MG/3ML; MG/3ML
3 SOLUTION RESPIRATORY (INHALATION) 3 TIMES DAILY
Status: DISCONTINUED | OUTPATIENT
Start: 2024-11-15 | End: 2024-11-21 | Stop reason: HOSPADM

## 2024-11-15 RX ORDER — IPRATROPIUM BROMIDE AND ALBUTEROL SULFATE 2.5; .5 MG/3ML; MG/3ML
3 SOLUTION RESPIRATORY (INHALATION)
Status: DISCONTINUED | OUTPATIENT
Start: 2024-11-15 | End: 2024-11-15

## 2024-11-15 RX ORDER — TALC
3 POWDER (GRAM) TOPICAL NIGHTLY PRN
Status: DISCONTINUED | OUTPATIENT
Start: 2024-11-15 | End: 2024-11-21 | Stop reason: HOSPADM

## 2024-11-15 RX ORDER — TORSEMIDE 20 MG/1
80 TABLET ORAL DAILY
Status: DISCONTINUED | OUTPATIENT
Start: 2024-11-15 | End: 2024-11-21 | Stop reason: HOSPADM

## 2024-11-15 RX ORDER — DEXTROSE 50 % IN WATER (D50W) INTRAVENOUS SYRINGE
12.5
Status: DISCONTINUED | OUTPATIENT
Start: 2024-11-15 | End: 2024-11-21 | Stop reason: HOSPADM

## 2024-11-15 RX ORDER — FUROSEMIDE 10 MG/ML
20 INJECTION INTRAMUSCULAR; INTRAVENOUS ONCE
Status: COMPLETED | OUTPATIENT
Start: 2024-11-15 | End: 2024-11-15

## 2024-11-15 RX ORDER — DEXTROSE 50 % IN WATER (D50W) INTRAVENOUS SYRINGE
25
Status: DISCONTINUED | OUTPATIENT
Start: 2024-11-15 | End: 2024-11-21 | Stop reason: HOSPADM

## 2024-11-15 RX ORDER — GUAIFENESIN/DEXTROMETHORPHAN 100-10MG/5
5 SYRUP ORAL EVERY 4 HOURS PRN
Status: DISCONTINUED | OUTPATIENT
Start: 2024-11-15 | End: 2024-11-21 | Stop reason: HOSPADM

## 2024-11-15 RX ORDER — GUAIFENESIN 100 MG/5ML
15 SOLUTION ORAL EVERY 6 HOURS PRN
Status: DISCONTINUED | OUTPATIENT
Start: 2024-11-15 | End: 2024-11-15

## 2024-11-15 RX ORDER — ACETAMINOPHEN 325 MG/1
650 TABLET ORAL EVERY 4 HOURS PRN
COMMUNITY

## 2024-11-15 RX ORDER — CLOPIDOGREL BISULFATE 300 MG/1
300 TABLET, FILM COATED ORAL ONCE
Status: COMPLETED | OUTPATIENT
Start: 2024-11-15 | End: 2024-11-15

## 2024-11-15 RX ORDER — BISOPROLOL FUMARATE 5 MG/1
5 TABLET, FILM COATED ORAL DAILY
Status: DISCONTINUED | OUTPATIENT
Start: 2024-11-15 | End: 2024-11-21 | Stop reason: HOSPADM

## 2024-11-15 RX ORDER — CHOLECALCIFEROL (VITAMIN D3) 25 MCG
2000 TABLET ORAL DAILY
Status: DISCONTINUED | OUTPATIENT
Start: 2024-11-15 | End: 2024-11-21 | Stop reason: HOSPADM

## 2024-11-15 RX ORDER — INSULIN GLARGINE 100 [IU]/ML
10 INJECTION, SOLUTION SUBCUTANEOUS EVERY MORNING
Status: DISCONTINUED | OUTPATIENT
Start: 2024-11-16 | End: 2024-11-21 | Stop reason: HOSPADM

## 2024-11-15 RX ORDER — ROSUVASTATIN CALCIUM 20 MG/1
20 TABLET, COATED ORAL NIGHTLY
Status: DISCONTINUED | OUTPATIENT
Start: 2024-11-15 | End: 2024-11-16

## 2024-11-15 RX ORDER — ALUMINUM HYDROXIDE, MAGNESIUM HYDROXIDE, AND SIMETHICONE 1200; 120; 1200 MG/30ML; MG/30ML; MG/30ML
20 SUSPENSION ORAL EVERY 4 HOURS PRN
COMMUNITY

## 2024-11-15 RX ORDER — GUAIFENESIN 100 MG/5ML
15 SOLUTION ORAL EVERY 6 HOURS PRN
COMMUNITY

## 2024-11-15 RX ORDER — BISACODYL 10 MG/1
10 SUPPOSITORY RECTAL ONCE AS NEEDED
COMMUNITY

## 2024-11-15 SDOH — SOCIAL STABILITY: SOCIAL INSECURITY: HAS ANYONE EVER THREATENED TO HURT YOUR FAMILY OR YOUR PETS?: NO

## 2024-11-15 SDOH — SOCIAL STABILITY: SOCIAL INSECURITY: WITHIN THE LAST YEAR, HAVE YOU BEEN AFRAID OF YOUR PARTNER OR EX-PARTNER?: NO

## 2024-11-15 SDOH — ECONOMIC STABILITY: INCOME INSECURITY: IN THE PAST 12 MONTHS HAS THE ELECTRIC, GAS, OIL, OR WATER COMPANY THREATENED TO SHUT OFF SERVICES IN YOUR HOME?: NO

## 2024-11-15 SDOH — SOCIAL STABILITY: SOCIAL INSECURITY: WITHIN THE LAST YEAR, HAVE YOU BEEN HUMILIATED OR EMOTIONALLY ABUSED IN OTHER WAYS BY YOUR PARTNER OR EX-PARTNER?: NO

## 2024-11-15 SDOH — SOCIAL STABILITY: SOCIAL INSECURITY: DOES ANYONE TRY TO KEEP YOU FROM HAVING/CONTACTING OTHER FRIENDS OR DOING THINGS OUTSIDE YOUR HOME?: NO

## 2024-11-15 SDOH — SOCIAL STABILITY: SOCIAL INSECURITY: ARE YOU OR HAVE YOU BEEN THREATENED OR ABUSED PHYSICALLY, EMOTIONALLY, OR SEXUALLY BY ANYONE?: NO

## 2024-11-15 SDOH — ECONOMIC STABILITY: FOOD INSECURITY: WITHIN THE PAST 12 MONTHS, THE FOOD YOU BOUGHT JUST DIDN'T LAST AND YOU DIDN'T HAVE MONEY TO GET MORE.: NEVER TRUE

## 2024-11-15 SDOH — SOCIAL STABILITY: SOCIAL INSECURITY: WERE YOU ABLE TO COMPLETE ALL THE BEHAVIORAL HEALTH SCREENINGS?: YES

## 2024-11-15 SDOH — ECONOMIC STABILITY: FOOD INSECURITY: WITHIN THE PAST 12 MONTHS, YOU WORRIED THAT YOUR FOOD WOULD RUN OUT BEFORE YOU GOT THE MONEY TO BUY MORE.: NEVER TRUE

## 2024-11-15 SDOH — SOCIAL STABILITY: SOCIAL INSECURITY: HAVE YOU HAD THOUGHTS OF HARMING ANYONE ELSE?: NO

## 2024-11-15 SDOH — SOCIAL STABILITY: SOCIAL INSECURITY: HAVE YOU HAD ANY THOUGHTS OF HARMING ANYONE ELSE?: NO

## 2024-11-15 SDOH — SOCIAL STABILITY: SOCIAL INSECURITY: ARE THERE ANY APPARENT SIGNS OF INJURIES/BEHAVIORS THAT COULD BE RELATED TO ABUSE/NEGLECT?: NO

## 2024-11-15 SDOH — SOCIAL STABILITY: SOCIAL INSECURITY: ABUSE: ADULT

## 2024-11-15 SDOH — SOCIAL STABILITY: SOCIAL INSECURITY: DO YOU FEEL UNSAFE GOING BACK TO THE PLACE WHERE YOU ARE LIVING?: NO

## 2024-11-15 SDOH — SOCIAL STABILITY: SOCIAL INSECURITY: DO YOU FEEL ANYONE HAS EXPLOITED OR TAKEN ADVANTAGE OF YOU FINANCIALLY OR OF YOUR PERSONAL PROPERTY?: NO

## 2024-11-15 ASSESSMENT — PAIN - FUNCTIONAL ASSESSMENT
PAIN_FUNCTIONAL_ASSESSMENT: 0-10
PAIN_FUNCTIONAL_ASSESSMENT: 0-10

## 2024-11-15 ASSESSMENT — LIFESTYLE VARIABLES
EVER HAD A DRINK FIRST THING IN THE MORNING TO STEADY YOUR NERVES TO GET RID OF A HANGOVER: NO
HOW MANY STANDARD DRINKS CONTAINING ALCOHOL DO YOU HAVE ON A TYPICAL DAY: PATIENT DOES NOT DRINK
HAVE PEOPLE ANNOYED YOU BY CRITICIZING YOUR DRINKING: NO
HAVE YOU EVER FELT YOU SHOULD CUT DOWN ON YOUR DRINKING: NO
AUDIT-C TOTAL SCORE: 0
AUDIT-C TOTAL SCORE: 0
EVER FELT BAD OR GUILTY ABOUT YOUR DRINKING: NO
TOTAL SCORE: 0
HOW OFTEN DO YOU HAVE 6 OR MORE DRINKS ON ONE OCCASION: NEVER
SKIP TO QUESTIONS 9-10: 1
HOW OFTEN DO YOU HAVE A DRINK CONTAINING ALCOHOL: NEVER

## 2024-11-15 ASSESSMENT — ACTIVITIES OF DAILY LIVING (ADL)
FEEDING YOURSELF: NEEDS ASSISTANCE
JUDGMENT_ADEQUATE_SAFELY_COMPLETE_DAILY_ACTIVITIES: YES
LACK_OF_TRANSPORTATION: NO
TOILETING: NEEDS ASSISTANCE
HEARING - LEFT EAR: DIFFICULTY WITH NOISE
BATHING: NEEDS ASSISTANCE
PATIENT'S MEMORY ADEQUATE TO SAFELY COMPLETE DAILY ACTIVITIES?: YES
ADEQUATE_TO_COMPLETE_ADL: NO
DRESSING YOURSELF: NEEDS ASSISTANCE
GROOMING: NEEDS ASSISTANCE
WALKS IN HOME: NEEDS ASSISTANCE
HEARING - RIGHT EAR: DIFFICULTY WITH NOISE

## 2024-11-15 ASSESSMENT — COGNITIVE AND FUNCTIONAL STATUS - GENERAL
STANDING UP FROM CHAIR USING ARMS: A LOT
STANDING UP FROM CHAIR USING ARMS: A LOT
TOILETING: A LOT
HELP NEEDED FOR BATHING: A LOT
MOBILITY SCORE: 15
HELP NEEDED FOR BATHING: A LOT
MOVING FROM LYING ON BACK TO SITTING ON SIDE OF FLAT BED WITH BEDRAILS: A LITTLE
DRESSING REGULAR LOWER BODY CLOTHING: A LOT
CLIMB 3 TO 5 STEPS WITH RAILING: A LOT
EATING MEALS: A LITTLE
DRESSING REGULAR UPPER BODY CLOTHING: A LOT
PERSONAL GROOMING: A LOT
EATING MEALS: A LOT
PERSONAL GROOMING: A LOT
DRESSING REGULAR LOWER BODY CLOTHING: A LOT
PATIENT BASELINE BEDBOUND: NO
TOILETING: A LOT
MOVING TO AND FROM BED TO CHAIR: A LOT
DRESSING REGULAR UPPER BODY CLOTHING: A LOT
CLIMB 3 TO 5 STEPS WITH RAILING: A LOT
WALKING IN HOSPITAL ROOM: A LOT
TURNING FROM BACK TO SIDE WHILE IN FLAT BAD: A LOT
TURNING FROM BACK TO SIDE WHILE IN FLAT BAD: A LITTLE
MOVING FROM LYING ON BACK TO SITTING ON SIDE OF FLAT BED WITH BEDRAILS: A LITTLE
DAILY ACTIVITIY SCORE: 13
MOBILITY SCORE: 13
MOVING TO AND FROM BED TO CHAIR: A LITTLE
DAILY ACTIVITIY SCORE: 12
WALKING IN HOSPITAL ROOM: A LOT

## 2024-11-15 ASSESSMENT — PAIN SCALES - GENERAL
PAINLEVEL_OUTOF10: 0 - NO PAIN
PAINLEVEL_OUTOF10: 3

## 2024-11-15 ASSESSMENT — PATIENT HEALTH QUESTIONNAIRE - PHQ9
SUM OF ALL RESPONSES TO PHQ9 QUESTIONS 1 & 2: 0
1. LITTLE INTEREST OR PLEASURE IN DOING THINGS: NOT AT ALL
2. FEELING DOWN, DEPRESSED OR HOPELESS: NOT AT ALL

## 2024-11-15 NOTE — PROGRESS NOTES
11/15/24 1405   Discharge Planning   Type of Residence Skilled nursing facility   Home or Post Acute Services Post acute facilities (Rehab/SNF/etc)   Expected Discharge Disposition SNF     Reviewed chart and met with pt. Introduced self and role. Pt confirmed he was admitted form the [facility] where he was receiving skilled care. We discussed likely need to return at discharge. Pt stated that he not ready to make that decision yet. He reported that prior to his recent stay at the [facility] he was home with his wife and was active with [facility] homecare. Return SNF referral sent in the event pt plans to return at discharge. Therapy evals are pending. Care transitions team will follow for a discharge plan.

## 2024-11-15 NOTE — NURSING NOTE
1000 - patient arrived to floor from ER    1830 - BLE's are noted to be crusty, flaky scaly skin.  There are a few small pink areas noted to the calves of both legs.  He received torsemide and lasix this evening with good results.  He becomes easily SOB with mild exertion and needs to be reminded to keep his O2 in place.

## 2024-11-15 NOTE — ED PROVIDER NOTES
HPI   Chief Complaint   Patient presents with    Shortness of Breath     Found by nurse at 430 am to SOB; h/o DM. Afib, CHF; sat 91 % on 3 liters; patient received one duoneb;        Patient is a 84-year-old male with history of CHF, HTN, A-fib on Eliquis, DM, HLD, CKD, CAD presenting [facility] ED for shortness of breath that began last hour.  Per nursing staff at patient's facility, patient was found to have shortness of breath in the last hour.  Patient was satting low 90s on room air.  Patient was placed on 2 L nasal cannula O2.  Patient was given 1 DuoNeb treatment at nursing facility.  Patient also had additional DuoNeb treatment en route by EMS.  Patient denies any active chest pain, nausea, vomiting, diarrhea, abdominal pain, fever, chills, dizziness, headache, or weakness.                Patient History   Past Medical History:   Diagnosis Date    Hyperlipidemia, unspecified 11/14/2022    Dyslipidemia    Other forms of dyspnea 08/09/2018    Dyspnea on exertion    Personal history of other diseases of the circulatory system 12/08/2014    History of hypertension    Personal history of other endocrine, nutritional and metabolic disease     History of diabetes mellitus    Personal history of other specified conditions     History of syncope    Presence of intraocular lens 07/20/2019    Pseudophakia of right eye    Presence of intraocular lens 07/20/2019    Pseudophakia of left eye    Unspecified atrial fibrillation (Multi) 11/14/2022    Atrial fibrillation     Past Surgical History:   Procedure Laterality Date    CHOLECYSTECTOMY  09/09/2015    Cholecystectomy    CORONARY ANGIOPLASTY WITH STENT PLACEMENT  10/06/2015    Cath Stent Placement    CORONARY ARTERY BYPASS GRAFT  10/06/2015    CABG    OTHER SURGICAL HISTORY  09/09/2015    Wrist Carpectomy    ROTATOR CUFF REPAIR  09/09/2015    Rotator Cuff Repair     No family history on file.  Social History     Tobacco Use    Smoking status: Never    Smokeless  tobacco: Never   Substance Use Topics    Alcohol use: Never    Drug use: Never       Physical Exam   ED Triage Vitals [11/15/24 0602]   Temperature Heart Rate Respirations BP   36.7 °C (98.1 °F) 77 (!) 24 151/72      Pulse Ox Temp src Heart Rate Source Patient Position   97 % -- Monitor --      BP Location FiO2 (%)     -- --       Physical Exam  Constitutional:       Appearance: Normal appearance. He is normal weight.   HENT:      Head: Normocephalic and atraumatic.      Nose: Nose normal.      Mouth/Throat:      Mouth: Mucous membranes are moist.      Pharynx: Oropharynx is clear.   Eyes:      Extraocular Movements: Extraocular movements intact.      Conjunctiva/sclera: Conjunctivae normal.      Pupils: Pupils are equal, round, and reactive to light.   Cardiovascular:      Rate and Rhythm: Normal rate and regular rhythm.      Pulses: Normal pulses.      Heart sounds: Normal heart sounds.   Pulmonary:      Effort: Pulmonary effort is normal.      Breath sounds: Examination of the right-upper field reveals wheezing. Examination of the left-upper field reveals wheezing. Examination of the right-middle field reveals wheezing. Examination of the left-middle field reveals wheezing. Decreased breath sounds and wheezing present. No rhonchi or rales.   Abdominal:      General: Abdomen is flat. Bowel sounds are normal.      Palpations: Abdomen is soft.   Musculoskeletal:         General: Normal range of motion.      Cervical back: Normal range of motion and neck supple.   Skin:     General: Skin is warm and dry.      Capillary Refill: Capillary refill takes less than 2 seconds.   Neurological:      General: No focal deficit present.      Mental Status: He is alert and oriented to person, place, and time. Mental status is at baseline.   Psychiatric:         Mood and Affect: Mood normal.         Behavior: Behavior normal.           ED Course & MDM   ED Course as of 11/15/24 2100   Fri Nov 15, 2024   0609 Patient is a  84-year-old male with history of CHF, HTN, A-fib on Eliquis, DM, HLD, CKD, CAD presenting [facility] ED for shortness of breath that began last hour.  Per nursing staff at patient's facility, patient was found to have shortness of breath in the last hour.  Patient was satting low 90s on room air.  Patient was placed on 2 L nasal cannula O2.  Patient was given 1 DuoNeb treatment at nursing facility.  Patient also had additional DuoNeb treatment en route by EMS.  Patient denies any active chest pain, nausea, vomiting, diarrhea, abdominal pain, fever, chills, dizziness, headache, or weakness. [MI]   0610 Physical exam notable for bilateral wheezes in the upper lobes. [MI]   0615 0606 hrs.: EKG personally interpreted by myself shows atrial fibrillation with a ventricular rate of 75 bpm.  QRS interval 88 ms.  QTc 422 ms.  Normal axis.  No acute ischemic injury pattern appreciated. [NW]   0809 XR chest 1 view  IMPRESSION:  Small bilateral pleural effusions. Patchy opacities in the right lung  field may represent asymmetric pulmonary edema versus infection.   [PV]   0816 EKG taken at 751 on 15 November 2024 showing A-fib with normal rate, normal axis, otherwise normal intervals, no acute ST elevation or depression, T wave inversions in leads V4-V6 [DS]      ED Course User Index  [DS] Steve Gonsales MD  [MI] Zaira Lujan MD  [NW] Freddie Silva DO  [PV] Mickey Adamson DO         Diagnoses as of 11/15/24 2100   Troponin level elevated   Shortness of breath                 No data recorded                                 Medical Decision Making  Patient is a 84 y.o. male who presents to [facility] ED for Shortness of Breath (Found by nurse at 430 am to SOB; h/o DM. Afib, CHF; sat 91 % on 3 liters; patient received one duoneb; ). On initial ED evaluation, patient found to be in mild distress. Per HPI, concern to evaluate and treat for possible pneumonia versus CHF exacerbation.  Obtaining cardiac labs and  diagnostics.  EKG showed no acute ischemic change.  Did show A-fib with normal rate.  Chest x-ray showed small bilateral pleural effusions, as well as infiltrate concerning for pulmonary edema versus infection.  Patient CBC showed no leukocytosis or anemia.  Patient's CMP showed no ISIAH or electrolyte abnormality from patient baseline.  Patient baseline troponins in the 100s, however slightly elevated above baseline at 219 today.  Based on clinical workup and clinical exam, concern to treat for pneumonia.  Patient covered with empiric Rocephin and azithromycin.  Patient also given Solu-Medrol and DuoNebs treatment for work of breathing.  Given troponinemia, likely secondary from demand ischemia.  Administering Plavix.  Patient does have noted aspirin allergy.  Patient to be admitted to medicine service for further evaluation management of hypoxic respiratory failure secondary to suspected pneumonia with new 3 L O2 nasal cannula requirement.  Diagnostic findings treatment plan discussed with patient.  Patient amenable to plan.  Patient hemodynamically stable at this time.        Procedure  Procedures     Zaira Lujan MD  Resident  11/15/24 8189

## 2024-11-15 NOTE — CONSULTS
Reason For Consult  SOB, elevated Trope, Hx CAD and CHF    History Of Present Illness  Elgin Marin is a 84 y.o. male with PMH significant for A-fib on Eliquis, HTN, HLD, CKD stage IV (baseline creatinine 1.9), DM2, CHF (with improved EF), CAD (with chronically elevated troponins 2/2 distal disease not able to be intervened upon), chronic lower extremity stasis dermatitis.  Patient presented to [facility] after staff at nursing facility checked on him approximately 4 AM found to be short of breath and hypoxic they gave him a DuoNeb however SpO2 continued to decrease so they called EMS.  At time for EMS reports he had labored and rapid breathing and speaking 4-5 words at a time.  Of note patient was recently discharged from our facility on 2024.    In the ED:  -Vitals: Temp 98.1 F, HR 77, RR 24, /72, SpO2 97% on 3 L via NC  -Labs: CMP: Glucose 198, BUN/creatinine 51/2.07, T. bili 1.3, , troponin 219-193             Coag: PT/INR 15.9/1.4             CBC: Hemoglobin 11.9 otherwise unremarkable             COVID/flu PCR: All negative             VB.39/56/45/33.9             UA: 4+ glucose, 1+ urobilinogen, otherwise unremarkable  -Imaging: EKG:A-fib with competing junctional rhythm at rate of 82 bpm, QRS 82, QTc 462, T wave inversion inferior lateral leads unchanged from previous EKGs                  CXR: Small bilateral pleural effusions.  Patchy opacities in right lung may represent asymmetric pulmonary edema versus infection.  -Intervention: Solu-Medrol 125 mg, DuoNeb, Plavix, Rocephin, azithromycin    At time of my evaluation, patient denies chest pain, palpitations, lightheadedness, dizziness, N/V/D/C, dysuria, hematuria, but does endorse shortness of breath while eating, and talking rapidly.  Patient was seen resting comfortably in bed.  He does not endorse significant weight gain from previous discharge.     Past Medical History  He has a past medical history of Hyperlipidemia, unspecified  (11/14/2022), Other forms of dyspnea (08/09/2018), Personal history of other diseases of the circulatory system (12/08/2014), Personal history of other endocrine, nutritional and metabolic disease, Personal history of other specified conditions, Presence of intraocular lens (07/20/2019), Presence of intraocular lens (07/20/2019), and Unspecified atrial fibrillation (Multi) (11/14/2022).    Surgical History  He has a past surgical history that includes Coronary angioplasty with stent (10/06/2015); Coronary artery bypass graft (10/06/2015); Other surgical history (09/09/2015); Cholecystectomy (09/09/2015); and Rotator cuff repair (09/09/2015).     Social History  He reports that he has never smoked. He has never used smokeless tobacco. He reports that he does not drink alcohol and does not use drugs.    Family History  No family history on file.     Allergies  Metoprolol, Oxycodone-aspirin, Sulfa (sulfonamide antibiotics), Tramadol, Lisinopril, and Warfarin    Review of Systems  Patient denies all symptomatology pertaining to a 12 point ROS with exception of those listed above HPI.     Physical Exam  General: Not in acute distress, A&O x 3, alert, cooperative, well-developed, anasarcic  HEENT: Normocephalic, atraumatic, EOMI, moist mucous membranes, NC in place  Neck: Neck supple, trachea midline, no evidence of trauma  Cardiovascular: IRR, S1 and S2 appreciated, no murmurs rubs gallops appreciated, distal pulses 2+ bilaterally (radial)  Respiratory: Vesicular breath sounds appreciated bilaterally, no adventitious sounds appreciated, no increased work of breathing on 3 L via NC  GI: Abdomen soft, nondistended, nontender to palpation, bowel sounds present, pitting edema present in the abdomen  Extremities: 3+ edema appreciated in lower extremities bilaterally, no cyanosis  Neuro: A&O X3, no focal deficits, strength and sensation intact bilaterally  Skin: Warm and dry, without lesions or rashes       Last Recorded  "Vitals  Blood pressure 151/81, pulse 86, temperature 36.6 °C (97.9 °F), temperature source Temporal, resp. rate 20, height 1.778 m (5' 10\"), weight 90.4 kg (199 lb 4.7 oz), SpO2 94%.    Relevant Results  Scheduled medications  [START ON 11/16/2024] azithromycin, 500 mg, oral, q24h BRE  [START ON 11/16/2024] cefTRIAXone, 2 g, intravenous, q24h  ipratropium-albuteroL, 3 mL, nebulization, q4h  oxygen, , inhalation, Continuous - Inhalation      Continuous medications     PRN medications  PRN medications: acetaminophen, dextromethorphan-guaifenesin, melatonin, ondansetron  Results for orders placed or performed during the hospital encounter of 11/15/24 (from the past 24 hours)   CBC and Auto Differential   Result Value Ref Range    WBC 8.2 4.4 - 11.3 x10*3/uL    nRBC 0.0 0.0 - 0.0 /100 WBCs    RBC 4.29 (L) 4.50 - 5.90 x10*6/uL    Hemoglobin 11.9 (L) 13.5 - 17.5 g/dL    Hematocrit 38.7 (L) 41.0 - 52.0 %    MCV 90 80 - 100 fL    MCH 27.7 26.0 - 34.0 pg    MCHC 30.7 (L) 32.0 - 36.0 g/dL    RDW 16.7 (H) 11.5 - 14.5 %    Platelets 194 150 - 450 x10*3/uL    Neutrophils % 79.3 40.0 - 80.0 %    Immature Granulocytes %, Automated 0.2 0.0 - 0.9 %    Lymphocytes % 7.7 13.0 - 44.0 %    Monocytes % 11.2 2.0 - 10.0 %    Eosinophils % 0.9 0.0 - 6.0 %    Basophils % 0.7 0.0 - 2.0 %    Neutrophils Absolute 6.48 (H) 1.60 - 5.50 x10*3/uL    Immature Granulocytes Absolute, Automated 0.02 0.00 - 0.50 x10*3/uL    Lymphocytes Absolute 0.63 (L) 0.80 - 3.00 x10*3/uL    Monocytes Absolute 0.92 (H) 0.05 - 0.80 x10*3/uL    Eosinophils Absolute 0.07 0.00 - 0.40 x10*3/uL    Basophils Absolute 0.06 0.00 - 0.10 x10*3/uL   Comprehensive Metabolic Panel   Result Value Ref Range    Glucose 198 (H) 74 - 99 mg/dL    Sodium 136 136 - 145 mmol/L    Potassium 4.3 3.5 - 5.3 mmol/L    Chloride 93 (L) 98 - 107 mmol/L    Bicarbonate 32 21 - 32 mmol/L    Anion Gap 15 10 - 20 mmol/L    Urea Nitrogen 51 (H) 6 - 23 mg/dL    Creatinine 2.07 (H) 0.50 - 1.30 mg/dL    " eGFR 31 (L) >60 mL/min/1.73m*2    Calcium 8.6 8.6 - 10.3 mg/dL    Albumin 3.1 (L) 3.4 - 5.0 g/dL    Alkaline Phosphatase 107 33 - 136 U/L    Total Protein 6.3 (L) 6.4 - 8.2 g/dL    AST 24 9 - 39 U/L    Bilirubin, Total 1.3 (H) 0.0 - 1.2 mg/dL    ALT 10 10 - 52 U/L   Magnesium   Result Value Ref Range    Magnesium 2.00 1.60 - 2.40 mg/dL   B-Type Natriuretic Peptide   Result Value Ref Range     (H) 0 - 99 pg/mL   Lactate   Result Value Ref Range    Lactate 1.0 0.4 - 2.0 mmol/L   aPTT   Result Value Ref Range    aPTT 38 27 - 38 seconds   Protime-INR   Result Value Ref Range    Protime 15.9 (H) 9.8 - 12.8 seconds    INR 1.4 (H) 0.9 - 1.1   Troponin I, High Sensitivity, Initial   Result Value Ref Range    Troponin I, High Sensitivity 219 (HH) 0 - 20 ng/L   BLOOD GAS VENOUS FULL PANEL   Result Value Ref Range    POCT pH, Venous 7.39 7.33 - 7.43 pH    POCT pCO2, Venous 56 (H) 41 - 51 mm Hg    POCT pO2, Venous 45 35 - 45 mm Hg    POCT SO2, Venous 73 45 - 75 %    POCT Oxy Hemoglobin, Venous 71.2 45.0 - 75.0 %    POCT Hematocrit Calculated, Venous 42.0 41.0 - 52.0 %    POCT Sodium, Venous 129 (L) 136 - 145 mmol/L    POCT Potassium, Venous 4.1 3.5 - 5.3 mmol/L    POCT Chloride, Venous 93 (L) 98 - 107 mmol/L    POCT Ionized Calicum, Venous 1.08 (L) 1.10 - 1.33 mmol/L    POCT Glucose, Venous 210 (H) 74 - 99 mg/dL    POCT Lactate, Venous 1.3 0.4 - 2.0 mmol/L    POCT Base Excess, Venous 7.1 (H) -2.0 - 3.0 mmol/L    POCT HCO3 Calculated, Venous 33.9 (H) 22.0 - 26.0 mmol/L    POCT Hemoglobin, Venous 13.9 13.5 - 17.5 g/dL    POCT Anion Gap, Venous 6.0 (L) 10.0 - 25.0 mmol/L    Patient Temperature      FiO2 4 %   Sars-CoV-2 PCR   Result Value Ref Range    Coronavirus 2019, PCR Not Detected Not Detected   Influenza A, and B PCR   Result Value Ref Range    Flu A Result Not Detected Not Detected    Flu B Result Not Detected Not Detected   Troponin, High Sensitivity, 1 Hour   Result Value Ref Range    Troponin I, High Sensitivity  193 (HH) 0 - 20 ng/L   Urinalysis with Reflex Culture and Microscopic   Result Value Ref Range    Color, Urine Light-Yellow Light-Yellow, Yellow, Dark-Yellow    Appearance, Urine Clear Clear    Specific Gravity, Urine 1.014 1.005 - 1.035    pH, Urine 6.0 5.0, 5.5, 6.0, 6.5, 7.0, 7.5, 8.0    Protein, Urine NEGATIVE NEGATIVE, 10 (TRACE), 20 (TRACE) mg/dL    Glucose, Urine OVER (4+) (A) Normal mg/dL    Blood, Urine NEGATIVE NEGATIVE    Ketones, Urine NEGATIVE NEGATIVE mg/dL    Bilirubin, Urine NEGATIVE NEGATIVE    Urobilinogen, Urine 2 (1+) (A) Normal mg/dL    Nitrite, Urine NEGATIVE NEGATIVE    Leukocyte Esterase, Urine NEGATIVE NEGATIVE   POCT GLUCOSE   Result Value Ref Range    POCT Glucose 212 (H) 74 - 99 mg/dL     XR chest 1 view    Result Date: 11/15/2024  Interpreted By:  Weston Cabello, STUDY: XR CHEST 1 VIEW;  11/15/2024 6:24 am   INDICATION: Signs/Symptoms:Chest Pain.   COMPARISON: Chest radiograph 11/10/2024.   ACCESSION NUMBER(S): [id number]   ORDERING CLINICIAN: RAYO KIM   FINDINGS:   CARDIOMEDIASTINAL SILHOUETTE: Cardiomediastinal silhouette is normal in size and configuration. There are sternotomy changes.   LUNGS/PLEURA: There are patchy opacities in the right lung field which may represent asymmetric pulmonary edema versus infection. Haziness of the bilateral costophrenic angles suggestive of small bilateral pleural effusions.There is no demonstrated pneumothorax.     BONES: No evidence of acute osseous abnormality.       Small bilateral pleural effusions. Patchy opacities in the right lung field may represent asymmetric pulmonary edema versus infection.   Signed by: Weston Cabello 11/15/2024 6:42 AM Dictation workstation:   [id number]    ECG 12 Lead    Result Date: 11/14/2024  Atrial fibrillation with premature ventricular or aberrantly conducted complexes Right axis deviation Septal infarct (cited on or before 21-JAN-2024) Abnormal ECG When compared with ECG of 21-JAN-2024 16:33, T wave  inversion less evident in Lateral leads Confirmed by Shubham Booker ([id number]) on 11/14/2024 11:24:26 AM       Assessment/Plan   Patient is an 84-year-old male with PMH significant for A-fib (on Eliquis), HTN, HLD, CKD stage IV (baseline creatinine 1.9), DM2, CHF (with improved EF), CAD (with chronically elevated troponins 2/2 distal disease not able to be intervened upon), chronic lower extremity stasis dermatitis.  Patient presented to [facility] after staff at nursing facility checked on him approximately 4 AM found to be short of breath and hypoxic they gave him a DuoNeb however SpO2 continued to decrease so they called EMS.  Patient was admitted to the internal medicine service for acute hypoxemic respiratory failure.  Cardiology was consulted in the setting of elevated troponin with concerning cardiac history.    #Chronically elevated troponin with likely superimposed type II demand  #Hx CAD with nonintervenable distal disease  #CHF (EF 54%)  #AHRF 2/2 PNA +/- CHF  Patient presented to [facility] with chief complaint of shortness of breath  -Patient is anasarcic on physical exam with pitting edema to the level of the abdomen  -Troponin 219-193 (baseline troponin 180-200s)  -, lower than on previous admission at 711  -CXR illustrating possible pneumonia  -EKG unchanged from previous electrocardiograms.  T wave inversions present on previous EKG  -Nuclear stress test 4/18/2023: EF 54%, negative for inducible ischemia  Plan:  -Recommend repeating echocardiogram as patient has not had one in greater than 1 year to evaluate ejection fraction  -Recommend diuresis as creatinine will tolerate as patient appears to be volume overloaded on physical exam as well as having small bilateral pleural effusions  -As patient has distal not intervenable coronary artery disease causing troponinemia chronically no current indication for cardiac catheterization at this time.  Also reassuring the patient is at his baseline level of troponin,  and troponin is downtrending.  Likely on the higher end of his baseline on initial presentation secondary to hypoxia causing a type II demand mismatch.  No indication to continue to trend troponin unless patient begins having chest pain or changes on telemetry/EKG.  -Continue supplemental oxygen wean per primary team, continue antibiotics for PNA also per primary team.  -Continue telemetry    Thank you for your consult, cardiology will continue to follow.    Case discussed with Dr. Womack.    Wilbert Santiago MD  Internal medicine PGY 2

## 2024-11-15 NOTE — PROGRESS NOTES
Emergency Medicine Transition of Care Note.    I received Elgin Marin in signout from Dr. Lujan.  Please see the previous ED provider note for all HPI, PE and MDM up to the time of signout at 07 100. This is in addition to the primary record.    In brief Elgin Marin is an 84 y.o. male presenting for   Chief Complaint   Patient presents with   • Shortness of Breath     Found by nurse at 430 am to SOB; h/o DM. Afib, CHF; sat 91 % on 3 liters; patient received one duoneb;      At the time of signout we were awaiting: Call out for admission for new oxygen requirement, hypoxia, troponinemia with a initial troponin of 219 subsequent troponin downtrending 193, chronically elevated BMPs.  Patient does have history of atrial fibrillation on Eliquis.    MDM:  Patient to be admitted to this facility for new oxygen requirement and troponinemia, given Plavix 300 mg, patient was also given Rocephin for empirical treatment of possible pneumonia.  ED Course as of 11/15/24 0809   Fri Nov 15, 2024   0609 Patient is a 84-year-old male with history of CHF, HTN, A-fib on Eliquis, DM, HLD, CKD, CAD presenting [facility] ED for shortness of breath that began last hour.  Per nursing staff at patient's facility, patient was found to have shortness of breath in the last hour.  Patient was satting low 90s on room air.  Patient was placed on 2 L nasal cannula O2.  Patient was given 1 DuoNeb treatment at nursing facility.  Patient also had additional DuoNeb treatment en route by EMS.  Patient denies any active chest pain, nausea, vomiting, diarrhea, abdominal pain, fever, chills, dizziness, headache, or weakness. [MI]   0610 Physical exam notable for bilateral wheezes in the upper lobes. [MI]   0615 0606 hrs.: EKG personally interpreted by myself shows atrial fibrillation with a ventricular rate of 75 bpm.  QRS interval 88 ms.  QTc 422 ms.  Normal axis.  No acute ischemic injury pattern appreciated. [NW]   0809 XR chest 1  view  IMPRESSION:  Small bilateral pleural effusions. Patchy opacities in the right lung  field may represent asymmetric pulmonary edema versus infection.   [PV]      ED Course User Index  [MI] Zaira Lujan MD  [NW] Freddie Silva DO  [PV] Mickey Adamson DO         Diagnoses as of 11/15/24 0809   Troponin level elevated   Disposition: Patient admitted to this facility for further treatment and evaluation of new oxygen requirement troponinemia.  Plavix administered, ceftriaxone 1 g administered    Mickey Adamson DO

## 2024-11-15 NOTE — H&P
History Of Present Illness  Elgin Marin is a 84 y.o. male presenting with shortness of breath.  Per reports nursing facility staff checked on patient around 4 AM and found him to be short of breath and hypoxic.  They gave him 1 DuoNeb but afterward reportedly his SpO2 dropped so they called for further evaluation.  Staff reported to EMS patient normally on 3 L nasal cannula. Although patient denies typically being on supplemental O2, he has used it intermittently in the recent past. He reports he has noticed recently SOB with any activity including eating and drinking. He otherwise is feeling ok.    Per EMS report he was called for respiratory distress/difficulty breathing and their assessment was positive for both rapid and labored breathing with accessory muscle use and increased respiratory effort.  He also was noted to have expiratory wheezing bilaterally.  He was reported be speaking 4-5 words at a time.    In the ED vital signs were significant for respiratory rate of 24 satting 97% on 4 L nasal cannula. Labs were significant for a chloride of 93, BUN 51, creatinine 2.07, albumin 3.1, T. bili 1.3, , troponin 219.  INR 1.4, hemoglobin 11.9.  COVID was negative.  VBG showed a pH of 7.39, pCO2 56, pO2 45.  CXR showed small bilateral pleural effusions.  Patchy opacities in the right lung field may represent asymmetric pulmonary edema versus infection.  Patient was given Solu-Medrol 125 and DuoNeb.  He was also given Plavix, Rocephin and azithromycin.  He is admitted for further evaluation of shortness of breath.    CODE STATUS: ok with CPR, no intubation      Past Medical History  He has a past medical history of Hyperlipidemia, unspecified (11/14/2022), Other forms of dyspnea (08/09/2018), Personal history of other diseases of the circulatory system (12/08/2014), Personal history of other endocrine, nutritional and metabolic disease, Personal history of other specified conditions, Presence of  intraocular lens (07/20/2019), Presence of intraocular lens (07/20/2019), and Unspecified atrial fibrillation (Multi) (11/14/2022).    Surgical History  He has a past surgical history that includes Coronary angioplasty with stent (10/06/2015); Coronary artery bypass graft (10/06/2015); Other surgical history (09/09/2015); Cholecystectomy (09/09/2015); and Rotator cuff repair (09/09/2015).     Social History  He reports that he has never smoked. He has never used smokeless tobacco. He reports that he does not drink alcohol and does not use drugs.    Family History  No family history on file.     Allergies  Metoprolol, Oxycodone-aspirin, Sulfa (sulfonamide antibiotics), Warfarin, and Lisinopril    Review of Systems   As per HPI   Physical Exam   Constitutional: Well developed, no distress, alert and cooperative  Skin: Warm and dry  Eyes: EOMI, clear sclera  ENMT: mucous membranes moist  Respiratory: good aeration, occasional expiratory wheeze, no conversational dyspnea, no tachypnea, NC in place   Cardiovascular: tachy  Abdominal: Nondistended, soft, non-tender, +BS  MSK: ROM intact, bilateral LE edema   Neuro: alert and oriented x3,     Last Recorded Vitals  /69 (BP Location: Right arm, Patient Position: Lying)   Pulse 88   Temp 36.6 °C (97.9 °F) (Temporal)   Resp 20   Wt 95.3 kg (210 lb)   SpO2 97%     Relevant Results  Results for orders placed or performed during the hospital encounter of 11/15/24 (from the past 24 hours)   CBC and Auto Differential   Result Value Ref Range    WBC 8.2 4.4 - 11.3 x10*3/uL    nRBC 0.0 0.0 - 0.0 /100 WBCs    RBC 4.29 (L) 4.50 - 5.90 x10*6/uL    Hemoglobin 11.9 (L) 13.5 - 17.5 g/dL    Hematocrit 38.7 (L) 41.0 - 52.0 %    MCV 90 80 - 100 fL    MCH 27.7 26.0 - 34.0 pg    MCHC 30.7 (L) 32.0 - 36.0 g/dL    RDW 16.7 (H) 11.5 - 14.5 %    Platelets 194 150 - 450 x10*3/uL    Neutrophils % 79.3 40.0 - 80.0 %    Immature Granulocytes %, Automated 0.2 0.0 - 0.9 %    Lymphocytes % 7.7  13.0 - 44.0 %    Monocytes % 11.2 2.0 - 10.0 %    Eosinophils % 0.9 0.0 - 6.0 %    Basophils % 0.7 0.0 - 2.0 %    Neutrophils Absolute 6.48 (H) 1.60 - 5.50 x10*3/uL    Immature Granulocytes Absolute, Automated 0.02 0.00 - 0.50 x10*3/uL    Lymphocytes Absolute 0.63 (L) 0.80 - 3.00 x10*3/uL    Monocytes Absolute 0.92 (H) 0.05 - 0.80 x10*3/uL    Eosinophils Absolute 0.07 0.00 - 0.40 x10*3/uL    Basophils Absolute 0.06 0.00 - 0.10 x10*3/uL   Comprehensive Metabolic Panel   Result Value Ref Range    Glucose 198 (H) 74 - 99 mg/dL    Sodium 136 136 - 145 mmol/L    Potassium 4.3 3.5 - 5.3 mmol/L    Chloride 93 (L) 98 - 107 mmol/L    Bicarbonate 32 21 - 32 mmol/L    Anion Gap 15 10 - 20 mmol/L    Urea Nitrogen 51 (H) 6 - 23 mg/dL    Creatinine 2.07 (H) 0.50 - 1.30 mg/dL    eGFR 31 (L) >60 mL/min/1.73m*2    Calcium 8.6 8.6 - 10.3 mg/dL    Albumin 3.1 (L) 3.4 - 5.0 g/dL    Alkaline Phosphatase 107 33 - 136 U/L    Total Protein 6.3 (L) 6.4 - 8.2 g/dL    AST 24 9 - 39 U/L    Bilirubin, Total 1.3 (H) 0.0 - 1.2 mg/dL    ALT 10 10 - 52 U/L   Magnesium   Result Value Ref Range    Magnesium 2.00 1.60 - 2.40 mg/dL   B-Type Natriuretic Peptide   Result Value Ref Range     (H) 0 - 99 pg/mL   Lactate   Result Value Ref Range    Lactate 1.0 0.4 - 2.0 mmol/L   aPTT   Result Value Ref Range    aPTT 38 27 - 38 seconds   Protime-INR   Result Value Ref Range    Protime 15.9 (H) 9.8 - 12.8 seconds    INR 1.4 (H) 0.9 - 1.1   Troponin I, High Sensitivity, Initial   Result Value Ref Range    Troponin I, High Sensitivity 219 (HH) 0 - 20 ng/L   BLOOD GAS VENOUS FULL PANEL   Result Value Ref Range    POCT pH, Venous 7.39 7.33 - 7.43 pH    POCT pCO2, Venous 56 (H) 41 - 51 mm Hg    POCT pO2, Venous 45 35 - 45 mm Hg    POCT SO2, Venous 73 45 - 75 %    POCT Oxy Hemoglobin, Venous 71.2 45.0 - 75.0 %    POCT Hematocrit Calculated, Venous 42.0 41.0 - 52.0 %    POCT Sodium, Venous 129 (L) 136 - 145 mmol/L    POCT Potassium, Venous 4.1 3.5 - 5.3 mmol/L     POCT Chloride, Venous 93 (L) 98 - 107 mmol/L    POCT Ionized Calicum, Venous 1.08 (L) 1.10 - 1.33 mmol/L    POCT Glucose, Venous 210 (H) 74 - 99 mg/dL    POCT Lactate, Venous 1.3 0.4 - 2.0 mmol/L    POCT Base Excess, Venous 7.1 (H) -2.0 - 3.0 mmol/L    POCT HCO3 Calculated, Venous 33.9 (H) 22.0 - 26.0 mmol/L    POCT Hemoglobin, Venous 13.9 13.5 - 17.5 g/dL    POCT Anion Gap, Venous 6.0 (L) 10.0 - 25.0 mmol/L    Patient Temperature      FiO2 4 %   Sars-CoV-2 PCR   Result Value Ref Range    Coronavirus 2019, PCR Not Detected Not Detected     XR chest 1 view    Result Date: 11/15/2024  Interpreted By:  Weston Cabello, STUDY: XR CHEST 1 VIEW;  11/15/2024 6:24 am   INDICATION: Signs/Symptoms:Chest Pain.   COMPARISON: Chest radiograph 11/10/2024.   ACCESSION NUMBER(S): [id number]   ORDERING CLINICIAN: RAYO KIM   FINDINGS:   CARDIOMEDIASTINAL SILHOUETTE: Cardiomediastinal silhouette is normal in size and configuration. There are sternotomy changes.   LUNGS/PLEURA: There are patchy opacities in the right lung field which may represent asymmetric pulmonary edema versus infection. Haziness of the bilateral costophrenic angles suggestive of small bilateral pleural effusions.There is no demonstrated pneumothorax.     BONES: No evidence of acute osseous abnormality.       Small bilateral pleural effusions. Patchy opacities in the right lung field may represent asymmetric pulmonary edema versus infection.   Signed by: Weston Cabello 11/15/2024 6:42 AM Dictation workstation:   [id number]       Assessment/Plan   Assessment & Plan    SOB  Chronically elevated trop with type II demand   Afib  CAD   HLD  Glaucoma  CKD   JOAO   HTN  CHF combined systolic and diastolic     -new onset shortness of breath   -CXR - Small bilateral pleural effusions. Patchy opacities in the right lung field may represent asymmetric pulmonary edema versus infection  -recent Wcx pseudomonas   -cefepime and azithro  -received 125mg solumedrol,  continue with pred 40mg daily starting tomorrow   -continue duonebs q4hrs and start budesonide   -trop 193<219, baseline 160   - < 711  -f/u echo   -hold torsemide, start IV lasix 20mg (since already got torsemide today)  -strict I/os   -cardio consulted   -Cr 2.07, at ~BL  -continue home meds as indicated   -incentive spirometer     FENGI   -encourage oral hydration   -replete electrolytes PRN  -cardiac/diabetic diet   -GI ppx: ppi  -DVT ppx: SCDs, eliquis    Dispo: Will likely stay >2 midnights.     Brit Boland, DO

## 2024-11-15 NOTE — PROGRESS NOTES
"Nutrition Initial Assessment:   Nutrition Assessment    Reason for Assessment: Admission nursing screening (MST 2 for wt loss and decreased appetite.)    Patient is a 84 y.o. male presenting from [facility] at [facility] for troponin level elevated. Cardiology, wound care and SLP on consult. Pt started on antibiotics. Per chart notes, chest xray showed small BL pleural effusion.     Past Medical History   has a past medical history of Hyperlipidemia, unspecified (11/14/2022), Other forms of dyspnea (08/09/2018), Personal history of other diseases of the circulatory system (12/08/2014), Personal history of other endocrine, nutritional and metabolic disease, Personal history of other specified conditions, Presence of intraocular lens (07/20/2019), Presence of intraocular lens (07/20/2019), and Unspecified atrial fibrillation (Multi) (11/14/2022).  Surgical History   has a past surgical history that includes Coronary angioplasty with stent (10/06/2015); Coronary artery bypass graft (10/06/2015); Other surgical history (09/09/2015); Cholecystectomy (09/09/2015); and Rotator cuff repair (09/09/2015).        Nutrition History:  Energy Intake: Fair 50-75 %  Food and Nutrient History: Pt reports decreased intakes d/t nausea at all meals x 2-3 years. Pt reports being at [facility] 3 days and was home w/son (caregiver) and bedbound wife. Pt has never tried ONS. Pt will sometimes \"indulge\" in snacks of enma crackers w/PB or low sodium cookies, which he wonders may impact his meal intakes also. Pt feels that morning meds sometimes get \"stuck\" in his throat. 3 months ago A1c 11.9%. At facility, pt provided w/a no added salt, diabetic diet w/thin liquids and regular textures. Pt also on a 2000mL/d fluid restriction.  Vitamin/Herbal Supplement Use: vitamin D  Food Allergies/Intolerances:  None  GI Symptoms: Nausea before meals per pt - MD informed.   Oral Problems: Swallowing difficulty reported meds " "sometimes get \"stuck\" - MD informed.       Anthropometrics:  Height: 177.8 cm (5' 10\")   Weight: 90.4 kg (199 lb 4.7 oz)   BMI (Calculated): 28.6             Weight History:   Wt Readings from Last 22 Encounters:   11/15/24 90.4 kg (199 lb 4.7 oz)   11/10/24 96 kg (211 lb 10.3 oz)   01/21/24 87.5 kg (192 lb 14.4 oz)   01/15/24 80.7 kg (178 lb)   07/10/23 80.7 kg (178 lb)   03/30/23 83.9 kg (185 lb)   11/14/22 83.9 kg (185 lb)   07/26/22 86.2 kg (190 lb)   01/03/22 89.8 kg (198 lb)   01/04/21 99.8 kg (220 lb)   07/06/20 99.8 kg (220 lb)       Weight Change %:  Weight History / % Weight Change: per pt 7# wt loss x 2 weeks. Per archives 203# 11/8/24, 186# 2/26/24; 192# 1/24/24, 7/23 178#. 1.9% wt loss x 1 week  Significant Weight Loss: Yes  Interpretation of Weight Loss: 1-2% in 1 week    Nutrition Focused Physical Exam Findings:    Subcutaneous Fat Loss:   Orbital Fat Pads: Mild-Moderate (slight dark circles and slight hollowing)  Buccal Fat Pads: Well nourished (full, rounded cheeks)  Triceps: Well nourished (ample fat tissue)  Muscle Wasting:  Temporalis: Mild-Moderate (slight depression)  Pectoralis (Clavicular Region): Mild-Moderate (some protrusion of clavicle)  Quadriceps: Well nourished (well developed, well rounded)  Gastrocnemius: Well nourished (well developed bulbous muscle)  Edema:  Edema Location: waist/thighs bilateral.  Physical Findings:  Skin: Positive (flushed, warm, dry, swollen, BL foot wounds and BL pretibal wounds)    Nutrition Significant Labs:  CBC Trend:   Results from last 7 days   Lab Units 11/15/24  0612 11/11/24  0542 11/10/24  1411   WBC AUTO x10*3/uL 8.2 6.7 6.4   RBC AUTO x10*6/uL 4.29* 4.12* 4.30*   HEMOGLOBIN g/dL 11.9* 11.2* 11.9*   HEMATOCRIT % 38.7* 38.4* 39.5*   MCV fL 90 93 92   PLATELETS AUTO x10*3/uL 194 181 169    , BMP Trend:   Results from last 7 days   Lab Units 11/15/24  0612 11/11/24  0542 11/10/24  1411   GLUCOSE mg/dL 198* 68* 63*   CALCIUM mg/dL 8.6 8.1* 8.4*   SODIUM " "mmol/L 136 138 138   POTASSIUM mmol/L 4.3 4.6 3.9   CO2 mmol/L 32 32 30   CHLORIDE mmol/L 93* 100 101   BUN mg/dL 51* 45* 46*   CREATININE mg/dL 2.07* 2.06* 2.08*    , A1C:  Lab Results   Component Value Date    HGBA1C 11.9 (H) 07/31/2024   , BG POCT trend:   Results from last 7 days   Lab Units 11/15/24  1048 11/13/24  1143 11/13/24  0750 11/12/24 2026 11/12/24  1623   POCT GLUCOSE mg/dL 212* 154* 164* 156* 120*    , Liver Function Trend:   Results from last 7 days   Lab Units 11/15/24  0612 11/10/24  1411   ALK PHOS U/L 107 102   AST U/L 24 23   ALT U/L 10 11   BILIRUBIN TOTAL mg/dL 1.3* 1.3*    , Renal Lab Trend:   Results from last 7 days   Lab Units 11/15/24  0612 11/11/24  0542 11/10/24  1411 11/10/24  1411   POTASSIUM mmol/L 4.3 4.6  --  3.9   PHOSPHORUS mg/dL  --  4.1  --   --    SODIUM mmol/L 136 138  --  138   MAGNESIUM mg/dL 2.00 1.83   < >  --    EGFR mL/min/1.73m*2 31* 31*  --  30*   BUN mg/dL 51* 45*  --  46*   CREATININE mg/dL 2.07* 2.06*  --  2.08*    < > = values in this interval not displayed.    , Vit D: No results found for: \"VITD25\" , Vit B12:   Lab Results   Component Value Date    TBGIMYEK25 306 09/27/2019    , Iron Panel:   Lab Results   Component Value Date    FERRITIN 198 09/27/2019    , Folate:   Lab Results   Component Value Date    FOLATE >23.3 09/27/2019        Nutrition Specific Medications:  Scheduled medications  [START ON 11/16/2024] azithromycin, 500 mg, oral, q24h BRE  [START ON 11/16/2024] cefTRIAXone, 2 g, intravenous, q24h  ipratropium-albuteroL, 3 mL, nebulization, q4h  oxygen, , inhalation, Continuous - Inhalation      Continuous medications     PRN medications  PRN medications: acetaminophen, dextromethorphan-guaifenesin, melatonin, ondansetron  0-10 (Numeric) Pain Score: 3      I/O:   Last BM Date: 11/14/24;      Dietary Orders (From admission, onward)       Start     Ordered    11/15/24 1242  Adult diet Cardiac, Consistent Carb; CCD 60 gm/meal; 70 gm fat; 2 - 3 grams " Sodium  Diet effective now        Question Answer Comment   Diet type Cardiac    Diet type Consistent Carb    Carb diet selection: CCD 60 gm/meal    Fat restriction: 70 gm fat    Sodium restriction: 2 - 3 grams Sodium        11/15/24 1242    11/15/24 1015  May Participate in Room Service With Assistance  ( ROOM SERVICE MAY PARTICIPATE WITH ASSISTANCE)  Once        Question:  .  Answer:  Yes    11/15/24 1014                     Estimated Needs:   Total Energy Estimated Needs (kCal):  (5812-2444 (20-25kcal/kg))     Total Protein Estimated Needs (g):  ( (IBW 1.2-1.5g/kg))     Total Fluid Estimated Needs (mL):  (1mL/kcal or per MD)           Nutrition Diagnosis   Malnutrition Diagnosis  Patient has Malnutrition Diagnosis: Yes  Diagnosis Status: New  Malnutrition Diagnosis: Moderate malnutrition related to acute disease or injury  As Evidenced by: moderate muscle wasting, 1.9% wt loss x 1 week and pt consuming <75% of estimated energy needs x 2 weeks per pt report.            Nutrition Interventions/Recommendations         Nutrition Prescription:  Individualized Nutrition Prescription Provided for : Diet: continue w/oral diet as ordered Adult diet Cardiac, Consistent Carb; CCD 60 gm/meal; 70 gm fat; 2 - 3 grams Sodium. Monitor labs for need to add fluid restriction.        Nutrition Interventions:   Interventions: Meals and snacks, Medical food supplement  Meals and Snacks: Carbohydrate-modified diet, Mineral-modified diet, Fat-modified diet  Goal: will consume 75% of meals  Medical Food Supplement: Commercial beverage  Goal: will provide Glucerna Shake (220kcal, 10g pro per 8oz) at breakfast and dinner meals.    Collaboration and Referral of Nutrition Care: Collaboration by nutrition professional with other providers  Goal: epic chat Dr. Boland    Nutrition Education:   Discussed ONS and pt willing to trial. Encouraged PO intakes. Will follow plan of care for needs.      Nutrition Monitoring and Evaluation    Food/Nutrient Related History Monitoring  Monitoring and Evaluation Plan: Energy intake  Energy Intake: Estimated energy intake  Criteria: will meet 75% of estimated energy needs from meals and ONS         Biochemical Data, Medical Tests and Procedures  Monitoring and Evaluation Plan: Electrolyte/renal panel, Glucose/endocrine profile  Electrolyte and Renal Panel: Sodium, Potassium, Phosphorus, Magnesium, Creatinine  Criteria: WNR  Glucose/Endocrine Profile: Glucose, casual  Criteria: BG 70-180mg/dL    Nutrition Focused Physical Findings  Monitoring and Evaluation Plan: Skin  Skin: Impaired wound healing  Criteria: promote wound healing         Time Spent (min): 60 minutes  Follow up 5-7 days  Last RD note 11/15/24

## 2024-11-16 LAB
ANION GAP SERPL CALC-SCNC: 14 MMOL/L (ref 10–20)
B-LACTAMASE ORGANISM ISLT: POSITIVE
BACTERIA SPEC CULT: ABNORMAL
BUN SERPL-MCNC: 56 MG/DL (ref 6–23)
CALCIUM SERPL-MCNC: 8.4 MG/DL (ref 8.6–10.3)
CHLORIDE SERPL-SCNC: 93 MMOL/L (ref 98–107)
CO2 SERPL-SCNC: 30 MMOL/L (ref 21–32)
CREAT SERPL-MCNC: 2.19 MG/DL (ref 0.5–1.3)
EGFRCR SERPLBLD CKD-EPI 2021: 29 ML/MIN/1.73M*2
ERYTHROCYTE [DISTWIDTH] IN BLOOD BY AUTOMATED COUNT: 16.3 % (ref 11.5–14.5)
GLUCOSE BLD MANUAL STRIP-MCNC: 249 MG/DL (ref 74–99)
GLUCOSE BLD MANUAL STRIP-MCNC: 249 MG/DL (ref 74–99)
GLUCOSE BLD MANUAL STRIP-MCNC: 275 MG/DL (ref 74–99)
GLUCOSE BLD MANUAL STRIP-MCNC: 290 MG/DL (ref 74–99)
GLUCOSE SERPL-MCNC: 301 MG/DL (ref 74–99)
GRAM STN SPEC: ABNORMAL
GRAM STN SPEC: ABNORMAL
HCT VFR BLD AUTO: 37.5 % (ref 41–52)
HGB BLD-MCNC: 11.4 G/DL (ref 13.5–17.5)
MAGNESIUM SERPL-MCNC: 2.12 MG/DL (ref 1.6–2.4)
MCH RBC QN AUTO: 27.4 PG (ref 26–34)
MCHC RBC AUTO-ENTMCNC: 30.4 G/DL (ref 32–36)
MCV RBC AUTO: 90 FL (ref 80–100)
NRBC BLD-RTO: 0 /100 WBCS (ref 0–0)
PHOSPHATE SERPL-MCNC: 4.6 MG/DL (ref 2.5–4.9)
PLATELET # BLD AUTO: 156 X10*3/UL (ref 150–450)
POTASSIUM SERPL-SCNC: 4.7 MMOL/L (ref 3.5–5.3)
RBC # BLD AUTO: 4.16 X10*6/UL (ref 4.5–5.9)
SODIUM SERPL-SCNC: 132 MMOL/L (ref 136–145)
WBC # BLD AUTO: 7.5 X10*3/UL (ref 4.4–11.3)

## 2024-11-16 PROCEDURE — 99233 SBSQ HOSP IP/OBS HIGH 50: CPT | Performed by: STUDENT IN AN ORGANIZED HEALTH CARE EDUCATION/TRAINING PROGRAM

## 2024-11-16 PROCEDURE — 2500000005 HC RX 250 GENERAL PHARMACY W/O HCPCS: Performed by: STUDENT IN AN ORGANIZED HEALTH CARE EDUCATION/TRAINING PROGRAM

## 2024-11-16 PROCEDURE — 2500000004 HC RX 250 GENERAL PHARMACY W/ HCPCS (ALT 636 FOR OP/ED): Mod: JZ | Performed by: STUDENT IN AN ORGANIZED HEALTH CARE EDUCATION/TRAINING PROGRAM

## 2024-11-16 PROCEDURE — 84100 ASSAY OF PHOSPHORUS: CPT | Performed by: STUDENT IN AN ORGANIZED HEALTH CARE EDUCATION/TRAINING PROGRAM

## 2024-11-16 PROCEDURE — 87899 AGENT NOS ASSAY W/OPTIC: CPT | Mod: STJLAB | Performed by: STUDENT IN AN ORGANIZED HEALTH CARE EDUCATION/TRAINING PROGRAM

## 2024-11-16 PROCEDURE — 36415 COLL VENOUS BLD VENIPUNCTURE: CPT | Performed by: STUDENT IN AN ORGANIZED HEALTH CARE EDUCATION/TRAINING PROGRAM

## 2024-11-16 PROCEDURE — 82947 ASSAY GLUCOSE BLOOD QUANT: CPT

## 2024-11-16 PROCEDURE — 2500000001 HC RX 250 WO HCPCS SELF ADMINISTERED DRUGS (ALT 637 FOR MEDICARE OP): Performed by: INTERNAL MEDICINE

## 2024-11-16 PROCEDURE — 80048 BASIC METABOLIC PNL TOTAL CA: CPT | Performed by: STUDENT IN AN ORGANIZED HEALTH CARE EDUCATION/TRAINING PROGRAM

## 2024-11-16 PROCEDURE — 83735 ASSAY OF MAGNESIUM: CPT | Performed by: STUDENT IN AN ORGANIZED HEALTH CARE EDUCATION/TRAINING PROGRAM

## 2024-11-16 PROCEDURE — 1200000002 HC GENERAL ROOM WITH TELEMETRY DAILY

## 2024-11-16 PROCEDURE — 87081 CULTURE SCREEN ONLY: CPT | Mod: STJLAB | Performed by: STUDENT IN AN ORGANIZED HEALTH CARE EDUCATION/TRAINING PROGRAM

## 2024-11-16 PROCEDURE — 85027 COMPLETE CBC AUTOMATED: CPT | Performed by: STUDENT IN AN ORGANIZED HEALTH CARE EDUCATION/TRAINING PROGRAM

## 2024-11-16 PROCEDURE — 99233 SBSQ HOSP IP/OBS HIGH 50: CPT | Performed by: INTERNAL MEDICINE

## 2024-11-16 PROCEDURE — 94640 AIRWAY INHALATION TREATMENT: CPT

## 2024-11-16 PROCEDURE — 2500000002 HC RX 250 W HCPCS SELF ADMINISTERED DRUGS (ALT 637 FOR MEDICARE OP, ALT 636 FOR OP/ED): Performed by: STUDENT IN AN ORGANIZED HEALTH CARE EDUCATION/TRAINING PROGRAM

## 2024-11-16 PROCEDURE — 2500000001 HC RX 250 WO HCPCS SELF ADMINISTERED DRUGS (ALT 637 FOR MEDICARE OP): Performed by: STUDENT IN AN ORGANIZED HEALTH CARE EDUCATION/TRAINING PROGRAM

## 2024-11-16 PROCEDURE — 87449 NOS EACH ORGANISM AG IA: CPT | Mod: STJLAB | Performed by: STUDENT IN AN ORGANIZED HEALTH CARE EDUCATION/TRAINING PROGRAM

## 2024-11-16 RX ORDER — ISOSORBIDE DINITRATE 10 MG/1
10 TABLET ORAL
Status: DISCONTINUED | OUTPATIENT
Start: 2024-11-16 | End: 2024-11-21 | Stop reason: HOSPADM

## 2024-11-16 RX ORDER — FUROSEMIDE 10 MG/ML
40 INJECTION INTRAMUSCULAR; INTRAVENOUS EVERY 12 HOURS
Status: DISCONTINUED | OUTPATIENT
Start: 2024-11-16 | End: 2024-11-19

## 2024-11-16 RX ORDER — ATORVASTATIN CALCIUM 80 MG/1
80 TABLET, FILM COATED ORAL NIGHTLY
Status: DISCONTINUED | OUTPATIENT
Start: 2024-11-16 | End: 2024-11-21 | Stop reason: HOSPADM

## 2024-11-16 RX ORDER — FUROSEMIDE 10 MG/ML
40 INJECTION INTRAMUSCULAR; INTRAVENOUS ONCE
Status: COMPLETED | OUTPATIENT
Start: 2024-11-16 | End: 2024-11-16

## 2024-11-16 RX ORDER — HYDRALAZINE HYDROCHLORIDE 10 MG/1
10 TABLET, FILM COATED ORAL 2 TIMES DAILY
Status: DISCONTINUED | OUTPATIENT
Start: 2024-11-16 | End: 2024-11-21 | Stop reason: HOSPADM

## 2024-11-16 ASSESSMENT — PAIN SCALES - GENERAL
PAINLEVEL_OUTOF10: 3
PAINLEVEL_OUTOF10: 0 - NO PAIN
PAINLEVEL_OUTOF10: 1
PAINLEVEL_OUTOF10: 0 - NO PAIN
PAINLEVEL_OUTOF10: 1

## 2024-11-16 ASSESSMENT — ENCOUNTER SYMPTOMS
RESPIRATORY NEGATIVE: 1
GASTROINTESTINAL NEGATIVE: 1
WEAKNESS: 1
PSYCHIATRIC NEGATIVE: 1
MUSCULOSKELETAL NEGATIVE: 1
FATIGUE: 1

## 2024-11-16 ASSESSMENT — COGNITIVE AND FUNCTIONAL STATUS - GENERAL
TURNING FROM BACK TO SIDE WHILE IN FLAT BAD: A LITTLE
MOVING FROM LYING ON BACK TO SITTING ON SIDE OF FLAT BED WITH BEDRAILS: A LITTLE
MOVING TO AND FROM BED TO CHAIR: A LITTLE
DRESSING REGULAR LOWER BODY CLOTHING: A LOT
TURNING FROM BACK TO SIDE WHILE IN FLAT BAD: A LITTLE
TOILETING: A LOT
DRESSING REGULAR UPPER BODY CLOTHING: A LOT
CLIMB 3 TO 5 STEPS WITH RAILING: A LOT
MOBILITY SCORE: 15
MOVING TO AND FROM BED TO CHAIR: A LITTLE
MOVING FROM LYING ON BACK TO SITTING ON SIDE OF FLAT BED WITH BEDRAILS: A LITTLE
PERSONAL GROOMING: A LOT
WALKING IN HOSPITAL ROOM: A LOT
DAILY ACTIVITIY SCORE: 14
WALKING IN HOSPITAL ROOM: A LOT
HELP NEEDED FOR BATHING: A LOT
STANDING UP FROM CHAIR USING ARMS: A LOT
MOBILITY SCORE: 15
STANDING UP FROM CHAIR USING ARMS: A LOT
CLIMB 3 TO 5 STEPS WITH RAILING: A LOT

## 2024-11-16 ASSESSMENT — PAIN - FUNCTIONAL ASSESSMENT
PAIN_FUNCTIONAL_ASSESSMENT: 0-10
PAIN_FUNCTIONAL_ASSESSMENT: 0-10

## 2024-11-16 ASSESSMENT — PAIN DESCRIPTION - LOCATION: LOCATION: BACK

## 2024-11-16 ASSESSMENT — PAIN DESCRIPTION - ORIENTATION: ORIENTATION: POSTERIOR

## 2024-11-16 NOTE — PROGRESS NOTES
Primary cardiologist: Dr. Amador  Covering cardiologist this hospitalization: Dr. Womack    Subjective:  The patient is confused, and denies acute cardiac related issues or complaints.  His bedside nurse corroborates this story..       Last Recorded Vitals:  Vitals:    11/15/24 2000 11/15/24 2013 11/16/24 0759 11/16/24 0800   BP: 121/71   120/68   BP Location:       Patient Position:       Pulse: 80  82 74   Resp: 20  20 16   Temp: 36.2 °C (97.2 °F)   36.1 °C (97 °F)   TempSrc: Temporal   Temporal   SpO2: 95% 96% 96% 96%   Weight:       Height:         Weight         11/15/2024  0602 11/15/2024  1026          Weight: 95.3 kg (210 lb) 90.4 kg (199 lb 4.7 oz)              Intake/Output Summary (Last 24 hours) at 11/16/2024 1014  Last data filed at 11/16/2024 0223  Gross per 24 hour   Intake 840 ml   Output 1650 ml   Net -810 ml         Last Labs:    Results from last 7 days   Lab Units 11/16/24  0640 11/15/24  0612   SODIUM mmol/L 132* 136   POTASSIUM mmol/L 4.7 4.3   CHLORIDE mmol/L 93* 93*   CO2 mmol/L 30 32   BUN mg/dL 56* 51*   CREATININE mg/dL 2.19* 2.07*   CALCIUM mg/dL 8.4* 8.6   PROTEIN TOTAL g/dL  --  6.3*   BILIRUBIN TOTAL mg/dL  --  1.3*   ALK PHOS U/L  --  107   ALT U/L  --  10   AST U/L  --  24   GLUCOSE mg/dL 301* 198*        Results for orders placed or performed during the hospital encounter of 11/15/24 (from the past 24 hours)   POCT GLUCOSE   Result Value Ref Range    POCT Glucose 212 (H) 74 - 99 mg/dL   POCT GLUCOSE   Result Value Ref Range    POCT Glucose 321 (H) 74 - 99 mg/dL   POCT GLUCOSE   Result Value Ref Range    POCT Glucose 320 (H) 74 - 99 mg/dL   POCT GLUCOSE   Result Value Ref Range    POCT Glucose 387 (H) 74 - 99 mg/dL   POCT GLUCOSE   Result Value Ref Range    POCT Glucose 283 (H) 74 - 99 mg/dL   CBC   Result Value Ref Range    WBC 7.5 4.4 - 11.3 x10*3/uL    nRBC 0.0 0.0 - 0.0 /100 WBCs    RBC 4.16 (L) 4.50 - 5.90 x10*6/uL    Hemoglobin 11.4 (L) 13.5 - 17.5 g/dL    Hematocrit  37.5 (L) 41.0 - 52.0 %    MCV 90 80 - 100 fL    MCH 27.4 26.0 - 34.0 pg    MCHC 30.4 (L) 32.0 - 36.0 g/dL    RDW 16.3 (H) 11.5 - 14.5 %    Platelets 156 150 - 450 x10*3/uL   Basic Metabolic Panel   Result Value Ref Range    Glucose 301 (H) 74 - 99 mg/dL    Sodium 132 (L) 136 - 145 mmol/L    Potassium 4.7 3.5 - 5.3 mmol/L    Chloride 93 (L) 98 - 107 mmol/L    Bicarbonate 30 21 - 32 mmol/L    Anion Gap 14 10 - 20 mmol/L    Urea Nitrogen 56 (H) 6 - 23 mg/dL    Creatinine 2.19 (H) 0.50 - 1.30 mg/dL    eGFR 29 (L) >60 mL/min/1.73m*2    Calcium 8.4 (L) 8.6 - 10.3 mg/dL   Magnesium   Result Value Ref Range    Magnesium 2.12 1.60 - 2.40 mg/dL   Phosphorus   Result Value Ref Range    Phosphorus 4.6 2.5 - 4.9 mg/dL   POCT GLUCOSE   Result Value Ref Range    POCT Glucose 249 (H) 74 - 99 mg/dL         PTT - 11/15/2024:  6:12 AM  1.4   15.9 38     Troponin I, High Sensitivity   Date/Time Value Ref Range Status   11/15/2024 07:20  (HH) 0 - 20 ng/L Final     Comment:     Previous result verified on 11/15/2024 0648 on specimen/case [id number] called with component [facility] for procedure Troponin I, High Sensitivity, Initial with value 219 ng/L.   11/15/2024 06:12  (HH) 0 - 20 ng/L Final   11/10/2024 03:22  (HH) 0 - 20 ng/L Final     Comment:     Previous result verified on 11/10/2024 1444 on specimen/case [id number] called with component [facility] for procedure Troponin I, High Sensitivity, Initial with value 161 ng/L.     BNP   Date/Time Value Ref Range Status   11/15/2024 06:12  (H) 0 - 99 pg/mL Final   11/10/2024 02:11  (H) 0 - 99 pg/mL Final     Hemoglobin A1C   Date/Time Value Ref Range Status   07/31/2024 05:00 PM 11.9 (H) 4.3 - 5.6 % Final     Comment:     American Diabetes Association guidelines indicate that patients with HgbA1c in the range 5.7-6.4% are at increased risk for development of diabetes, and intervention by lifestyle modification may be beneficial. HgbA1c greater or equal to  6.5% is considered diagnostic of diabetes.   06/01/2024 02:30 PM 13.0 (H) 4.3 - 5.6 % Final     Comment:     American Diabetes Association guidelines indicate that patients with HgbA1c in the range 5.7-6.4% are at increased risk for development of diabetes, and intervention by lifestyle modification may be beneficial. HgbA1c greater or equal to 6.5% is considered diagnostic of diabetes.     VLDL   Date/Time Value Ref Range Status   04/01/2022 05:16 AM 16 0 - 40 mg/dL Final      Last I/O:  I/O last 3 completed shifts:  In: 1140 (12.6 mL/kg) [P.O.:840; IV Piggyback:300]  Out: 1650 (18.3 mL/kg) [Urine:1650 (0.5 mL/kg/hr)]  Weight: 90.4 kg     Allergies:  Metoprolol, Oxycodone-aspirin, Sulfa (sulfonamide antibiotics), Tramadol, Lisinopril, and Warfarin    Inpatient Medications:  Scheduled medications   Medication Dose Route Frequency    apixaban  2.5 mg oral BID    azithromycin  500 mg oral q24h BRE    bisoprolol  5 mg oral Daily    budesonide  0.25 mg nebulization BID    cefepime  1 g intravenous q12h    cholecalciferol  2,000 Units oral Daily    empagliflozin  12.5 mg oral Daily    furosemide  40 mg intravenous Once    insulin glargine  10 Units subcutaneous q AM    insulin lispro  0-10 Units subcutaneous Before meals & nightly    ipratropium-albuteroL  3 mL nebulization TID    NIFEdipine ER  30 mg oral Daily before breakfast    oxygen   inhalation Continuous - Inhalation    pantoprazole  40 mg oral Nightly    predniSONE  40 mg oral Daily    rosuvastatin  20 mg oral Nightly    [Held by provider] torsemide  80 mg oral Daily     PRN medications   Medication    acetaminophen    alum-mag hydroxide-simeth    dextromethorphan-guaifenesin    dextrose    dextrose    glucagon    glucagon    ipratropium-albuteroL    melatonin    ondansetron     Continuous Medications   Medication Dose Last Rate     Outpatient Medications:  Current Outpatient Medications   Medication Instructions    acetaminophen (TYLENOL) 1,000 mg, oral, Every  "12 hours PRN    acetaminophen (TYLENOL) 650 mg, oral, Every 4 hours PRN    alum-mag hydroxide-simeth (Mylanta) 200-200-20 mg/5 mL oral suspension 20 mL, oral, Every 4 hours PRN    amoxicillin-pot clavulanate (Augmentin) 875-125 mg tablet 1 tablet, oral, 2 times daily    apixaban (ELIQUIS) 2.5 mg, oral, 2 times daily    bisacodyl (DULCOLAX) 10 mg, rectal, Once as needed    bisoprolol (ZEBETA) 5 mg, Daily    cholecalciferol (VITAMIN D-3) 50 mcg, Daily    empagliflozin (Jardiance) 25 mg 0.5 tablets, Daily    empagliflozin (JARDIANCE) 10 mg, oral, Daily    guaiFENesin (Robitussin) 100 mg/5 mL syrup 15 mL, oral, Every 6 hours PRN    ipratropium-albuteroL (Duo-Neb) 0.5-2.5 mg/3 mL nebulizer solution 3 mL, nebulization, 3 times daily    Lantus U-100 Insulin 10 Units, subcutaneous, Every morning    magnesium hydroxide (Milk of Magnesia) 400 mg/5 mL suspension 30 mL, oral, Daily PRN    NIFEdipine ER (ADALAT CC) 30 mg, oral, Daily before breakfast, Do not crush, chew, or split.    pantoprazole (PROTONIX) 40 mg, Nightly    pen needle, diabetic 31 gauge x 5/16\" needle Use to inject insulin 1 to 4 times daily as directed    rosuvastatin (CRESTOR) 20 mg, Nightly    torsemide 80 mg, oral, Daily       Current Imaging  ECG 12 lead    Result Date: 11/15/2024  Atrial fibrillation with a competing junctional pacemaker Rightward axis Low voltage QRS Septal infarct (cited on or before 21-JAN-2024) T wave abnormality, consider inferolateral ischemia or digitalis effect Abnormal ECG When compared with ECG of 15-NOV-2024 06:06, (unconfirmed) No significant change was found    ECG 12 lead    Result Date: 11/15/2024  Atrial fibrillation with a competing junctional pacemaker Rightward axis Low voltage QRS Septal infarct (cited on or before 21-JAN-2024) ST & T wave abnormality, consider inferolateral ischemia or digitalis effect Abnormal ECG When compared with ECG of 10-NOV-2024 14:02, No significant change was found    XR chest 1 " view    Result Date: 11/15/2024  Interpreted By:  Weston Cabello, STUDY: XR CHEST 1 VIEW;  11/15/2024 6:24 am   INDICATION: Signs/Symptoms:Chest Pain.   COMPARISON: Chest radiograph 11/10/2024.   ACCESSION NUMBER(S): [id number]   ORDERING CLINICIAN: RAYO KIM   FINDINGS:   CARDIOMEDIASTINAL SILHOUETTE: Cardiomediastinal silhouette is normal in size and configuration. There are sternotomy changes.   LUNGS/PLEURA: There are patchy opacities in the right lung field which may represent asymmetric pulmonary edema versus infection. Haziness of the bilateral costophrenic angles suggestive of small bilateral pleural effusions.There is no demonstrated pneumothorax.     BONES: No evidence of acute osseous abnormality.       Small bilateral pleural effusions. Patchy opacities in the right lung field may represent asymmetric pulmonary edema versus infection.   Signed by: Weston Cabello 11/15/2024 6:42 AM Dictation workstation:   [id number]      Physical Exam:  HEENT: No xanthelasma  NECK: Supple, no palpable adenopathy or thyromegaly  CHEST: Clear to auscultation, respiratory effort unlabored  CARDIAC: Irregularly irregular, normal S1 and S2, no audible murmur, rub, gallop, carotids are brisk, PMI is not displaced  ABD: Active bowel sounds, nontender, no organomegaly, no evidence of ascites  EXT: No clubbing, cyanosis, or tenderness; there is 2+ edema  NEURO: Awake, alert, appropriate, speech is fluent       Assessment/Plan   1.  Acute on chronic heart failure with midrange ejection fraction of 40-45% by echocardiography, March 2023  2.  Coronary artery disease not amenable to revascularization  3.  Chronic kidney disease stage III-IV  4.  Essential hypertension  5.  Hyperlipidemia  6.  Chronic troponin elevations  7.  Diabetes mellitus  8.  Dementia  9.  Chronic hypoxic respiratory failure on home oxygen    Plan:  1.  Echocardiography is pending  2.  Add oral nitrate and hydralazine for afterload reduction and lieu of  ACE inhibitor/ARB/Entresto (renal dysfunction prohibits starting such agents with risk exceeding potential benefit).  3.  In light of renal status, rosuvastatin should be switched to atorvastatin.    Code Status:  Full Code      Osiel Finch MD

## 2024-11-16 NOTE — NURSING NOTE
0800: pt alert and orientedx4, but forgetful and requiring reorientation. Pt calm and cooperative at this time. Bed alarm on and functioning.     1343: pt became agitated and combative to RN. Pt alert and oriented to self only. Pt given tylenol per order for back pain after assisting pt back to bed for safety.     1539: pt alert and orientedx4, recognizes episodes of hallucinations and confusions. Dr jones at bedside. MD will discontinue steroids d/t to possible cause of confusion. Emotional support offered to patient

## 2024-11-16 NOTE — PROGRESS NOTES
Subjective   Patient ID: Elgin Marin is a 84 y.o. male.    84 y.o. male presenting after a fall. Patient has history of recurrent falls. Patient gets around the house/apartment with a walker. Earlier today patient states that he was walking with his walker and then lost balance and fell backward. Patient did hit his head. He did not lose consciousness. He was unable to get up and his spouse called EMS to help get him up. When he was helped up patient was barely able to stand and relying on furniture to maintain his posture. Given patient was unable to walk he was brought to [facility] for further evaluation. Patient otherwise has been feeling fairly well and denies any chest pain shortness of breath nausea vomiting diarrhea constipation fever chills dysuria or headache.   Patient was observed in the hospital largely for placement in skilled nursing facility. He worked with physical therapy and Occupational Therapy and plan is for patient to be discharged to skilled nursing facility when this has been arranged. 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 Previously there were affordability issues and for this reason patient was not on it. Patient was seen by wound care nurse who recommended podiatry consult out of concern for infection. Patient was started on Augmentin and is being discharged on this as well. He follows with wound clinic and will continue to do so in the outpatient setting. An x-ray of the foot was done which did not reveal any concern for osteomyelitis. Finally patient was on 15 units of insulin twice a day. While here patient was on the hypoglycemic side while on 8 units twice daily. Insulin was held and patient only received 4 units thus far today. For this reason his insulin dose is being reduced to 10 units every morning and can be further adjusted by his primary care physician. He is medically  stable for discharge at this time. Patient seen and examined at rehab facility.  He regards that he is overall doing well, however does feel slightly fluid overloaded in the lower extremities.  Otherwise, states breathing is overall stable, otherwise, still feels weak in general.  States no additional issues or concerns at this time.          Review of Systems   Constitutional:  Positive for fatigue.   HENT: Negative.     Respiratory: Negative.     Cardiovascular:  Positive for leg swelling.   Gastrointestinal: Negative.    Musculoskeletal: Negative.    Skin: Negative.    Neurological:  Positive for weakness.   Psychiatric/Behavioral: Negative.         Objective Vitals Reviewed via facility EMR   Physical Exam  Constitutional:       General: He is not in acute distress.     Appearance: He is not ill-appearing.      Comments: In bedside chair, elderly male   Eyes:      Pupils: Pupils are equal, round, and reactive to light.   Cardiovascular:      Rate and Rhythm: Normal rate and regular rhythm.      Pulses: Normal pulses.      Heart sounds: No murmur heard.  Pulmonary:      Effort: No respiratory distress.      Breath sounds: No wheezing.   Abdominal:      General: Abdomen is flat. Bowel sounds are normal. There is no distension.   Musculoskeletal:      Right lower leg: Edema present.      Left lower leg: Edema present.      Comments: LE edema wrapped, slight swelling 2+ edema   Skin:     General: Skin is warm and dry.   Neurological:      Mental Status: He is alert. Mental status is at baseline.      Cranial Nerves: No cranial nerve deficit.      Motor: Weakness present.   Psychiatric:         Mood and Affect: Mood normal.         Behavior: Behavior normal.         Assessment/Plan   Diagnoses and all orders for this visit:  Atrial fibrillation, unspecified type (Multi)  Chronic combined systolic and diastolic heart failure  Essential hypertension, benign  Unable to walk  Bilateral edema of lower extremity    Patient  seen and examined at bedside.  Hospital course reviewed and medications reviewed and reconciled.  Will need to enroll patient into heart failure protocol at the facility, encourage low-salt diet, encouraged optimization with physical therapy.  Otherwise, no change in medications at this time.  Will closely labs encouraged optimization of potassium and magnesium.  Otherwise no additional issues.    Reviewed and approved by GAGE STEWART on 11/16/24 at 1:47 PM.

## 2024-11-16 NOTE — CARE PLAN
Patient alert and oriented this shift with occasional confusion. Patient restless at times. Patient ambulatory with staff assist to the bsc. No acute changes this shift. Dressing the BLE clean dry and intact    The patient's goals for the shift include      The clinical goals for the shift include see poc      Problem: Pain - Adult  Goal: Verbalizes/displays adequate comfort level or baseline comfort level  11/16/2024 0642 by Lia Srinivasan LPN  Outcome: Progressing  11/16/2024 0638 by Lia Srinivasan LPN  Outcome: Progressing     Problem: Discharge Planning  Goal: Discharge to home or other facility with appropriate resources  11/16/2024 0642 by Lia Srinivasan LPN  Outcome: Progressing  11/16/2024 0638 by Lia Srinivasan LPN  Outcome: Progressing     Problem: Chronic Conditions and Co-morbidities  Goal: Patient's chronic conditions and co-morbidity symptoms are monitored and maintained or improved  11/16/2024 0642 by Lia Srinivasan LPN  Outcome: Progressing  11/16/2024 0638 by Lia Srinivasan LPN  Outcome: Progressing     Problem: Nutrition  Goal: Oral intake greater 75%  11/16/2024 0642 by Lia Srinivasan LPN  Outcome: Progressing  11/16/2024 0638 by Lia Srinivasan LPN  Outcome: Progressing  Goal: Consume prescribed supplement  11/16/2024 0642 by Lia Srinivasan LPN  Outcome: Progressing  11/16/2024 0638 by Lia Srinivasan LPN  Outcome: Progressing  Goal: Adequate PO fluid intake  11/16/2024 0642 by Lia Srinivasan LPN  Outcome: Progressing  11/16/2024 0638 by Lia Srinivasan LPN  Outcome: Progressing  Goal: BG  mg/dL  11/16/2024 0642 by Lia Srinivasan LPN  Outcome: Progressing  11/16/2024 0638 by Lia Srinivasan LPN  Outcome: Progressing  Goal: Electrolytes WNL  11/16/2024 0642 by Lia Srinivasan LPN  Outcome: Progressing  11/16/2024 0638 by Lia Srinivasan LPN  Outcome: Progressing  Goal: Promote healing  11/16/2024 0642 by Lia Srinivasan LPN  Outcome: Progressing  11/16/2024 0638 by Lia Srinivasan LPN  Outcome: Progressing  Goal:  Maintain stable weight  11/16/2024 0642 by Lia Srinivasan LPN  Outcome: Progressing  11/16/2024 0638 by Lia Srinivasan LPN  Outcome: Progressing     Problem: Skin  Goal: Decreased wound size/increased tissue granulation at next dressing change  11/16/2024 0642 by Lia Srinivasan LPN  Outcome: Progressing  11/16/2024 0638 by Lia Srinivasan LPN  Outcome: Progressing  Goal: Participates in plan/prevention/treatment measures  11/16/2024 0642 by Lia Srinivasan LPN  Outcome: Progressing  11/16/2024 0638 by Lia Srinivasan LPN  Outcome: Progressing  Goal: Prevent/manage excess moisture  11/16/2024 0642 by Lia Srinivasan LPN  Outcome: Progressing  11/16/2024 0638 by Lia Srinivasan LPN  Outcome: Progressing  Goal: Prevent/minimize sheer/friction injuries  11/16/2024 0642 by Lia Srinivasan LPN  Outcome: Progressing  11/16/2024 0638 by Lia Srinivasan LPN  Outcome: Progressing  Goal: Promote/optimize nutrition  11/16/2024 0642 by Lia Srinivasan LPN  Outcome: Progressing  Flowsheets (Taken 11/16/2024 0642)  Promote/optimize nutrition:   Assist with feeding   Monitor/record intake including meals  11/16/2024 0638 by Lia Srinivasan LPN  Outcome: Progressing  Goal: Promote skin healing  11/16/2024 0642 by Lia Srinivasan LPN  Outcome: Progressing  11/16/2024 0638 by Lia Srinivasan LPN  Outcome: Progressing     Problem: Fall/Injury  Goal: Verbalize understanding of personal risk factors for fall in the hospital  11/16/2024 0642 by Lia Srinivasan LPN  Outcome: Progressing  11/16/2024 0638 by Lia Srinivasan LPN  Outcome: Progressing  Goal: Verbalize understanding of risk factor reduction measures to prevent injury from fall in the home  11/16/2024 0642 by Lia Srinivasan LPN  Outcome: Progressing  11/16/2024 0638 by Lia Srinivasan LPN  Outcome: Progressing     Problem: Fall/Injury  Goal: Not fall by end of shift  Outcome: Met  Goal: Be free from injury by end of the shift  Outcome: Met  Goal: Use assistive devices by end of the shift  Outcome: Met  Goal:  Pace activities to prevent fatigue by end of the shift  Outcome: Met     Problem: Pain  Goal: Takes deep breaths with improved pain control throughout the shift  Outcome: Met  Goal: Turns in bed with improved pain control throughout the shift  Outcome: Met  Goal: Walks with improved pain control throughout the shift  Outcome: Met  Goal: Performs ADL's with improved pain control throughout shift  Outcome: Met  Goal: Participates in PT with improved pain control throughout the shift  Outcome: Met  Goal: Free from opioid side effects throughout the shift  Outcome: Met  Goal: Free from acute confusion related to pain meds throughout the shift  Outcome: Met

## 2024-11-16 NOTE — PROGRESS NOTES
"Elgin Marin is a 84 y.o. male on day 1 of admission presenting with Troponin level elevated.    Subjective   Patient seen and examined at bedside.  Patient reports he is doing about the same today as he was yesterday.  He reports any little activity makes him feel short of breath.  He has no other complaints.    Notified by nursing later in the day that patient seemed to be a little bit more confused and agitated.  She reported he seemed confused and thinking he was at home. I went back to see the patient, he is similar to this AM for me. He is profusely apologizing for his earlier behavior. He reports he has been having some hallucinations for several weeks he was attributing to his home tramadol. He states it has been held for 3 days, and he is glad. He is frustrated by the hallucinations and recognizes them. He gives examples of talking to his wife, but knowing she wasn't here. He also reports recently being told he has dementia and that is worrying him. All questions answered and support provided. Will have nursing staff reorient as needed.         Objective     Physical Exam    Constitutional: Well developed, no distress, alert and cooperative  Skin: Warm and dry  Eyes: EOMI, clear sclera  ENMT: mucous membranes moist  Respiratory: clear, but diminished bases, slight conversational dyspnea today, NC in place   Cardiovascular: Regular, rate and rhythm, + murmur  Abdominal: Nondistended, soft, non-tender, +BS  MSK: ROM intact  Neuro: awake and alert, oriented to situation, answers questions appropriately, did not ask year/month    Last Recorded Vitals  Blood pressure 120/68, pulse 70, temperature 36.1 °C (97 °F), temperature source Temporal, resp. rate 18, height 1.778 m (5' 10\"), weight 90.4 kg (199 lb 4.7 oz), SpO2 98%.  Intake/Output last 3 Shifts:  I/O last 3 completed shifts:  In: 1140 (12.6 mL/kg) [P.O.:840; IV Piggyback:300]  Out: 1650 (18.3 mL/kg) [Urine:1650 (0.5 mL/kg/hr)]  Weight: 90.4 kg "     Relevant Results  Scheduled medications  apixaban, 2.5 mg, oral, BID  azithromycin, 500 mg, oral, q24h BRE  bisoprolol, 5 mg, oral, Daily  budesonide, 0.25 mg, nebulization, BID  cefepime, 1 g, intravenous, q12h  cholecalciferol, 2,000 Units, oral, Daily  empagliflozin, 12.5 mg, oral, Daily  insulin glargine, 10 Units, subcutaneous, q AM  insulin lispro, 0-10 Units, subcutaneous, Before meals & nightly  ipratropium-albuteroL, 3 mL, nebulization, TID  NIFEdipine ER, 30 mg, oral, Daily before breakfast  oxygen, , inhalation, Continuous - Inhalation  pantoprazole, 40 mg, oral, Nightly  predniSONE, 40 mg, oral, Daily  rosuvastatin, 20 mg, oral, Nightly  [Held by provider] torsemide, 80 mg, oral, Daily      Continuous medications     PRN medications  PRN medications: acetaminophen, alum-mag hydroxide-simeth, dextromethorphan-guaifenesin, dextrose, dextrose, glucagon, glucagon, ipratropium-albuteroL, melatonin, ondansetron  Results from last 7 days   Lab Units 11/16/24  0640 11/15/24  0612 11/11/24  0542   WBC AUTO x10*3/uL 7.5 8.2 6.7   RBC AUTO x10*6/uL 4.16* 4.29* 4.12*   HEMOGLOBIN g/dL 11.4* 11.9* 11.2*     Results from last 7 days   Lab Units 11/16/24  0640 11/15/24  0612 11/11/24  0542 11/10/24  1411   SODIUM mmol/L 132* 136 138 138   POTASSIUM mmol/L 4.7 4.3 4.6 3.9   CHLORIDE mmol/L 93* 93* 100 101   CO2 mmol/L 30 32 32 30   BUN mg/dL 56* 51* 45* 46*   CREATININE mg/dL 2.19* 2.07* 2.06* 2.08*   CALCIUM mg/dL 8.4* 8.6 8.1* 8.4*   PHOSPHORUS mg/dL 4.6  --  4.1  --    MAGNESIUM mg/dL 2.12 2.00 1.83  --    BILIRUBIN TOTAL mg/dL  --  1.3*  --  1.3*   ALT U/L  --  10  --  11   AST U/L  --  24  --  23       ECG 12 lead    Result Date: 11/15/2024  Atrial fibrillation with a competing junctional pacemaker Rightward axis Low voltage QRS Septal infarct (cited on or before 21-JAN-2024) T wave abnormality, consider inferolateral ischemia or digitalis effect Abnormal ECG When compared with ECG of 15-NOV-2024 06:06,  (unconfirmed) No significant change was found    ECG 12 lead    Result Date: 11/15/2024  Atrial fibrillation with a competing junctional pacemaker Rightward axis Low voltage QRS Septal infarct (cited on or before 21-JAN-2024) ST & T wave abnormality, consider inferolateral ischemia or digitalis effect Abnormal ECG When compared with ECG of 10-NOV-2024 14:02, No significant change was found    XR chest 1 view    Result Date: 11/15/2024  Interpreted By:  Weston Cabello, STUDY: XR CHEST 1 VIEW;  11/15/2024 6:24 am   INDICATION: Signs/Symptoms:Chest Pain.   COMPARISON: Chest radiograph 11/10/2024.   ACCESSION NUMBER(S): [id number]   ORDERING CLINICIAN: RAYO KIM   FINDINGS:   CARDIOMEDIASTINAL SILHOUETTE: Cardiomediastinal silhouette is normal in size and configuration. There are sternotomy changes.   LUNGS/PLEURA: There are patchy opacities in the right lung field which may represent asymmetric pulmonary edema versus infection. Haziness of the bilateral costophrenic angles suggestive of small bilateral pleural effusions.There is no demonstrated pneumothorax.     BONES: No evidence of acute osseous abnormality.       Small bilateral pleural effusions. Patchy opacities in the right lung field may represent asymmetric pulmonary edema versus infection.   Signed by: Weston Cabello 11/15/2024 6:42 AM Dictation workstation:   [id number]      Assessment/Plan   Principal Problem:    Troponin level elevated  Active Problems:    Shortness of breath  Chronically elevated trop with type II demand   Afib  CAD   HLD  Glaucoma  CKD   JOAO   HTN  CHF combined systolic and diastolic      -new onset shortness of breath   -CXR - Small bilateral pleural effusions. Patchy opacities in the right lung field may represent asymmetric pulmonary edema versus infection  -recent Wcx pseudomonas   -cefepime and azithro  -received 125mg solumedrol, continue with pred 40mg daily x5d - due to mentation and reported hallucinations, will stop  prednisone   -continue duonebs q4hrs and budesonide   -trop 193<219, baseline 160   - < 711  -f/u echo   -hold torsemide, continue IV lasix 40mg BID  -strict I/os   -cardio consulted   -Cr 2.19, at ~BL  -nursing noting waxing and waning mentation, likely hospital delirium, pt reports recent diagnosis of dementia and recent hallucinations he believes were from tramadol   -reorient as needed   -continue home meds as indicated   -incentive spirometer      FENGI   -encourage oral hydration   -replete electrolytes PRN  -cardiac/diabetic diet   -GI ppx: ppi  -DVT ppx: SCDs, eliquis     Dispo: This is an 84-year-old male who was admitted with acute hypoxic respiratory failure with CHF exacerbation and possible COPD exacerbation with pneumonia.  Started on breathing treatments, steroids, IV antibiotics and IV diuresis.  Cardiology is consulted.  With waxing and waning mentation on 11/16, suspect hospital delirium superimposed on recent dementia diagnosis.  Will likely stay >2 midnights.     This note is created using voice recognition software. All efforts are made to minimize errors, if there are errors there due to transcription.    Brit Boland, DO   Hospitalist

## 2024-11-17 ENCOUNTER — APPOINTMENT (OUTPATIENT)
Dept: CARDIOLOGY | Facility: HOSPITAL | Age: 84
DRG: 280 | End: 2024-11-17
Payer: MEDICARE

## 2024-11-17 VITALS
WEIGHT: 199.3 LBS | HEART RATE: 62 BPM | BODY MASS INDEX: 28.53 KG/M2 | OXYGEN SATURATION: 97 % | DIASTOLIC BLOOD PRESSURE: 64 MMHG | SYSTOLIC BLOOD PRESSURE: 100 MMHG | TEMPERATURE: 97.2 F | HEIGHT: 70 IN | RESPIRATION RATE: 18 BRPM

## 2024-11-17 LAB
ANION GAP SERPL CALC-SCNC: 12 MMOL/L (ref 10–20)
AORTIC VALVE MEAN GRADIENT: 6 MMHG
AORTIC VALVE PEAK VELOCITY: 1.48 M/S
AV PEAK GRADIENT: 9 MMHG
AVA (PEAK VEL): 1.91 CM2
AVA (VTI): 1.79 CM2
BUN SERPL-MCNC: 67 MG/DL (ref 6–23)
CALCIUM SERPL-MCNC: 8.1 MG/DL (ref 8.6–10.3)
CHLORIDE SERPL-SCNC: 91 MMOL/L (ref 98–107)
CO2 SERPL-SCNC: 32 MMOL/L (ref 21–32)
CREAT SERPL-MCNC: 2.33 MG/DL (ref 0.5–1.3)
EGFRCR SERPLBLD CKD-EPI 2021: 27 ML/MIN/1.73M*2
EJECTION FRACTION APICAL 4 CHAMBER: 42.4
EJECTION FRACTION: 42 %
ERYTHROCYTE [DISTWIDTH] IN BLOOD BY AUTOMATED COUNT: 16.5 % (ref 11.5–14.5)
GLUCOSE BLD MANUAL STRIP-MCNC: 228 MG/DL (ref 74–99)
GLUCOSE BLD MANUAL STRIP-MCNC: 235 MG/DL (ref 74–99)
GLUCOSE BLD MANUAL STRIP-MCNC: 264 MG/DL (ref 74–99)
GLUCOSE BLD MANUAL STRIP-MCNC: 85 MG/DL (ref 74–99)
GLUCOSE SERPL-MCNC: 245 MG/DL (ref 74–99)
HCT VFR BLD AUTO: 37.1 % (ref 41–52)
HGB BLD-MCNC: 11.4 G/DL (ref 13.5–17.5)
LEFT ATRIUM VOLUME AREA LENGTH INDEX BSA: 44.8 ML/M2
LEFT VENTRICLE INTERNAL DIMENSION DIASTOLE: 4.4 CM (ref 3.5–6)
LEFT VENTRICULAR OUTFLOW TRACT DIAMETER: 2 CM
LEGIONELLA AG UR QL: NEGATIVE
LV EJECTION FRACTION BIPLANE: 42 %
MAGNESIUM SERPL-MCNC: 2.18 MG/DL (ref 1.6–2.4)
MCH RBC QN AUTO: 27.5 PG (ref 26–34)
MCHC RBC AUTO-ENTMCNC: 30.7 G/DL (ref 32–36)
MCV RBC AUTO: 89 FL (ref 80–100)
NRBC BLD-RTO: 0 /100 WBCS (ref 0–0)
PHOSPHATE SERPL-MCNC: 4.7 MG/DL (ref 2.5–4.9)
PLATELET # BLD AUTO: 196 X10*3/UL (ref 150–450)
POTASSIUM SERPL-SCNC: 4.3 MMOL/L (ref 3.5–5.3)
RBC # BLD AUTO: 4.15 X10*6/UL (ref 4.5–5.9)
RIGHT VENTRICLE FREE WALL PEAK S': 5.11 CM/S
RIGHT VENTRICLE PEAK SYSTOLIC PRESSURE: 45.5 MMHG
S PNEUM AG UR QL: NEGATIVE
SODIUM SERPL-SCNC: 131 MMOL/L (ref 136–145)
TRICUSPID ANNULAR PLANE SYSTOLIC EXCURSION: 1.3 CM
WBC # BLD AUTO: 8.8 X10*3/UL (ref 4.4–11.3)

## 2024-11-17 PROCEDURE — 2500000001 HC RX 250 WO HCPCS SELF ADMINISTERED DRUGS (ALT 637 FOR MEDICARE OP): Performed by: INTERNAL MEDICINE

## 2024-11-17 PROCEDURE — 2500000001 HC RX 250 WO HCPCS SELF ADMINISTERED DRUGS (ALT 637 FOR MEDICARE OP): Performed by: STUDENT IN AN ORGANIZED HEALTH CARE EDUCATION/TRAINING PROGRAM

## 2024-11-17 PROCEDURE — 1200000002 HC GENERAL ROOM WITH TELEMETRY DAILY

## 2024-11-17 PROCEDURE — 99232 SBSQ HOSP IP/OBS MODERATE 35: CPT | Performed by: INTERNAL MEDICINE

## 2024-11-17 PROCEDURE — 84100 ASSAY OF PHOSPHORUS: CPT | Performed by: STUDENT IN AN ORGANIZED HEALTH CARE EDUCATION/TRAINING PROGRAM

## 2024-11-17 PROCEDURE — 85027 COMPLETE CBC AUTOMATED: CPT | Performed by: STUDENT IN AN ORGANIZED HEALTH CARE EDUCATION/TRAINING PROGRAM

## 2024-11-17 PROCEDURE — 80048 BASIC METABOLIC PNL TOTAL CA: CPT | Performed by: STUDENT IN AN ORGANIZED HEALTH CARE EDUCATION/TRAINING PROGRAM

## 2024-11-17 PROCEDURE — 99233 SBSQ HOSP IP/OBS HIGH 50: CPT | Performed by: STUDENT IN AN ORGANIZED HEALTH CARE EDUCATION/TRAINING PROGRAM

## 2024-11-17 PROCEDURE — 94640 AIRWAY INHALATION TREATMENT: CPT

## 2024-11-17 PROCEDURE — 2500000005 HC RX 250 GENERAL PHARMACY W/O HCPCS: Performed by: STUDENT IN AN ORGANIZED HEALTH CARE EDUCATION/TRAINING PROGRAM

## 2024-11-17 PROCEDURE — 2500000002 HC RX 250 W HCPCS SELF ADMINISTERED DRUGS (ALT 637 FOR MEDICARE OP, ALT 636 FOR OP/ED): Performed by: STUDENT IN AN ORGANIZED HEALTH CARE EDUCATION/TRAINING PROGRAM

## 2024-11-17 PROCEDURE — 93306 TTE W/DOPPLER COMPLETE: CPT

## 2024-11-17 PROCEDURE — 82947 ASSAY GLUCOSE BLOOD QUANT: CPT

## 2024-11-17 PROCEDURE — 83735 ASSAY OF MAGNESIUM: CPT | Performed by: STUDENT IN AN ORGANIZED HEALTH CARE EDUCATION/TRAINING PROGRAM

## 2024-11-17 PROCEDURE — 2500000004 HC RX 250 GENERAL PHARMACY W/ HCPCS (ALT 636 FOR OP/ED): Performed by: STUDENT IN AN ORGANIZED HEALTH CARE EDUCATION/TRAINING PROGRAM

## 2024-11-17 PROCEDURE — 93306 TTE W/DOPPLER COMPLETE: CPT | Performed by: INTERNAL MEDICINE

## 2024-11-17 PROCEDURE — 36415 COLL VENOUS BLD VENIPUNCTURE: CPT | Performed by: STUDENT IN AN ORGANIZED HEALTH CARE EDUCATION/TRAINING PROGRAM

## 2024-11-17 RX ORDER — PETROLATUM 420 MG/G
OINTMENT TOPICAL DAILY
Status: DISCONTINUED | OUTPATIENT
Start: 2024-11-17 | End: 2024-11-21 | Stop reason: HOSPADM

## 2024-11-17 ASSESSMENT — COGNITIVE AND FUNCTIONAL STATUS - GENERAL
DRESSING REGULAR LOWER BODY CLOTHING: A LOT
WALKING IN HOSPITAL ROOM: A LOT
DRESSING REGULAR UPPER BODY CLOTHING: A LOT
MOVING FROM LYING ON BACK TO SITTING ON SIDE OF FLAT BED WITH BEDRAILS: A LITTLE
WALKING IN HOSPITAL ROOM: A LOT
MOBILITY SCORE: 16
CLIMB 3 TO 5 STEPS WITH RAILING: A LOT
TURNING FROM BACK TO SIDE WHILE IN FLAT BAD: A LITTLE
PERSONAL GROOMING: A LOT
DAILY ACTIVITIY SCORE: 14
STANDING UP FROM CHAIR USING ARMS: A LITTLE
HELP NEEDED FOR BATHING: A LOT
CLIMB 3 TO 5 STEPS WITH RAILING: A LOT
DRESSING REGULAR LOWER BODY CLOTHING: A LOT
PERSONAL GROOMING: A LOT
MOVING TO AND FROM BED TO CHAIR: A LITTLE
DRESSING REGULAR UPPER BODY CLOTHING: A LOT
STANDING UP FROM CHAIR USING ARMS: A LITTLE
TOILETING: A LOT
MOVING FROM LYING ON BACK TO SITTING ON SIDE OF FLAT BED WITH BEDRAILS: A LITTLE
MOBILITY SCORE: 16
TOILETING: A LOT
DAILY ACTIVITIY SCORE: 14
HELP NEEDED FOR BATHING: A LOT
TURNING FROM BACK TO SIDE WHILE IN FLAT BAD: A LITTLE
MOVING TO AND FROM BED TO CHAIR: A LITTLE

## 2024-11-17 ASSESSMENT — PAIN SCALES - GENERAL
PAINLEVEL_OUTOF10: 0 - NO PAIN
PAINLEVEL_OUTOF10: 1

## 2024-11-17 NOTE — CARE PLAN
The patient's goals for the shift include      The clinical goals for the shift include see poc      Problem: Pain - Adult  Goal: Verbalizes/displays adequate comfort level or baseline comfort level  Outcome: Progressing     Problem: Discharge Planning  Goal: Discharge to home or other facility with appropriate resources  Outcome: Progressing     Problem: Chronic Conditions and Co-morbidities  Goal: Patient's chronic conditions and co-morbidity symptoms are monitored and maintained or improved  Outcome: Progressing     Problem: Nutrition  Goal: Oral intake greater 75%  Outcome: Progressing  Goal: Consume prescribed supplement  Outcome: Progressing  Goal: Adequate PO fluid intake  Outcome: Progressing  Goal: BG  mg/dL  Outcome: Progressing  Goal: Electrolytes WNL  Outcome: Progressing  Goal: Promote healing  Outcome: Progressing  Goal: Maintain stable weight  Outcome: Progressing     Problem: Skin  Goal: Decreased wound size/increased tissue granulation at next dressing change  Outcome: Progressing  Goal: Participates in plan/prevention/treatment measures  Outcome: Progressing  Goal: Prevent/manage excess moisture  Outcome: Progressing  Goal: Prevent/minimize sheer/friction injuries  Outcome: Progressing  Goal: Promote/optimize nutrition  Outcome: Progressing  Goal: Promote skin healing  Outcome: Progressing     Problem: Fall/Injury  Goal: Verbalize understanding of personal risk factors for fall in the hospital  Outcome: Progressing  Goal: Verbalize understanding of risk factor reduction measures to prevent injury from fall in the home  Outcome: Progressing

## 2024-11-17 NOTE — PROGRESS NOTES
"Elgin Marin is a 84 y.o. male on day 2 of admission presenting with Troponin level elevated.    Subjective   Patient seen and examined at bedside.  Patient reports he is doing ok today. He does not feel his breathing is changed much.     Objective     Physical Exam    Constitutional: Well developed, no distress, alert and cooperative  Skin: Warm and dry  Eyes: EOMI, clear sclera  ENMT: mucous membranes moist  Respiratory: clear, diminished bases, NC in place   Cardiovascular: Regular, rate and rhythm, + murmur  Abdominal: Nondistended, soft, non-tender, +BS  MSK: ROM intact  Neuro: awake and alert x3-4, answers questions appropriately     Last Recorded Vitals  Blood pressure 127/57, pulse 88, temperature 36.6 °C (97.9 °F), resp. rate 18, height 1.778 m (5' 10\"), weight 90.4 kg (199 lb 4.7 oz), SpO2 96%.  Intake/Output last 3 Shifts:  I/O last 3 completed shifts:  In: 220 (2.4 mL/kg) [P.O.:120; IV Piggyback:100]  Out: 4500 (49.8 mL/kg) [Urine:4500 (1.4 mL/kg/hr)]  Weight: 90.4 kg     Relevant Results  Scheduled medications  apixaban, 2.5 mg, oral, BID  atorvastatin, 80 mg, oral, Nightly  azithromycin, 500 mg, oral, q24h BRE  bisoprolol, 5 mg, oral, Daily  budesonide, 0.25 mg, nebulization, BID  cefepime, 1 g, intravenous, q12h  cholecalciferol, 2,000 Units, oral, Daily  empagliflozin, 12.5 mg, oral, Daily  furosemide, 40 mg, intravenous, q12h  hydrALAZINE, 10 mg, oral, BID  insulin glargine, 10 Units, subcutaneous, q AM  insulin lispro, 0-10 Units, subcutaneous, Before meals & nightly  ipratropium-albuteroL, 3 mL, nebulization, TID  isosorbide dinitrate, 10 mg, oral, BID  NIFEdipine ER, 30 mg, oral, Daily before breakfast  oxygen, , inhalation, Continuous - Inhalation  pantoprazole, 40 mg, oral, Nightly  [Held by provider] torsemide, 80 mg, oral, Daily      Continuous medications     PRN medications  PRN medications: acetaminophen, alum-mag hydroxide-simeth, dextromethorphan-guaifenesin, dextrose, dextrose, " glucagon, glucagon, ipratropium-albuteroL, melatonin, ondansetron  Results from last 7 days   Lab Units 11/17/24  0619 11/16/24  0640 11/15/24  0612   WBC AUTO x10*3/uL 8.8 7.5 8.2   RBC AUTO x10*6/uL 4.15* 4.16* 4.29*   HEMOGLOBIN g/dL 11.4* 11.4* 11.9*     Results from last 7 days   Lab Units 11/17/24  0619 11/16/24  0640 11/15/24  0612 11/11/24  0542 11/10/24  1411 11/10/24  1411   SODIUM mmol/L 131* 132* 136 138  --  138   POTASSIUM mmol/L 4.3 4.7 4.3 4.6  --  3.9   CHLORIDE mmol/L 91* 93* 93* 100  --  101   CO2 mmol/L 32 30 32 32  --  30   BUN mg/dL 67* 56* 51* 45*  --  46*   CREATININE mg/dL 2.33* 2.19* 2.07* 2.06*  --  2.08*   CALCIUM mg/dL 8.1* 8.4* 8.6 8.1*  --  8.4*   PHOSPHORUS mg/dL 4.7 4.6  --  4.1  --   --    MAGNESIUM mg/dL 2.18 2.12 2.00 1.83   < >  --    BILIRUBIN TOTAL mg/dL  --   --  1.3*  --   --  1.3*   ALT U/L  --   --  10  --   --  11   AST U/L  --   --  24  --   --  23    < > = values in this interval not displayed.       No results found.     Assessment/Plan   Principal Problem:    Troponin level elevated  Active Problems:    Shortness of breath  Chronically elevated trop with type II demand   Afib  CAD   HLD  Glaucoma  CKD   JOAO   HTN  CHF combined systolic and diastolic      -new onset shortness of breath   -CXR - Small bilateral pleural effusions. Patchy opacities in the right lung field may represent asymmetric pulmonary edema versus infection  -recent Wcx pseudomonas   -continue cefepime, completed azithro  -received 125mg solumedrol, continue with pred 40mg daily x5d - due to mentation and reported hallucinations, stopped prednisone   -continue duonebs q4hrs and budesonide   -trop 193<219, baseline 160   - < 711  -f/u echo   -hold torsemide, continue IV lasix 40mg BID  -net neg 2.6L yesterday  -strict I/os   -cardio consulted   -Cr 2.33, similar to BL, continue to monitor  -monitor for hospital delirium, reorient as needed   -continue home meds as indicated   -incentive  spirometer      FENGI   -encourage oral hydration   -replete electrolytes PRN  -cardiac/diabetic diet   -GI ppx: ppi  -DVT ppx: SCDs, eliquis     Dispo: This is an 84-year-old male who was admitted with acute hypoxic respiratory failure with CHF exacerbation and possible COPD exacerbation with pneumonia.  Started on breathing treatments, steroids, IV antibiotics and IV diuresis.  Cardiology is consulted.  With waxing and waning mentation on 11/16, suspect hospital delirium superimposed on recent dementia diagnosis, steroids stopped. Mentation improved today. Diuresing well, monitoring renal function.  Will likely stay >2 midnights.     This note is created using voice recognition software. All efforts are made to minimize errors, if there are errors there due to transcription.    Brit Boland, DO   Hospitalist

## 2024-11-17 NOTE — CARE PLAN
The patient's goals for the shift include      The clinical goals for the shift include see poc    Problem: Skin  Goal: Promote/optimize nutrition  11/17/2024 0031 by Khurram Hernandez, RN  Flowsheets (Taken 11/17/2024 0031)  Promote/optimize nutrition: Consume > 50% meals/supplements  11/16/2024 1938 by Khurram Hernandez, RN  Outcome: Progressing

## 2024-11-17 NOTE — NURSING NOTE
Patient is confused at times but easily redirectable. He has pulled his pur wick off X 2 so far this shift, His samaria care and bed bath have been completed.

## 2024-11-17 NOTE — NURSING NOTE
0800: pt is alert and orientedx3, but disoriented to situation. Pt alert and sitting upright in bed. RN notified Dr Boland this am of need for PT evaluation d/t pt not tolerating chair/standing even with gait belt and two person assist. Bed alarm on and functioning

## 2024-11-17 NOTE — PROGRESS NOTES
Subjective:   The patient denies chest discomfort, dyspnea, palpitations, orthopnea, PND, syncope, and near syncope.    Last Recorded Vitals:  Vitals:    11/16/24 1600 11/16/24 2000 11/17/24 0800 11/17/24 0839   BP: 101/64 (!) 97/46 127/57    BP Location: Right arm Right arm     Patient Position: Lying Lying     Pulse: 91 64 88    Resp: 16 18 18    Temp: 36.2 °C (97.2 °F) 36.2 °C (97.2 °F) 36.6 °C (97.9 °F)    TempSrc: Temporal Temporal     SpO2: 94% 96% 95% 96%   Weight:       Height:           Last Labs:    Results from last 7 days   Lab Units 11/17/24  0619 11/16/24  0640 11/15/24  0612   SODIUM mmol/L 131*   < > 136   POTASSIUM mmol/L 4.3   < > 4.3   CHLORIDE mmol/L 91*   < > 93*   CO2 mmol/L 32   < > 32   BUN mg/dL 67*   < > 51*   CREATININE mg/dL 2.33*   < > 2.07*   CALCIUM mg/dL 8.1*   < > 8.6   PROTEIN TOTAL g/dL  --   --  6.3*   BILIRUBIN TOTAL mg/dL  --   --  1.3*   ALK PHOS U/L  --   --  107   ALT U/L  --   --  10   AST U/L  --   --  24   GLUCOSE mg/dL 245*   < > 198*    < > = values in this interval not displayed.        Results for orders placed or performed during the hospital encounter of 11/15/24 (from the past 24 hours)   Legionella Antigen, Urine    Specimen: Urine   Result Value Ref Range    L. pneumophila Urine Ag Negative Negative   Streptococcus pneumoniae Antigen, Urine    Specimen: Urine   Result Value Ref Range    Streptococcus pneumoniae Ag, Urine Negative Negative   POCT GLUCOSE   Result Value Ref Range    POCT Glucose 275 (H) 74 - 99 mg/dL   POCT GLUCOSE   Result Value Ref Range    POCT Glucose 249 (H) 74 - 99 mg/dL   POCT GLUCOSE   Result Value Ref Range    POCT Glucose 290 (H) 74 - 99 mg/dL   CBC   Result Value Ref Range    WBC 8.8 4.4 - 11.3 x10*3/uL    nRBC 0.0 0.0 - 0.0 /100 WBCs    RBC 4.15 (L) 4.50 - 5.90 x10*6/uL    Hemoglobin 11.4 (L) 13.5 - 17.5 g/dL    Hematocrit 37.1 (L) 41.0 - 52.0 %    MCV 89 80 - 100 fL    MCH 27.5 26.0 - 34.0 pg    MCHC 30.7 (L) 32.0 - 36.0 g/dL    RDW  16.5 (H) 11.5 - 14.5 %    Platelets 196 150 - 450 x10*3/uL   Basic Metabolic Panel   Result Value Ref Range    Glucose 245 (H) 74 - 99 mg/dL    Sodium 131 (L) 136 - 145 mmol/L    Potassium 4.3 3.5 - 5.3 mmol/L    Chloride 91 (L) 98 - 107 mmol/L    Bicarbonate 32 21 - 32 mmol/L    Anion Gap 12 10 - 20 mmol/L    Urea Nitrogen 67 (H) 6 - 23 mg/dL    Creatinine 2.33 (H) 0.50 - 1.30 mg/dL    eGFR 27 (L) >60 mL/min/1.73m*2    Calcium 8.1 (L) 8.6 - 10.3 mg/dL   Magnesium   Result Value Ref Range    Magnesium 2.18 1.60 - 2.40 mg/dL   Phosphorus   Result Value Ref Range    Phosphorus 4.7 2.5 - 4.9 mg/dL   POCT GLUCOSE   Result Value Ref Range    POCT Glucose 235 (H) 74 - 99 mg/dL         PTT - 11/15/2024:  6:12 AM  1.4   15.9 38     Troponin I, High Sensitivity   Date/Time Value Ref Range Status   11/15/2024 07:20  (HH) 0 - 20 ng/L Final     Comment:     Previous result verified on 11/15/2024 0648 on specimen/case [id number] called with component [facility] for procedure Troponin I, High Sensitivity, Initial with value 219 ng/L.   11/15/2024 06:12  (HH) 0 - 20 ng/L Final   11/10/2024 03:22  (HH) 0 - 20 ng/L Final     Comment:     Previous result verified on 11/10/2024 1444 on specimen/case [id number] called with component [facility] for procedure Troponin I, High Sensitivity, Initial with value 161 ng/L.     BNP   Date/Time Value Ref Range Status   11/15/2024 06:12  (H) 0 - 99 pg/mL Final   11/10/2024 02:11  (H) 0 - 99 pg/mL Final     Hemoglobin A1C   Date/Time Value Ref Range Status   07/31/2024 05:00 PM 11.9 (H) 4.3 - 5.6 % Final     Comment:     American Diabetes Association guidelines indicate that patients with HgbA1c in the range 5.7-6.4% are at increased risk for development of diabetes, and intervention by lifestyle modification may be beneficial. HgbA1c greater or equal to 6.5% is considered diagnostic of diabetes.   06/01/2024 02:30 PM 13.0 (H) 4.3 - 5.6 % Final     Comment:      American Diabetes Association guidelines indicate that patients with HgbA1c in the range 5.7-6.4% are at increased risk for development of diabetes, and intervention by lifestyle modification may be beneficial. HgbA1c greater or equal to 6.5% is considered diagnostic of diabetes.     VLDL   Date/Time Value Ref Range Status   04/01/2022 05:16 AM 16 0 - 40 mg/dL Final      Last I/O:  I/O last 3 completed shifts:  In: 220 (2.4 mL/kg) [P.O.:120; IV Piggyback:100]  Out: 4500 (49.8 mL/kg) [Urine:4500 (1.4 mL/kg/hr)]  Weight: 90.4 kg     Allergies:  Metoprolol, Oxycodone-aspirin, Sulfa (sulfonamide antibiotics), Tramadol, Lisinopril, and Warfarin    Inpatient Medications:  Scheduled medications   Medication Dose Route Frequency    apixaban  2.5 mg oral BID    atorvastatin  80 mg oral Nightly    azithromycin  500 mg oral q24h BRE    bisoprolol  5 mg oral Daily    budesonide  0.25 mg nebulization BID    cefepime  1 g intravenous q12h    cholecalciferol  2,000 Units oral Daily    empagliflozin  12.5 mg oral Daily    furosemide  40 mg intravenous q12h    hydrALAZINE  10 mg oral BID    insulin glargine  10 Units subcutaneous q AM    insulin lispro  0-10 Units subcutaneous Before meals & nightly    ipratropium-albuteroL  3 mL nebulization TID    isosorbide dinitrate  10 mg oral BID    NIFEdipine ER  30 mg oral Daily before breakfast    oxygen   inhalation Continuous - Inhalation    pantoprazole  40 mg oral Nightly    [Held by provider] torsemide  80 mg oral Daily     PRN medications   Medication    acetaminophen    alum-mag hydroxide-simeth    dextromethorphan-guaifenesin    dextrose    dextrose    glucagon    glucagon    ipratropium-albuteroL    melatonin    ondansetron     Continuous Medications   Medication Dose Last Rate     Outpatient Medications:  Current Outpatient Medications   Medication Instructions    acetaminophen (TYLENOL) 1,000 mg, oral, Every 12 hours PRN    acetaminophen (TYLENOL) 650 mg, oral, Every 4 hours PRN  "   alum-mag hydroxide-simeth (Mylanta) 200-200-20 mg/5 mL oral suspension 20 mL, oral, Every 4 hours PRN    amoxicillin-pot clavulanate (Augmentin) 875-125 mg tablet 1 tablet, oral, 2 times daily    apixaban (ELIQUIS) 2.5 mg, oral, 2 times daily    bisacodyl (DULCOLAX) 10 mg, rectal, Once as needed    bisoprolol (ZEBETA) 5 mg, Daily    cholecalciferol (VITAMIN D-3) 50 mcg, Daily    empagliflozin (Jardiance) 25 mg 0.5 tablets, Daily    empagliflozin (JARDIANCE) 10 mg, oral, Daily    guaiFENesin (Robitussin) 100 mg/5 mL syrup 15 mL, oral, Every 6 hours PRN    ipratropium-albuteroL (Duo-Neb) 0.5-2.5 mg/3 mL nebulizer solution 3 mL, nebulization, 3 times daily    Lantus U-100 Insulin 10 Units, subcutaneous, Every morning    magnesium hydroxide (Milk of Magnesia) 400 mg/5 mL suspension 30 mL, oral, Daily PRN    NIFEdipine ER (ADALAT CC) 30 mg, oral, Daily before breakfast, Do not crush, chew, or split.    pantoprazole (PROTONIX) 40 mg, Nightly    pen needle, diabetic 31 gauge x 5/16\" needle Use to inject insulin 1 to 4 times daily as directed    rosuvastatin (CRESTOR) 20 mg, Nightly    torsemide 80 mg, oral, Daily       Current Imaging  No results found.     Physical Exam:  CHEST:  breath sounds at bases  CARDIAC: Irregularly irregular, normal S1 and S2, no murmur rub or gallop; carotids are brisk without bruits, PMI is not displaced  ABDOMEN: Active bowel sounds, no tenderness, no evidence of ascites  EXTREMITIES: No clubbing, cyanosis,  or tenderness; there is 2+ edema         Assessment/Plan   1.  Acute on chronic heart failure with midrange ejection fraction of 40-45% by echocardiography, today.  2.  Coronary artery disease not amenable to revascularization  3.  Chronic kidney disease stage III-IV  4.  Essential hypertension  5.  Chronic hypoxic respiratory failure on home oxygen     Plan:  Improving on medical therapy.  Not a candidate for revascularization  Dr. Amador will assume care at 0800 on " 11/18.      Code Status:  Full Code      Osiel Finch MD

## 2024-11-17 NOTE — CONSULTS
Wound Care Consult     Visit Date: 11/17/2024      Patient Name: Elgin Marin         MRN: [id number]           YOB: 1940     Reason for Consult: BLE Wounds        Wound History: Abrasions     Pertinent Labs:   Albumin   Date Value Ref Range Status   11/15/2024 3.1 (L) 3.4 - 5.0 g/dL Final       Wound Assessment:  Wound 01/22/24 Diabetic Ulcer Pretibial Left (Active)   Wound Image   11/15/24 1933   Dressing Xeroform 11/15/24 1933   Dressing Changed Changed 11/15/24 1933   Dressing Status Clean;Dry 11/16/24 1900       Wound 11/11/24 Foot Dorsal foot;Left (Active)   Wound Image   11/15/24 1800   Drainage Description Serosanguineous 11/15/24 1800   Drainage Amount Scant 11/15/24 1800   Dressing Foam;Xeroform 11/16/24 1017   Dressing Changed Reinforced 11/17/24 0800   Dressing Status Clean;Dry 11/17/24 0800       Wound 11/11/24 Dorsal foot;Right (Active)   Wound Image   11/15/24 1929   Drainage Description Serosanguineous 11/15/24 1929   Drainage Amount Scant 11/15/24 1929   Dressing Xeroform;Foam 11/16/24 1017   Dressing Changed Reinforced 11/17/24 0800   Dressing Status Clean;Dry 11/17/24 0800       Wound 11/15/24 Tibial Dorsal;Right (Active)   Wound Image   11/15/24 1931   Dressing Xeroform 11/15/24 1931   Dressing Changed Changed 11/15/24 1931   Dressing Status Clean;Dry 11/17/24 0800       Wound 11/15/24 Pressure Injury Buttock Left (Active)   Wound Image   11/15/24 1934   Site Assessment Red 11/15/24 1934   Wound Length (cm) 0.25 cm 11/15/24 1934   Wound Width (cm) 0.13 cm 11/15/24 1934   Wound Surface Area (cm^2) 0.0325 cm^2 11/15/24 1934   Wound Depth (cm) 0.02 cm 11/15/24 1934   Wound Volume (cm^3) 0.65996 cm^3 11/15/24 1934   Dressing Status Clean;Dry 11/17/24 0800       Wound Team Summary Assessment: Received consult to see patient, chart and pictures reviewed. Patient has a red dry small red open area on left buttock, no drainage noted.   BLEs noted to have very dry skin, no open areas  noted, some new skin pink areas noted. Multiple red open wounds noted on top of toes, small amount of serosanguineous drainage. Right foot first and second toe without toenails, red tissue noted.      Wound Team Plan: Recommendation for wound care- cleanse toes with soap and water, apply Xeroform and cover with dry dressings daily. Aquaphor to BLEs dry skin daily. Mepilex to left buttock. Will follow-up as needed.      Peyton Schwab RN  11/17/2024  2:43 PM

## 2024-11-18 LAB
ANION GAP SERPL CALC-SCNC: 11 MMOL/L (ref 10–20)
BUN SERPL-MCNC: 66 MG/DL (ref 6–23)
CALCIUM SERPL-MCNC: 7.9 MG/DL (ref 8.6–10.3)
CHLORIDE SERPL-SCNC: 93 MMOL/L (ref 98–107)
CO2 SERPL-SCNC: 34 MMOL/L (ref 21–32)
CREAT SERPL-MCNC: 2.18 MG/DL (ref 0.5–1.3)
EGFRCR SERPLBLD CKD-EPI 2021: 29 ML/MIN/1.73M*2
ERYTHROCYTE [DISTWIDTH] IN BLOOD BY AUTOMATED COUNT: 16.5 % (ref 11.5–14.5)
GLUCOSE BLD MANUAL STRIP-MCNC: 219 MG/DL (ref 74–99)
GLUCOSE BLD MANUAL STRIP-MCNC: 249 MG/DL (ref 74–99)
GLUCOSE BLD MANUAL STRIP-MCNC: 254 MG/DL (ref 74–99)
GLUCOSE BLD MANUAL STRIP-MCNC: 255 MG/DL (ref 74–99)
GLUCOSE SERPL-MCNC: 238 MG/DL (ref 74–99)
HCT VFR BLD AUTO: 39.2 % (ref 41–52)
HGB BLD-MCNC: 11.7 G/DL (ref 13.5–17.5)
MAGNESIUM SERPL-MCNC: 2.24 MG/DL (ref 1.6–2.4)
MCH RBC QN AUTO: 27.4 PG (ref 26–34)
MCHC RBC AUTO-ENTMCNC: 29.8 G/DL (ref 32–36)
MCV RBC AUTO: 92 FL (ref 80–100)
NRBC BLD-RTO: 0 /100 WBCS (ref 0–0)
PHOSPHATE SERPL-MCNC: 4 MG/DL (ref 2.5–4.9)
PLATELET # BLD AUTO: 154 X10*3/UL (ref 150–450)
POTASSIUM SERPL-SCNC: 4.3 MMOL/L (ref 3.5–5.3)
RBC # BLD AUTO: 4.27 X10*6/UL (ref 4.5–5.9)
SODIUM SERPL-SCNC: 134 MMOL/L (ref 136–145)
STAPHYLOCOCCUS SPEC CULT: ABNORMAL
WBC # BLD AUTO: 8.5 X10*3/UL (ref 4.4–11.3)

## 2024-11-18 PROCEDURE — 84100 ASSAY OF PHOSPHORUS: CPT | Performed by: STUDENT IN AN ORGANIZED HEALTH CARE EDUCATION/TRAINING PROGRAM

## 2024-11-18 PROCEDURE — 2500000001 HC RX 250 WO HCPCS SELF ADMINISTERED DRUGS (ALT 637 FOR MEDICARE OP): Performed by: STUDENT IN AN ORGANIZED HEALTH CARE EDUCATION/TRAINING PROGRAM

## 2024-11-18 PROCEDURE — 2500000005 HC RX 250 GENERAL PHARMACY W/O HCPCS: Performed by: STUDENT IN AN ORGANIZED HEALTH CARE EDUCATION/TRAINING PROGRAM

## 2024-11-18 PROCEDURE — 2500000002 HC RX 250 W HCPCS SELF ADMINISTERED DRUGS (ALT 637 FOR MEDICARE OP, ALT 636 FOR OP/ED): Performed by: STUDENT IN AN ORGANIZED HEALTH CARE EDUCATION/TRAINING PROGRAM

## 2024-11-18 PROCEDURE — 2500000004 HC RX 250 GENERAL PHARMACY W/ HCPCS (ALT 636 FOR OP/ED): Mod: JZ | Performed by: STUDENT IN AN ORGANIZED HEALTH CARE EDUCATION/TRAINING PROGRAM

## 2024-11-18 PROCEDURE — 99232 SBSQ HOSP IP/OBS MODERATE 35: CPT | Performed by: INTERNAL MEDICINE

## 2024-11-18 PROCEDURE — 97165 OT EVAL LOW COMPLEX 30 MIN: CPT | Mod: GO

## 2024-11-18 PROCEDURE — 1200000002 HC GENERAL ROOM WITH TELEMETRY DAILY

## 2024-11-18 PROCEDURE — 97161 PT EVAL LOW COMPLEX 20 MIN: CPT | Mod: GP

## 2024-11-18 PROCEDURE — 2500000001 HC RX 250 WO HCPCS SELF ADMINISTERED DRUGS (ALT 637 FOR MEDICARE OP): Performed by: INTERNAL MEDICINE

## 2024-11-18 PROCEDURE — 94640 AIRWAY INHALATION TREATMENT: CPT

## 2024-11-18 PROCEDURE — 82947 ASSAY GLUCOSE BLOOD QUANT: CPT

## 2024-11-18 PROCEDURE — 36415 COLL VENOUS BLD VENIPUNCTURE: CPT | Performed by: STUDENT IN AN ORGANIZED HEALTH CARE EDUCATION/TRAINING PROGRAM

## 2024-11-18 PROCEDURE — 99233 SBSQ HOSP IP/OBS HIGH 50: CPT | Performed by: STUDENT IN AN ORGANIZED HEALTH CARE EDUCATION/TRAINING PROGRAM

## 2024-11-18 PROCEDURE — 85027 COMPLETE CBC AUTOMATED: CPT | Performed by: STUDENT IN AN ORGANIZED HEALTH CARE EDUCATION/TRAINING PROGRAM

## 2024-11-18 PROCEDURE — 82374 ASSAY BLOOD CARBON DIOXIDE: CPT | Performed by: STUDENT IN AN ORGANIZED HEALTH CARE EDUCATION/TRAINING PROGRAM

## 2024-11-18 PROCEDURE — 83735 ASSAY OF MAGNESIUM: CPT | Performed by: STUDENT IN AN ORGANIZED HEALTH CARE EDUCATION/TRAINING PROGRAM

## 2024-11-18 ASSESSMENT — COGNITIVE AND FUNCTIONAL STATUS - GENERAL
MOBILITY SCORE: 16
CLIMB 3 TO 5 STEPS WITH RAILING: TOTAL
MOVING TO AND FROM BED TO CHAIR: A LOT
MOVING FROM LYING ON BACK TO SITTING ON SIDE OF FLAT BED WITH BEDRAILS: A LITTLE
DRESSING REGULAR UPPER BODY CLOTHING: A LOT
STANDING UP FROM CHAIR USING ARMS: A LITTLE
MOBILITY SCORE: 10
DRESSING REGULAR LOWER BODY CLOTHING: A LOT
TOILETING: A LOT
STANDING UP FROM CHAIR USING ARMS: A LOT
DRESSING REGULAR LOWER BODY CLOTHING: A LITTLE
DRESSING REGULAR UPPER BODY CLOTHING: A LOT
DAILY ACTIVITIY SCORE: 11
PERSONAL GROOMING: A LOT
DRESSING REGULAR LOWER BODY CLOTHING: TOTAL
MOVING FROM LYING ON BACK TO SITTING ON SIDE OF FLAT BED WITH BEDRAILS: A LOT
EATING MEALS: A LITTLE
PERSONAL GROOMING: A LITTLE
MOBILITY SCORE: 13
WALKING IN HOSPITAL ROOM: A LOT
MOVING TO AND FROM BED TO CHAIR: A LITTLE
MOVING FROM LYING ON BACK TO SITTING ON SIDE OF FLAT BED WITH BEDRAILS: A LITTLE
TURNING FROM BACK TO SIDE WHILE IN FLAT BAD: A LOT
HELP NEEDED FOR BATHING: TOTAL
TOILETING: A LOT
HELP NEEDED FOR BATHING: A LOT
TURNING FROM BACK TO SIDE WHILE IN FLAT BAD: A LOT
CLIMB 3 TO 5 STEPS WITH RAILING: A LOT
EATING MEALS: A LITTLE
WALKING IN HOSPITAL ROOM: A LOT
WALKING IN HOSPITAL ROOM: TOTAL
TOILETING: TOTAL
DAILY ACTIVITIY SCORE: 15
STANDING UP FROM CHAIR USING ARMS: A LOT
MOVING TO AND FROM BED TO CHAIR: A LOT
DAILY ACTIVITIY SCORE: 14
DRESSING REGULAR UPPER BODY CLOTHING: A LOT
HELP NEEDED FOR BATHING: A LOT
CLIMB 3 TO 5 STEPS WITH RAILING: A LOT
PERSONAL GROOMING: A LITTLE
TURNING FROM BACK TO SIDE WHILE IN FLAT BAD: A LITTLE

## 2024-11-18 ASSESSMENT — PAIN SCALES - GENERAL
PAINLEVEL_OUTOF10: 0 - NO PAIN
PAINLEVEL_OUTOF10: 5 - MODERATE PAIN
PAINLEVEL_OUTOF10: 7
PAINLEVEL_OUTOF10: 7

## 2024-11-18 ASSESSMENT — PAIN - FUNCTIONAL ASSESSMENT
PAIN_FUNCTIONAL_ASSESSMENT: 0-10

## 2024-11-18 ASSESSMENT — ACTIVITIES OF DAILY LIVING (ADL)
BATHING_ASSISTANCE: MAXIMAL
ADL_ASSISTANCE: NEEDS ASSISTANCE

## 2024-11-18 ASSESSMENT — PAIN DESCRIPTION - DESCRIPTORS: DESCRIPTORS: ACHING

## 2024-11-18 NOTE — PROGRESS NOTES
"Elgin Marin is a 84 y.o. male on day 3 of admission presenting with Troponin level elevated.    Subjective   Patient seen and examined at bedside.  Patient is up sitting on the side of the bed with his oxygen out of his nose.  He reports he feels like he is may be doing better today.  He states he has an appetite and ate all of his breakfast though he does feel like he had a little discomfort while he was swallowing and he is worried about that.  We talk about having speech therapy come and evaluate him.  He is agreeable.  He also has a lot of concerns about home-going after he goes back to rehab.  He lives with his wife who is bedbound and his son who is in his 60s and is the primary caregiver.  Patient reports he is unable to go up and down the 13 steps required to get in and out of his apartment and he is worried about assistance to even get in his apartment.  We discussed benefit of having social work to hopefully provide some resources or further information.    Objective     Physical Exam    Constitutional: Well developed, no distress, alert and cooperative, appears improved today   Skin: Warm and dry  Eyes: EOMI, clear sclera  ENMT: mucous membranes moist  Respiratory: clear, diminished bases though slightly improved today, NC out of nose, no conversational dyspnea   Cardiovascular: Regular, rate and rhythm, + murmur  Abdominal: Nondistended, soft, non-tender, +BS  MSK: ROM intact  Neuro: awake and alert x3-4, answers questions appropriately     Last Recorded Vitals  Blood pressure 123/78, pulse 79, temperature 36.8 °C (98.2 °F), temperature source Temporal, resp. rate 18, height 1.778 m (5' 10\"), weight 90.4 kg (199 lb 4.7 oz), SpO2 97%.  Intake/Output last 3 Shifts:  I/O last 3 completed shifts:  In: - (0 mL/kg)   Out: 4350 (48.1 mL/kg) [Urine:4350 (1.3 mL/kg/hr)]  Weight: 90.4 kg     Relevant Results  Scheduled medications  apixaban, 2.5 mg, oral, BID  atorvastatin, 80 mg, oral, Nightly  bisoprolol, 5 " mg, oral, Daily  budesonide, 0.25 mg, nebulization, BID  cefepime, 1 g, intravenous, q12h  cholecalciferol, 2,000 Units, oral, Daily  empagliflozin, 12.5 mg, oral, Daily  furosemide, 40 mg, intravenous, q12h  hydrALAZINE, 10 mg, oral, BID  insulin glargine, 10 Units, subcutaneous, q AM  insulin lispro, 0-10 Units, subcutaneous, Before meals & nightly  ipratropium-albuteroL, 3 mL, nebulization, TID  isosorbide dinitrate, 10 mg, oral, BID  NIFEdipine ER, 30 mg, oral, Daily before breakfast  oxygen, , inhalation, Continuous - Inhalation  pantoprazole, 40 mg, oral, Nightly  [Held by provider] torsemide, 80 mg, oral, Daily  white petrolatum, , Topical, Daily      Continuous medications     PRN medications  PRN medications: acetaminophen, alum-mag hydroxide-simeth, dextromethorphan-guaifenesin, dextrose, dextrose, glucagon, glucagon, ipratropium-albuteroL, melatonin, ondansetron  Results from last 7 days   Lab Units 11/18/24  0558 11/17/24  0619 11/16/24  0640   WBC AUTO x10*3/uL 8.5 8.8 7.5   RBC AUTO x10*6/uL 4.27* 4.15* 4.16*   HEMOGLOBIN g/dL 11.7* 11.4* 11.4*     Results from last 7 days   Lab Units 11/18/24  0558 11/17/24  0619 11/16/24  0640 11/15/24  0612   SODIUM mmol/L 134* 131* 132* 136   POTASSIUM mmol/L 4.3 4.3 4.7 4.3   CHLORIDE mmol/L 93* 91* 93* 93*   CO2 mmol/L 34* 32 30 32   BUN mg/dL 66* 67* 56* 51*   CREATININE mg/dL 2.18* 2.33* 2.19* 2.07*   CALCIUM mg/dL 7.9* 8.1* 8.4* 8.6   PHOSPHORUS mg/dL 4.0 4.7 4.6  --    MAGNESIUM mg/dL 2.24 2.18 2.12 2.00   BILIRUBIN TOTAL mg/dL  --   --   --  1.3*   ALT U/L  --   --   --  10   AST U/L  --   --   --  24       Transthoracic Echo (TTE) Complete    Result Date: 11/17/2024            [facility] [address], [facility] [address]    Tel [phone] Fax [phone] TRANSTHORACIC ECHOCARDIOGRAM REPORT Patient Name:       NORRIS Stringer Physician:    [id number] Osiel Finch MD  Study Date:         11/17/2024          Ordering Provider:    [id number] PRAVEENA HART MRN/PID:            [id number]            Fellow: Accession#:         [id number]        Nurse: Date of Birth/Age:  1940 / 84      Sonographer:          Florence belle Gender Assigned at                     Additional Staff: Birth: Height:             177.80 cm           Admit Date:           11/15/2024 Weight:             90.27 kg            Admission Status:     Inpatient -                                                               Routine BSA / BMI:          2.08 m2 / 28.55     Department Location:  [address]                     kg/m2 Blood Pressure: 97 /46 mmHg Study Type:    TRANSTHORACIC ECHO (TTE) COMPLETE Diagnosis/ICD: Elevated Troponin-R79.89; Localized edema-R60.0; Chronic combined                systolic (congestive) and diastolic (congestive) heart failure                (CHF)-I50.42 Indication:    Congestive Heart Failure CPT Codes:     Echo Complete w Full Doppler-22122 Patient History: Pertinent History: A-Fib, CAD, CHF, HTN and Dyspnea. elevated troponin, CKD. Study Detail: The following Echo studies were performed: 2D, M-Mode, Doppler and               color flow.  PHYSICIAN INTERPRETATION: Left Ventricle: The left ventricular systolic function is mildly decreased, with a Strong's biplane calculated ejection fraction of 42%. There is moderate concentric left ventricular hypertrophy. There is global hypokinesis of the left ventricle with minor regional variations. The left ventricular cavity size is normal. There is moderately increased septal and moderately increased posterior left ventricular wall thickness. Left ventricular diastolic filling was indeterminate with an elevated left atrial pressure. Left Atrium: The left atrium is moderately dilated. Right Ventricle: The right ventricle is mildly enlarged. There is  severely reduced right ventricular systolic function. Right Atrium: The right atrium is moderately dilated. Aortic Valve: The aortic valve appears abnormal. The aortic valve dimensionless index is 0.57. There is no evidence of aortic valve regurgitation. The peak instantaneous gradient of the aortic valve is 9 mmHg. The mean gradient of the aortic valve is 6 mmHg. There is aortic sclerosis with normal leaflet mobility. Mitral Valve: The mitral valve is mildly thickened. There is mild mitral annular calcification. There is trace mitral valve regurgitation. Tricuspid Valve: The tricuspid valve is structurally normal. There is mild to moderate tricuspid regurgitation. The estimated RVSP is 46 mm. Pulmonic Valve: The pulmonic valve is structurally normal. There is trace pulmonic valve regurgitation. Pericardium: No pericardial effusion noted. Aorta: The aortic root is normal. Systemic Veins: The inferior vena cava appears severely dilated.  CONCLUSIONS:  1. The left ventricular systolic function is mildly decreased, with a Strong's biplane calculated ejection fraction of 42%.  2. There is global hypokinesis of the left ventricle with minor regional variations.  3. Left ventricular diastolic filling was indeterminate with an elevated left atrial pressure.  4. There is severely reduced right ventricular systolic function.  5. Mildly enlarged right ventricle.  6. The left atrium is moderately dilated.  7. The right atrium is moderately dilated.  8. Mild to moderate tricuspid regurgitation.  9. The estimated RVSP is 46 mm. 10. There is aortic sclerosis with normal leaflet mobility. 11. The inferior vena cava appears severely dilated. 12. There is moderately increased septal and moderately increased posterior left ventricular wall thickness. QUANTITATIVE DATA SUMMARY:  2D MEASUREMENTS:            Normal Ranges: LAs:             5.00 cm    (2.7-4.0cm) IVSd:            1.60 cm    (0.6-1.1cm) LVPWd:           1.40 cm     (0.6-1.1cm) LVIDd:           4.40 cm    (3.9-5.9cm) LVIDs:           2.90 cm LV Mass Index:   128.1 g/m2 LV % FS          34.1 %  LA VOLUME:                    Normal Ranges: LA Vol A4C:        79.5 ml    (22+/-6mL/m2) LA Vol A2C:        101.6 ml LA Vol BP:         93.3 ml LA Vol Index A4C:  38.1ml/m2 LA Vol Index A2C:  48.8 ml/m2 LA Vol Index BP:   44.8 ml/m2 LA Area A4C:       24.8 cm2 LA Area A2C:       27.0 cm2 LA Major Axis A4C: 6.6 cm LA Major Axis A2C: 6.1 cm LA Volume Index:   42.7 ml/m2 LA Vol A4C:        74.3 ml LA Vol A2C:        99.3 ml LA Vol Index BSA:  41.7 ml/m2  RA VOLUME BY A/L METHOD:            Normal Ranges: RA Vol A4C:              70.2 ml    (8.3-19.5ml) RA Vol Index A4C:        33.7 ml/m2 RA Area A4C:             22.9 cm2 RA Major Axis A4C:       6.4 cm  M-MODE MEASUREMENTS:         Normal Ranges: Ao Root:             3.60 cm (2.0-3.7cm) AoV Exc:             1.20 cm (1.5-2.5cm)  AORTA MEASUREMENTS:         Normal Ranges: AoV Exc:            1.20 cm (1.5-2.5cm) Ao Sinus, d:        3.30 cm (2.1-3.5cm) Ao STJ, d:          2.80 cm (1.7-3.4cm) Asc Ao, d:          3.10 cm (2.1-3.4cm)  LV SYSTOLIC FUNCTION BY 2D PLANIMETRY (MOD):                      Normal Ranges: EF-A4C View:    42 % (>=55%) EF-A2C View:    41 % EF-Biplane:     42 % LV EF Reported: 42 %  LV DIASTOLIC FUNCTION:           Normal Ranges: MV Peak E:             1.20 m/s  (0.7-1.2 m/s) MV e'                  0.042 m/s (>8.0) MV lateral e'          0.05 m/s MV medial e'           0.04 m/s E/e' Ratio:            28.64     (<8.0)  AORTIC VALVE:                     Normal Ranges: AoV Vmax:                1.48 m/s (<=1.7m/s) AoV Peak P.8 mmHg (<20mmHg) AoV Mean P.0 mmHg (1.7-11.5mmHg) LVOT Max Kalyan:            0.90 m/s (<=1.1m/s) AoV VTI:                 30.00 cm (18-25cm) LVOT VTI:                17.10 cm LVOT Diameter:           2.00 cm  (1.8-2.4cm) AoV Area, VTI:           1.79 cm2 (2.5-5.5cm2) AoV  Area,Vmax:           1.91 cm2 (2.5-4.5cm2) AoV Dimensionless Index: 0.57  RIGHT VENTRICLE: RV Basal 4.60 cm RV Mid   4.40 cm RV Major 7.9 cm TAPSE:   12.6 mm RV s'    0.05 m/s  TRICUSPID VALVE/RVSP:          Normal Ranges: Peak TR Velocity:     3.26 m/s Est. RA Pressure:     3 mmHg RV Syst Pressure:     46 mmHg  (< 30mmHg) IVC Diam:             3.10 cm  PULMONIC VALVE:          Normal Ranges: PV Accel Time:  98 msec  (>120ms) PV Max Kalyan:     0.9 m/s  (0.6-0.9m/s) PV Max P.9 mmHg  [id number] Osiel Finch MD Electronically signed on 2024 at 1:31:20 PM  ** Final **       Assessment/Plan   Principal Problem:    Troponin level elevated  Active Problems:    Shortness of breath  Chronically elevated trop with type II demand   Afib  CAD   HLD  Glaucoma  CKD   JOAO   HTN  CHF combined systolic and diastolic      -new onset shortness of breath, improving    -CXR - Small bilateral pleural effusions. Patchy opacities in the right lung field may represent asymmetric pulmonary edema versus infection  -recent Wcx pseudomonas   -continue cefepime, completed azithro  -received 125mg solumedrol, continue with pred 40mg daily x5d - due to mentation and reported hallucinations, stopped prednisone   -continue duonebs q4hrs and budesonide   -trop 193<219, baseline 160   - < 711  -echo - EF 42%, indeterminate diastolic filling, severely reduced RVSF, mod dilation of LA &RA, mild to mod TR, aortic sclerosis, inferior vena cava severely dilated, moderately increased septal and posterior left ventricular wall thickenss    -hold torsemide, continue IV lasix 40mg BID - diuresing well   -net neg ~2.6L again yesterday (total ~6.4L)  -strict I/os   -cardio consulted   -Cr 2.18, similar to BL, continue to monitor  -monitor for hospital delirium, reorient as needed   -continue home meds as indicated   -incentive spirometer      FENGI   -encourage oral hydration   -replete electrolytes PRN  -cardiac/diabetic diet   -GI ppx:  ppi  -DVT ppx: SCDs, eliquis     Dispo: This is an 84-year-old male who was admitted with acute hypoxic respiratory failure with CHF exacerbation and possible COPD exacerbation with pneumonia.  Started on breathing treatments, steroids, IV antibiotics and IV diuresis.  Cardiology is consulted.  With waxing and waning mentation on 11/16, suspect hospital delirium superimposed on recent dementia diagnosis, steroids stopped. Mentation at baseline. Diuresing well, monitoring renal function.  Anticipate discharge in 24-48hrs.     This note is created using voice recognition software. All efforts are made to minimize errors, if there are errors there due to transcription.    Brit Boland,    Hospitalist

## 2024-11-18 NOTE — PROGRESS NOTES
Physical Therapy    Physical Therapy Evaluation    Patient Name: Elgin Marin  MRN: [id number]  Today's Date: 11/18/2024   Time Calculation  Start Time: 1110  Stop Time: 1120  Time Calculation (min): 10 min  3020/3020-A    Assessment/Plan   PT Assessment  PT Assessment Results: Decreased strength, Decreased range of motion, Decreased endurance, Impaired balance, Decreased mobility, Decreased safety awareness, Impaired judgement, Pain  Rehab Prognosis: Good  Evaluation/Treatment Tolerance: Patient limited by fatigue, Patient limited by pain  Medical Staff Made Aware: Yes  End of Session Communication: Bedside nurse  Assessment Comment: Pt is a 85 y/o male admitted for SOB. Pt presents with pain, decreased strength, endurance and balance. Pt able to tolerate transfers and ambulating short distance from bed to chair. He is functioning below baseline level of function and will benefit from skilled therapy during stay to improve overall functional mobility and safety awareness. Upon discharge pt will benefit from moderate intensity therapy for continued improvement in functional mobility.     End of Session Patient Position: Up in chair, Alarm on (call light within reach)  IP OR SWING BED PT PLAN  Inpatient or Swing Bed: Inpatient  PT Plan  Treatment/Interventions: Bed mobility, Transfer training, Gait training, Stair training, Balance training, Neuromuscular re-education, Endurance training, Strengthening, Range of motion, Therapeutic exercise, Therapeutic activity, Home exercise program, Positioning, Postural re-education  PT Plan: Ongoing PT  PT Frequency: 3 times per week  PT Discharge Recommendations: Moderate intensity level of continued care  Equipment Recommended upon Discharge: Wheeled walker  PT Recommended Transfer Status: Assist x2, Assistive device (mod A x2)  PT - OK to Discharge: Yes (Once medically cleared to next level of care.)    Subjective     Current Problem:  1. Shortness of breath        2.  Troponin level elevated  Transthoracic Echo (TTE) Complete    Transthoracic Echo (TTE) Complete      3. Bilateral edema of lower extremity  Transthoracic Echo (TTE) Complete    Transthoracic Echo (TTE) Complete      4. Chronic combined systolic and diastolic heart failure  Transthoracic Echo (TTE) Complete    Transthoracic Echo (TTE) Complete        Patient Active Problem List   Diagnosis    Abrasion of conjunctiva    Arthritis    Atrial fibrillation (Multi)    Bilateral edema of lower extremity    Coronary artery disease involving coronary bypass graft of native heart    Dry eyes    Dyslipidemia    Entropion of right lower eyelid    Glaucoma suspect, both eyes    Hypertensive kidney disease with CKD (chronic kidney disease), stage 1-4 or unspecified chronic kidney disease    Low tension glaucoma of right eye, mild stage    Obstructive sleep apnea    Pseudophakia of both eyes    Rejection of corneal graft    S/P PKP (penetrating keratoplasty)    Dyspnea on exertion    Essential hypertension, benign    Chronic combined systolic and diastolic heart failure    Hyperglycemia    Unable to walk    Troponin level elevated    Shortness of breath     General Visit Information:  General  Reason for Referral: P/w SOB. Per reports nursing facility staff checked on patient around 4 AM and found him to be short of breath and hypoxic.  CXR showed small bilateral pleural effusions.  Patchy opacities in the right lung field may represent asymmetric pulmonary edema versus infection.  Referred By: Dr. oBland  Past Medical History Relevant to Rehab: A-fib, CAD, HLD, Glaucoma, CKD, JOAO, HTN, CHF combined systolic and diastolic  Family/Caregiver Present: No  Co-Treatment: OT  Co-Treatment Reason: To maximize pt safety with functional mobility and discharge planning  Prior to Session Communication: Bedside nurse  Patient Position Received: Bed, 3 rail up, Alarm on (Pt seated EOB)  Preferred Learning Style: verbal  General Comment: Pt  pleasant and agreeable to work with therapy.    Home Living/PLOF:  Home Living  Home Living Comments: Pt admitted to this facility from [facility] where he was receiving rehab. Prior to that, pt living at home with wife and son, 13 RIOS to enter home with HR. Tub shower; however pt only completed sponge baths 2/2 difficulty transitioning in/out of tub. Had active [facility] homecare. Pt and son caregiver for wife who is bed/w/c bound. (-) drives and mod I with RW for ambulating.    Precautions:  Precautions  Hearing/Visual Limitations: [place]  Medical Precautions: Fall precautions, Oxygen therapy device and L/min (2L O2 via NC)    Objective     Pain:  Pain Assessment  Pain Assessment: 0-10  0-10 (Numeric) Pain Score: 7  Pain Type: Chronic pain  Pain Location:  (back/knee)  Pain Interventions: Repositioned    Cognition:  Cognition  Orientation Level: Disoriented to time    General Assessments:      Activity Tolerance  Endurance: Tolerates 10 - 20 min exercise with multiple rests    Sensation  Light Touch: No apparent deficits  Sensation Comment: Pt reports n/t in L arm.     Static Sitting Balance  Static Sitting-Balance Support: Bilateral upper extremity supported, Feet supported  Static Sitting-Level of Assistance: Close supervision  Static Standing Balance  Static Standing-Balance Support: Bilateral upper extremity supported  Static Standing-Level of Assistance: Maximum assistance (x2)  Dynamic Standing Balance  Dynamic Standing-Balance Support: Bilateral upper extremity supported  Dynamic Standing-Level of Assistance: Maximum assistance (x2)    Functional Assessments:     Bed Mobility  Bed Mobility: No (Pt received sitting EOB and returned to chair)    Transfers  Transfer: Yes  Transfer 1  Transfer From 1: Bed to  Transfer to 1: Stand  Technique 1: Sit to stand  Transfer Device 1: Walker, Gait belt  Transfer Level of Assistance 1: Moderate assistance, Maximum verbal cues, +2  Trials/Comments 1: VCs for proper hand  placement and body mechanics. Significant trunk/hip forward flex with difficulty maintaining upright posture.  Transfers 2  Transfer From 2: Stand to  Transfer to 2: Chair with arms  Technique 2: Stand to sit  Transfer Device 2: Walker, Gait belt  Transfer Level of Assistance 2: Moderate assistance, Maximum verbal cues, +2  Trials/Comments 2: VCs for proper hand placement and body mechanics. Significant trunk/hip forward flex with difficulty maintaining upright posture. Demos poor eccentric control when descending to sit.    Ambulation/Gait Training  Ambulation/Gait Training Performed: Yes  Ambulation/Gait Training 1  Surface 1: Level tile  Device 1: Rolling walker  Gait Support Devices: Gait belt  Assistance 1: Moderate assistance, Maximum verbal cues (x2)  Quality of Gait 1: Wide base of support, Diminished heel strike, Inconsistent stride length, Decreased step length, Shuffling gait, Forward flexed posture, Antalgic (decreased ricardo, poor posture with increased trunk/hip flex, insufficient foot clearance, difficulty with weight shifting)  Comments/Distance (ft) 1: ~2 ft from bed > chair, VCs for weight shifting, safety awareness with FWW and to maintain upright posture    Extremity/Trunk Assessments:  RUE   RUE :  (Limited AROM with shoulder flex. >/= 3/5 strength grossly)  LUE   LUE:  (Limited AROM with shoulder flex. >/= 3/5 strength grossly)  Strength RLE  RLE Overall Strength: Greater than or equal to 3/5 as evidenced by functional mobility  Strength LLE  LLE Overall Strength: Greater than or equal to 3/5 as evidenced by functional mobility    Outcome Measures:     [facility] Basic Mobility  Turning from your back to your side while in a flat bed without using bedrails: A lot  Moving from lying on your back to sitting on the side of a flat bed without using bedrails: A lot  Moving to and from bed to chair (including a wheelchair): A lot  Standing up from a chair using your arms (e.g. wheelchair or bedside  chair): A lot  To walk in hospital room: Total  Climbing 3-5 steps with railing: Total  Basic Mobility - Total Score: 10     Goals:  Encounter Problems       Encounter Problems (Active)       Mobility       Pt will be able to ambulate >/= 10 ft with FWW mod A with good safety awareness.        Start:  11/18/24    Expected End:  12/02/24            Pt will complete supine, seated and standing exercises to maintain/improve overall strength with minimal verbal cues.         Start:  11/18/24    Expected End:  12/02/24               PT Transfers       Pt will be able to complete all bed mobility tasks min A with use of bed HR.        Start:  11/18/24    Expected End:  12/02/24            Pt will be able to complete all transfers with FWW mod A demonstrating good safety awareness and proper body mechanics.        Start:  11/18/24    Expected End:  12/02/24                 Education Documentation  Home Exercise Program, taught by Paulina Fuentes PT at 11/18/2024  1:58 PM.  Learner: Patient  Readiness: Acceptance  Method: Explanation  Response: Verbalizes Understanding, Demonstrated Understanding, Needs Reinforcement    Precautions, taught by Paulina Fuentes PT at 11/18/2024  1:58 PM.  Learner: Patient  Readiness: Acceptance  Method: Explanation  Response: Verbalizes Understanding, Demonstrated Understanding, Needs Reinforcement    Body Mechanics, taught by Paulina Fuentes PT at 11/18/2024  1:58 PM.  Learner: Patient  Readiness: Acceptance  Method: Explanation  Response: Verbalizes Understanding, Demonstrated Understanding, Needs Reinforcement    Mobility Training, taught by Paulina Fuentes PT at 11/18/2024  1:58 PM.  Learner: Patient  Readiness: Acceptance  Method: Explanation  Response: Verbalizes Understanding, Demonstrated Understanding, Needs Reinforcement    Education Comments  No comments found.

## 2024-11-18 NOTE — PROGRESS NOTES
Subjective Data:  Patient says that his shortness of breath is improved.  Legs are still swollen but believes that they are better.  He is wearing oxygen still.    Overnight Events:    No significant events overnight     Objective Data:  Last Recorded Vitals:  Vitals:    11/17/24 1600 11/17/24 2000 11/18/24 0800 11/18/24 0802   BP: 106/72 100/64 123/78    BP Location:  Right arm Right arm    Patient Position:  Lying Lying    Pulse: 81 62 79    Resp: 18 18 18    Temp: 36 °C (96.8 °F) 36.2 °C (97.2 °F) 36.8 °C (98.2 °F)    TempSrc:  Temporal Temporal    SpO2: 98% 97% 97% 97%   Weight:       Height:           Last Labs:  CBC - 11/18/2024:  5:58 AM  8.5 11.7 154    39.2      CMP - 11/18/2024:  5:58 AM  7.9 6.3 24 --- 1.3   4.0 3.1 10 107      PTT - 11/15/2024:  6:12 AM  1.4   15.9 38     TROPHS   Date/Time Value Ref Range Status   11/15/2024 07:20  0 - 20 ng/L Final     Comment:     Previous result verified on 11/15/2024 0648 on specimen/case [id number] called with component TRPHS for procedure Troponin I, High Sensitivity, Initial with value 219 ng/L.   11/15/2024 06:12  0 - 20 ng/L Final   11/10/2024 03:22  0 - 20 ng/L Final     Comment:     Previous result verified on 11/10/2024 1444 on specimen/case [id number] called with component TRPHS for procedure Troponin I, High Sensitivity, Initial with value 161 ng/L.     BNP   Date/Time Value Ref Range Status   11/15/2024 06:12  0 - 99 pg/mL Final   11/10/2024 02:11  0 - 99 pg/mL Final     HGBA1C   Date/Time Value Ref Range Status   07/31/2024 05:00 PM 11.9 4.3 - 5.6 % Final     Comment:     American Diabetes Association guidelines indicate that patients with HgbA1c in the range 5.7-6.4% are at increased risk for development of diabetes, and intervention by lifestyle modification may be beneficial. HgbA1c greater or equal to 6.5% is considered diagnostic of diabetes.   06/01/2024 02:30 PM 13.0 4.3 - 5.6 % Final     Comment:      American Diabetes Association guidelines indicate that patients with HgbA1c in the range 5.7-6.4% are at increased risk for development of diabetes, and intervention by lifestyle modification may be beneficial. HgbA1c greater or equal to 6.5% is considered diagnostic of diabetes.     VLDL   Date/Time Value Ref Range Status   04/01/2022 05:16 AM 16 0 - 40 mg/dL Final      Last I/O:  I/O last 3 completed shifts:  In: - (0 mL/kg)   Out: 4350 (48.1 mL/kg) [Urine:4350 (1.3 mL/kg/hr)]  Weight: 90.4 kg     Past Cardiology Tests (Last 3 Years):  EKG:  ECG 12 lead 11/15/2024 (Preliminary)      ECG 12 lead 11/15/2024 (Preliminary)      ECG 12 Lead 11/10/2024      ECG 12 lead 01/21/2024      ECG 12 lead 01/21/2024      ECG 12 lead 01/21/2024    Echo:  Transthoracic Echo (TTE) Complete 11/17/2024    Ejection Fractions:  EF   Date/Time Value Ref Range Status   11/17/2024 12:00 PM 42 %      Cath:  No results found for this or any previous visit from the past 1095 days.    Stress Test:  NUCLEAR STRESS TEST 04/18/2023    Cardiac Imaging:  No results found for this or any previous visit from the past 1095 days.      Inpatient Medications:  Scheduled medications   Medication Dose Route Frequency    apixaban  2.5 mg oral BID    atorvastatin  80 mg oral Nightly    bisoprolol  5 mg oral Daily    budesonide  0.25 mg nebulization BID    cefepime  1 g intravenous q12h    cholecalciferol  2,000 Units oral Daily    empagliflozin  12.5 mg oral Daily    furosemide  40 mg intravenous q12h    hydrALAZINE  10 mg oral BID    insulin glargine  10 Units subcutaneous q AM    insulin lispro  0-10 Units subcutaneous Before meals & nightly    ipratropium-albuteroL  3 mL nebulization TID    isosorbide dinitrate  10 mg oral BID    NIFEdipine ER  30 mg oral Daily before breakfast    oxygen   inhalation Continuous - Inhalation    pantoprazole  40 mg oral Nightly    [Held by provider] torsemide  80 mg oral Daily    white petrolatum   Topical Daily     PRN  medications   Medication    acetaminophen    alum-mag hydroxide-simeth    dextromethorphan-guaifenesin    dextrose    dextrose    glucagon    glucagon    ipratropium-albuteroL    melatonin    ondansetron     Continuous Medications   Medication Dose Last Rate       Physical Exam:  General: Alert and oriented and in no apparent distress  HEENT: JVP normal.   Cardiovascular: S1, S2, irregularly irregular.  Respiratory: Decreased breath sounds at the bases bilaterally.  No obvious rales.  Lower extremities: Warm.  Thready distal pulses. Nicole edema.       Assessment/Plan   1) acute on chronic combined systolic and diastolic heart failure: Patient symptomatically has improved with good diuresis.  Unfortunately his chronic kidney disease limits the ability to maximize goal-directed medical therapy.    Fortunately he has done well with bisoprolol, and Jardiance.  He was started on Isordil and low-dose hydralazine.  This was in an attempt to give afterload reducing effects similar to ACE inhibitor/ARB's.  Overall the effectiveness using this strategy in [place] -[place] is questionable.  Despite this questionable effectiveness the patient has improved clinically.  For now we will continue with this current strategy.    Do not want to try MRA for ACE-I/ARB/ARNI given his renal insufficiency.  Continue with torsemide 80 mg daily for now.    2) CAD: Not amenable to percutaneous nor surgical intervention by most recent catheterization.  Patient has had chronically elevated troponins.  Would continue to observe for now.    3) chronic atrial fibrillation: Continue on renally adjusted Eliquis.  Rate control with bisoprolol.    4) chronic kidney disease: Kidney function has improved somewhat over the last 24 hours.  Continue to observe.    5) dyslipidemia: Continue statin therapy with rosuvastatin 20 mg daily.      Peripheral IV 11/15/24 20 G Distal;Left;Upper;Anterior Arm (Active)   Site Assessment Dry;Clean;Intact 11/18/24  0757   Dressing Status Clean;Dry 11/18/24 0757   Number of days: 3       Code Status:  Full Code    I spent 30 minutes in the professional and overall care of this patient.        Lewis Amador MD

## 2024-11-18 NOTE — PROGRESS NOTES
Occupational Therapy    Occupational Therapy    Evaluation    Patient Name: Elgin Marin  MRN: [id number]  Today's Date: 11/18/2024  Time Calculation  Start Time: 1111  Stop Time: 1120  Time Calculation (min): 9 min  3020/3020-A    Assessment  IP OT Assessment  OT Assessment:  (Pt. presents with decreased balance and endurance impacting pt. ability to complete ADLs and mobility independently)  Prognosis: Fair  Evaluation/Treatment Tolerance: Patient limited by fatigue  End of Session Communication: Bedside nurse  End of Session Patient Position: Up in chair, Alarm on    Plan:  Treatment Interventions: ADL retraining, Functional transfer training  OT Frequency: 3 times per week  OT Discharge Recommendations: Moderate intensity level of continued care  Equipment Recommended upon Discharge: Wheeled walker  OT Recommended Transfer Status: Assist of 2  OT - OK to Discharge: Yes (Next level of care when cleared by medical team)    Subjective   Per EMR: Elgin Marin is a 84 y.o. male presenting with shortness of breath.  Per reports nursing facility staff checked on patient around 4 AM and found him to be short of breath and hypoxic.  They gave him 1 DuoNeb but afterward reportedly his SpO2 dropped so they called for further evaluation.  Staff reported to EMS patient normally on 3 L nasal cannula. Although patient denies typically being on supplemental O2, he has used it intermittently in the recent past. He reports he has noticed recently SOB with any activity including eating and drinking. He otherwise is feeling ok.     Per EMS report he was called for respiratory distress/difficulty breathing and their assessment was positive for both rapid and labored breathing with accessory muscle use and increased respiratory effort.  He also was noted to have expiratory wheezing bilaterally.  He was reported be speaking 4-5 words at a time.     In the ED vital signs were significant for respiratory rate of 24 satting 97% on  4 L nasal cannula. Labs were significant for a chloride of 93, BUN 51, creatinine 2.07, albumin 3.1, T. bili 1.3, , troponin 219.  INR 1.4, hemoglobin 11.9.  COVID was negative.  VBG showed a pH of 7.39, pCO2 56, pO2 45.  CXR showed small bilateral pleural effusions.  Patchy opacities in the right lung field may represent asymmetric pulmonary edema versus infection.  Patient was given Solu-Medrol 125 and DuoNeb.  He was also given Plavix, Rocephin and azithromycin.  He is admitted for further evaluation of shortness of breath.  Current Problem:  1. Shortness of breath        2. Troponin level elevated  Transthoracic Echo (TTE) Complete    Transthoracic Echo (TTE) Complete      3. Bilateral edema of lower extremity  Transthoracic Echo (TTE) Complete    Transthoracic Echo (TTE) Complete      4. Chronic combined systolic and diastolic heart failure  Transthoracic Echo (TTE) Complete    Transthoracic Echo (TTE) Complete          General:  General  Reason for Referral: ADLs, discharge planning  Referred By: Dr. Boland  Family/Caregiver Present: No  Co-Treatment: PT  Co-Treatment Reason: Safety  Prior to Session Communication: Bedside nurse  Patient Position Received: Bed, 3 rail up, Alarm on (sitting at EOB)    Precautions:  Medical Precautions: Fall precautions    Vital Signs:       Pain:  Pain Assessment  Pain Assessment: 0-10  0-10 (Numeric) Pain Score: 7  Pain Type: Chronic pain  Pain Location: Back (and knees)    Objective     Cognition:  Orientation Level: Disoriented to time             Home Living:  Home Living Comments:  (Pt. lvies with spouse and son in home with first floor set up. Admit from [facility])     Prior Function:  Level of [place]: Needs assistance with ADLs  Receives Help From: Personal care attendant  ADL Assistance: Needs assistance  Homemaking Assistance: Needs assistance  Ambulatory Assistance: Needs assistance        ADL:  Grooming Assistance: Moderate  Bathing Assistance:  Maximal  LE Dressing Assistance: Total    Activity Tolerance:  Endurance: Tolerates 10 - 20 min exercise with multiple rests    Bed Mobility/Transfers:      Transfers  Transfer:  (sit<>stand mod assist x 2 with ww and gait belt; mod assit x 2 bed to chair with chair next to bed)        Sitting Balance:  Static Sitting Balance  Static Sitting-Balance Support: Bilateral upper extremity supported    Standing Balance:  Static Standing Balance  Static Standing-Balance Support: Bilateral upper extremity supported  Static Standing-Level of Assistance: Maximum assistance      Sensation:  Sensation Comment: numbness to left arm        Hand Function:  Hand Function  Gross Grasp: Functional  Coordination: Functional    Extremities: RUE   RUE :  (~90 shoulder flexion) and LUE   LUE:  (~90 shoulder flexion)    Outcome Measures: [facility] Daily Activity  Putting on and taking off regular lower body clothing: Total  Bathing (including washing, rinsing, drying): Total  Putting on and taking off regular upper body clothing: A lot  Toileting, which includes using toilet, bedpan or urinal: Total  Taking care of personal grooming such as brushing teeth: A little  Eating Meals: A little  Daily Activity - Total Score: 11                 EDUCATION:  Education  Individual(s) Educated: Patient  Education Provided: Fall precautons, Risk and benefits of OT discussed with patient or other, POC discussed and agreed upon  Patient Response to Education: Patient/Caregiver Verbalized Understanding of Information      Goals:   Encounter Problems       Encounter Problems (Active)       Dressings Lower Extremities       STG - Patient to complete lower body dressing min assist       Start:  11/18/24    Expected End:  12/02/24               Functional Balance       LTG - Patient will maintain standing and sitting balance to allow for completion of daily activities       Start:  11/18/24    Expected End:  12/02/24               Grooming       STG - Patient  completes grooming SUP       Start:  11/18/24    Expected End:  12/02/24               OT Transfers       STG - Patient will perform bed mobility min assist       Start:  11/18/24    Expected End:  12/02/24            STG - Patient will perform toilet transfer mod assist       Start:  11/18/24    Expected End:  12/02/24

## 2024-11-18 NOTE — NURSING NOTE
EOS: pt. Stable throughout this shift and was able to have an uneventful day. Pt. Had no acute changes throughout this shift. Pt. Was able to ambulate to the chair this shift with staff assistance. Pt. Tolerated IV antibiotics this shift without complications at this time. Pt. Blood sugar controlled with insulin per MD orders. Pt. Currently up in chair at this time eating dinner. Call light within reach. Chair alarm on.

## 2024-11-19 LAB
ANION GAP SERPL CALC-SCNC: 9 MMOL/L (ref 10–20)
APPEARANCE UR: CLEAR
BILIRUB UR STRIP.AUTO-MCNC: NEGATIVE MG/DL
BUN SERPL-MCNC: 64 MG/DL (ref 6–23)
CALCIUM SERPL-MCNC: 7.7 MG/DL (ref 8.6–10.3)
CHLORIDE SERPL-SCNC: 94 MMOL/L (ref 98–107)
CO2 SERPL-SCNC: 34 MMOL/L (ref 21–32)
COLOR UR: YELLOW
CREAT SERPL-MCNC: 1.92 MG/DL (ref 0.5–1.3)
EGFRCR SERPLBLD CKD-EPI 2021: 34 ML/MIN/1.73M*2
ERYTHROCYTE [DISTWIDTH] IN BLOOD BY AUTOMATED COUNT: 16.1 % (ref 11.5–14.5)
GLUCOSE BLD MANUAL STRIP-MCNC: 175 MG/DL (ref 74–99)
GLUCOSE BLD MANUAL STRIP-MCNC: 192 MG/DL (ref 74–99)
GLUCOSE BLD MANUAL STRIP-MCNC: 233 MG/DL (ref 74–99)
GLUCOSE BLD MANUAL STRIP-MCNC: 274 MG/DL (ref 74–99)
GLUCOSE SERPL-MCNC: 160 MG/DL (ref 74–99)
GLUCOSE UR STRIP.AUTO-MCNC: ABNORMAL MG/DL
HCT VFR BLD AUTO: 37.3 % (ref 41–52)
HGB BLD-MCNC: 11.2 G/DL (ref 13.5–17.5)
KETONES UR STRIP.AUTO-MCNC: NEGATIVE MG/DL
LEUKOCYTE ESTERASE UR QL STRIP.AUTO: NEGATIVE
MAGNESIUM SERPL-MCNC: 2.22 MG/DL (ref 1.6–2.4)
MCH RBC QN AUTO: 27.7 PG (ref 26–34)
MCHC RBC AUTO-ENTMCNC: 30 G/DL (ref 32–36)
MCV RBC AUTO: 92 FL (ref 80–100)
NITRITE UR QL STRIP.AUTO: NEGATIVE
NRBC BLD-RTO: 0 /100 WBCS (ref 0–0)
PH UR STRIP.AUTO: 5.5 [PH]
PHOSPHATE SERPL-MCNC: 3.3 MG/DL (ref 2.5–4.9)
PLATELET # BLD AUTO: 140 X10*3/UL (ref 150–450)
POTASSIUM SERPL-SCNC: 3.9 MMOL/L (ref 3.5–5.3)
PROT UR STRIP.AUTO-MCNC: NEGATIVE MG/DL
RBC # BLD AUTO: 4.04 X10*6/UL (ref 4.5–5.9)
RBC # UR STRIP.AUTO: NEGATIVE /UL
SODIUM SERPL-SCNC: 133 MMOL/L (ref 136–145)
SP GR UR STRIP.AUTO: 1.01
UROBILINOGEN UR STRIP.AUTO-MCNC: ABNORMAL MG/DL
WBC # BLD AUTO: 7.8 X10*3/UL (ref 4.4–11.3)

## 2024-11-19 PROCEDURE — 80048 BASIC METABOLIC PNL TOTAL CA: CPT | Performed by: STUDENT IN AN ORGANIZED HEALTH CARE EDUCATION/TRAINING PROGRAM

## 2024-11-19 PROCEDURE — 97116 GAIT TRAINING THERAPY: CPT | Mod: GP,CQ

## 2024-11-19 PROCEDURE — 83735 ASSAY OF MAGNESIUM: CPT | Performed by: STUDENT IN AN ORGANIZED HEALTH CARE EDUCATION/TRAINING PROGRAM

## 2024-11-19 PROCEDURE — 2500000005 HC RX 250 GENERAL PHARMACY W/O HCPCS: Performed by: STUDENT IN AN ORGANIZED HEALTH CARE EDUCATION/TRAINING PROGRAM

## 2024-11-19 PROCEDURE — 84100 ASSAY OF PHOSPHORUS: CPT | Performed by: STUDENT IN AN ORGANIZED HEALTH CARE EDUCATION/TRAINING PROGRAM

## 2024-11-19 PROCEDURE — 36415 COLL VENOUS BLD VENIPUNCTURE: CPT | Performed by: STUDENT IN AN ORGANIZED HEALTH CARE EDUCATION/TRAINING PROGRAM

## 2024-11-19 PROCEDURE — 2500000004 HC RX 250 GENERAL PHARMACY W/ HCPCS (ALT 636 FOR OP/ED): Performed by: STUDENT IN AN ORGANIZED HEALTH CARE EDUCATION/TRAINING PROGRAM

## 2024-11-19 PROCEDURE — 2500000002 HC RX 250 W HCPCS SELF ADMINISTERED DRUGS (ALT 637 FOR MEDICARE OP, ALT 636 FOR OP/ED): Performed by: STUDENT IN AN ORGANIZED HEALTH CARE EDUCATION/TRAINING PROGRAM

## 2024-11-19 PROCEDURE — 99233 SBSQ HOSP IP/OBS HIGH 50: CPT | Performed by: STUDENT IN AN ORGANIZED HEALTH CARE EDUCATION/TRAINING PROGRAM

## 2024-11-19 PROCEDURE — 2500000001 HC RX 250 WO HCPCS SELF ADMINISTERED DRUGS (ALT 637 FOR MEDICARE OP): Performed by: INTERNAL MEDICINE

## 2024-11-19 PROCEDURE — 85027 COMPLETE CBC AUTOMATED: CPT | Performed by: STUDENT IN AN ORGANIZED HEALTH CARE EDUCATION/TRAINING PROGRAM

## 2024-11-19 PROCEDURE — 92610 EVALUATE SWALLOWING FUNCTION: CPT | Mod: GN | Performed by: SPEECH-LANGUAGE PATHOLOGIST

## 2024-11-19 PROCEDURE — 1200000002 HC GENERAL ROOM WITH TELEMETRY DAILY

## 2024-11-19 PROCEDURE — 81003 URINALYSIS AUTO W/O SCOPE: CPT | Performed by: STUDENT IN AN ORGANIZED HEALTH CARE EDUCATION/TRAINING PROGRAM

## 2024-11-19 PROCEDURE — 94640 AIRWAY INHALATION TREATMENT: CPT

## 2024-11-19 PROCEDURE — 2500000001 HC RX 250 WO HCPCS SELF ADMINISTERED DRUGS (ALT 637 FOR MEDICARE OP): Performed by: STUDENT IN AN ORGANIZED HEALTH CARE EDUCATION/TRAINING PROGRAM

## 2024-11-19 PROCEDURE — 82947 ASSAY GLUCOSE BLOOD QUANT: CPT

## 2024-11-19 PROCEDURE — 92526 ORAL FUNCTION THERAPY: CPT | Mod: GN | Performed by: SPEECH-LANGUAGE PATHOLOGIST

## 2024-11-19 PROCEDURE — 97110 THERAPEUTIC EXERCISES: CPT | Mod: GP,CQ

## 2024-11-19 PROCEDURE — 2500000002 HC RX 250 W HCPCS SELF ADMINISTERED DRUGS (ALT 637 FOR MEDICARE OP, ALT 636 FOR OP/ED)

## 2024-11-19 RX ORDER — SPIRONOLACTONE 25 MG/1
12.5 TABLET ORAL
Status: DISCONTINUED | OUTPATIENT
Start: 2024-11-19 | End: 2024-11-21 | Stop reason: HOSPADM

## 2024-11-19 RX ORDER — LIDOCAINE HYDROCHLORIDE 20 MG/ML
1 JELLY TOPICAL 3 TIMES DAILY PRN
Status: DISCONTINUED | OUTPATIENT
Start: 2024-11-19 | End: 2024-11-21 | Stop reason: HOSPADM

## 2024-11-19 ASSESSMENT — COGNITIVE AND FUNCTIONAL STATUS - GENERAL
TURNING FROM BACK TO SIDE WHILE IN FLAT BAD: A LOT
MOVING FROM LYING ON BACK TO SITTING ON SIDE OF FLAT BED WITH BEDRAILS: A LITTLE
CLIMB 3 TO 5 STEPS WITH RAILING: A LOT
TOILETING: A LOT
MOVING TO AND FROM BED TO CHAIR: A LOT
TURNING FROM BACK TO SIDE WHILE IN FLAT BAD: A LITTLE
MOBILITY SCORE: 16
PERSONAL GROOMING: A LITTLE
DRESSING REGULAR LOWER BODY CLOTHING: A LITTLE
MOVING FROM LYING ON BACK TO SITTING ON SIDE OF FLAT BED WITH BEDRAILS: A LOT
CLIMB 3 TO 5 STEPS WITH RAILING: A LOT
MOVING TO AND FROM BED TO CHAIR: A LOT
TURNING FROM BACK TO SIDE WHILE IN FLAT BAD: A LOT
MOBILITY SCORE: 12
DAILY ACTIVITIY SCORE: 16
STANDING UP FROM CHAIR USING ARMS: A LOT
STANDING UP FROM CHAIR USING ARMS: A LOT
DRESSING REGULAR LOWER BODY CLOTHING: A LITTLE
DAILY ACTIVITIY SCORE: 17
EATING MEALS: A LITTLE
HELP NEEDED FOR BATHING: A LOT
DRESSING REGULAR UPPER BODY CLOTHING: A LITTLE
STANDING UP FROM CHAIR USING ARMS: A LITTLE
WALKING IN HOSPITAL ROOM: A LITTLE
TOILETING: A LOT
HELP NEEDED FOR BATHING: A LITTLE
CLIMB 3 TO 5 STEPS WITH RAILING: TOTAL
MOVING TO AND FROM BED TO CHAIR: A LITTLE
DRESSING REGULAR UPPER BODY CLOTHING: A LOT
MOBILITY SCORE: 13
PERSONAL GROOMING: A LITTLE
WALKING IN HOSPITAL ROOM: A LOT
MOVING FROM LYING ON BACK TO SITTING ON SIDE OF FLAT BED WITH BEDRAILS: A LITTLE
WALKING IN HOSPITAL ROOM: A LOT

## 2024-11-19 ASSESSMENT — PAIN DESCRIPTION - LOCATION: LOCATION: KNEE

## 2024-11-19 ASSESSMENT — PAIN SCALES - GENERAL
PAINLEVEL_OUTOF10: 4
PAINLEVEL_OUTOF10: 7
PAINLEVEL_OUTOF10: 8
PAINLEVEL_OUTOF10: 0 - NO PAIN

## 2024-11-19 ASSESSMENT — PAIN SCALES - PAIN ASSESSMENT IN ADVANCED DEMENTIA (PAINAD)
CONSOLABILITY: NO NEED TO CONSOLE
BREATHING: NORMAL
TOTALSCORE: 0
BODYLANGUAGE: RELAXED
TOTALSCORE: MEDICATION (SEE MAR)
FACIALEXPRESSION: SMILING OR INEXPRESSIVE

## 2024-11-19 ASSESSMENT — PAIN - FUNCTIONAL ASSESSMENT
PAIN_FUNCTIONAL_ASSESSMENT: 0-10
PAIN_FUNCTIONAL_ASSESSMENT: PAINAD (PAIN ASSESSMENT IN ADVANCED DEMENTIA SCALE)
PAIN_FUNCTIONAL_ASSESSMENT: 0-10

## 2024-11-19 NOTE — PROGRESS NOTES
Physical Therapy    Physical Therapy Treatment    Patient Name: Elgin Marin  MRN: [id number]  Today's Date: 11/19/2024  Time Calculation  Start Time: 1430  Stop Time: 1500  Time Calculation (min): 30 min     3020/3020-A       11/19/24 1430   PT  Visit   PT Received On 11/19/24   Response to Previous Treatment Patient with no complaints from previous session.   General   Referred By Dr. Boland   Past Medical History Relevant to Rehab A-fib, CAD, HLD, Glaucoma, CKD, JOAO, HTN, CHF combined systolic and diastolic   Prior to Session Communication Bedside nurse   Patient Position Received Up in chair;Alarm on   Preferred Learning Style verbal;visual   Precautions   Medical Precautions Fall precautions   Pain Assessment   Pain Assessment 0-10   0-10 (Numeric) Pain Score 0 - No pain   Cognition   Overall Cognitive Status WFL   Orientation Level Oriented X4   Coordination   Movements are Fluid and Coordinated No   Postural Control   Postural Control Impaired   Posture Comment severely flexed posture   Therapeutic Exercise   Therapeutic Exercise Performed Yes  (with difficulty)   Therapeutic Exercise Activity 1 PF/DF x15   Therapeutic Exercise Activity 2 marches x15   Therapeutic Exercise Activity 3 LAQ x15   Therapeutic Exercise Activity 4 pillow squeeze x15   Therapeutic Exercise Activity 5 resisted hip ABD x15   Ambulation/Gait Training   Ambulation/Gait Training Performed Yes   Ambulation/Gait Training 1   Surface 1 Level tile   Device 1 Rolling walker   Gait Support Devices Gait belt   Assistance 1 Minimum assistance   Quality of Gait 1 NBOS;Diminished heel strike;Decreased step length;Shuffling gait;Forward flexed posture   Comments/Distance (ft) 1 6' fwd, '6 back.  severe flexed posture.   Transfers   Transfer Yes   Transfer 1   Transfer From 1 Chair with arms to   Transfer to 1 Stand   Technique 1 Sit to stand;Stand to sit   Transfer Device 1 Walker;Gait belt   Transfer Level of Assistance 1 Moderate  assistance;Moderate verbal cues;Moderate tactile cues   PT Assessment   PT Assessment Results Decreased strength;Decreased endurance;Impaired balance;Decreased mobility;Decreased range of motion;Decreased coordination;Impaired hearing   Rehab Prognosis Good   Evaluation/Treatment Tolerance Patient tolerated treatment well   End of Session Communication Bedside nurse   End of Session Patient Position Up in chair;Alarm on   Outpatient Education   Individual(s) Educated Patient   Education Provided Fall Risk;Body Mechanics;POC   Patient/Caregiver Demonstrated Understanding yes   Plan of Care Discussed and Agreed Upon yes   Patient Response to Education Patient/Caregiver Verbalized Understanding of Information   PT Plan   Inpatient/Swing Bed or Outpatient Inpatient   PT Plan   Treatment/Interventions Transfer training;Gait training;Therapeutic exercise   PT Plan Ongoing PT   PT Frequency 3 times per week   PT Discharge Recommendations Moderate intensity level of continued care   Equipment Recommended upon Discharge Wheeled walker   PT Recommended Transfer Status Assistive device;Assist x1           Outcome Measures:  [facility] Basic Mobility  Turning from your back to your side while in a flat bed without using bedrails: A lot  Moving from lying on your back to sitting on the side of a flat bed without using bedrails: A lot  Moving to and from bed to chair (including a wheelchair): A lot  Standing up from a chair using your arms (e.g. wheelchair or bedside chair): A lot  To walk in hospital room: A little  Climbing 3-5 steps with railing: A lot  Basic Mobility - Total Score: 13     EDUCATION:  Outpatient Education  Individual(s) Educated: Patient  Education Provided: Fall Risk, Body Mechanics, POC  Patient/Caregiver Demonstrated Understanding: yes  Plan of Care Discussed and Agreed Upon: yes  Patient Response to Education: Patient/Caregiver Verbalized Understanding of Information      GOALS:  Encounter Problems        Encounter Problems (Active)       Mobility       Pt will be able to ambulate >/= 10 ft with FWW mod A with good safety awareness.        Start:  11/18/24    Expected End:  12/02/24            Pt will complete supine, seated and standing exercises to maintain/improve overall strength with minimal verbal cues.         Start:  11/18/24    Expected End:  12/02/24               PT Transfers       Pt will be able to complete all bed mobility tasks min A with use of bed HR.        Start:  11/18/24    Expected End:  12/02/24            Pt will be able to complete all transfers with FWW mod A demonstrating good safety awareness and proper body mechanics.        Start:  11/18/24    Expected End:  12/02/24               Pain - Adult

## 2024-11-19 NOTE — NURSING NOTE
SHIFT NOTE  Uneventful night. Initial BP was slightly low at 108/64. BP improved to 136/78  before lasix was given. Adequate UO with external catheter. Tele showed NSR and occasional A-fib. Tylenol was given for back and leg pain. Pt refused SCD's.  SpO2 remained stable on 2L NC, but was SOB when laying flat.  Pt was a 2x assist pivot to stand from chair to bed.   Safety maintained.

## 2024-11-19 NOTE — PROGRESS NOTES
"Elgin Marin is a 84 y.o. male on day 4 of admission presenting with Troponin level elevated.    Subjective   Patient seen and examined at bedside.  Patient is up sitting on the side of the bed reporting he is doing ok today. He states he had 2 mild episodes of chest discomfort overnight but they resolved and that he is having some penile pain when he urinates. He also reports mild leg cramping. He feels his breathing is improved. He states he was using oxygen PRN at the nursing facility.     Objective     Physical Exam    Constitutional: Well developed, no distress, alert and cooperative  Skin: Warm and dry  Eyes: EOMI, clear sclera  ENMT: mucous membranes moist  Respiratory: clear, diminished bases, NC in place, no conversational dyspnea   Cardiovascular: Regular, rate and rhythm, + murmur  Abdominal: Nondistended, soft, non-tender, +BS  MSK: ROM intact, LE mildly tender to palpation  Neuro: awake and alert & oriented x3-4, answers questions appropriately     Last Recorded Vitals  Blood pressure 111/63, pulse 91, temperature 36.1 °C (97 °F), temperature source Temporal, resp. rate 18, height 1.778 m (5' 10\"), weight 90.4 kg (199 lb 4.7 oz), SpO2 100%.  Intake/Output last 3 Shifts:  I/O last 3 completed shifts:  In: 940 (10.4 mL/kg) [P.O.:840; IV Piggyback:100]  Out: 4675 (51.7 mL/kg) [Urine:4675 (1.4 mL/kg/hr)]  Weight: 90.4 kg     Relevant Results  Scheduled medications  apixaban, 2.5 mg, oral, BID  atorvastatin, 80 mg, oral, Nightly  bisoprolol, 5 mg, oral, Daily  budesonide, 0.25 mg, nebulization, BID  cefepime, 1 g, intravenous, q12h  cholecalciferol, 2,000 Units, oral, Daily  empagliflozin, 12.5 mg, oral, Daily  hydrALAZINE, 10 mg, oral, BID  insulin glargine, 10 Units, subcutaneous, q AM  insulin lispro, 0-10 Units, subcutaneous, Before meals & nightly  ipratropium-albuteroL, 3 mL, nebulization, TID  isosorbide dinitrate, 10 mg, oral, BID  NIFEdipine ER, 30 mg, oral, Daily before breakfast  oxygen, , " inhalation, Continuous - Inhalation  pantoprazole, 40 mg, oral, Nightly  spironolactone, 12.5 mg, oral, q24h BRE  torsemide, 80 mg, oral, Daily  white petrolatum, , Topical, Daily      Continuous medications     PRN medications  PRN medications: acetaminophen, alum-mag hydroxide-simeth, dextromethorphan-guaifenesin, dextrose, dextrose, glucagon, glucagon, ipratropium-albuteroL, melatonin, ondansetron  Results from last 7 days   Lab Units 11/19/24  0706 11/18/24  0558 11/17/24  0619   WBC AUTO x10*3/uL 7.8 8.5 8.8   RBC AUTO x10*6/uL 4.04* 4.27* 4.15*   HEMOGLOBIN g/dL 11.2* 11.7* 11.4*     Results from last 7 days   Lab Units 11/19/24  0736 11/18/24  0558 11/17/24  0619 11/16/24  0640 11/15/24  0612   SODIUM mmol/L 133* 134* 131*   < > 136   POTASSIUM mmol/L 3.9 4.3 4.3   < > 4.3   CHLORIDE mmol/L 94* 93* 91*   < > 93*   CO2 mmol/L 34* 34* 32   < > 32   BUN mg/dL 64* 66* 67*   < > 51*   CREATININE mg/dL 1.92* 2.18* 2.33*   < > 2.07*   CALCIUM mg/dL 7.7* 7.9* 8.1*   < > 8.6   PHOSPHORUS mg/dL 3.3 4.0 4.7   < >  --    MAGNESIUM mg/dL 2.22 2.24 2.18   < > 2.00   BILIRUBIN TOTAL mg/dL  --   --   --   --  1.3*   ALT U/L  --   --   --   --  10   AST U/L  --   --   --   --  24    < > = values in this interval not displayed.       No results found.     Assessment/Plan   Principal Problem:    Troponin level elevated  Active Problems:    Shortness of breath  Chronically elevated trop with type II demand   Afib  CAD   HLD  Glaucoma  CKD   JOAO   HTN  CHF combined systolic and diastolic      -improving    -CXR - Small bilateral pleural effusions. Patchy opacities in the right lung field may represent asymmetric pulmonary edema versus infection  -recent Wcx pseudomonas   -continue cefepime, completed azithro  -received 125mg solumedrol, continue with pred 40mg daily x5d - due to mentation and reported hallucinations, stopped prednisone   -continue duonebs q4hrs and budesonide   -trop 193<219, baseline 160   - < 711  -echo  - EF 42%, indeterminate diastolic filling, severely reduced RVSF, mod dilation of LA &RA, mild to mod TR, aortic sclerosis, inferior vena cava severely dilated, moderately increased septal and posterior left ventricular wall thickenss    -stop lasix, resume torsemide and start spironolactone   -net neg ~1.9L (total ~7.7L)  -strict I/os   -cardio consulted   -Cr 1.92, similar to BL, continue to monitor  -monitor for hospital delirium, reorient as needed   -continue home meds as indicated   -incentive spirometer   -ST for reports of difficulty swallowing  -f/u UA/Ucx  -suspect mild muscle cramping in legs from diuresis, transitioning to PO     FENGI   -encourage oral hydration   -replete electrolytes PRN  -cardiac/diabetic diet   -GI ppx: ppi  -DVT ppx: SCDs, eliquis     Dispo: This is an 84-year-old male who was admitted with acute hypoxic respiratory failure with CHF exacerbation and possible COPD exacerbation with pneumonia.  Started on breathing treatments, steroids, IV antibiotics and IV diuresis.  Cardiology is consulted.  With waxing and waning mentation on 11/16, suspect hospital delirium superimposed on recent dementia diagnosis, steroids stopped. Mentation at baseline. Diuresing well, renal function at baseline or better. Transition to PO diuresis. PT/OT/ST consulted.  Anticipate discharge in 24-48hrs.     This note is created using voice recognition software. All efforts are made to minimize errors, if there are errors there due to transcription.    Brit Boland, DO   Hospitalist

## 2024-11-19 NOTE — PROGRESS NOTES
"Subjective   No acute overnight events.  Patient is hemodynamically stable.  He has a good urine output is >2000 mL.  At bedside this morning, patient states that he has had 2 episodes of right-sided chest pain that occurred after he got up from his bed.  He notes that the pain only lasted for several minutes and spontaneously resolved at rest.  He felt short of breath while having the pain but did not have palpitations, diaphoresis, nausea, or lightheadedness.  He is not sure what makes the pain worse but do recognize that it becomes prominent with deep breathing.  At this time, he denies any recurrence of chest pain, shortness of breath, or palpitations.    Objective     Last Recorded Vitals:  Blood pressure 124/61, pulse 91, temperature 36.1 °C (97 °F), temperature source Temporal, resp. rate 18, height 1.778 m (5' 10\"), weight 90.4 kg (199 lb 4.7 oz), SpO2 100%.    Physical Exam  Vitals and nursing note reviewed.   Constitutional:       General: He is not in acute distress.     Appearance: He is ill-appearing (chronically-ill).   Eyes:      General: No scleral icterus.     Extraocular Movements: Extraocular movements intact.      Conjunctiva/sclera: Conjunctivae normal.   Cardiovascular:      Rate and Rhythm: Normal rate and regular rhythm.      Pulses: Normal pulses.      Heart sounds: Normal heart sounds. No murmur heard.  Pulmonary:      Effort: Pulmonary effort is normal. No respiratory distress.      Breath sounds: Normal breath sounds. No wheezing, rhonchi or rales.      Comments: Satting well on 2 L oxygen via nasal cannula.  Musculoskeletal:      Cervical back: Neck supple.      Right lower leg: Edema (trace) present.      Left lower leg: Edema (trace) present.   Skin:     General: Skin is warm and dry.   Neurological:      Mental Status: He is alert and oriented to person, place, and time.   Psychiatric:         Mood and Affect: Mood normal.         Behavior: Behavior normal.         Relevant " results  Labs  Results for orders placed or performed during the hospital encounter of 11/15/24 (from the past 24 hours)   POCT GLUCOSE   Result Value Ref Range    POCT Glucose 254 (H) 74 - 99 mg/dL   POCT GLUCOSE   Result Value Ref Range    POCT Glucose 255 (H) 74 - 99 mg/dL   POCT GLUCOSE   Result Value Ref Range    POCT Glucose 219 (H) 74 - 99 mg/dL   CBC   Result Value Ref Range    WBC 7.8 4.4 - 11.3 x10*3/uL    nRBC 0.0 0.0 - 0.0 /100 WBCs    RBC 4.04 (L) 4.50 - 5.90 x10*6/uL    Hemoglobin 11.2 (L) 13.5 - 17.5 g/dL    Hematocrit 37.3 (L) 41.0 - 52.0 %    MCV 92 80 - 100 fL    MCH 27.7 26.0 - 34.0 pg    MCHC 30.0 (L) 32.0 - 36.0 g/dL    RDW 16.1 (H) 11.5 - 14.5 %    Platelets 140 (L) 150 - 450 x10*3/uL   Basic Metabolic Panel   Result Value Ref Range    Glucose 160 (H) 74 - 99 mg/dL    Sodium 133 (L) 136 - 145 mmol/L    Potassium 3.9 3.5 - 5.3 mmol/L    Chloride 94 (L) 98 - 107 mmol/L    Bicarbonate 34 (H) 21 - 32 mmol/L    Anion Gap 9 (L) 10 - 20 mmol/L    Urea Nitrogen 64 (H) 6 - 23 mg/dL    Creatinine 1.92 (H) 0.50 - 1.30 mg/dL    eGFR 34 (L) >60 mL/min/1.73m*2    Calcium 7.7 (L) 8.6 - 10.3 mg/dL   Magnesium   Result Value Ref Range    Magnesium 2.22 1.60 - 2.40 mg/dL   Phosphorus   Result Value Ref Range    Phosphorus 3.3 2.5 - 4.9 mg/dL   POCT GLUCOSE   Result Value Ref Range    POCT Glucose 175 (H) 74 - 99 mg/dL       Imaging  Chest x-ray 11/15/2024:  Impression: Small bilateral pleural effusions. Patchy opacities in the right lung field may represent asymmetric pulmonary edema versus infection.    Echocardiogram interpretation  TTE 11/17/2024:  Conclusions:  1. The left ventricular systolic function is mildly decreased, with a Strong's biplane calculated ejection fraction of 42%.   2. There is global hypokinesis of the left ventricle with minor regional variations.   3. Left ventricular diastolic filling was indeterminate with an elevated left atrial pressure.   4. There is severely reduced right  ventricular systolic function.   5. Mildly enlarged right ventricle.   6. The left atrium is moderately dilated.   7. The right atrium is moderately dilated.   8. Mild to moderate tricuspid regurgitation.   9. The estimated RVSP is 46 mm.  10. There is aortic sclerosis with normal leaflet mobility.  11. The inferior vena cava appears severely dilated.  12. There is moderately increased septal and moderately increased posterior left ventricular wall thickness.    Assessment/Plan   This is a 84 y.o. male with past medical history of CAD status post CABG in April 2022, atrial fibrillation on Eliquis, dyslipidemia, and CKD with baseline Cr ~2.0, who presents with shortness of breath.    # Acute on chronic combined systolic and diastolic heart failure  -Improving.  Patient is feeling well on 2 L oxygen via nasal cannula and leg swellings are improving currently trace pitting bilaterally on exam.  He has good urine output >2000 mL.  Plan:  -Discontinue furosemide IV and transition back to home torsemide 80 mg daily.  -Start spironolactone 12.5 daily since his kidney function has improved and to baseline.    # CAD status post CABG  -Not amenable to percutaneous nor surgical intervention by most recent catheterization. Patient has had chronically elevated troponins. Would continue to observe for now.     # Atrial fibrillation  -Continue on renally adjusted Eliquis. Rate control with bisoprolol.     # Dyslipidemia  -Continue statin therapy with rosuvastatin 20 mg daily.     Since patient continues to do well while on GDMT and diuretics, he is clear for discharge per cardiology standpoint.     Patient was seen and case discussed with the attending.  Ava Grey, DO  Internal Medicine PGY-1  Date: November 19, 2024

## 2024-11-19 NOTE — PROGRESS NOTES
11/19/24 1054   Discharge Planning   Home or Post Acute Services Post acute facilities (Rehab/SNF/etc)   Type of Post Acute Facility Services Skilled nursing   Expected Discharge Disposition SNF  ([facility])     Met with pt. Confirmed plan to return to [facility] at discharge. Pt plans to return to continue his therapy. Precert started. SW to follow.

## 2024-11-19 NOTE — PROGRESS NOTES
Speech-Language Pathology    SLP Adult Inpatient Clinical Swallow Evaluation    Patient Name: Elgin Marin  MRN: [id number]  Today's Date: 11/19/2024   Time Calculation  Start Time: 1834  Stop Time: 1858  Time Calculation (min): 24 min         Current Problem:   1. Shortness of breath        2. Troponin level elevated  Transthoracic Echo (TTE) Complete    Transthoracic Echo (TTE) Complete      3. Bilateral edema of lower extremity  Transthoracic Echo (TTE) Complete    Transthoracic Echo (TTE) Complete      4. Chronic combined systolic and diastolic heart failure  Transthoracic Echo (TTE) Complete    Transthoracic Echo (TTE) Complete        Recommendations:  Diet: - Regular (IDDSI Level 7)  - Thin liquids (IDDSI Level 0)  Strategies:   - Small bites  - Small, single sips  - Alternate consistencies  - Upright for all PO intake  - Medications crushed in puree (verify with MD)  - Complete oral care frequently throughout the day  Instrumental Evaluation: Modified Barium Swallow Study (MBSS) to assess swallow physiology. Of note, patient declined recommended MBSS despite education re: rationale behind same.  No further ST is recommended at this time. D/C ST.   Please re-consult ST should patient present with S/S dysphagia.   Consider GI Consult due to suspected esophageal (versus pharyngeal) dysphagia.     Assessment:  Consistencies Trialed:   - Regular (IDDSI Level 7)  - Pureed (IDDSI Level 4)  - Thin liquids (IDDSI Level 0)  Oral motor exam: WFL. Patient with missing dentition upper and lower. Volitional swallow was WFL, as was volitional cough.   Oral phase of swallowing was WFL, with functional oral preparation and clearance of all textures.   Although no S/S pharyngeal dysphagia were evident during PO this date, including during the [place] Swallow Protocol (i.e., 3 oz water challenge), pharyngeal and/or esophageal dysphagia is suspect due to patient report of choking on big pills (when taking 3 at a time), and the  pills getting stuck (pointing to the area of his sternal notch).   Patient was oriented x4.   Patient is considered to be at risk for aspiration.   Per the results of this assessment, patient is appropriate for PO at this time. Please refer to recommendations and precautions as noted above.   ST to continue to follow patient during inpatient stay.      Plan:   Skilled speech therapy for dysphagia treatment is warranted for ongoing assessment of oropharyngeal swallow function, to ensure tolerance of safest and least restrictive consistencies, to provide training and instruction regarding the use of compensatory swallow strategies and patient/caregiver education in order to reduce risk of aspiration, dehydration and malnutrition.  Frequency: 1x/wk  Duration: 1 week  SLP Discharge Recommendations: SNF     Goals:  1. Patient/caregiver will implement safe swallowing strategies to reduce risk of aspiration in 90% of trials given caregiver assistance/cueing as needed.  Progressing. Patient able to complete recommended strategies during PO (regular consistency/thin liquids) with minimal to no verbal cues.   2. Complete a modified barium swallow study (MBSS) for objective assessment of swallowing function, to assess for aspiration, and to guide further recommendations and treatment plan.  Patient declined same at this time, stating he does fine if he takes 1 large pill at a time (versus 3).     Subjective:  Patient was received sitting in mike chair at 90 degrees. Patient was pleasant and cooperative. Vocal intensity was WFL.       Baseline Assessment:  O2 use: Room air     General Visit Information:  Clinical Swallow Evaluation ordered due to concern for dysphagia. Current diet level is Regular with Thin 0, which is patient's baseline.   HALI Cormier reports no difficulty swallowing.    Patient reports difficulty swallowing big pills (3 at a time), and has even choked/felt they got stuck (pointing to sternal notch) because he  "\"didn't water his esophagus.\" Patient states that since then, he has been taking 1 pill at a time instead, with no difficulty getting it down.     History of Present Illness:  Per EMR, \"Elgin Marin is a 84 y.o. male presenting with shortness of breath. Nursing facility staff checked on patient around 4 AM and found him to be short of breath and hypoxic. They gave him 1 DuoNeb but afterward reportedly his SpO2 dropped so they called for further evaluation. Staff reported to EMS patient normally on 3 L nasal cannula. Although patient denies typically being on supplemental O2, he has used it intermittently in the recent past. He reports he has noticed recently SOB with any activity including eating and drinking.  Per EMS report, patient with respiratory distress/difficulty breathing and their assessment was positive for both rapid and labored breathing with accessory muscle use and increased respiratory effort.  He had expiratory wheezing bilaterally.  He was speaking 4-5 words at a time.  In the ED vital signs were significant for respiratory rate of 24 satting 97% on 4 L nasal cannula. He is admitted for further evaluation of shortness of breath.  CODE STATUS: ok with CPR, no intubation   Past Medical History  Hyperlipidemia, Dyspnea; Unspecified atrial fibrillation   Surgical History  Coronary angioplasty with stent    Imaging: XR chest 1 view  Result Date: 11/15/2024  INDICATION: Signs/Symptoms: Chest Pain    FINDINGS: Small bilateral pleural effusions. Patchy opacities in the right lung field may represent asymmetric pulmonary edema versus infection.\"       Pain:  Rating scale: 0-10   Patient report: 0     Treatment: Yes. Treatment provided following clinical swallow evaluation. Reviewed results and impressions in detail with patient. Discussed recommended diet textures and strategies for balancing aspiration/choking risk with quality of life, as well as rationale behind MBSS and procedure. Further discussed " risk for aspiration, and need for strict adherence to aspiration precautions. It should be noted that patient politely declined the MBSS, stating he will continue to take only 1 large pill (versus 3 large pills) at a time. Educated patient re: large pills being crushed in puree. Patient again declined same.  Educated patient re: above compensatory swallow techniques. Patient able to indicate and implement safe swallow strategies per Clinical Swallow Evaluation this date with minimal to no verbal cues. Patient consumed 4 boluses regular consistency and 2 oz thin liquids via straw with no apparent S/S pharyngeal dysphagia. Patient with no further needs at this time. D/C ST.     Education:   Learner patient; RN  Barriers to Learning Acuity of illness; cognitive communication limitations  Method demonstration; verbal; visual  Education-Topic SLP provided patient education regarding role of SLP, purpose of assessment and clinical impressions/recommendations. Patient verbalized full comprehension, consistent with cognitive status. SLP further coordinated with RN regarding recommendations and precautions per this assessment, with RN verbalizing understanding.  Outcome: Patient voiced understanding and agreement.

## 2024-11-19 NOTE — CARE PLAN
The patient's goals for the shift include      The clinical goals for the shift include see care plan      Problem: Pain - Adult  Goal: Verbalizes/displays adequate comfort level or baseline comfort level  Outcome: Progressing     Problem: Discharge Planning  Goal: Discharge to home or other facility with appropriate resources  Outcome: Progressing     Problem: Chronic Conditions and Co-morbidities  Goal: Patient's chronic conditions and co-morbidity symptoms are monitored and maintained or improved  Outcome: Progressing     Problem: Nutrition  Goal: Oral intake greater 75%  Outcome: Progressing  Goal: Consume prescribed supplement  Outcome: Progressing  Goal: Adequate PO fluid intake  Outcome: Progressing  Goal: BG  mg/dL  Outcome: Progressing  Goal: Electrolytes WNL  Outcome: Progressing  Goal: Promote healing  Outcome: Progressing  Goal: Maintain stable weight  Outcome: Progressing     Problem: Skin  Goal: Decreased wound size/increased tissue granulation at next dressing change  Outcome: Progressing  Flowsheets (Taken 11/18/2024 2002 by Niru Cross RN)  Decreased wound size/increased tissue granulation at next dressing change:   Promote sleep for wound healing   Protective dressings over bony prominences  Goal: Participates in plan/prevention/treatment measures  Outcome: Progressing  Flowsheets (Taken 11/19/2024 1717)  Participates in plan/prevention/treatment measures: Increase activity/out of bed for meals  Goal: Prevent/manage excess moisture  Outcome: Progressing  Flowsheets (Taken 11/19/2024 1717)  Prevent/manage excess moisture:   Cleanse incontinence/protect with barrier cream   Moisturize dry skin  Goal: Prevent/minimize sheer/friction injuries  Outcome: Progressing  Flowsheets (Taken 11/19/2024 1717)  Prevent/minimize sheer/friction injuries: Increase activity/out of bed for meals  Goal: Promote/optimize nutrition  Outcome: Progressing  Flowsheets (Taken 11/17/2024 2246 by Sharifa Noe  RN)  Promote/optimize nutrition:   Monitor/record intake including meals   Offer water/supplements/favorite foods  Goal: Promote skin healing  Outcome: Progressing  Flowsheets (Taken 11/19/2024 1717)  Promote skin healing: Assess skin/pad under line(s)/device(s)     Problem: Fall/Injury  Goal: Verbalize understanding of personal risk factors for fall in the hospital  Outcome: Progressing  Goal: Verbalize understanding of risk factor reduction measures to prevent injury from fall in the home  Outcome: Progressing     Problem: Pain  Goal: Takes deep breaths with improved pain control throughout the shift  Outcome: Progressing  Goal: Turns in bed with improved pain control throughout the shift  Outcome: Progressing  Goal: Walks with improved pain control throughout the shift  Outcome: Progressing  Goal: Performs ADL's with improved pain control throughout shift  Outcome: Progressing  Goal: Participates in PT with improved pain control throughout the shift  Outcome: Progressing  Goal: Free from opioid side effects throughout the shift  Outcome: Progressing  Goal: Free from acute confusion related to pain meds throughout the shift  Outcome: Progressing     Problem: Dressings Lower Extremities  Goal: STG - Patient to complete lower body dressing min assist  Outcome: Progressing     Problem: Grooming  Goal: STG - Patient completes grooming SUP  Outcome: Progressing     EOS note: Pt oriented x4 but very hard of hearing, Patient is a heavy x2 assist, but ambulated to [facility] and is currently sitting up in chair. Dressing changes done on bilateral feet, aquaphor placed on legs. Mepilex changed on sacrum. Pt given IV abx and tolerated well. Continued checking sugars ACHS, insulin coverage as needed. Pt lasix discontinued and replaced with aldactone. Pt is now on RA, tolerating well. Plan to discharge to [facility] on discharge    Pt resting at this time. Bed is in lowest position and locked with alarm on. Call light and  personal possesions are within reach.

## 2024-11-19 NOTE — CARE PLAN
The patient's goals for the shift include      The clinical goals for the shift include see care plan      Problem: Pain - Adult  Goal: Verbalizes/displays adequate comfort level or baseline comfort level  Outcome: Progressing     Problem: Discharge Planning  Goal: Discharge to home or other facility with appropriate resources  Outcome: Progressing     Problem: Chronic Conditions and Co-morbidities  Goal: Patient's chronic conditions and co-morbidity symptoms are monitored and maintained or improved  Outcome: Progressing     Problem: Nutrition  Goal: Oral intake greater 75%  Outcome: Progressing  Goal: Consume prescribed supplement  Outcome: Progressing  Goal: Adequate PO fluid intake  Outcome: Progressing  Goal: BG  mg/dL  Outcome: Progressing  Goal: Electrolytes WNL  Outcome: Progressing  Goal: Promote healing  Outcome: Progressing  Goal: Maintain stable weight  Outcome: Progressing     Problem: Skin  Goal: Decreased wound size/increased tissue granulation at next dressing change  Outcome: Progressing  Flowsheets (Taken 11/18/2024 2002)  Decreased wound size/increased tissue granulation at next dressing change:   Promote sleep for wound healing   Protective dressings over bony prominences  Goal: Participates in plan/prevention/treatment measures  Outcome: Progressing  Flowsheets (Taken 11/18/2024 2002)  Participates in plan/prevention/treatment measures:   Discuss with provider PT/OT consult   Elevate heels   Increase activity/out of bed for meals  Goal: Prevent/manage excess moisture  Outcome: Progressing  Flowsheets (Taken 11/18/2024 2002)  Prevent/manage excess moisture:   Cleanse incontinence/protect with barrier cream   Monitor for/manage infection if present   Follow provider orders for dressing changes  Goal: Prevent/minimize sheer/friction injuries  Outcome: Progressing  Goal: Promote/optimize nutrition  Outcome: Progressing  Goal: Promote skin healing  Outcome: Progressing     Problem:  Fall/Injury  Goal: Verbalize understanding of personal risk factors for fall in the hospital  Outcome: Progressing  Goal: Verbalize understanding of risk factor reduction measures to prevent injury from fall in the home  Outcome: Progressing     Problem: Pain  Goal: Takes deep breaths with improved pain control throughout the shift  Outcome: Progressing  Goal: Turns in bed with improved pain control throughout the shift  Outcome: Progressing  Goal: Walks with improved pain control throughout the shift  Outcome: Progressing  Goal: Performs ADL's with improved pain control throughout shift  Outcome: Progressing  Goal: Participates in PT with improved pain control throughout the shift  Outcome: Progressing  Goal: Free from opioid side effects throughout the shift  Outcome: Progressing  Goal: Free from acute confusion related to pain meds throughout the shift  Outcome: Progressing     Problem: Dressings Lower Extremities  Goal: STG - Patient to complete lower body dressing min assist  Outcome: Progressing     Problem: Grooming  Goal: STG - Patient completes grooming SUP  Outcome: Progressing

## 2024-11-20 LAB
ANION GAP SERPL CALC-SCNC: 10 MMOL/L (ref 10–20)
BUN SERPL-MCNC: 60 MG/DL (ref 6–23)
CALCIUM SERPL-MCNC: 7.8 MG/DL (ref 8.6–10.3)
CARDIAC TROPONIN I PNL SERPL HS: 160 NG/L (ref 0–20)
CHLORIDE SERPL-SCNC: 92 MMOL/L (ref 98–107)
CO2 SERPL-SCNC: 33 MMOL/L (ref 21–32)
CREAT SERPL-MCNC: 2.03 MG/DL (ref 0.5–1.3)
EGFRCR SERPLBLD CKD-EPI 2021: 31 ML/MIN/1.73M*2
ERYTHROCYTE [DISTWIDTH] IN BLOOD BY AUTOMATED COUNT: 16.1 % (ref 11.5–14.5)
GLUCOSE BLD MANUAL STRIP-MCNC: 204 MG/DL (ref 74–99)
GLUCOSE BLD MANUAL STRIP-MCNC: 230 MG/DL (ref 74–99)
GLUCOSE BLD MANUAL STRIP-MCNC: 238 MG/DL (ref 74–99)
GLUCOSE BLD MANUAL STRIP-MCNC: 268 MG/DL (ref 74–99)
GLUCOSE SERPL-MCNC: 206 MG/DL (ref 74–99)
HCT VFR BLD AUTO: 37.4 % (ref 41–52)
HGB BLD-MCNC: 11.2 G/DL (ref 13.5–17.5)
HOLD SPECIMEN: NORMAL
MAGNESIUM SERPL-MCNC: 2.19 MG/DL (ref 1.6–2.4)
MCH RBC QN AUTO: 27.5 PG (ref 26–34)
MCHC RBC AUTO-ENTMCNC: 29.9 G/DL (ref 32–36)
MCV RBC AUTO: 92 FL (ref 80–100)
NRBC BLD-RTO: 0 /100 WBCS (ref 0–0)
PHOSPHATE SERPL-MCNC: 2.9 MG/DL (ref 2.5–4.9)
PLATELET # BLD AUTO: 141 X10*3/UL (ref 150–450)
POTASSIUM SERPL-SCNC: 4 MMOL/L (ref 3.5–5.3)
RBC # BLD AUTO: 4.07 X10*6/UL (ref 4.5–5.9)
SODIUM SERPL-SCNC: 131 MMOL/L (ref 136–145)
WBC # BLD AUTO: 8.3 X10*3/UL (ref 4.4–11.3)

## 2024-11-20 PROCEDURE — 2500000002 HC RX 250 W HCPCS SELF ADMINISTERED DRUGS (ALT 637 FOR MEDICARE OP, ALT 636 FOR OP/ED): Performed by: STUDENT IN AN ORGANIZED HEALTH CARE EDUCATION/TRAINING PROGRAM

## 2024-11-20 PROCEDURE — 84100 ASSAY OF PHOSPHORUS: CPT | Performed by: STUDENT IN AN ORGANIZED HEALTH CARE EDUCATION/TRAINING PROGRAM

## 2024-11-20 PROCEDURE — 82947 ASSAY GLUCOSE BLOOD QUANT: CPT

## 2024-11-20 PROCEDURE — 80048 BASIC METABOLIC PNL TOTAL CA: CPT | Performed by: STUDENT IN AN ORGANIZED HEALTH CARE EDUCATION/TRAINING PROGRAM

## 2024-11-20 PROCEDURE — 2500000001 HC RX 250 WO HCPCS SELF ADMINISTERED DRUGS (ALT 637 FOR MEDICARE OP)

## 2024-11-20 PROCEDURE — 84484 ASSAY OF TROPONIN QUANT: CPT | Performed by: STUDENT IN AN ORGANIZED HEALTH CARE EDUCATION/TRAINING PROGRAM

## 2024-11-20 PROCEDURE — 94640 AIRWAY INHALATION TREATMENT: CPT

## 2024-11-20 PROCEDURE — 2500000001 HC RX 250 WO HCPCS SELF ADMINISTERED DRUGS (ALT 637 FOR MEDICARE OP): Performed by: STUDENT IN AN ORGANIZED HEALTH CARE EDUCATION/TRAINING PROGRAM

## 2024-11-20 PROCEDURE — 2500000005 HC RX 250 GENERAL PHARMACY W/O HCPCS: Performed by: STUDENT IN AN ORGANIZED HEALTH CARE EDUCATION/TRAINING PROGRAM

## 2024-11-20 PROCEDURE — 85027 COMPLETE CBC AUTOMATED: CPT | Performed by: STUDENT IN AN ORGANIZED HEALTH CARE EDUCATION/TRAINING PROGRAM

## 2024-11-20 PROCEDURE — 36415 COLL VENOUS BLD VENIPUNCTURE: CPT | Performed by: STUDENT IN AN ORGANIZED HEALTH CARE EDUCATION/TRAINING PROGRAM

## 2024-11-20 PROCEDURE — 97535 SELF CARE MNGMENT TRAINING: CPT | Mod: GO,CO

## 2024-11-20 PROCEDURE — 83735 ASSAY OF MAGNESIUM: CPT | Performed by: STUDENT IN AN ORGANIZED HEALTH CARE EDUCATION/TRAINING PROGRAM

## 2024-11-20 PROCEDURE — 99233 SBSQ HOSP IP/OBS HIGH 50: CPT | Performed by: STUDENT IN AN ORGANIZED HEALTH CARE EDUCATION/TRAINING PROGRAM

## 2024-11-20 PROCEDURE — 2500000001 HC RX 250 WO HCPCS SELF ADMINISTERED DRUGS (ALT 637 FOR MEDICARE OP): Performed by: INTERNAL MEDICINE

## 2024-11-20 PROCEDURE — 1200000002 HC GENERAL ROOM WITH TELEMETRY DAILY

## 2024-11-20 PROCEDURE — 2500000002 HC RX 250 W HCPCS SELF ADMINISTERED DRUGS (ALT 637 FOR MEDICARE OP, ALT 636 FOR OP/ED)

## 2024-11-20 RX ORDER — DICLOFENAC SODIUM 10 MG/G
4 GEL TOPICAL 3 TIMES DAILY PRN
Status: DISCONTINUED | OUTPATIENT
Start: 2024-11-20 | End: 2024-11-21 | Stop reason: HOSPADM

## 2024-11-20 RX ORDER — LIDOCAINE 560 MG/1
1 PATCH PERCUTANEOUS; TOPICAL; TRANSDERMAL DAILY
Status: DISCONTINUED | OUTPATIENT
Start: 2024-11-20 | End: 2024-11-21 | Stop reason: HOSPADM

## 2024-11-20 ASSESSMENT — COGNITIVE AND FUNCTIONAL STATUS - GENERAL
DRESSING REGULAR UPPER BODY CLOTHING: A LOT
MOVING TO AND FROM BED TO CHAIR: A LOT
CLIMB 3 TO 5 STEPS WITH RAILING: A LOT
TOILETING: A LOT
MOVING FROM LYING ON BACK TO SITTING ON SIDE OF FLAT BED WITH BEDRAILS: A LITTLE
WALKING IN HOSPITAL ROOM: A LOT
STANDING UP FROM CHAIR USING ARMS: A LOT
DRESSING REGULAR LOWER BODY CLOTHING: TOTAL
STANDING UP FROM CHAIR USING ARMS: A LOT
DRESSING REGULAR UPPER BODY CLOTHING: A LITTLE
DRESSING REGULAR LOWER BODY CLOTHING: A LITTLE
HELP NEEDED FOR BATHING: A LITTLE
MOBILITY SCORE: 13
MOBILITY SCORE: 14
HELP NEEDED FOR BATHING: A LOT
DRESSING REGULAR LOWER BODY CLOTHING: A LITTLE
CLIMB 3 TO 5 STEPS WITH RAILING: TOTAL
DRESSING REGULAR UPPER BODY CLOTHING: A LITTLE
TURNING FROM BACK TO SIDE WHILE IN FLAT BAD: A LITTLE
HELP NEEDED FOR BATHING: A LOT
PERSONAL GROOMING: A LITTLE
MOVING FROM LYING ON BACK TO SITTING ON SIDE OF FLAT BED WITH BEDRAILS: A LITTLE
DAILY ACTIVITIY SCORE: 14
DAILY ACTIVITIY SCORE: 20
TOILETING: A LITTLE
DAILY ACTIVITIY SCORE: 17
WALKING IN HOSPITAL ROOM: A LOT
MOVING TO AND FROM BED TO CHAIR: A LOT
TURNING FROM BACK TO SIDE WHILE IN FLAT BAD: A LITTLE
TOILETING: TOTAL

## 2024-11-20 ASSESSMENT — PAIN DESCRIPTION - ORIENTATION: ORIENTATION: RIGHT;LEFT

## 2024-11-20 ASSESSMENT — PAIN SCALES - PAIN ASSESSMENT IN ADVANCED DEMENTIA (PAINAD)
BODYLANGUAGE: RELAXED
FACIALEXPRESSION: SMILING OR INEXPRESSIVE
CONSOLABILITY: NO NEED TO CONSOLE
TOTALSCORE: 1
BREATHING: OCCASIONAL LABORED BREATHING, SHORT PERIOD OF HYPERVENTILATION

## 2024-11-20 ASSESSMENT — PAIN SCALES - GENERAL
PAINLEVEL_OUTOF10: 8
PAINLEVEL_OUTOF10: 5 - MODERATE PAIN

## 2024-11-20 ASSESSMENT — ACTIVITIES OF DAILY LIVING (ADL)
HOME_MANAGEMENT_TIME_ENTRY: 39
BATHING_LEVEL_OF_ASSISTANCE: MAXIMUM ASSISTANCE

## 2024-11-20 ASSESSMENT — PAIN - FUNCTIONAL ASSESSMENT
PAIN_FUNCTIONAL_ASSESSMENT: 0-10

## 2024-11-20 NOTE — CARE PLAN
The patient's goals for the shift include      The clinical goals for the shift include see care plan      Problem: Pain - Adult  Goal: Verbalizes/displays adequate comfort level or baseline comfort level  Outcome: Progressing     Problem: Discharge Planning  Goal: Discharge to home or other facility with appropriate resources  Outcome: Progressing     Problem: Chronic Conditions and Co-morbidities  Goal: Patient's chronic conditions and co-morbidity symptoms are monitored and maintained or improved  Outcome: Progressing     Problem: Nutrition  Goal: Oral intake greater 75%  Outcome: Progressing  Goal: Consume prescribed supplement  Outcome: Progressing  Goal: Adequate PO fluid intake  Outcome: Progressing  Goal: BG  mg/dL  Outcome: Progressing  Goal: Electrolytes WNL  Outcome: Progressing  Goal: Promote healing  Outcome: Progressing  Goal: Maintain stable weight  Outcome: Progressing     Problem: Skin  Goal: Decreased wound size/increased tissue granulation at next dressing change  Outcome: Progressing  Flowsheets (Taken 11/19/2024 7013 by Niru Cross RN)  Decreased wound size/increased tissue granulation at next dressing change:   Promote sleep for wound healing   Protective dressings over bony prominences  Goal: Participates in plan/prevention/treatment measures  Outcome: Progressing  Flowsheets (Taken 11/20/2024 1718)  Participates in plan/prevention/treatment measures: Discuss with provider PT/OT consult  Goal: Prevent/manage excess moisture  Outcome: Progressing  Flowsheets (Taken 11/20/2024 1718)  Prevent/manage excess moisture:   Cleanse incontinence/protect with barrier cream   Monitor for/manage infection if present  Goal: Prevent/minimize sheer/friction injuries  Outcome: Progressing  Flowsheets (Taken 11/19/2024 1717)  Prevent/minimize sheer/friction injuries: Increase activity/out of bed for meals  Goal: Promote/optimize nutrition  Outcome: Progressing  Flowsheets (Taken 11/20/2024  1718)  Promote/optimize nutrition:   Consume > 50% meals/supplements   Monitor/record intake including meals  Goal: Promote skin healing  Outcome: Progressing  Flowsheets (Taken 11/19/2024 1717)  Promote skin healing: Assess skin/pad under line(s)/device(s)     Problem: Fall/Injury  Goal: Verbalize understanding of personal risk factors for fall in the hospital  Outcome: Progressing  Goal: Verbalize understanding of risk factor reduction measures to prevent injury from fall in the home  Outcome: Progressing     Problem: Pain  Goal: Takes deep breaths with improved pain control throughout the shift  Outcome: Progressing  Goal: Turns in bed with improved pain control throughout the shift  Outcome: Progressing  Goal: Walks with improved pain control throughout the shift  Outcome: Progressing  Goal: Performs ADL's with improved pain control throughout shift  Outcome: Progressing  Goal: Participates in PT with improved pain control throughout the shift  Outcome: Progressing  Goal: Free from opioid side effects throughout the shift  Outcome: Progressing  Goal: Free from acute confusion related to pain meds throughout the shift  Outcome: Progressing     Problem: Dressings Lower Extremities  Goal: STG - Patient to complete lower body dressing min assist  Outcome: Progressing     Problem: Grooming  Goal: STG - Patient completes grooming SUP  Outcome: Progressing     EOS note: Pt oriented x4 and very pleasant but [place]. Weaned to 1L but VSS. No wheezes noted on exam today but patient continues to get scheduled breathing treatments. Reports of generalized pain, tylenol given. Lidocaine patch ordered to be placed on patients back. Continuing to diuresis. Pt took all pills. Ambulated as a 2 assist to the chair. Purewick is in place with good output. Plan is still to return to the [facility].    Pt resting at this time. Bed is in lowest position and locked with alarm on. Call light and personal possesions are within  reach.    1826 - Critical lab value called for a troponin of 160. Dr. Boland notified.

## 2024-11-20 NOTE — PROGRESS NOTES
Occupational Therapy    OT Treatment    Patient Name: Elgin Marin  MRN: [id number]  Today's Date: 11/20/2024  Time Calculation  Start Time: 1011  Stop Time: 1050  Time Calculation (min): 39 min       3020/3020-A    Assessment:  OT Assessment: Pt. presents with decreased balance and endurance impacting pt. ability to complete ADLs and mobility independently  Prognosis: Fair  Evaluation/Treatment Tolerance: Patient limited by fatigue  End of Session Communication: Bedside nurse  End of Session Patient Position: Up in chair, Alarm on  OT Assessment Results: Decreased ADL status, Decreased functional mobility  Prognosis: Fair  Evaluation/Treatment Tolerance: Patient limited by fatigue    Plan:  Treatment Interventions: ADL retraining, Functional transfer training  OT Frequency: 3 times per week  OT Discharge Recommendations: Moderate intensity level of continued care  Equipment Recommended upon Discharge: Wheeled walker  Treatment Interventions: ADL retraining, Functional transfer training  Subjective     Outcome Measures:[facility] Daily Activity  Putting on and taking off regular lower body clothing: Total  Bathing (including washing, rinsing, drying): A lot  Putting on and taking off regular upper body clothing: A little  Toileting, which includes using toilet, bedpan or urinal: Total  Taking care of personal grooming such as brushing teeth: A little  Eating Meals: None  Daily Activity - Total Score: 14       11/20/24 1011   OT Last Visit   OT Received On 11/20/24   General   Prior to Session Communication Bedside nurse   Patient Position Received Up in chair;Alarm on   Preferred Learning Style verbal;visual   General Comment Pt pleasant and agreeable to work with therapy. Pt sates he feels worse than yesterday, difficulty breathing   Precautions   Medical Precautions Fall precautions   Precautions Comment 2L O2   Pain Assessment   Pain Assessment 0-10   0-10 (Numeric) Pain Score 5 - Moderate pain   Pain Type Chronic  pain   Pain Location Knee   Cognition   Orientation Level Oriented X4   Grooming   Grooming Level of Assistance Setup   Grooming Where Assessed Chair   Grooming Comments washed face, brushed teeth   UE Bathing   UE Bathing Level of Assistance Setup   UE Bathing Where Assessed   (chair)   UE Bathing Comments washed under arms and chest   LE Bathing   LE Bathing Level of Assistance Maximum assistance   LE Bathing Comments pt able to stand for a minute with ww support while therapist washed buttocks   UE Dressing   UE Dressing Level of Assistance Minimum assistance   UE Dressing Where Assessed Chair   UE Dressing Comments changed gown   Toileting   Toileting Comments pt using pure wick   Functional Mobility   Functional Mobility Performed   (pt declined ambulation)   Transfers   Transfer   (STS at min A with ww support)   IP OT Assessment   OT Assessment Pt. presents with decreased balance and endurance impacting pt. ability to complete ADLs and mobility independently   Prognosis Fair   Evaluation/Treatment Tolerance Patient limited by fatigue   End of Session Communication Bedside nurse   End of Session Patient Position Up in chair;Alarm on   OT Assessment   OT Assessment Results Decreased ADL status;Decreased functional mobility   Education   Individual(s) Educated Patient   Education Provided Fall precautons   Patient Response to Education Patient/Caregiver Verbalized Understanding of Information   Education Comment Pt would benefit from contiued reinforcement   Inpatient Plan   Treatment Interventions ADL retraining;Functional transfer training   OT Frequency 3 times per week   OT Discharge Recommendations Moderate intensity level of continued care   Equipment Recommended upon Discharge Wheeled walker           Goals:  Encounter Problems       Encounter Problems (Active)       Dressings Lower Extremities       STG - Patient to complete lower body dressing min assist (Progressing)       Start:  11/18/24    Expected  End:  12/02/24               Functional Balance       LTG - Patient will maintain standing and sitting balance to allow for completion of daily activities (Progressing)       Start:  11/18/24    Expected End:  12/02/24               Grooming       STG - Patient completes grooming SUP (Progressing)       Start:  11/18/24    Expected End:  12/02/24               OT Transfers       STG - Patient will perform bed mobility min assist (Progressing)       Start:  11/18/24    Expected End:  12/02/24            STG - Patient will perform toilet transfer mod assist (Progressing)       Start:  11/18/24    Expected End:  12/02/24

## 2024-11-20 NOTE — CARE PLAN
The patient's goals for the shift include      The clinical goals for the shift include see care plan      Problem: Pain - Adult  Goal: Verbalizes/displays adequate comfort level or baseline comfort level  Outcome: Progressing     Problem: Discharge Planning  Goal: Discharge to home or other facility with appropriate resources  Outcome: Progressing     Problem: Chronic Conditions and Co-morbidities  Goal: Patient's chronic conditions and co-morbidity symptoms are monitored and maintained or improved  Outcome: Progressing     Problem: Nutrition  Goal: Oral intake greater 75%  Outcome: Progressing  Goal: Consume prescribed supplement  Outcome: Progressing  Goal: Adequate PO fluid intake  Outcome: Progressing  Goal: BG  mg/dL  Outcome: Progressing  Goal: Electrolytes WNL  Outcome: Progressing  Goal: Promote healing  Outcome: Progressing  Goal: Maintain stable weight  Outcome: Progressing     Problem: Skin  Goal: Decreased wound size/increased tissue granulation at next dressing change  Outcome: Progressing  Flowsheets (Taken 11/19/2024 2243)  Decreased wound size/increased tissue granulation at next dressing change:   Promote sleep for wound healing   Protective dressings over bony prominences  Goal: Participates in plan/prevention/treatment measures  Outcome: Progressing  Flowsheets (Taken 11/19/2024 2243)  Participates in plan/prevention/treatment measures:   Discuss with provider PT/OT consult   Elevate heels   Increase activity/out of bed for meals  Goal: Prevent/manage excess moisture  Outcome: Progressing  Flowsheets (Taken 11/19/2024 2243)  Prevent/manage excess moisture:   Cleanse incontinence/protect with barrier cream   Follow provider orders for dressing changes   Monitor for/manage infection if present  Goal: Prevent/minimize sheer/friction injuries  Outcome: Progressing  Goal: Promote/optimize nutrition  Outcome: Progressing  Goal: Promote skin healing  Outcome: Progressing     Problem:  Fall/Injury  Goal: Verbalize understanding of personal risk factors for fall in the hospital  Outcome: Progressing  Goal: Verbalize understanding of risk factor reduction measures to prevent injury from fall in the home  Outcome: Progressing     Problem: Pain  Goal: Takes deep breaths with improved pain control throughout the shift  Outcome: Progressing  Goal: Turns in bed with improved pain control throughout the shift  Outcome: Progressing  Goal: Walks with improved pain control throughout the shift  Outcome: Progressing  Goal: Performs ADL's with improved pain control throughout shift  Outcome: Progressing  Goal: Participates in PT with improved pain control throughout the shift  Outcome: Progressing  Goal: Free from opioid side effects throughout the shift  Outcome: Progressing  Goal: Free from acute confusion related to pain meds throughout the shift  Outcome: Progressing     Problem: Dressings Lower Extremities  Goal: STG - Patient to complete lower body dressing min assist  Outcome: Progressing

## 2024-11-20 NOTE — NURSING NOTE
Four Eye Skin Check completed by Sandi Borden RN and Genia Vargas RN.     No new wounds noted. Pictures placed in chart or bilateral toe wounds, dressing changes done as ordered. Picture of sacrum placed in chart and measured. Wound care consulted with daily dressing changes ordered.

## 2024-11-20 NOTE — PROGRESS NOTES
11/20/24 1135   Discharge Planning   Expected Discharge Disposition SNF     Patient has precert to return to [facility] good through 11/22. Updated attending.

## 2024-11-20 NOTE — NURSING NOTE
SHIFT NOTE  Pt remained A&O x4, and afebrile.  Tylenol was given twice for pain ans   pt said it wasn't working. Voltaren cream was requested and  ordered for chronic knee pain.   0118  Pt's heavy breathing was heard from neighbor's room.  Pt felt SOB  and was resting in bed. RR was 28 but he was slouching.  Pt felt   like he needed supplemental O2, after being weaned to RA   earlier yesterday. 2L added and SpO2 climbed from 92 to 97%.    Crackles heard in bases. Lasix was changed to torsemide   yesterday.  Dr was updated. No changes at that time.  0600 Pt was found SOB. SpO2 stable at 96% on 2L. Breathing tx given  PRN.  Work of breathing improved but not resolved.  Dr was  updated.  1250mL of urine was emptied from external cath this  shift.   0615 Bilateral toe wounds were cleaned, dried, and dressed. Right foot  photo was updated in the chart.   Safety precautions in place.

## 2024-11-20 NOTE — PROGRESS NOTES
"Elgin Marin is a 84 y.o. male on day 5 of admission presenting with Troponin level elevated.    Subjective   Patient seen and examined at bedside.  Patient is up in the chair and reports he is not feeling as well today. He had some chest pain and didn't sleep well. He reports feeling weaker today. Overnight he was complaining about SOB and was placed back on O2 though his sats stayed >90%. Suspect a component of anxiety about returning to Snf.     Objective     Physical Exam    Constitutional: Well developed, no distress, alert and cooperative  Skin: Warm and dry  Eyes: EOMI, clear sclera  ENMT: mucous membranes moist  Respiratory: clear, diminished bases, NC in place, no conversational dyspnea   Cardiovascular: Regular, rate and rhythm, + murmur  Abdominal: Nondistended, soft, non-tender, +BS  MSK: ROM intact, LE mildly tender to palpation  Neuro: awake and alert & oriented x3-4, answers questions appropriately     Last Recorded Vitals  Blood pressure 120/60, pulse 60, temperature 36 °C (96.8 °F), temperature source Temporal, resp. rate 20, height 1.778 m (5' 10\"), weight 90.4 kg (199 lb 4.7 oz), SpO2 98%.  Intake/Output last 3 Shifts:  I/O last 3 completed shifts:  In: 300 (3.3 mL/kg) [P.O.:300]  Out: 3125 (34.6 mL/kg) [Urine:3125 (1 mL/kg/hr)]  Weight: 90.4 kg     Relevant Results  Scheduled medications  apixaban, 2.5 mg, oral, BID  atorvastatin, 80 mg, oral, Nightly  bisoprolol, 5 mg, oral, Daily  budesonide, 0.25 mg, nebulization, BID  cholecalciferol, 2,000 Units, oral, Daily  empagliflozin, 12.5 mg, oral, Daily  hydrALAZINE, 10 mg, oral, BID  insulin glargine, 10 Units, subcutaneous, q AM  insulin lispro, 0-10 Units, subcutaneous, Before meals & nightly  ipratropium-albuteroL, 3 mL, nebulization, TID  isosorbide dinitrate, 10 mg, oral, BID  NIFEdipine ER, 30 mg, oral, Daily before breakfast  oxygen, , inhalation, Continuous - Inhalation  pantoprazole, 40 mg, oral, Nightly  spironolactone, 12.5 mg, oral, " q24h BRE  torsemide, 80 mg, oral, Daily  white petrolatum, , Topical, Daily      Continuous medications     PRN medications  PRN medications: acetaminophen, alum-mag hydroxide-simeth, dextromethorphan-guaifenesin, dextrose, dextrose, diclofenac sodium, glucagon, glucagon, ipratropium-albuteroL, lidocaine, melatonin, ondansetron  Results from last 7 days   Lab Units 11/20/24  0555 11/19/24  0706 11/18/24  0558   WBC AUTO x10*3/uL 8.3 7.8 8.5   RBC AUTO x10*6/uL 4.07* 4.04* 4.27*   HEMOGLOBIN g/dL 11.2* 11.2* 11.7*     Results from last 7 days   Lab Units 11/20/24  0556 11/19/24  0736 11/18/24  0558 11/16/24  0640 11/15/24  0612   SODIUM mmol/L 131* 133* 134*   < > 136   POTASSIUM mmol/L 4.0 3.9 4.3   < > 4.3   CHLORIDE mmol/L 92* 94* 93*   < > 93*   CO2 mmol/L 33* 34* 34*   < > 32   BUN mg/dL 60* 64* 66*   < > 51*   CREATININE mg/dL 2.03* 1.92* 2.18*   < > 2.07*   CALCIUM mg/dL 7.8* 7.7* 7.9*   < > 8.6   PHOSPHORUS mg/dL 2.9 3.3 4.0   < >  --    MAGNESIUM mg/dL 2.19 2.22 2.24   < > 2.00   BILIRUBIN TOTAL mg/dL  --   --   --   --  1.3*   ALT U/L  --   --   --   --  10   AST U/L  --   --   --   --  24    < > = values in this interval not displayed.       No results found.     Assessment/Plan   Principal Problem:    Troponin level elevated  Active Problems:    Shortness of breath  Chronically elevated trop with type II demand   Afib  CAD   HLD  Glaucoma  CKD   JOAO   HTN  CHF combined systolic and diastolic      -improving    -CXR - Small bilateral pleural effusions. Patchy opacities in the right lung field may represent asymmetric pulmonary edema versus infection  -recent Wcx pseudomonas   -completed cefepime and azithro  -continue duonebs q4hrs and budesonide   -trop 193<219, baseline 160 ; f/u repeat trop due to chest discomfort over night  -echo - EF 42%, indeterminate diastolic filling, severely reduced RVSF, mod dilation of LA &RA, mild to mod TR, aortic sclerosis, inferior vena cava severely dilated, moderately  increased septal and posterior left ventricular wall thickenss    -continue torsemide and spironolactone   -net neg ~8.9L this stay  -strict I/os   -cardio consulted   -Cr 2.03, similar to BL, continue to monitor  -monitor for hospital delirium, reorient as needed   -continue home meds as indicated   -incentive spirometer   -ST for reports of difficulty swallowing  -UA not cw infection     FENGI   -encourage oral hydration   -replete electrolytes PRN  -cardiac/diabetic diet   -GI ppx: ppi  -DVT ppx: SCDs, eliquis     Dispo: This is an 84-year-old male who was admitted with acute hypoxic respiratory failure with CHF exacerbation and possible COPD exacerbation with pneumonia.  Started on breathing treatments, steroids, IV antibiotics and IV diuresis.  Cardiology is consulted.  With waxing and waning mentation on 11/16, suspect hospital delirium superimposed on recent dementia diagnosis, steroids stopped. Mentation at baseline. Diuresing well, renal function at baseline or better. Transition to PO diuresis. PT/OT/ST consulted.  Anticipate discharge in 24-48hrs.     This note is created using voice recognition software. All efforts are made to minimize errors, if there are errors there due to transcription.    Brit Boland, DO   Hospitalist

## 2024-11-21 VITALS
SYSTOLIC BLOOD PRESSURE: 126 MMHG | DIASTOLIC BLOOD PRESSURE: 63 MMHG | HEIGHT: 70 IN | HEART RATE: 78 BPM | BODY MASS INDEX: 28.53 KG/M2 | RESPIRATION RATE: 16 BRPM | TEMPERATURE: 98.6 F | OXYGEN SATURATION: 98 % | WEIGHT: 199.3 LBS

## 2024-11-21 PROBLEM — R06.02 SHORTNESS OF BREATH: Status: RESOLVED | Noted: 2024-11-15 | Resolved: 2024-11-21

## 2024-11-21 LAB
ANION GAP SERPL CALC-SCNC: 11 MMOL/L (ref 10–20)
BNP SERPL-MCNC: 907 PG/ML (ref 0–99)
BUN SERPL-MCNC: 59 MG/DL (ref 6–23)
CALCIUM SERPL-MCNC: 7.9 MG/DL (ref 8.6–10.3)
CHLORIDE SERPL-SCNC: 92 MMOL/L (ref 98–107)
CO2 SERPL-SCNC: 33 MMOL/L (ref 21–32)
CREAT SERPL-MCNC: 2.11 MG/DL (ref 0.5–1.3)
EGFRCR SERPLBLD CKD-EPI 2021: 30 ML/MIN/1.73M*2
ERYTHROCYTE [DISTWIDTH] IN BLOOD BY AUTOMATED COUNT: 16.1 % (ref 11.5–14.5)
GLUCOSE BLD MANUAL STRIP-MCNC: 159 MG/DL (ref 74–99)
GLUCOSE BLD MANUAL STRIP-MCNC: 204 MG/DL (ref 74–99)
GLUCOSE BLD MANUAL STRIP-MCNC: 219 MG/DL (ref 74–99)
GLUCOSE SERPL-MCNC: 156 MG/DL (ref 74–99)
HCT VFR BLD AUTO: 37.2 % (ref 41–52)
HGB BLD-MCNC: 11.4 G/DL (ref 13.5–17.5)
MAGNESIUM SERPL-MCNC: 2.28 MG/DL (ref 1.6–2.4)
MCH RBC QN AUTO: 28.1 PG (ref 26–34)
MCHC RBC AUTO-ENTMCNC: 30.6 G/DL (ref 32–36)
MCV RBC AUTO: 92 FL (ref 80–100)
NRBC BLD-RTO: 0 /100 WBCS (ref 0–0)
PHOSPHATE SERPL-MCNC: 2.9 MG/DL (ref 2.5–4.9)
PLATELET # BLD AUTO: 150 X10*3/UL (ref 150–450)
POTASSIUM SERPL-SCNC: 4.1 MMOL/L (ref 3.5–5.3)
RBC # BLD AUTO: 4.06 X10*6/UL (ref 4.5–5.9)
SODIUM SERPL-SCNC: 132 MMOL/L (ref 136–145)
WBC # BLD AUTO: 9.7 X10*3/UL (ref 4.4–11.3)

## 2024-11-21 PROCEDURE — 84100 ASSAY OF PHOSPHORUS: CPT | Performed by: STUDENT IN AN ORGANIZED HEALTH CARE EDUCATION/TRAINING PROGRAM

## 2024-11-21 PROCEDURE — 99232 SBSQ HOSP IP/OBS MODERATE 35: CPT

## 2024-11-21 PROCEDURE — 99239 HOSP IP/OBS DSCHRG MGMT >30: CPT | Performed by: STUDENT IN AN ORGANIZED HEALTH CARE EDUCATION/TRAINING PROGRAM

## 2024-11-21 PROCEDURE — 2500000001 HC RX 250 WO HCPCS SELF ADMINISTERED DRUGS (ALT 637 FOR MEDICARE OP)

## 2024-11-21 PROCEDURE — 85027 COMPLETE CBC AUTOMATED: CPT | Performed by: STUDENT IN AN ORGANIZED HEALTH CARE EDUCATION/TRAINING PROGRAM

## 2024-11-21 PROCEDURE — 2500000005 HC RX 250 GENERAL PHARMACY W/O HCPCS: Performed by: STUDENT IN AN ORGANIZED HEALTH CARE EDUCATION/TRAINING PROGRAM

## 2024-11-21 PROCEDURE — 83735 ASSAY OF MAGNESIUM: CPT | Performed by: STUDENT IN AN ORGANIZED HEALTH CARE EDUCATION/TRAINING PROGRAM

## 2024-11-21 PROCEDURE — 94640 AIRWAY INHALATION TREATMENT: CPT

## 2024-11-21 PROCEDURE — 2500000001 HC RX 250 WO HCPCS SELF ADMINISTERED DRUGS (ALT 637 FOR MEDICARE OP): Performed by: STUDENT IN AN ORGANIZED HEALTH CARE EDUCATION/TRAINING PROGRAM

## 2024-11-21 PROCEDURE — 82947 ASSAY GLUCOSE BLOOD QUANT: CPT

## 2024-11-21 PROCEDURE — 80048 BASIC METABOLIC PNL TOTAL CA: CPT | Performed by: STUDENT IN AN ORGANIZED HEALTH CARE EDUCATION/TRAINING PROGRAM

## 2024-11-21 PROCEDURE — 36415 COLL VENOUS BLD VENIPUNCTURE: CPT | Performed by: STUDENT IN AN ORGANIZED HEALTH CARE EDUCATION/TRAINING PROGRAM

## 2024-11-21 PROCEDURE — 2500000002 HC RX 250 W HCPCS SELF ADMINISTERED DRUGS (ALT 637 FOR MEDICARE OP, ALT 636 FOR OP/ED)

## 2024-11-21 PROCEDURE — 2500000002 HC RX 250 W HCPCS SELF ADMINISTERED DRUGS (ALT 637 FOR MEDICARE OP, ALT 636 FOR OP/ED): Performed by: STUDENT IN AN ORGANIZED HEALTH CARE EDUCATION/TRAINING PROGRAM

## 2024-11-21 PROCEDURE — 83880 ASSAY OF NATRIURETIC PEPTIDE: CPT

## 2024-11-21 RX ORDER — INSULIN LISPRO 100 [IU]/ML
0-10 INJECTION, SOLUTION INTRAVENOUS; SUBCUTANEOUS
Start: 2024-11-21

## 2024-11-21 RX ORDER — LIDOCAINE 560 MG/1
1 PATCH PERCUTANEOUS; TOPICAL; TRANSDERMAL DAILY
Qty: 30 PATCH | Refills: 0 | Status: SHIPPED | OUTPATIENT
Start: 2024-11-22 | End: 2024-12-22

## 2024-11-21 RX ORDER — ISOSORBIDE DINITRATE 10 MG/1
10 TABLET ORAL
Qty: 60 TABLET | Refills: 0 | Status: SHIPPED | OUTPATIENT
Start: 2024-11-22 | End: 2024-11-21

## 2024-11-21 RX ORDER — IPRATROPIUM BROMIDE AND ALBUTEROL SULFATE 2.5; .5 MG/3ML; MG/3ML
3 SOLUTION RESPIRATORY (INHALATION) EVERY 2 HOUR PRN
Start: 2024-11-21

## 2024-11-21 RX ORDER — PETROLATUM 420 MG/G
1 OINTMENT TOPICAL DAILY
Qty: 7 G | Refills: 0 | Status: SHIPPED | OUTPATIENT
Start: 2024-11-22

## 2024-11-21 RX ORDER — ISOSORBIDE DINITRATE 10 MG/1
10 TABLET ORAL
Qty: 60 TABLET | Refills: 0 | Status: SHIPPED | OUTPATIENT
Start: 2024-11-22 | End: 2024-12-22

## 2024-11-21 RX ORDER — HYDRALAZINE HYDROCHLORIDE 10 MG/1
10 TABLET, FILM COATED ORAL 2 TIMES DAILY
Qty: 60 TABLET | Refills: 0 | Status: SHIPPED | OUTPATIENT
Start: 2024-11-21 | End: 2024-12-21

## 2024-11-21 RX ORDER — NIFEDIPINE 30 MG/1
30 TABLET, FILM COATED, EXTENDED RELEASE ORAL
Start: 2024-11-21

## 2024-11-21 RX ORDER — SPIRONOLACTONE 25 MG/1
12.5 TABLET ORAL
Qty: 15 TABLET | Refills: 0 | Status: SHIPPED | OUTPATIENT
Start: 2024-11-22 | End: 2024-12-22

## 2024-11-21 ASSESSMENT — PAIN SCALES - GENERAL
PAINLEVEL_OUTOF10: 3
PAINLEVEL_OUTOF10: 5 - MODERATE PAIN

## 2024-11-21 ASSESSMENT — COGNITIVE AND FUNCTIONAL STATUS - GENERAL
MOBILITY SCORE: 13
TURNING FROM BACK TO SIDE WHILE IN FLAT BAD: A LITTLE
DRESSING REGULAR LOWER BODY CLOTHING: A LITTLE
TOILETING: A LITTLE
MOVING FROM LYING ON BACK TO SITTING ON SIDE OF FLAT BED WITH BEDRAILS: A LITTLE
WALKING IN HOSPITAL ROOM: A LOT
DRESSING REGULAR UPPER BODY CLOTHING: A LITTLE
STANDING UP FROM CHAIR USING ARMS: A LOT
CLIMB 3 TO 5 STEPS WITH RAILING: TOTAL
HELP NEEDED FOR BATHING: A LITTLE
MOVING TO AND FROM BED TO CHAIR: A LOT
DAILY ACTIVITIY SCORE: 20

## 2024-11-21 ASSESSMENT — PAIN DESCRIPTION - LOCATION: LOCATION: HEAD

## 2024-11-21 ASSESSMENT — PAIN - FUNCTIONAL ASSESSMENT: PAIN_FUNCTIONAL_ASSESSMENT: 0-10

## 2024-11-21 NOTE — NURSING NOTE
PT has been up in chair, multiple attempts to elevate legs are made, pt puts legs down . Diuresing well. Pt has had incontinence and pure wik has leaked multiple times. Insulin Coverage given per order. Pt to be discharged to [facility] today, report called to Ingrid. Chair alarm in use, call light inreach, safety maintained

## 2024-11-21 NOTE — CARE PLAN
The patient's goals for the shift include      The clinical goals for the shift include See POC      Problem: Pain - Adult  Goal: Verbalizes/displays adequate comfort level or baseline comfort level  Outcome: Progressing     Problem: Discharge Planning  Goal: Discharge to home or other facility with appropriate resources  Outcome: Progressing     Problem: Chronic Conditions and Co-morbidities  Goal: Patient's chronic conditions and co-morbidity symptoms are monitored and maintained or improved  Outcome: Progressing     Problem: Nutrition  Goal: Oral intake greater 75%  Outcome: Progressing  Goal: Consume prescribed supplement  Outcome: Progressing  Goal: Adequate PO fluid intake  Outcome: Progressing  Goal: BG  mg/dL  Outcome: Progressing  Goal: Electrolytes WNL  Outcome: Progressing  Goal: Promote healing  Outcome: Progressing  Goal: Maintain stable weight  Outcome: Progressing     Problem: Skin  Goal: Decreased wound size/increased tissue granulation at next dressing change  Outcome: Progressing  Flowsheets (Taken 11/21/2024 0613)  Decreased wound size/increased tissue granulation at next dressing change:   Promote sleep for wound healing   Protective dressings over bony prominences  Goal: Participates in plan/prevention/treatment measures  Outcome: Progressing  Flowsheets (Taken 11/21/2024 0613)  Participates in plan/prevention/treatment measures:   Discuss with provider PT/OT consult   Elevate heels   Increase activity/out of bed for meals  Goal: Prevent/manage excess moisture  Outcome: Progressing  Flowsheets (Taken 11/21/2024 0613)  Prevent/manage excess moisture:   Cleanse incontinence/protect with barrier cream   Follow provider orders for dressing changes   Monitor for/manage infection if present  Goal: Prevent/minimize sheer/friction injuries  Outcome: Progressing  Goal: Promote/optimize nutrition  Outcome: Progressing  Goal: Promote skin healing  Outcome: Progressing     Problem: Fall/Injury  Goal:  Verbalize understanding of personal risk factors for fall in the hospital  Outcome: Progressing  Goal: Verbalize understanding of risk factor reduction measures to prevent injury from fall in the home  Outcome: Progressing     Problem: Pain  Goal: Takes deep breaths with improved pain control throughout the shift  Outcome: Progressing  Goal: Turns in bed with improved pain control throughout the shift  Outcome: Progressing  Goal: Walks with improved pain control throughout the shift  Outcome: Progressing  Goal: Performs ADL's with improved pain control throughout shift  Outcome: Progressing  Goal: Participates in PT with improved pain control throughout the shift  Outcome: Progressing  Goal: Free from opioid side effects throughout the shift  Outcome: Progressing  Goal: Free from acute confusion related to pain meds throughout the shift  Outcome: Progressing     Problem: Dressings Lower Extremities  Goal: STG - Patient to complete lower body dressing min assist  Outcome: Progressing

## 2024-11-21 NOTE — PROGRESS NOTES
11/21/24 1537   Discharge Planning   Home or Post Acute Services Post acute facilities (Rehab/SNF/etc)   Type of Post Acute Facility Services Skilled nursing   Expected Discharge Disposition SNF  ([facility])   Has discharge transport been arranged? Yes   What time is the transport expected? 1600     Pt cleared for return to [facility] today at 6:00. Pt aware of discharge and time and message was left for his son, Duane, per pt's request.  Bedside nurse and facility aware of discharge and time. Nursing will call report prior to discharge.

## 2024-11-21 NOTE — DISCHARGE SUMMARY
Discharge Diagnosis  Troponin level elevated    Issues Requiring Follow-Up  CHF  Suspected COPD  CAD    Discharge Meds     Medication List      START taking these medications     insulin lispro 100 unit/mL injection; Inject 0-10 Units under the skin 4   times a day before meals. Take as directed per insulin instructions.   isosorbide dinitrate 10 mg tablet; Commonly known as: Isordil; Take 1   tablet (10 mg) by mouth 2 times a day.; Start taking on: November 22, 2024   lidocaine 4 % patch; Place 1 patch over 12 hours on the skin once daily.   Remove & discard patch within 12 hours or as directed by MD.; Start taking   on: November 22, 2024   oxygen gas therapy; Commonly known as: O2; Inhale 1 each continuously.   spironolactone 25 mg tablet; Commonly known as: Aldactone; Take 0.5   tablets (12.5 mg) by mouth once every 24 hours.; Start taking on: November 22, 2024   white petrolatum 41 % ointment ointment; Commonly known as: Aquaphor;   Apply 1 Application topically once daily.; Start taking on: November 22, 2024     CHANGE how you take these medications     empagliflozin 25 mg; Commonly known as: Jardiance; What changed: Another   medication with the same name was removed. Continue taking this   medication, and follow the directions you see here.   * ipratropium-albuteroL 0.5-2.5 mg/3 mL nebulizer solution; Commonly   known as: Duo-Neb; Take 3 mL by nebulization 3 times a day.; What changed:   Another medication with the same name was added. Make sure you understand   how and when to take each.   * ipratropium-albuteroL 0.5-2.5 mg/3 mL nebulizer solution; Commonly   known as: Duo-Neb; Take 3 mL by nebulization every 2 hours if needed for   shortness of breath.; What changed: You were already taking a medication   with the same name, and this prescription was added. Make sure you   understand how and when to take each.  * This list has 2 medication(s) that are the same as other medications   prescribed for you.  "Read the directions carefully, and ask your doctor or   other care provider to review them with you.     CONTINUE taking these medications     * acetaminophen 500 mg tablet; Commonly known as: Tylenol   * acetaminophen 325 mg tablet; Commonly known as: Tylenol   alum-mag hydroxide-simeth 200-200-20 mg/5 mL oral suspension; Commonly   known as: Mylanta   apixaban 2.5 mg tablet; Commonly known as: Eliquis; Take 1 tablet (2.5   mg) by mouth 2 times a day.   bisacodyl 10 mg suppository; Commonly known as: Dulcolax   bisoprolol 5 mg tablet; Commonly known as: Zebeta   cholecalciferol 50 MCG (2000 UT) tablet; Commonly known as: Vitamin D-3   guaiFENesin 100 mg/5 mL syrup; Commonly known as: Robitussin   Lantus U-100 Insulin 100 unit/mL injection; Generic drug: insulin   glargine; Inject 10 Units under the skin once daily in the morning.   magnesium hydroxide 400 mg/5 mL suspension; Commonly known as: Milk of   Magnesia   NIFEdipine ER 30 mg 24 hr tablet; Commonly known as: Adalat CC; Take 1   tablet (30 mg) by mouth once daily in the morning. Take before meals. Do   not crush, chew, or split.   pantoprazole 40 mg EC tablet; Commonly known as: ProtoNix   pen needle, diabetic 31 gauge x 5/16\" needle; Use to inject insulin 1 to   4 times daily as directed   rosuvastatin 20 mg tablet; Commonly known as: Crestor   torsemide 40 mg tablet; Take 80 mg by mouth once daily.  * This list has 2 medication(s) that are the same as other medications   prescribed for you. Read the directions carefully, and ask your doctor or   other care provider to review them with you.     STOP taking these medications     amoxicillin-pot clavulanate 875-125 mg tablet; Commonly known as:   Augmentin       Test Results Pending At Discharge  Pending Labs       Order Current Status    B-type natriuretic peptide In process            Hospital Course   This is an 84-year-old male who was admitted with acute hypoxic respiratory failure with CHF exacerbation " and possible COPD exacerbation with pneumonia. Started on breathing treatments, steroids, IV antibiotics and IV diuresis. Cardiology was consulted. With waxing and waning mentation on 11/16, it was suspected pt hospital delirium superimposed on recent dementia diagnosis, steroids stopped and mentation improved and returned to baseline. He completed a course of antibiotics. Pt diuresed well and renal function was at baseline or better. He was transitioned to PO diuresis. PT/OT/ST consulted. He continued to improve and was determined to be medically stable for discharge to SNF with instructions for follow-up.    D/c >30min    Pertinent Physical Exam At Time of Discharge  Physical Exam  Constitutional: Well developed, no distress, alert and cooperative  Skin: Warm and dry  Eyes: EOMI, clear sclera  ENMT: mucous membranes moist  Respiratory: clear, slightly diminished bases, rare wheeze, NC in place, no conversational dyspnea   Cardiovascular: Regular, rate and rhythm, + murmur  Abdominal: Nondistended, soft, non-tender, +BS  MSK: ROM intact, LE mildly tender to palpation  Neuro: awake and alert & oriented x3-4, answers questions appropriately     Outpatient Follow-Up  Future Appointments   Date Time Provider Department Center   11/25/2024  1:30 PM Sissy Connolly OD [id number]         Brit Boland DO

## 2024-11-21 NOTE — NURSING NOTE
SHIFT NOTE     notified of BP 98/56 and holding nifedipine. Hydralazine was also held earlier on in shift.  Pt asymptomatic to hypotension.   No acute or worsening respiratory distress this shift.   SpO2 stable on 1L of oxygen.   Dressing changes done on feet.   Pain was controlled.   Pt ambulated from the chair to bed well with two people.   Safety maintained.

## 2024-11-21 NOTE — PROGRESS NOTES
11/21/24 1442   Patient Choice   Provider Choice list and CMS website (https://medicare.gov/care-compare#search) for post-acute Quality and Resource Measure Data were provided and reviewed with: Patient;Family     Informed patient of bed available at [facility]. Agreeable with discharge. Wants [facility] notified of time.  [address] [employer] Van you requested for Elgin VERASAsher in unit/room 3020A on 11/21/2024 is scheduled to arrive at 6:00pm EST! [facility] is handling this ride and you can contact them at [phone].

## 2024-11-21 NOTE — DISCHARGE INSTRUCTIONS
-Your medications have changed, please review your list carefully  -Please follow-up with your primary care provider in 7 to 10 days, please call to schedule  -Please follow-up with cardiology in 2 to 4 weeks, please call to schedule

## 2024-11-21 NOTE — PROGRESS NOTES
"Subjective   No acute overnight events.  Patient was hemodynamically stable.  He has a good urine output is 1850 mL.  Cardiology was reconsulted by primary team due to recurrent chest pain and shortness of breath. He is currently on 2 L oxygen.  His repeat troponin was 160 on 11/20 which is at his baseline.  At bedside this morning, patient states that he developed shortness of breath and chest pain whenever he gets up from the bed and moves around. Compared to when he initially presented, patient feels he significantly improved.  His breathing and leg swellings are better than before.     Objective     Last Recorded Vitals:  Blood pressure 108/60, pulse 70, temperature 36.5 °C (97.7 °F), temperature source Temporal, resp. rate 20, height 1.778 m (5' 10\"), weight 90.4 kg (199 lb 4.7 oz), SpO2 96%.    Physical Exam  Vitals and nursing note reviewed.   Constitutional:       General: He is not in acute distress.     Appearance: He is ill-appearing (chronically-ill).   Eyes:      General: No scleral icterus.     Extraocular Movements: Extraocular movements intact.      Conjunctiva/sclera: Conjunctivae normal.   Cardiovascular:      Rate and Rhythm: Normal rate and regular rhythm.      Pulses: Normal pulses.      Heart sounds: Normal heart sounds. No murmur heard.  Pulmonary:      Effort: Pulmonary effort is normal. No respiratory distress.      Breath sounds: Normal breath sounds. No wheezing, rhonchi or rales.      Comments: Satting well on 2 L oxygen via nasal cannula.  Musculoskeletal:      Cervical back: Neck supple.      Right lower leg: Edema (trace) present.      Left lower leg: Edema (trace) present.   Skin:     General: Skin is warm and dry.   Neurological:      Mental Status: He is alert and oriented to person, place, and time.   Psychiatric:         Mood and Affect: Mood normal.         Behavior: Behavior normal.         Relevant results  Labs  Results for orders placed or performed during the hospital " encounter of 11/15/24 (from the past 24 hours)   POCT GLUCOSE   Result Value Ref Range    POCT Glucose 268 (H) 74 - 99 mg/dL   POCT GLUCOSE   Result Value Ref Range    POCT Glucose 230 (H) 74 - 99 mg/dL   CBC   Result Value Ref Range    WBC 9.7 4.4 - 11.3 x10*3/uL    nRBC 0.0 0.0 - 0.0 /100 WBCs    RBC 4.06 (L) 4.50 - 5.90 x10*6/uL    Hemoglobin 11.4 (L) 13.5 - 17.5 g/dL    Hematocrit 37.2 (L) 41.0 - 52.0 %    MCV 92 80 - 100 fL    MCH 28.1 26.0 - 34.0 pg    MCHC 30.6 (L) 32.0 - 36.0 g/dL    RDW 16.1 (H) 11.5 - 14.5 %    Platelets 150 150 - 450 x10*3/uL   Basic Metabolic Panel   Result Value Ref Range    Glucose 156 (H) 74 - 99 mg/dL    Sodium 132 (L) 136 - 145 mmol/L    Potassium 4.1 3.5 - 5.3 mmol/L    Chloride 92 (L) 98 - 107 mmol/L    Bicarbonate 33 (H) 21 - 32 mmol/L    Anion Gap 11 10 - 20 mmol/L    Urea Nitrogen 59 (H) 6 - 23 mg/dL    Creatinine 2.11 (H) 0.50 - 1.30 mg/dL    eGFR 30 (L) >60 mL/min/1.73m*2    Calcium 7.9 (L) 8.6 - 10.3 mg/dL   Magnesium   Result Value Ref Range    Magnesium 2.28 1.60 - 2.40 mg/dL   Phosphorus   Result Value Ref Range    Phosphorus 2.9 2.5 - 4.9 mg/dL   B-type natriuretic peptide   Result Value Ref Range     (H) 0 - 99 pg/mL   POCT GLUCOSE   Result Value Ref Range    POCT Glucose 159 (H) 74 - 99 mg/dL   POCT GLUCOSE   Result Value Ref Range    POCT Glucose 204 (H) 74 - 99 mg/dL       Imaging  Chest x-ray 11/15/2024:  Impression: Small bilateral pleural effusions. Patchy opacities in the right lung field may represent asymmetric pulmonary edema versus infection.    Echocardiogram interpretation  TTE 11/17/2024:  Conclusions:  1. The left ventricular systolic function is mildly decreased, with a Strong's biplane calculated ejection fraction of 42%.   2. There is global hypokinesis of the left ventricle with minor regional variations.   3. Left ventricular diastolic filling was indeterminate with an elevated left atrial pressure.   4. There is severely reduced right  ventricular systolic function.   5. Mildly enlarged right ventricle.   6. The left atrium is moderately dilated.   7. The right atrium is moderately dilated.   8. Mild to moderate tricuspid regurgitation.   9. The estimated RVSP is 46 mm.  10. There is aortic sclerosis with normal leaflet mobility.  11. The inferior vena cava appears severely dilated.  12. There is moderately increased septal and moderately increased posterior left ventricular wall thickness.    Assessment/Plan   This is a 84 y.o. male with past medical history of CAD status post CABG in April 2022, atrial fibrillation on Eliquis, dyslipidemia, and CKD with baseline Cr ~2.0, who presents with shortness of breath.    # Acute on chronic combined systolic and diastolic heart failure- Jardiance, Aldactone, and torsemide.  On hydralazine and Isordil for afterload reduction.  # Chronic troponinemia and CAD status post CABG Not amenable to percutaneous nor surgical intervention by most recent catheterization.  # Atrial fibrillation on renally adjusted Eliquis and bisoprolol for rate control  # Dyslipidemia- Atorvastatin    Cardiology was reconsulted due to recurrent chest pain and shortness of breath.  Based on clinical presentation, his symptoms are likely secondary to a pulmonary disease and not cardiac.  He did very well with diuretics, and he is no longer fluid overloaded.  No indications for increasing diuretics at this time.  Recommended continuing his cardiac medications.  We did repeat BNP though we anticipate that this would be much improved compared to prior given his current status.  Per cardiology standpoint, he is cleared for discharge to SNF.    Patient was seen and case discussed with the attending.  Ava Grey,   Internal Medicine PGY-1  Date: November 21, 2024

## 2024-11-22 ENCOUNTER — TELEPHONE (OUTPATIENT)
Dept: CARDIOLOGY | Facility: CLINIC | Age: 84
End: 2024-11-22
Payer: MEDICARE

## 2024-11-22 NOTE — TELEPHONE ENCOUNTER
Pt son called stating his father was at [facility] then transferred him to [facility] as of 11/20/24 and he is doing well.  Pt is coming in for follow up with Tracy Schwab on December 5 at 11:30 am.

## 2024-11-25 ENCOUNTER — APPOINTMENT (OUTPATIENT)
Dept: OPHTHALMOLOGY | Facility: CLINIC | Age: 84
End: 2024-11-25
Payer: MEDICARE

## 2024-11-25 DIAGNOSIS — Z94.7 S/P PKP (PENETRATING KERATOPLASTY): Primary | ICD-10-CM

## 2024-11-25 LAB
ATRIAL RATE: 63 BPM
ATRIAL RATE: 77 BPM
Q ONSET: 210 MS
Q ONSET: 213 MS
QRS COUNT: 13 BEATS
QRS COUNT: 14 BEATS
QRS DURATION: 102 MS
QRS DURATION: 88 MS
QT INTERVAL: 378 MS
QT INTERVAL: 396 MS
QTC CALCULATION(BAZETT): 422 MS
QTC CALCULATION(BAZETT): 462 MS
QTC FREDERICIA: 406 MS
QTC FREDERICIA: 439 MS
R AXIS: 108 DEGREES
R AXIS: 108 DEGREES
T AXIS: 229 DEGREES
T AXIS: 237 DEGREES
T OFFSET: 402 MS
T OFFSET: 408 MS
VENTRICULAR RATE: 75 BPM
VENTRICULAR RATE: 82 BPM

## 2024-11-25 NOTE — DOCUMENTATION CLARIFICATION NOTE
"    PATIENT:               NORRIS VINSON  ACCT #:                  [id number]  MRN:                       [id number]  :                       1940  ADMIT DATE:       11/15/2024 6:00 AM  DISCH DATE:        2024 6:24 PM  RESPONDING PROVIDER #:        [id number]          PROVIDER RESPONSE TEXT:    Type II MI    CDI QUERY TEXT:    Clarification    Instruction:    Based on your assessment of the patient and the clinical information, please provide the requested documentation by clicking on the appropriate radio button and enter any additional information if prompted.    Question: Is there a diagnosis indicative of the patient elevated Troponins and symptoms    When answering this query, please exercise your independent professional judgment. The fact that a question is being asked, does not imply that any particular answer is desired or expected.    The patient's clinical indicators include:  Clinical Information: 85 y/o M admitted with acute hypoxic respiratory failure with CHF exacerbation    Clinical Indicators:    Troponin:  11/15/24 06:12: 219  11/15/24 07:20: 193  24 05:56: 160  EKG 11/15/24: \"Atrial fibrillation with a competing junctional pacemaker, Rightward axis, Low voltage QRS, Septal infarct (cited on or before 2024), T wave abnormality, consider inferolateral ischemia or digitalis effect, Abnormal ECG\"    ED note 11/15 Dr. Lujan: \"presenting [facility] ED for shortness of breath that began last hour.  Patient baseline troponins in the 100s, however slightly elevated above baseline at 219 today.  troponinemia, likely secondary from demand ischemia.\"    ED PN 11/15 Dr. Adamson: \"Troponin level elevated\"    H/P 11/15,  Dr. Boland: \"Chronically elevated trop with type II demand\"    cardiology consult 11/15 Dr. Santiago: \"Chronically elevated troponin with likely superimposed type II demand.  hypoxia causing a type II demand mismatch.\"    MD PN  Dr. Boland: \"He states he had 2 mild " "episodes of chest discomfort overnight but they resolved.  echo - EF 42%, indeterminate diastolic filling, severely reduced RVSF, mod dilation of LA &RA, mild to mod TR, aortic sclerosis, inferior vena cava severely dilated, moderately increased septal and posterior left ventricular wall thickenss\"    MD PN 11/20 Dr. Boland: \"He had some chest pain and didn't sleep well.  trop 193<219, baseline 160 ; f/u repeat trop due to chest discomfort over night\"    MD PN 11/21 Dr. Grey/Perry: Cardiology was reconsulted due to recurrent chest pain and SOB. His repeat troponin was 160 on 11/20 which is at his baseline.  At bedside this morning, patient states that he developed shortness of breath and chest pain whenever he gets up from the bed and moves around.  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

## 2024-11-26 ENCOUNTER — NURSING HOME VISIT (OUTPATIENT)
Dept: POST ACUTE CARE | Facility: EXTERNAL LOCATION | Age: 84
End: 2024-11-26
Payer: MEDICARE

## 2024-11-26 DIAGNOSIS — I50.42 CHRONIC COMBINED SYSTOLIC AND DIASTOLIC HEART FAILURE: Primary | ICD-10-CM

## 2024-11-26 DIAGNOSIS — E78.5 DYSLIPIDEMIA: ICD-10-CM

## 2024-11-26 DIAGNOSIS — I48.21 PERMANENT ATRIAL FIBRILLATION (MULTI): ICD-10-CM

## 2024-11-26 DIAGNOSIS — I12.9 HYPERTENSIVE KIDNEY DISEASE WITH CKD (CHRONIC KIDNEY DISEASE), STAGE 1-4 OR UNSPECIFIED CHRONIC KIDNEY DISEASE: ICD-10-CM

## 2024-11-26 DIAGNOSIS — R60.0 BILATERAL EDEMA OF LOWER EXTREMITY: ICD-10-CM

## 2024-11-26 PROCEDURE — 99306 1ST NF CARE HIGH MDM 50: CPT | Performed by: STUDENT IN AN ORGANIZED HEALTH CARE EDUCATION/TRAINING PROGRAM

## 2024-11-26 NOTE — LETTER
Patient: Elgin Marin  : 1940    Encounter Date: 2024    Subjective  Patient ID: Elgin Marin is a 84 y.o. male.    84-year-old male who was admitted with acute hypoxic respiratory failure with CHF exacerbation and possible COPD exacerbation with pneumonia. Started on breathing treatments, steroids, IV antibiotics and IV diuresis. Cardiology was consulted. With waxing and waning mentation on , it was suspected pt hospital delirium superimposed on recent dementia diagnosis, steroids stopped and mentation improved and returned to baseline. He completed a course of antibiotics. Pt diuresed well and renal function was at baseline or better. He was transitioned to PO diuresis. PT/OT/ST consulted. He continued to improve and was determined to be medically stable for discharge to SNF with instructions for follow-up. Patient seen on hospital follow up and admission. Regards that he is overall doing well at this time and notes that he feels like his legs are swollen but overall at baseline. Otherwise endorses that he is still weak but overall stable. States no additional issues or concerns at this time.        Review of Systems   Constitutional:  Positive for fatigue.   HENT: Negative.     Respiratory: Negative.     Cardiovascular:  Positive for leg swelling.   Gastrointestinal: Negative.    Musculoskeletal: Negative.    Skin: Negative.    Neurological:  Positive for weakness.   Psychiatric/Behavioral: Negative.         ObjectiveVisit Vitals  Smoking Status Never    Vitals Reviewed via facility EMR   Physical Exam  Constitutional:       General: He is not in acute distress.     Appearance: He is ill-appearing.   Eyes:      Pupils: Pupils are equal, round, and reactive to light.   Cardiovascular:      Rate and Rhythm: Normal rate and regular rhythm.      Pulses: Normal pulses.      Heart sounds: No murmur heard.  Pulmonary:      Effort: No respiratory distress.      Breath sounds: No wheezing.    Abdominal:      General: Abdomen is flat. Bowel sounds are normal. There is no distension.   Musculoskeletal:      Right lower leg: Edema present.      Left lower leg: Edema present.   Skin:     General: Skin is warm and dry.      Comments: Wounds wrapped in LE, skin dry   Neurological:      Mental Status: He is alert. Mental status is at baseline.      Cranial Nerves: No cranial nerve deficit.      Motor: Weakness present.   Psychiatric:         Mood and Affect: Mood normal.         Behavior: Behavior normal.         Assessment/Plan  Diagnoses and all orders for this visit:  Chronic combined systolic and diastolic heart failure  Permanent atrial fibrillation (Multi)  Dyslipidemia  Hypertensive kidney disease with CKD (chronic kidney disease), stage 1-4 or unspecified chronic kidney disease  Bilateral edema of lower extremity  Pt seen and examined, hospital course reviewed and medications reviewed and reconciled. Will need to admit to heart failure protocol at facility. Oxygenation is overall at baseline at this time but will need to closely monitor respiratory status as well as weights. Encourage low salt diet, and fluid restriction and monitor bi weekly labs. Monitor wounds for s/s of infections. Otherwise continue supportive care and optimization with ADLs. No additional issues.     Reviewed and approved by JAIR STEWART on 11/28/24 at 1:57 PM.         Electronically Signed By: Jair Stewart DO   11/28/24  1:57 PM

## 2024-11-28 ASSESSMENT — ENCOUNTER SYMPTOMS
GASTROINTESTINAL NEGATIVE: 1
RESPIRATORY NEGATIVE: 1
MUSCULOSKELETAL NEGATIVE: 1
WEAKNESS: 1
PSYCHIATRIC NEGATIVE: 1
FATIGUE: 1

## 2024-11-28 NOTE — PROGRESS NOTES
Subjective   Patient ID: Elgin Marin is a 84 y.o. male.    84-year-old male who was admitted with acute hypoxic respiratory failure with CHF exacerbation and possible COPD exacerbation with pneumonia. Started on breathing treatments, steroids, IV antibiotics and IV diuresis. Cardiology was consulted. With waxing and waning mentation on 11/16, it was suspected pt hospital delirium superimposed on recent dementia diagnosis, steroids stopped and mentation improved and returned to baseline. He completed a course of antibiotics. Pt diuresed well and renal function was at baseline or better. He was transitioned to PO diuresis. PT/OT/ST consulted. He continued to improve and was determined to be medically stable for discharge to SNF with instructions for follow-up. Patient seen on hospital follow up and admission. Regards that he is overall doing well at this time and notes that he feels like his legs are swollen but overall at baseline. Otherwise endorses that he is still weak but overall stable. States no additional issues or concerns at this time.        Review of Systems   Constitutional:  Positive for fatigue.   HENT: Negative.     Respiratory: Negative.     Cardiovascular:  Positive for leg swelling.   Gastrointestinal: Negative.    Musculoskeletal: Negative.    Skin: Negative.    Neurological:  Positive for weakness.   Psychiatric/Behavioral: Negative.         Objective Visit Vitals  Smoking Status Never    Vitals Reviewed via facility EMR   Physical Exam  Constitutional:       General: He is not in acute distress.     Appearance: He is ill-appearing.   Eyes:      Pupils: Pupils are equal, round, and reactive to light.   Cardiovascular:      Rate and Rhythm: Normal rate and regular rhythm.      Pulses: Normal pulses.      Heart sounds: No murmur heard.  Pulmonary:      Effort: No respiratory distress.      Breath sounds: No wheezing.   Abdominal:      General: Abdomen is flat. Bowel sounds are normal. There is  no distension.   Musculoskeletal:      Right lower leg: Edema present.      Left lower leg: Edema present.   Skin:     General: Skin is warm and dry.      Comments: Wounds wrapped in LE, skin dry   Neurological:      Mental Status: He is alert. Mental status is at baseline.      Cranial Nerves: No cranial nerve deficit.      Motor: Weakness present.   Psychiatric:         Mood and Affect: Mood normal.         Behavior: Behavior normal.         Assessment/Plan   Diagnoses and all orders for this visit:  Chronic combined systolic and diastolic heart failure  Permanent atrial fibrillation (Multi)  Dyslipidemia  Hypertensive kidney disease with CKD (chronic kidney disease), stage 1-4 or unspecified chronic kidney disease  Bilateral edema of lower extremity  Pt seen and examined, hospital course reviewed and medications reviewed and reconciled. Will need to admit to heart failure protocol at facility. Oxygenation is overall at baseline at this time but will need to closely monitor respiratory status as well as weights. Encourage low salt diet, and fluid restriction and monitor bi weekly labs. Monitor wounds for s/s of infections. Otherwise continue supportive care and optimization with ADLs. No additional issues.     Reviewed and approved by GAGE STEWART on 11/28/24 at 1:57 PM.

## 2024-11-30 NOTE — DOCUMENTATION CLARIFICATION NOTE
"    PATIENT:               NORRIS VINSON  ACCT #:                  [id number]  MRN:                       [id number]  :                       1940  ADMIT DATE:       11/15/2024 6:00 AM  DISCH DATE:        2024 6:24 PM  RESPONDING PROVIDER #:        [id number]          PROVIDER RESPONSE TEXT:    I agree with dietician diagnosis of Moderate malnutrition on 11/15/2024    CDI QUERY TEXT:    Clarification    Instruction:    Based on your assessment of the patient and the clinical information, please provide the requested documentation by clicking on the appropriate radio button and enter any additional information if prompted.    Question: Please further clarify this patient nutritional status as    When answering this query, please exercise your independent professional judgment. The fact that a question is being asked, does not imply that any particular answer is desired or expected.    The patient's clinical indicators include:  Clinical Information: 85 y/o M admitted with acute hypoxic respiratory failure with CHF exacerbation    Clinical Indicators:    BMI: 30.13    dietician PN 11/15: \"Malnutrition Diagnosis: Moderate malnutrition related to acute disease or injury As Evidenced by: moderate muscle wasting, 1.9 percent wt loss x 1 week and pt consuming less than 75 percent of estimated energy needs x 2 weeks per pt report.\"    Treatment: dietician consult, carb controlled and cardiac diet, glucerna shake at breakfast and dinner    Risk Factors: age, resides at nursing facility, CHF exacerbation, dementia  Options provided:  -- I agree with dietician diagnosis of Moderate malnutrition on 11/15/2024  -- Other - I will add my own diagnosis  -- Refer to Clinical Documentation Reviewer    Query created by: Diana Alvarado on 2024 1:08 PM      Electronically signed by:  PRAVEENA HART DO 2024 3:21 PM          "

## 2024-12-03 ENCOUNTER — NURSING HOME VISIT (OUTPATIENT)
Dept: POST ACUTE CARE | Facility: EXTERNAL LOCATION | Age: 84
End: 2024-12-03
Payer: MEDICARE

## 2024-12-03 DIAGNOSIS — I12.9 HYPERTENSIVE KIDNEY DISEASE WITH CKD (CHRONIC KIDNEY DISEASE), STAGE 1-4 OR UNSPECIFIED CHRONIC KIDNEY DISEASE: ICD-10-CM

## 2024-12-03 DIAGNOSIS — I48.21 PERMANENT ATRIAL FIBRILLATION (MULTI): Primary | ICD-10-CM

## 2024-12-03 DIAGNOSIS — I25.810 CORONARY ARTERY DISEASE INVOLVING CORONARY BYPASS GRAFT OF NATIVE HEART WITHOUT ANGINA PECTORIS: ICD-10-CM

## 2024-12-03 DIAGNOSIS — I50.42 CHRONIC COMBINED SYSTOLIC AND DIASTOLIC HEART FAILURE: ICD-10-CM

## 2024-12-03 PROCEDURE — 99308 SBSQ NF CARE LOW MDM 20: CPT | Performed by: STUDENT IN AN ORGANIZED HEALTH CARE EDUCATION/TRAINING PROGRAM

## 2024-12-03 NOTE — LETTER
Patient: Elgin Marin  : 1940    Encounter Date: 2024    Subjective  Patient ID: Elgin Marin is a 84 y.o. male.    Patient seen and examined on routine evaluation, regards that his lower extremity wounds are significantly improving and lower extremity swelling is getting much better.  Still fairly frustrated that he has a Graham catheter, endorses that he does not have a urology follow-up as well.  Otherwise, states weights have overall been fairly stable blood pressures have overall been stable, and respiratory status has been stable as well but he does feel some congestion at times.  Denies any additional issues or concerns.  No constitutional symptoms.        Review of Systems   Constitutional:  Positive for fatigue.   HENT: Negative.     Respiratory: Negative.     Cardiovascular: Negative.    Gastrointestinal: Negative.    Musculoskeletal: Negative.    Skin: Negative.    Neurological:  Positive for weakness.   Psychiatric/Behavioral: Negative.         ObjectiveVitals Reviewed via facility EMR   Physical Exam  Constitutional:       General: He is not in acute distress.     Appearance: He is not ill-appearing.   Eyes:      Pupils: Pupils are equal, round, and reactive to light.   Cardiovascular:      Rate and Rhythm: Normal rate and regular rhythm.      Pulses: Normal pulses.      Heart sounds: No murmur heard.  Pulmonary:      Effort: No respiratory distress.      Breath sounds: No wheezing.   Abdominal:      General: Abdomen is flat. Bowel sounds are normal. There is no distension.   Musculoskeletal:      Right lower leg: Edema present.      Left lower leg: Edema present.   Skin:     General: Skin is warm and dry.      Comments: Wound, wrapped le   Neurological:      Mental Status: He is alert. Mental status is at baseline.      Cranial Nerves: No cranial nerve deficit.      Motor: Weakness present.   Psychiatric:         Mood and Affect: Mood normal.         Behavior: Behavior normal.          Assessment/Plan  Diagnoses and all orders for this visit:  Permanent atrial fibrillation (Multi)  Chronic combined systolic and diastolic heart failure  Coronary artery disease involving coronary bypass graft of native heart without angina pectoris  Hypertensive kidney disease with CKD (chronic kidney disease), stage 1-4 or unspecified chronic kidney disease      Patient seen and examined at bedside.  Overall medically stable, weight did slightly however is overall euvolemic on exam.  I do suspect that this was a incorrect reading.  Staff will closely monitor this.  Otherwise closely monitor respiratory status will start humidifiers with oxygenation as I suspect that this is related to patient's underlying irritation.  Recommend PT OT, low-salt diet and fluid restrictions.  We will also try a trial of void to see if this helps with urinary retention.  Otherwise no additional issues continue supportive care.    Reviewed and approved by JAIR STEWART on 12/4/24 at 10:04 PM.       Electronically Signed By: Jair Stewart DO   12/4/24 10:04 PM   no

## 2024-12-04 ASSESSMENT — ENCOUNTER SYMPTOMS
GASTROINTESTINAL NEGATIVE: 1
RESPIRATORY NEGATIVE: 1
FATIGUE: 1
WEAKNESS: 1
PSYCHIATRIC NEGATIVE: 1
CARDIOVASCULAR NEGATIVE: 1
MUSCULOSKELETAL NEGATIVE: 1

## 2024-12-05 ENCOUNTER — APPOINTMENT (OUTPATIENT)
Dept: CARDIOLOGY | Facility: CLINIC | Age: 84
End: 2024-12-05
Payer: MEDICARE

## 2024-12-05 ENCOUNTER — NURSING HOME VISIT (OUTPATIENT)
Dept: POST ACUTE CARE | Facility: EXTERNAL LOCATION | Age: 84
End: 2024-12-05

## 2024-12-05 DIAGNOSIS — I12.9 HYPERTENSIVE KIDNEY DISEASE WITH CKD (CHRONIC KIDNEY DISEASE), STAGE 1-4 OR UNSPECIFIED CHRONIC KIDNEY DISEASE: ICD-10-CM

## 2024-12-05 DIAGNOSIS — I25.810 CORONARY ARTERY DISEASE INVOLVING CORONARY BYPASS GRAFT OF NATIVE HEART WITHOUT ANGINA PECTORIS: Primary | ICD-10-CM

## 2024-12-05 DIAGNOSIS — R60.0 BILATERAL EDEMA OF LOWER EXTREMITY: ICD-10-CM

## 2024-12-05 DIAGNOSIS — I50.42 CHRONIC COMBINED SYSTOLIC AND DIASTOLIC HEART FAILURE: ICD-10-CM

## 2024-12-05 DIAGNOSIS — I10 ESSENTIAL HYPERTENSION, BENIGN: ICD-10-CM

## 2024-12-05 PROCEDURE — 99309 SBSQ NF CARE MODERATE MDM 30: CPT | Performed by: STUDENT IN AN ORGANIZED HEALTH CARE EDUCATION/TRAINING PROGRAM

## 2024-12-05 ASSESSMENT — ENCOUNTER SYMPTOMS
MUSCULOSKELETAL NEGATIVE: 1
WEAKNESS: 1
GASTROINTESTINAL NEGATIVE: 1
RESPIRATORY NEGATIVE: 1
FATIGUE: 1
PSYCHIATRIC NEGATIVE: 1

## 2024-12-05 NOTE — PROGRESS NOTES
Subjective   Patient ID: Elgin Marin is a 84 y.o. male.    Patient seen and examined on routine evaluation, regards that his lower extremity wounds are significantly improving and lower extremity swelling is getting much better.  Still fairly frustrated that he has a Graham catheter, endorses that he does not have a urology follow-up as well.  Otherwise, states weights have overall been fairly stable blood pressures have overall been stable, and respiratory status has been stable as well but he does feel some congestion at times.  Denies any additional issues or concerns.  No constitutional symptoms.        Review of Systems   Constitutional:  Positive for fatigue.   HENT: Negative.     Respiratory: Negative.     Cardiovascular: Negative.    Gastrointestinal: Negative.    Musculoskeletal: Negative.    Skin: Negative.    Neurological:  Positive for weakness.   Psychiatric/Behavioral: Negative.         Objective Vitals Reviewed via facility EMR   Physical Exam  Constitutional:       General: He is not in acute distress.     Appearance: He is not ill-appearing.   Eyes:      Pupils: Pupils are equal, round, and reactive to light.   Cardiovascular:      Rate and Rhythm: Normal rate and regular rhythm.      Pulses: Normal pulses.      Heart sounds: No murmur heard.  Pulmonary:      Effort: No respiratory distress.      Breath sounds: No wheezing.   Abdominal:      General: Abdomen is flat. Bowel sounds are normal. There is no distension.   Musculoskeletal:      Right lower leg: Edema present.      Left lower leg: Edema present.   Skin:     General: Skin is warm and dry.      Comments: Wound, wrapped le   Neurological:      Mental Status: He is alert. Mental status is at baseline.      Cranial Nerves: No cranial nerve deficit.      Motor: Weakness present.   Psychiatric:         Mood and Affect: Mood normal.         Behavior: Behavior normal.         Assessment/Plan   Diagnoses and all orders for this visit:  Permanent  atrial fibrillation (Multi)  Chronic combined systolic and diastolic heart failure  Coronary artery disease involving coronary bypass graft of native heart without angina pectoris  Hypertensive kidney disease with CKD (chronic kidney disease), stage 1-4 or unspecified chronic kidney disease      Patient seen and examined at bedside.  Overall medically stable, weight did slightly however is overall euvolemic on exam.  I do suspect that this was a incorrect reading.  Staff will closely monitor this.  Otherwise closely monitor respiratory status will start humidifiers with oxygenation as I suspect that this is related to patient's underlying irritation.  Recommend PT OT, low-salt diet and fluid restrictions.  We will also try a trial of void to see if this helps with urinary retention.  Otherwise no additional issues continue supportive care.    Reviewed and approved by GAGE STEWART on 12/4/24 at 10:04 PM.

## 2024-12-05 NOTE — LETTER
Patient: Elgin Marin  : 1940    Encounter Date: 2024    Subjective  Patient ID: Elgin Marin is a 84 y.o. male.    Patient seen and examined at bedside.  She he regards he is doing overall well and breathing is overall stable, however has started noticing some weeping and some drainage coming from his lower extremities.  Otherwise, endorses that his blood pressure is overall been doing well, and is slowly improving with physical therapy.  States no additional issues or concerns at this time.  Is hopeful for returning home soon, as he feels like he is getting close to his optimal level status.  Otherwise no additional issues at this time.        Review of Systems   Constitutional:  Positive for fatigue.   HENT: Negative.     Respiratory: Negative.     Cardiovascular:  Positive for leg swelling.   Gastrointestinal: Negative.    Musculoskeletal: Negative.    Skin: Negative.    Neurological:  Positive for weakness.   Psychiatric/Behavioral: Negative.         ObjectiveVitals Reviewed via facility EMR   Physical Exam  Constitutional:       General: He is not in acute distress.     Appearance: He is not ill-appearing.   Eyes:      Pupils: Pupils are equal, round, and reactive to light.   Cardiovascular:      Rate and Rhythm: Normal rate and regular rhythm.      Pulses: Normal pulses.      Heart sounds: No murmur heard.  Pulmonary:      Effort: No respiratory distress.      Breath sounds: No wheezing.   Abdominal:      General: Abdomen is flat. Bowel sounds are normal. There is no distension.   Musculoskeletal:      Right lower leg: Edema present.      Left lower leg: Edema present.   Skin:     General: Skin is warm and dry.   Neurological:      Mental Status: He is alert. Mental status is at baseline.      Cranial Nerves: No cranial nerve deficit.      Motor: Weakness present.   Psychiatric:         Mood and Affect: Mood normal.         Behavior: Behavior normal.         Assessment/Plan  Diagnoses and  all orders for this visit:  Coronary artery disease involving coronary bypass graft of native heart without angina pectoris  Chronic combined systolic and diastolic heart failure  Essential hypertension, benign  Hypertensive kidney disease with CKD (chronic kidney disease), stage 1-4 or unspecified chronic kidney disease  Bilateral edema of lower extremity    Patient seen and examined at bedside.  Significantly fluid overloaded on exam, due to this, we will need to titrate cardiac meds.  Will increase spironolactone to 25 mg from 12.5 in addition also titrate up SGLT2.  Monitor daily weights, and encouraged low-salt diet.  Otherwise continue optimization of physical therapy.  No other changes in medications.  Discussed care plan with staff who is agreeable.    Reviewed and approved by JAIR STEWART on 12/5/24 at 8:52 PM.       Electronically Signed By: Jair Stewart DO   12/5/24  8:52 PM

## 2024-12-06 NOTE — PROGRESS NOTES
Subjective   Patient ID: Elgin Marin is a 84 y.o. male.    Patient seen and examined at bedside.  She he regards he is doing overall well and breathing is overall stable, however has started noticing some weeping and some drainage coming from his lower extremities.  Otherwise, endorses that his blood pressure is overall been doing well, and is slowly improving with physical therapy.  States no additional issues or concerns at this time.  Is hopeful for returning home soon, as he feels like he is getting close to his optimal level status.  Otherwise no additional issues at this time.        Review of Systems   Constitutional:  Positive for fatigue.   HENT: Negative.     Respiratory: Negative.     Cardiovascular:  Positive for leg swelling.   Gastrointestinal: Negative.    Musculoskeletal: Negative.    Skin: Negative.    Neurological:  Positive for weakness.   Psychiatric/Behavioral: Negative.         Objective Vitals Reviewed via facility EMR   Physical Exam  Constitutional:       General: He is not in acute distress.     Appearance: He is not ill-appearing.   Eyes:      Pupils: Pupils are equal, round, and reactive to light.   Cardiovascular:      Rate and Rhythm: Normal rate and regular rhythm.      Pulses: Normal pulses.      Heart sounds: No murmur heard.  Pulmonary:      Effort: No respiratory distress.      Breath sounds: No wheezing.   Abdominal:      General: Abdomen is flat. Bowel sounds are normal. There is no distension.   Musculoskeletal:      Right lower leg: Edema present.      Left lower leg: Edema present.   Skin:     General: Skin is warm and dry.   Neurological:      Mental Status: He is alert. Mental status is at baseline.      Cranial Nerves: No cranial nerve deficit.      Motor: Weakness present.   Psychiatric:         Mood and Affect: Mood normal.         Behavior: Behavior normal.         Assessment/Plan   Diagnoses and all orders for this visit:  Coronary artery disease involving coronary  bypass graft of native heart without angina pectoris  Chronic combined systolic and diastolic heart failure  Essential hypertension, benign  Hypertensive kidney disease with CKD (chronic kidney disease), stage 1-4 or unspecified chronic kidney disease  Bilateral edema of lower extremity    Patient seen and examined at bedside.  Significantly fluid overloaded on exam, due to this, we will need to titrate cardiac meds.  Will increase spironolactone to 25 mg from 12.5 in addition also titrate up SGLT2.  Monitor daily weights, and encouraged low-salt diet.  Otherwise continue optimization of physical therapy.  No other changes in medications.  Discussed care plan with staff who is agreeable.    Reviewed and approved by GAGE STEWART on 12/5/24 at 8:52 PM.

## 2024-12-10 ENCOUNTER — NURSING HOME VISIT (OUTPATIENT)
Dept: POST ACUTE CARE | Facility: EXTERNAL LOCATION | Age: 84
End: 2024-12-10
Payer: MEDICARE

## 2024-12-10 DIAGNOSIS — I10 ESSENTIAL HYPERTENSION, BENIGN: ICD-10-CM

## 2024-12-10 DIAGNOSIS — N48.1 BALANITIS: ICD-10-CM

## 2024-12-10 DIAGNOSIS — I12.9 HYPERTENSIVE KIDNEY DISEASE WITH CKD (CHRONIC KIDNEY DISEASE), STAGE 1-4 OR UNSPECIFIED CHRONIC KIDNEY DISEASE: ICD-10-CM

## 2024-12-10 DIAGNOSIS — I50.42 CHRONIC COMBINED SYSTOLIC AND DIASTOLIC HEART FAILURE: Primary | ICD-10-CM

## 2024-12-10 DIAGNOSIS — I48.21 PERMANENT ATRIAL FIBRILLATION (MULTI): ICD-10-CM

## 2024-12-10 PROCEDURE — 99309 SBSQ NF CARE MODERATE MDM 30: CPT | Performed by: STUDENT IN AN ORGANIZED HEALTH CARE EDUCATION/TRAINING PROGRAM

## 2024-12-10 NOTE — LETTER
Patient: Elgin Marin  : 1940    Encounter Date: 12/10/2024    Subjective  Patient ID: Elgin Marin is a 84 y.o. male.      Patient seen on routine evaluation.  Regards that since his last evaluation, his leg swelling has significantly improved.  In addition, has lost about 4 to 5 pounds since titration of SGLT2 as well as potassium sparing diuretic.  Otherwise, he states that he is slowly improving with physical therapy, does have a follow-up with cardiology upcoming.  Otherwise, he states that he is also having some pain with urination especially around the tip of his penis.  Endorses that he also has some prostate pain as well and tenderness in this area as to.  Otherwise, states no additional issues or concerns, feels overall well.          Review of Systems   Constitutional:  Positive for fatigue.   HENT: Negative.     Respiratory: Negative.     Cardiovascular:  Positive for leg swelling.   Gastrointestinal: Negative.    Genitourinary:  Positive for difficulty urinating and penile pain.   Musculoskeletal: Negative.    Skin: Negative.    Neurological:  Positive for weakness.   Psychiatric/Behavioral: Negative.         ObjectiveVitals Reviewed via facility EMR   Physical Exam  Constitutional:       General: He is not in acute distress.     Appearance: He is not ill-appearing.   Eyes:      Pupils: Pupils are equal, round, and reactive to light.   Cardiovascular:      Rate and Rhythm: Normal rate and regular rhythm.      Pulses: Normal pulses.      Heart sounds: No murmur heard.  Pulmonary:      Effort: No respiratory distress.      Breath sounds: No wheezing.   Abdominal:      General: Abdomen is flat. Bowel sounds are normal. There is no distension.   Genitourinary:     Comments: Tenderness around glans of penis  Musculoskeletal:      Right lower leg: No edema.      Left lower leg: No edema.      Comments: Imrpoved le edema   Skin:     General: Skin is warm and dry.   Neurological:      Mental  Status: He is alert. Mental status is at baseline.      Cranial Nerves: No cranial nerve deficit.      Motor: Weakness present.   Psychiatric:         Mood and Affect: Mood normal.         Behavior: Behavior normal.         Assessment/Plan  Diagnoses and all orders for this visit:  Chronic combined systolic and diastolic heart failure  Permanent atrial fibrillation (Multi)  Essential hypertension, benign  Hypertensive kidney disease with CKD (chronic kidney disease), stage 1-4 or unspecified chronic kidney disease    Patient seen and examined at bedside.  Overall medically stable and significant weight loss since last evaluation.  Due to concern for bowel otitis and prostatitis we will treat with course of doxycycline.  Continue optimization with physical therapy, otherwise, discussed care plan with staff and no issues noted.    Reviewed and approved by JAIR STEWART on 12/11/24 at 10:35 PM.       Electronically Signed By: Jair Stewart DO   12/11/24 10:35 PM

## 2024-12-11 PROBLEM — N48.1 BALANITIS: Status: ACTIVE | Noted: 2024-12-11

## 2024-12-11 ASSESSMENT — ENCOUNTER SYMPTOMS
MUSCULOSKELETAL NEGATIVE: 1
DIFFICULTY URINATING: 1
RESPIRATORY NEGATIVE: 1
WEAKNESS: 1
GASTROINTESTINAL NEGATIVE: 1
PSYCHIATRIC NEGATIVE: 1
FATIGUE: 1

## 2024-12-12 ENCOUNTER — NURSING HOME VISIT (OUTPATIENT)
Dept: POST ACUTE CARE | Facility: EXTERNAL LOCATION | Age: 84
End: 2024-12-12
Payer: MEDICARE

## 2024-12-12 DIAGNOSIS — I10 ESSENTIAL HYPERTENSION, BENIGN: ICD-10-CM

## 2024-12-12 DIAGNOSIS — I12.9 HYPERTENSIVE KIDNEY DISEASE WITH CKD (CHRONIC KIDNEY DISEASE), STAGE 1-4 OR UNSPECIFIED CHRONIC KIDNEY DISEASE: ICD-10-CM

## 2024-12-12 DIAGNOSIS — N30.00 ACUTE CYSTITIS WITHOUT HEMATURIA: ICD-10-CM

## 2024-12-12 DIAGNOSIS — I50.42 CHRONIC COMBINED SYSTOLIC AND DIASTOLIC HEART FAILURE: ICD-10-CM

## 2024-12-12 DIAGNOSIS — I48.21 PERMANENT ATRIAL FIBRILLATION (MULTI): Primary | ICD-10-CM

## 2024-12-12 PROCEDURE — 99309 SBSQ NF CARE MODERATE MDM 30: CPT | Performed by: STUDENT IN AN ORGANIZED HEALTH CARE EDUCATION/TRAINING PROGRAM

## 2024-12-12 NOTE — LETTER
Patient: Elgin Marin  : 1940    Encounter Date: 2024    Subjective  Patient ID: Elgin Marin is a 84 y.o. male.    Patient seen and examined on routine evaluation.  Recently was diagnosed with a UTI, and is having significant issues with urinary dysuria.  In addition, he he feels like he is having some difficulty with urination.  Does have a follow-up with urology upcoming.  Otherwise, despite the discomfort, feels like he is overall euvolemic.  States no additional issues at this time.        Review of Systems   Constitutional: Negative.    HENT: Negative.     Respiratory: Negative.     Cardiovascular: Negative.    Gastrointestinal: Negative.    Genitourinary:  Positive for difficulty urinating, dysuria, frequency and urgency.   Musculoskeletal: Negative.    Skin: Negative.    Neurological: Negative.    Psychiatric/Behavioral: Negative.         ObjectiveVitals Reviewed via facility EMR   Physical Exam  Constitutional:       General: He is not in acute distress.     Appearance: He is not ill-appearing.   Eyes:      Pupils: Pupils are equal, round, and reactive to light.   Cardiovascular:      Rate and Rhythm: Normal rate and regular rhythm.      Pulses: Normal pulses.      Heart sounds: No murmur heard.  Pulmonary:      Effort: No respiratory distress.      Breath sounds: No wheezing.   Abdominal:      General: Abdomen is flat. Bowel sounds are normal. There is no distension.   Musculoskeletal:      Right lower leg: No edema.      Left lower leg: No edema.      Comments: Flank pain   Skin:     General: Skin is warm and dry.   Neurological:      Mental Status: He is alert. Mental status is at baseline.      Cranial Nerves: No cranial nerve deficit.      Motor: Weakness present.   Psychiatric:         Mood and Affect: Mood normal.         Behavior: Behavior normal.         Assessment/Plan  Diagnoses and all orders for this visit:  Permanent atrial fibrillation (Multi)  Chronic combined systolic  and diastolic heart failure  Hypertensive kidney disease with CKD (chronic kidney disease), stage 1-4 or unspecified chronic kidney disease  Essential hypertension, benign  Acute cystitis without hematuria        Patient seen and examined at bedside.  Concern for other etiologies due to significance of pain, recommend renal ultrasound as well as bladder scan.  Follow-up on urine cultures from [facility].  If patient continues to have frequent UTIs, will need to transition off SGLT2.  Otherwise no additional issues at this time.  Continue supportive care  Reviewed and approved by JAIR STEWART on 12/14/24 at 10:20 AM.       Electronically Signed By: Jair Stewart DO   12/14/24 10:20 AM

## 2024-12-12 NOTE — PROGRESS NOTES
Subjective   Patient ID: Elgin Marin is a 84 y.o. male.      Patient seen on routine evaluation.  Regards that since his last evaluation, his leg swelling has significantly improved.  In addition, has lost about 4 to 5 pounds since titration of SGLT2 as well as potassium sparing diuretic.  Otherwise, he states that he is slowly improving with physical therapy, does have a follow-up with cardiology upcoming.  Otherwise, he states that he is also having some pain with urination especially around the tip of his penis.  Endorses that he also has some prostate pain as well and tenderness in this area as to.  Otherwise, states no additional issues or concerns, feels overall well.          Review of Systems   Constitutional:  Positive for fatigue.   HENT: Negative.     Respiratory: Negative.     Cardiovascular:  Positive for leg swelling.   Gastrointestinal: Negative.    Genitourinary:  Positive for difficulty urinating and penile pain.   Musculoskeletal: Negative.    Skin: Negative.    Neurological:  Positive for weakness.   Psychiatric/Behavioral: Negative.         Objective Vitals Reviewed via facility EMR   Physical Exam  Constitutional:       General: He is not in acute distress.     Appearance: He is not ill-appearing.   Eyes:      Pupils: Pupils are equal, round, and reactive to light.   Cardiovascular:      Rate and Rhythm: Normal rate and regular rhythm.      Pulses: Normal pulses.      Heart sounds: No murmur heard.  Pulmonary:      Effort: No respiratory distress.      Breath sounds: No wheezing.   Abdominal:      General: Abdomen is flat. Bowel sounds are normal. There is no distension.   Genitourinary:     Comments: Tenderness around glans of penis  Musculoskeletal:      Right lower leg: No edema.      Left lower leg: No edema.      Comments: Imrpoved le edema   Skin:     General: Skin is warm and dry.   Neurological:      Mental Status: He is alert. Mental status is at baseline.      Cranial Nerves: No  cranial nerve deficit.      Motor: Weakness present.   Psychiatric:         Mood and Affect: Mood normal.         Behavior: Behavior normal.         Assessment/Plan   Diagnoses and all orders for this visit:  Chronic combined systolic and diastolic heart failure  Permanent atrial fibrillation (Multi)  Essential hypertension, benign  Hypertensive kidney disease with CKD (chronic kidney disease), stage 1-4 or unspecified chronic kidney disease    Patient seen and examined at bedside.  Overall medically stable and significant weight loss since last evaluation.  Due to concern for bowel otitis and prostatitis we will treat with course of doxycycline.  Continue optimization with physical therapy, otherwise, discussed care plan with staff and no issues noted.    Reviewed and approved by GAGE STEWART on 12/11/24 at 10:35 PM.

## 2024-12-14 PROBLEM — N30.00 ACUTE CYSTITIS WITHOUT HEMATURIA: Status: ACTIVE | Noted: 2024-12-14

## 2024-12-14 ASSESSMENT — ENCOUNTER SYMPTOMS
RESPIRATORY NEGATIVE: 1
CONSTITUTIONAL NEGATIVE: 1
CARDIOVASCULAR NEGATIVE: 1
NEUROLOGICAL NEGATIVE: 1
GASTROINTESTINAL NEGATIVE: 1
MUSCULOSKELETAL NEGATIVE: 1
DYSURIA: 1
DIFFICULTY URINATING: 1
PSYCHIATRIC NEGATIVE: 1
FREQUENCY: 1

## 2024-12-14 NOTE — PROGRESS NOTES
Subjective   Patient ID: Elgin Marin is a 84 y.o. male.    Patient seen and examined on routine evaluation.  Recently was diagnosed with a UTI, and is having significant issues with urinary dysuria.  In addition, he he feels like he is having some difficulty with urination.  Does have a follow-up with urology upcoming.  Otherwise, despite the discomfort, feels like he is overall euvolemic.  States no additional issues at this time.        Review of Systems   Constitutional: Negative.    HENT: Negative.     Respiratory: Negative.     Cardiovascular: Negative.    Gastrointestinal: Negative.    Genitourinary:  Positive for difficulty urinating, dysuria, frequency and urgency.   Musculoskeletal: Negative.    Skin: Negative.    Neurological: Negative.    Psychiatric/Behavioral: Negative.         Objective Vitals Reviewed via facility EMR   Physical Exam  Constitutional:       General: He is not in acute distress.     Appearance: He is not ill-appearing.   Eyes:      Pupils: Pupils are equal, round, and reactive to light.   Cardiovascular:      Rate and Rhythm: Normal rate and regular rhythm.      Pulses: Normal pulses.      Heart sounds: No murmur heard.  Pulmonary:      Effort: No respiratory distress.      Breath sounds: No wheezing.   Abdominal:      General: Abdomen is flat. Bowel sounds are normal. There is no distension.   Musculoskeletal:      Right lower leg: No edema.      Left lower leg: No edema.      Comments: Flank pain   Skin:     General: Skin is warm and dry.   Neurological:      Mental Status: He is alert. Mental status is at baseline.      Cranial Nerves: No cranial nerve deficit.      Motor: Weakness present.   Psychiatric:         Mood and Affect: Mood normal.         Behavior: Behavior normal.         Assessment/Plan   Diagnoses and all orders for this visit:  Permanent atrial fibrillation (Multi)  Chronic combined systolic and diastolic heart failure  Hypertensive kidney disease with CKD  (chronic kidney disease), stage 1-4 or unspecified chronic kidney disease  Essential hypertension, benign  Acute cystitis without hematuria        Patient seen and examined at bedside.  Concern for other etiologies due to significance of pain, recommend renal ultrasound as well as bladder scan.  Follow-up on urine cultures from [facility].  If patient continues to have frequent UTIs, will need to transition off SGLT2.  Otherwise no additional issues at this time.  Continue supportive care  Reviewed and approved by GAGE STEWART on 12/14/24 at 10:20 AM.

## 2024-12-17 ENCOUNTER — NURSING HOME VISIT (OUTPATIENT)
Dept: PRIMARY CARE | Facility: CLINIC | Age: 84
End: 2024-12-17
Payer: MEDICARE

## 2024-12-17 DIAGNOSIS — I48.21 PERMANENT ATRIAL FIBRILLATION (MULTI): Primary | ICD-10-CM

## 2024-12-17 DIAGNOSIS — I50.42 CHRONIC COMBINED SYSTOLIC AND DIASTOLIC HEART FAILURE: ICD-10-CM

## 2024-12-17 DIAGNOSIS — N30.00 ACUTE CYSTITIS WITHOUT HEMATURIA: ICD-10-CM

## 2024-12-17 DIAGNOSIS — I12.9 HYPERTENSIVE KIDNEY DISEASE WITH CKD (CHRONIC KIDNEY DISEASE), STAGE 1-4 OR UNSPECIFIED CHRONIC KIDNEY DISEASE: ICD-10-CM

## 2024-12-17 DIAGNOSIS — R26.2 UNABLE TO WALK: ICD-10-CM

## 2024-12-17 PROCEDURE — 99309 SBSQ NF CARE MODERATE MDM 30: CPT | Performed by: STUDENT IN AN ORGANIZED HEALTH CARE EDUCATION/TRAINING PROGRAM

## 2024-12-18 ENCOUNTER — DOCUMENTATION (OUTPATIENT)
Dept: OPHTHALMOLOGY | Facility: CLINIC | Age: 84
End: 2024-12-18

## 2024-12-18 ENCOUNTER — HOSPITAL ENCOUNTER (OUTPATIENT)
Dept: RADIOLOGY | Facility: HOSPITAL | Age: 84
Discharge: HOME | End: 2024-12-18
Payer: MEDICARE

## 2024-12-18 ENCOUNTER — APPOINTMENT (OUTPATIENT)
Dept: RADIOLOGY | Facility: HOSPITAL | Age: 84
End: 2024-12-18
Payer: MEDICARE

## 2024-12-18 ENCOUNTER — APPOINTMENT (OUTPATIENT)
Dept: OPHTHALMOLOGY | Facility: CLINIC | Age: 84
End: 2024-12-18
Payer: MEDICARE

## 2024-12-18 DIAGNOSIS — Z96.1 PSEUDOPHAKIA OF BOTH EYES: ICD-10-CM

## 2024-12-18 DIAGNOSIS — H02.002 ENTROPION OF RIGHT LOWER EYELID: ICD-10-CM

## 2024-12-18 DIAGNOSIS — R10.9 ABDOMINAL PAIN: ICD-10-CM

## 2024-12-18 DIAGNOSIS — Z94.7 S/P PKP (PENETRATING KERATOPLASTY): Primary | ICD-10-CM

## 2024-12-18 PROCEDURE — V2531 CONTACT LENS GAS PERMEABLE: HCPCS | Performed by: OPTOMETRIST

## 2024-12-18 PROCEDURE — 76770 US EXAM ABDO BACK WALL COMP: CPT | Performed by: RADIOLOGY

## 2024-12-18 PROCEDURE — 76770 US EXAM ABDO BACK WALL COMP: CPT

## 2024-12-18 PROCEDURE — V2510 CNTCT GAS PERMEABLE SPHERICL: HCPCS | Performed by: OPTOMETRIST

## 2024-12-18 PROCEDURE — 92004 COMPRE OPH EXAM NEW PT 1/>: CPT | Performed by: OPTOMETRIST

## 2024-12-18 ASSESSMENT — ENCOUNTER SYMPTOMS
MUSCULOSKELETAL NEGATIVE: 1
PSYCHIATRIC NEGATIVE: 1
EYES NEGATIVE: 1
GASTROINTESTINAL NEGATIVE: 1
CARDIOVASCULAR NEGATIVE: 1
ENDOCRINE NEGATIVE: 1
RESPIRATORY NEGATIVE: 1
CARDIOVASCULAR NEGATIVE: 1
WEAKNESS: 1
FATIGUE: 1

## 2024-12-18 ASSESSMENT — PACHYMETRY
OD_CT(UM): 102
OS_CT(UM): 533

## 2024-12-18 ASSESSMENT — VISUAL ACUITY
OS_SC: 20/80
METHOD: SNELLEN - LINEAR
OS_PH_SC: 20/50
OD_SC: 20/100

## 2024-12-18 ASSESSMENT — REFRACTION_CURRENTRX
OD_DIAMETER: 11.0
OD_SPHERE: +9.00
OS_ADDL_SPECS: 36/42
OD_BASECURVE: 6.82
OS_DIAMETER: 16.0
OS_BASECURVE: 8.4
OS_SPHERE: +4.50
OD_BRAND: RGP ESSILOR

## 2024-12-18 ASSESSMENT — SLIT LAMP EXAM - LIDS: COMMENTS: GOOD POSITION

## 2024-12-18 ASSESSMENT — CONF VISUAL FIELD
OD_SUPERIOR_NASAL_RESTRICTION: 0
OS_INFERIOR_NASAL_RESTRICTION: 0
OS_SUPERIOR_NASAL_RESTRICTION: 0
OS_NORMAL: 1
OS_SUPERIOR_TEMPORAL_RESTRICTION: 0
OD_NORMAL: 1
OD_INFERIOR_NASAL_RESTRICTION: 0
OD_SUPERIOR_TEMPORAL_RESTRICTION: 0
OS_INFERIOR_TEMPORAL_RESTRICTION: 0
OD_INFERIOR_TEMPORAL_RESTRICTION: 0

## 2024-12-18 ASSESSMENT — CUP TO DISC RATIO
OS_RATIO: .35
OD_RATIO: .3

## 2024-12-18 ASSESSMENT — REFRACTION_MANIFEST
OS_AXIS: 018
OS_SPHERE: +2.25
METHOD_AUTOREFRACTION: 1
OS_CYLINDER: -7.50

## 2024-12-18 ASSESSMENT — EXTERNAL EXAM - RIGHT EYE: OD_EXAM: NORMAL

## 2024-12-18 ASSESSMENT — EXTERNAL EXAM - LEFT EYE: OS_EXAM: NORMAL

## 2024-12-18 ASSESSMENT — TONOMETRY
IOP_METHOD: TONOPEN
OS_IOP_MMHG: 10
OD_IOP_MMHG: 09

## 2024-12-18 NOTE — PROGRESS NOTES
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 Lost lenses 1 year ago.     Spastic entropion RLL. Persists and referred to Dr Fay for evaluation and management.    Bilateral PCIOL. In place.    Poor dilation OD and pupil decentered nasally. No evidence of adhesions or inflammatory process. Cornea is not thin and no descemetocele.     Able to get a good view OS but not OD.     Ordered replacement lenses. RTC for CL PUP.

## 2024-12-18 NOTE — Clinical Note
Right lens from [facility] I marked all the peripheral curves and OZ as well, material can be optimum XTRA Left ;lens is [employer] VS  NEEDS PUP appointment.

## 2024-12-19 ENCOUNTER — HOSPITAL ENCOUNTER (EMERGENCY)
Facility: HOSPITAL | Age: 84
Discharge: HOME | End: 2024-12-19
Attending: EMERGENCY MEDICINE
Payer: MEDICARE

## 2024-12-19 ENCOUNTER — NURSING HOME VISIT (OUTPATIENT)
Dept: POST ACUTE CARE | Facility: EXTERNAL LOCATION | Age: 84
End: 2024-12-19
Payer: MEDICARE

## 2024-12-19 ENCOUNTER — APPOINTMENT (OUTPATIENT)
Dept: RADIOLOGY | Facility: HOSPITAL | Age: 84
End: 2024-12-19
Payer: MEDICARE

## 2024-12-19 VITALS
OXYGEN SATURATION: 97 % | TEMPERATURE: 97.7 F | BODY MASS INDEX: 26.48 KG/M2 | HEIGHT: 70 IN | HEART RATE: 68 BPM | SYSTOLIC BLOOD PRESSURE: 116 MMHG | DIASTOLIC BLOOD PRESSURE: 63 MMHG | RESPIRATION RATE: 20 BRPM | WEIGHT: 185 LBS

## 2024-12-19 DIAGNOSIS — N40.1 URINARY RETENTION DUE TO BENIGN PROSTATIC HYPERPLASIA: ICD-10-CM

## 2024-12-19 DIAGNOSIS — I50.42 CHRONIC COMBINED SYSTOLIC AND DIASTOLIC HEART FAILURE: ICD-10-CM

## 2024-12-19 DIAGNOSIS — I12.9 HYPERTENSIVE KIDNEY DISEASE WITH CKD (CHRONIC KIDNEY DISEASE), STAGE 1-4 OR UNSPECIFIED CHRONIC KIDNEY DISEASE: ICD-10-CM

## 2024-12-19 DIAGNOSIS — R60.0 BILATERAL EDEMA OF LOWER EXTREMITY: ICD-10-CM

## 2024-12-19 DIAGNOSIS — R82.90 ABNORMAL URINALYSIS: ICD-10-CM

## 2024-12-19 DIAGNOSIS — Z96.0 URINARY CATHETER IN PLACE: ICD-10-CM

## 2024-12-19 DIAGNOSIS — I48.21 PERMANENT ATRIAL FIBRILLATION (MULTI): Primary | ICD-10-CM

## 2024-12-19 DIAGNOSIS — R33.9 URINARY RETENTION: ICD-10-CM

## 2024-12-19 DIAGNOSIS — Z91.199 NON-COMPLIANCE: ICD-10-CM

## 2024-12-19 DIAGNOSIS — R33.8 URINARY RETENTION DUE TO BENIGN PROSTATIC HYPERPLASIA: ICD-10-CM

## 2024-12-19 DIAGNOSIS — I50.42 CHRONIC COMBINED SYSTOLIC AND DIASTOLIC CONGESTIVE HEART FAILURE: Primary | ICD-10-CM

## 2024-12-19 LAB
AMPHETAMINES UR QL SCN: NORMAL
ANION GAP SERPL CALC-SCNC: 11 MMOL/L (ref 10–20)
APPEARANCE UR: ABNORMAL
BARBITURATES UR QL SCN: NORMAL
BASOPHILS # BLD AUTO: 0.04 X10*3/UL (ref 0–0.1)
BASOPHILS NFR BLD AUTO: 0.4 %
BENZODIAZ UR QL SCN: NORMAL
BILIRUB UR STRIP.AUTO-MCNC: NEGATIVE MG/DL
BNP SERPL-MCNC: 941 PG/ML (ref 0–99)
BUN SERPL-MCNC: 64 MG/DL (ref 6–23)
BZE UR QL SCN: NORMAL
CALCIUM SERPL-MCNC: 8.7 MG/DL (ref 8.6–10.3)
CANNABINOIDS UR QL SCN: NORMAL
CHLORIDE SERPL-SCNC: 96 MMOL/L (ref 98–107)
CO2 SERPL-SCNC: 34 MMOL/L (ref 21–32)
COLOR UR: ABNORMAL
CREAT SERPL-MCNC: 2.02 MG/DL (ref 0.5–1.3)
EGFRCR SERPLBLD CKD-EPI 2021: 32 ML/MIN/1.73M*2
EOSINOPHIL # BLD AUTO: 0.12 X10*3/UL (ref 0–0.4)
EOSINOPHIL NFR BLD AUTO: 1.3 %
ERYTHROCYTE [DISTWIDTH] IN BLOOD BY AUTOMATED COUNT: 15 % (ref 11.5–14.5)
FENTANYL+NORFENTANYL UR QL SCN: NORMAL
GLUCOSE SERPL-MCNC: 175 MG/DL (ref 74–99)
GLUCOSE UR STRIP.AUTO-MCNC: ABNORMAL MG/DL
HCT VFR BLD AUTO: 37.9 % (ref 41–52)
HGB BLD-MCNC: 11.4 G/DL (ref 13.5–17.5)
HOLD SPECIMEN: NORMAL
HOLD SPECIMEN: NORMAL
IMM GRANULOCYTES # BLD AUTO: 0.05 X10*3/UL (ref 0–0.5)
IMM GRANULOCYTES NFR BLD AUTO: 0.6 % (ref 0–0.9)
KETONES UR STRIP.AUTO-MCNC: NEGATIVE MG/DL
LEUKOCYTE ESTERASE UR QL STRIP.AUTO: ABNORMAL
LYMPHOCYTES # BLD AUTO: 0.83 X10*3/UL (ref 0.8–3)
LYMPHOCYTES NFR BLD AUTO: 9.2 %
MCH RBC QN AUTO: 28.1 PG (ref 26–34)
MCHC RBC AUTO-ENTMCNC: 30.1 G/DL (ref 32–36)
MCV RBC AUTO: 93 FL (ref 80–100)
METHADONE UR QL SCN: NORMAL
MONOCYTES # BLD AUTO: 0.68 X10*3/UL (ref 0.05–0.8)
MONOCYTES NFR BLD AUTO: 7.5 %
MUCOUS THREADS #/AREA URNS AUTO: ABNORMAL /LPF
NEUTROPHILS # BLD AUTO: 7.33 X10*3/UL (ref 1.6–5.5)
NEUTROPHILS NFR BLD AUTO: 81 %
NITRITE UR QL STRIP.AUTO: NEGATIVE
NRBC BLD-RTO: 0 /100 WBCS (ref 0–0)
OPIATES UR QL SCN: NORMAL
OXYCODONE+OXYMORPHONE UR QL SCN: NORMAL
PCP UR QL SCN: NORMAL
PH UR STRIP.AUTO: 6.5 [PH]
PLATELET # BLD AUTO: 163 X10*3/UL (ref 150–450)
POTASSIUM SERPL-SCNC: 4.2 MMOL/L (ref 3.5–5.3)
PROT UR STRIP.AUTO-MCNC: NEGATIVE MG/DL
RBC # BLD AUTO: 4.06 X10*6/UL (ref 4.5–5.9)
RBC # UR STRIP.AUTO: NEGATIVE /UL
RBC #/AREA URNS AUTO: ABNORMAL /HPF
SODIUM SERPL-SCNC: 137 MMOL/L (ref 136–145)
SP GR UR STRIP.AUTO: 1
UROBILINOGEN UR STRIP.AUTO-MCNC: NORMAL MG/DL
WBC # BLD AUTO: 9.1 X10*3/UL (ref 4.4–11.3)
WBC #/AREA URNS AUTO: >50 /HPF
YEAST BUDDING #/AREA UR COMP ASSIST: PRESENT /HPF

## 2024-12-19 PROCEDURE — 87086 URINE CULTURE/COLONY COUNT: CPT | Mod: STJLAB | Performed by: EMERGENCY MEDICINE

## 2024-12-19 PROCEDURE — 80307 DRUG TEST PRSMV CHEM ANLYZR: CPT

## 2024-12-19 PROCEDURE — 99310 SBSQ NF CARE HIGH MDM 45: CPT | Performed by: STUDENT IN AN ORGANIZED HEALTH CARE EDUCATION/TRAINING PROGRAM

## 2024-12-19 PROCEDURE — 99284 EMERGENCY DEPT VISIT MOD MDM: CPT | Performed by: EMERGENCY MEDICINE

## 2024-12-19 PROCEDURE — 2500000004 HC RX 250 GENERAL PHARMACY W/ HCPCS (ALT 636 FOR OP/ED): Performed by: EMERGENCY MEDICINE

## 2024-12-19 PROCEDURE — 85025 COMPLETE CBC W/AUTO DIFF WBC: CPT | Performed by: EMERGENCY MEDICINE

## 2024-12-19 PROCEDURE — 82374 ASSAY BLOOD CARBON DIOXIDE: CPT | Performed by: EMERGENCY MEDICINE

## 2024-12-19 PROCEDURE — 2500000004 HC RX 250 GENERAL PHARMACY W/ HCPCS (ALT 636 FOR OP/ED)

## 2024-12-19 PROCEDURE — 36415 COLL VENOUS BLD VENIPUNCTURE: CPT | Performed by: EMERGENCY MEDICINE

## 2024-12-19 PROCEDURE — 51798 US URINE CAPACITY MEASURE: CPT

## 2024-12-19 PROCEDURE — 2500000001 HC RX 250 WO HCPCS SELF ADMINISTERED DRUGS (ALT 637 FOR MEDICARE OP)

## 2024-12-19 PROCEDURE — 74176 CT ABD & PELVIS W/O CONTRAST: CPT | Performed by: RADIOLOGY

## 2024-12-19 PROCEDURE — 99285 EMERGENCY DEPT VISIT HI MDM: CPT | Mod: 25 | Performed by: EMERGENCY MEDICINE

## 2024-12-19 PROCEDURE — 2500000005 HC RX 250 GENERAL PHARMACY W/O HCPCS

## 2024-12-19 PROCEDURE — 83880 ASSAY OF NATRIURETIC PEPTIDE: CPT | Performed by: EMERGENCY MEDICINE

## 2024-12-19 PROCEDURE — 81001 URINALYSIS AUTO W/SCOPE: CPT | Performed by: EMERGENCY MEDICINE

## 2024-12-19 PROCEDURE — 96365 THER/PROPH/DIAG IV INF INIT: CPT | Mod: 59

## 2024-12-19 PROCEDURE — 96366 THER/PROPH/DIAG IV INF ADDON: CPT | Mod: 59

## 2024-12-19 PROCEDURE — 51702 INSERT TEMP BLADDER CATH: CPT

## 2024-12-19 PROCEDURE — 74176 CT ABD & PELVIS W/O CONTRAST: CPT

## 2024-12-19 PROCEDURE — 96375 TX/PRO/DX INJ NEW DRUG ADDON: CPT | Mod: 59

## 2024-12-19 RX ORDER — CEPHALEXIN 500 MG/1
500 CAPSULE ORAL 3 TIMES DAILY
Qty: 30 CAPSULE | Refills: 0 | Status: SHIPPED | OUTPATIENT
Start: 2024-12-19 | End: 2024-12-19

## 2024-12-19 RX ORDER — TAMSULOSIN HYDROCHLORIDE 0.4 MG/1
0.4 CAPSULE ORAL DAILY
Qty: 30 CAPSULE | Refills: 0 | Status: SHIPPED | OUTPATIENT
Start: 2024-12-19 | End: 2024-12-19

## 2024-12-19 RX ORDER — ACETAMINOPHEN 325 MG/1
975 TABLET ORAL ONCE
Status: COMPLETED | OUTPATIENT
Start: 2024-12-19 | End: 2024-12-19

## 2024-12-19 RX ORDER — FLUCONAZOLE 150 MG/1
150 TABLET ORAL ONCE
Qty: 1 TABLET | Refills: 0 | Status: SHIPPED | OUTPATIENT
Start: 2024-12-19 | End: 2024-12-19

## 2024-12-19 RX ORDER — FUROSEMIDE 10 MG/ML
20 INJECTION INTRAMUSCULAR; INTRAVENOUS ONCE
Status: COMPLETED | OUTPATIENT
Start: 2024-12-19 | End: 2024-12-19

## 2024-12-19 RX ORDER — CEPHALEXIN 500 MG/1
500 CAPSULE ORAL 3 TIMES DAILY
Qty: 30 CAPSULE | Refills: 0 | Status: SHIPPED | OUTPATIENT
Start: 2024-12-19 | End: 2024-12-29

## 2024-12-19 RX ORDER — TAMSULOSIN HYDROCHLORIDE 0.4 MG/1
0.4 CAPSULE ORAL DAILY
Qty: 30 CAPSULE | Refills: 0 | Status: SHIPPED | OUTPATIENT
Start: 2024-12-19

## 2024-12-19 RX ORDER — CEFTRIAXONE 1 G/50ML
1 INJECTION, SOLUTION INTRAVENOUS ONCE
Status: COMPLETED | OUTPATIENT
Start: 2024-12-19 | End: 2024-12-19

## 2024-12-19 ASSESSMENT — ENCOUNTER SYMPTOMS
RESPIRATORY NEGATIVE: 1
CARDIOVASCULAR NEGATIVE: 1
MUSCULOSKELETAL NEGATIVE: 1
DIFFICULTY URINATING: 1
AGITATION: 1
ABDOMINAL PAIN: 1
NEUROLOGICAL NEGATIVE: 1
CONSTITUTIONAL NEGATIVE: 1

## 2024-12-19 ASSESSMENT — PAIN DESCRIPTION - FREQUENCY: FREQUENCY: CONSTANT/CONTINUOUS

## 2024-12-19 ASSESSMENT — PAIN DESCRIPTION - PROGRESSION: CLINICAL_PROGRESSION: NOT CHANGED

## 2024-12-19 ASSESSMENT — PAIN SCALES - GENERAL
PAINLEVEL_OUTOF10: 4
PAINLEVEL_OUTOF10: 0 - NO PAIN
PAINLEVEL_OUTOF10: 0 - NO PAIN

## 2024-12-19 ASSESSMENT — PAIN - FUNCTIONAL ASSESSMENT
PAIN_FUNCTIONAL_ASSESSMENT: 0-10
PAIN_FUNCTIONAL_ASSESSMENT: 0-10

## 2024-12-19 ASSESSMENT — PAIN DESCRIPTION - PAIN TYPE: TYPE: ACUTE PAIN

## 2024-12-19 ASSESSMENT — PAIN DESCRIPTION - ORIENTATION: ORIENTATION: RIGHT;LEFT;LOWER

## 2024-12-19 ASSESSMENT — PAIN DESCRIPTION - LOCATION: LOCATION: PELVIS

## 2024-12-19 ASSESSMENT — PAIN DESCRIPTION - ONSET: ONSET: ONGOING

## 2024-12-19 ASSESSMENT — PAIN DESCRIPTION - DESCRIPTORS: DESCRIPTORS: PRESSURE

## 2024-12-19 NOTE — PROGRESS NOTES
Subjective   Patient ID: Elgin Marin is a 84 y.o. male.    Patient seen and examined on routine evaluation.  Regards that he is overall doing well, however is having persistent issues with pain in his penile region.  Has been on antibiotics for underlying UTI, however has very minimal improvement in symptoms.  States that the pain and excruciating, and is significantly bothering him.  Otherwise, fluid status is overall been stable and slowly improving with physical therapy.  Does not feel back to his strength yet, most slowly but surely getting better.  Does want to go home at discharge.  Otherwise, states no additional issues or concerns at this time.  Feels overall well.        Review of Systems   Constitutional:  Positive for fatigue.   HENT: Negative.     Respiratory: Negative.     Cardiovascular: Negative.    Gastrointestinal: Negative.    Musculoskeletal: Negative.    Skin: Negative.    Neurological:  Positive for weakness.   Psychiatric/Behavioral: Negative.         Objective Vitals Reviewed via facility EMR   Physical Exam  Constitutional:       General: He is not in acute distress.     Appearance: He is not ill-appearing.   Eyes:      Pupils: Pupils are equal, round, and reactive to light.   Cardiovascular:      Rate and Rhythm: Normal rate and regular rhythm.      Pulses: Normal pulses.      Heart sounds: No murmur heard.  Pulmonary:      Effort: No respiratory distress.      Breath sounds: No wheezing.   Abdominal:      General: Abdomen is flat. Bowel sounds are normal. There is no distension.   Musculoskeletal:         General: Tenderness present.      Right lower leg: No edema.      Left lower leg: No edema.   Skin:     General: Skin is warm and dry.   Neurological:      Mental Status: He is alert. Mental status is at baseline.      Cranial Nerves: No cranial nerve deficit.      Motor: Weakness present.   Psychiatric:         Mood and Affect: Mood normal.         Behavior: Behavior normal.          Assessment/Plan   Diagnoses and all orders for this visit:  Permanent atrial fibrillation (Multi)  Chronic combined systolic and diastolic heart failure  Acute cystitis without hematuria  Unable to walk  Hypertensive kidney disease with CKD (chronic kidney disease), stage 1-4 or unspecified chronic kidney disease    Patient seen and examined at bedside.  Overall medically stable, however, continues to be having persistent dysuria in addition with a UTI.  Did recently seen urology yesterday, did get cultures drawn of waiting those results.  Continue on current antibiotics however we will add in tramadol to help with underlying pain control.  Discussed extensively with patient, I do believe that benefits of medications to outweigh the risks at this time.  Otherwise no additional issues.  Discussed care plan with staff who is agreeable.    Reviewed and approved by GAGE STEWART on 12/18/24 at 8:50 PM.

## 2024-12-19 NOTE — LETTER
Patient: Elgin Marin  : 1940    Encounter Date: 2024    Subjective  Patient ID: Elgin Marin is a 84 y.o. male.    Patient seen and examined on follow-up.  Recently had a renal ultrasound done as well as a bladder scan done that showed significant urinary retention.  Urinary retention from ultrasound imaging showed that patient's bladder was distended with greater than 800 cc of urine.  I did instruct the staff to place a Minaya last night, however patient refused this stating that he was going home today, and he did not want a Minaya catheter inserted.  Patient still is having some abdominal pain, however denies any fevers chills nausea or vomiting.  Discussed with patient the risks of not having a Minaya catheter and that he does need to follow-up with urology, he endorses that he would like to be seen by urology today, to get this sorted out I did inform the patient that he likely would need to be sent to the emergency room in order for this to be evaluated and he stated he wishes to do so as he did not want to do this as an outpatient.  Otherwise states no issues or concerns, overall feels well.        Review of Systems   Constitutional: Negative.    HENT: Negative.     Respiratory: Negative.     Cardiovascular: Negative.    Gastrointestinal:  Positive for abdominal pain.   Genitourinary:  Positive for difficulty urinating.   Musculoskeletal: Negative.    Skin: Negative.    Neurological: Negative.    Psychiatric/Behavioral:  Positive for agitation.        ObjectiveVitals Reviewed via facility EMR   Physical Exam  Constitutional:       General: He is not in acute distress.     Appearance: He is not ill-appearing.      Comments: Refusing minaya   Eyes:      Pupils: Pupils are equal, round, and reactive to light.   Cardiovascular:      Rate and Rhythm: Normal rate and regular rhythm.      Pulses: Normal pulses.      Heart sounds: No murmur heard.  Pulmonary:      Effort: No respiratory distress.       Breath sounds: No wheezing.   Abdominal:      General: Abdomen is flat. Bowel sounds are normal. There is distension.      Comments: pain   Musculoskeletal:         General: Tenderness present.      Right lower leg: No edema.      Left lower leg: No edema.   Skin:     General: Skin is warm and dry.   Neurological:      Mental Status: He is alert. Mental status is at baseline.      Cranial Nerves: No cranial nerve deficit.      Motor: Weakness present.   Psychiatric:         Mood and Affect: Mood normal.         Behavior: Behavior normal.         Assessment/Plan  Diagnoses and all orders for this visit:  Permanent atrial fibrillation (Multi)  Chronic combined systolic and diastolic heart failure  Hypertensive kidney disease with CKD (chronic kidney disease), stage 1-4 or unspecified chronic kidney disease  Bilateral edema of lower extremity  Urinary retention  Non-compliance        Patient seen and examined at bedside.  Overall stable still having some belly tenderness.  As per HPI, I suspect that this is related to his bladder distention.  Highly advised Graham.  Explained however he would like to be seen in the emergency room.  Will plan on transferring patient to the ED, discussed care plan with staff.    Reviewed and approved by JAIR STEWART on 12/19/24 at 11:09 PM.     >45 minutes spent in management of pt        Electronically Signed By: Jair Stewart DO   12/19/24 11:09 PM

## 2024-12-19 NOTE — ED PROVIDER NOTES
EMERGENCY DEPARTMENT ENCOUNTER      Pt Name: Elgin Marin  MRN: [id number]  Birthdate 1940  Date of evaluation: 12/19/2024  Provider: Roger Lorenz MD    CHIEF COMPLAINT       Chief Complaint   Patient presents with    Urinary Retention     Patient sent by physician      HISTORY OF PRESENT ILLNESS    HPI  84-year-old male presents emergency department with chief complaint of urinary retention.  Patient claims that he was recently in the hospital due to urinary retention in which they placed a Graham catheter.  He returned back to his facility where the Graham catheter was causing him pain so they removed it.  However today he developed pain and urinary retention.  Nursing Notes were reviewed.    PAST MEDICAL HISTORY     Past Medical History:   Diagnosis Date    Hyperlipidemia, unspecified 11/14/2022    Dyslipidemia    Other forms of dyspnea 08/09/2018    Dyspnea on exertion    Personal history of other diseases of the circulatory system 12/08/2014    History of hypertension    Personal history of other endocrine, nutritional and metabolic disease     History of diabetes mellitus    Personal history of other specified conditions     History of syncope    Presence of intraocular lens 07/20/2019    Pseudophakia of right eye    Presence of intraocular lens 07/20/2019    Pseudophakia of left eye    Unspecified atrial fibrillation (Multi) 11/14/2022    Atrial fibrillation         SURGICAL HISTORY       Past Surgical History:   Procedure Laterality Date    CATARACT EXTRACTION      CHOLECYSTECTOMY  09/09/2015    Cholecystectomy    CORNEAL TRANSPLANT      CORONARY ANGIOPLASTY WITH STENT PLACEMENT  10/06/2015    Cath Stent Placement    CORONARY ARTERY BYPASS GRAFT  10/06/2015    CABG    OTHER SURGICAL HISTORY  09/09/2015    Wrist Carpectomy    ROTATOR CUFF REPAIR  09/09/2015    Rotator Cuff Repair         CURRENT MEDICATIONS       Previous Medications    ACETAMINOPHEN (TYLENOL) 325 MG TABLET    Take 2 tablets (650 mg)  by mouth every 4 hours if needed for fever (temp greater than 38.0 C).    ACETAMINOPHEN (TYLENOL) 500 MG TABLET    Take 2 tablets (1,000 mg) by mouth every 12 hours if needed for mild pain (1 - 3) or moderate pain (4 - 6).    ALUM-MAG HYDROXIDE-SIMETH (MYLANTA) 200-200-20 MG/5 ML ORAL SUSPENSION    Take 20 mL by mouth every 4 hours if needed for indigestion or heartburn.    APIXABAN (ELIQUIS) 2.5 MG TABLET    Take 1 tablet (2.5 mg) by mouth 2 times a day.    BISACODYL (DULCOLAX) 10 MG SUPPOSITORY    Insert 1 suppository (10 mg) into the rectum 1 time if needed for constipation.    BISOPROLOL (ZEBETA) 5 MG TABLET    Take 1 tablet (5 mg) by mouth once daily.    CHOLECALCIFEROL (VITAMIN D-3) 50 MCG (2000 UT) TABLET    Take 1 tablet (50 mcg) by mouth once daily.    EMPAGLIFLOZIN (JARDIANCE) 25 MG    Take 0.5 tablets (12.5 mg) by mouth once daily.    GUAIFENESIN (ROBITUSSIN) 100 MG/5 ML SYRUP    Take 15 mL by mouth every 6 hours if needed for cough.    HYDRALAZINE (APRESOLINE) 10 MG TABLET    Take 1 tablet (10 mg) by mouth 2 times a day. Hold for SBP <110    INSULIN GLARGINE (LANTUS U-100 INSULIN) 100 UNIT/ML INJECTION    Inject 10 Units under the skin once daily in the morning.    INSULIN LISPRO 100 UNIT/ML INJECTION    Inject 0-10 Units under the skin 4 times a day before meals. Take as directed per insulin instructions.    IPRATROPIUM-ALBUTEROL (DUO-NEB) 0.5-2.5 MG/3 ML NEBULIZER SOLUTION    Take 3 mL by nebulization 3 times a day.    IPRATROPIUM-ALBUTEROL (DUO-NEB) 0.5-2.5 MG/3 ML NEBULIZER SOLUTION    Take 3 mL by nebulization every 2 hours if needed for shortness of breath.    ISOSORBIDE DINITRATE (ISORDIL) 10 MG TABLET    Take 1 tablet (10 mg) by mouth 2 times a day. Hold for SBP <110 Do not fill before November 22, 2024.    LIDOCAINE 4 % PATCH    Place 1 patch over 12 hours on the skin once daily. Remove & discard patch within 12 hours or as directed by MD.    MAGNESIUM HYDROXIDE (MILK OF MAGNESIA) 400 MG/5 ML  "SUSPENSION    Take 30 mL by mouth once daily as needed for constipation.    NIFEDIPINE ER (ADALAT CC) 30 MG 24 HR TABLET    Take 1 tablet (30 mg) by mouth once daily in the morning. Take before meals. Do not crush, chew, or split. Hold for SBP <110    OXYGEN (O2) GAS THERAPY    Inhale 1 each continuously.    PANTOPRAZOLE (PROTONIX) 40 MG EC TABLET    Take 1 tablet (40 mg) by mouth once daily at bedtime.    PEN NEEDLE, DIABETIC 31 GAUGE X 5/16\" NEEDLE    Use to inject insulin 1 to 4 times daily as directed    ROSUVASTATIN (CRESTOR) 20 MG TABLET    Take 1 tablet (20 mg) by mouth once daily at bedtime.    SPIRONOLACTONE (ALDACTONE) 25 MG TABLET    Take 0.5 tablets (12.5 mg) by mouth once every 24 hours.    TORSEMIDE 40 MG TABLET    Take 80 mg by mouth once daily.    WHITE PETROLATUM (AQUAPHOR) 41 % OINTMENT OINTMENT    Apply 1 Application topically once daily.       ALLERGIES     Metoprolol, Oxycodone-aspirin, Sulfa (sulfonamide antibiotics), Tramadol, Lisinopril, and Warfarin    FAMILY HISTORY     No family history on file.       SOCIAL HISTORY       Social History     Socioeconomic History    Marital status:    Tobacco Use    Smoking status: Never    Smokeless tobacco: Never   Substance and Sexual Activity    Alcohol use: Never    Drug use: Never     Social Drivers of Health     Financial Resource Strain: Low Risk  (11/15/2024)    Overall Financial Resource Strain (CARDIA)     Difficulty of Paying Living Expenses: Not hard at all   Food Insecurity: No Food Insecurity (11/15/2024)    Hunger Vital Sign     Worried About Running Out of Food in the Last Year: Never true     Ran Out of Food in the Last Year: Never true   Transportation Needs: No Transportation Needs (11/15/2024)    PRAPARE - Transportation     Lack of Transportation (Medical): No     Lack of Transportation (Non-Medical): No   Physical Activity: Inactive (11/10/2024)    Exercise Vital Sign     Days of Exercise per Week: 0 days     Minutes of " Exercise per Session: 0 min   Stress: No Stress Concern Present (11/10/2024)    [employer] - Occupational Stress Questionnaire     Feeling of Stress : Not at all   Social Connections: Moderately Isolated (11/10/2024)    Social Connection and Isolation Panel [NHANES]     Frequency of Communication with Friends and Family: More than three times a week     Frequency of Social Gatherings with Friends and Family: Once a week     Attends [employer] Services: Never     Active Member of Clubs or Organizations: No     Attends Club or Organization Meetings: Never     Marital Status:    Intimate Partner Violence: Not At Risk (11/15/2024)    Humiliation, Afraid, Rape, and Kick questionnaire     Fear of Current or Ex-Partner: No     Emotionally Abused: No     Physically Abused: No     Sexually Abused: No   Housing Stability: Low Risk  (11/15/2024)    Housing Stability Vital Sign     Unable to Pay for Housing in the Last Year: No     Number of Times Moved in the Last Year: 0     Homeless in the Last Year: No       SCREENINGS                        PHYSICAL EXAM    (up to 7 for level 4, 8 or more for level 5)     ED Triage Vitals [12/19/24 1026]   Temperature Heart Rate Respirations BP   36.5 °C (97.7 °F) 85 18 116/66      Pulse Ox Temp Source Heart Rate Source Patient Position   96 % Temporal Monitor Sitting      BP Location FiO2 (%)     Right arm --       Physical Exam  Constitutional:       Appearance: Normal appearance.   HENT:      Head: Normocephalic.      Mouth/Throat:      Mouth: Mucous membranes are moist.      Pharynx: Oropharynx is clear.   Eyes:      Extraocular Movements: Extraocular movements intact.      Pupils: Pupils are equal, round, and reactive to light.   Cardiovascular:      Rate and Rhythm: Normal rate and regular rhythm.      Pulses: Normal pulses.      Heart sounds: Normal heart sounds.   Pulmonary:      Effort: Pulmonary effort is normal.      Breath sounds: Normal  breath sounds.   Abdominal:      General: Abdomen is flat.      Palpations: Abdomen is soft.      Tenderness: There is generalized abdominal tenderness.      Comments: Mild abdominal tenderness generalized indicates this is normal for him.   Musculoskeletal:         General: Normal range of motion.      Right lower leg: Pitting Edema present.      Left lower leg: Pitting Edema present.      Comments: Patient has +3 pitting edema of the lower extremities bilaterally as well as stasis dermatitis to lower extremities.   Skin:     General: Skin is warm.      Capillary Refill: Capillary refill takes less than 2 seconds.   Neurological:      General: No focal deficit present.      Mental Status: He is alert and oriented to person, place, and time.   Psychiatric:         Mood and Affect: Mood normal.          DIAGNOSTIC RESULTS     LABS:  Labs Reviewed   URINALYSIS WITH REFLEX CULTURE AND MICROSCOPIC - Abnormal       Result Value    Color, Urine Light-Yellow      Appearance, Urine Turbid (*)     Specific Gravity, Urine 1.005      pH, Urine 6.5      Protein, Urine NEGATIVE      Glucose, Urine 1000 (4+) (*)     Blood, Urine NEGATIVE      Ketones, Urine NEGATIVE      Bilirubin, Urine NEGATIVE      Urobilinogen, Urine Normal      Nitrite, Urine NEGATIVE      Leukocyte Esterase, Urine 500 Allie/µL (*)    CBC WITH AUTO DIFFERENTIAL - Abnormal    WBC 9.1      nRBC 0.0      RBC 4.06 (*)     Hemoglobin 11.4 (*)     Hematocrit 37.9 (*)     MCV 93      MCH 28.1      MCHC 30.1 (*)     RDW 15.0 (*)     Platelets 163      Neutrophils % 81.0      Immature Granulocytes %, Automated 0.6      Lymphocytes % 9.2      Monocytes % 7.5      Eosinophils % 1.3      Basophils % 0.4      Neutrophils Absolute 7.33 (*)     Immature Granulocytes Absolute, Automated 0.05      Lymphocytes Absolute 0.83      Monocytes Absolute 0.68      Eosinophils Absolute 0.12      Basophils Absolute 0.04     MICROSCOPIC ONLY, URINE - Abnormal    WBC, Urine >50 (*)      RBC, Urine 3-5      Budding Yeast, Urine PRESENT (*)     Mucus, Urine FEW     URINE CULTURE   URINALYSIS WITH REFLEX CULTURE AND MICROSCOPIC    Narrative:     The following orders were created for panel order Urinalysis with Reflex Culture and Microscopic.  Procedure                               Abnormality         Status                     ---------                               -----------         ------                     Urinalysis with Reflex C...[[id number]]  Abnormal            Final result               Extra Urine Gray Tube[[id number]]                            In process                   Please view results for these tests on the individual orders.   EXTRA URINE GRAY TUBE   BASIC METABOLIC PANEL       All other labs were within normal range or not returned as of this dictation.    Imaging  CT abdomen pelvis wo IV contrast    (Results Pending)        Procedures  Procedures     EMERGENCY DEPARTMENT COURSE/MDM:   Medical Decision Making  84-year-old male presents emergency department chief complaint of urinary retention.  Medical management treatment emergency department will be to place a Graham catheter.  1 L was removed initial Graham catheter placement.  The patient did have relief on administration of the Graham catheter.  Labs will be performed to rule out any kidney damage.  Urine will also be evaluated.  Urine is showing leukocyte esterase positive urine the patient is on doxycycline after discharge from the hospital.  Is unclear why doxycycline was chosen.  We will give 1 g of Rocephin here in the emergency department.  The patient was endorsing mild abdominal tenderness and urinary retention we will be scanning his abdomen to rule out any intra-abdominal pathology.Patient's creatinine is mildly elevated however at baseline.  CBC is relatively unremarkable patient's urinalysis shows leukocyte esterase positive urine he is currently being treated for this.  CT abdomen pelvis shows IMPRESSION:  1.   Circumferential wall thickening of the urinary bladder with tiny  amount of intraluminal gas, which may be due to cystitis or  underdistention and recent instrumentation/Graham catheter placement.  Correlation with urinalysis is recommended.  2. Diffuse body wall edema, which may be correlated for symptoms of  volume overload.  3. Bilateral layering pleural effusions, moderate-to-large on the  right and small on the left.  4. Small area of consolidation in the posterior right middle lobe may  represent atelectasis or pneumonia.  5. Additional patchy bandlike areas of subsegmental atelectasis in  the lung bases.  6. Additional chronic incidental findings, as detailed above.      We have given the patient Dr. Leone urology to follow-up with.  The patient will be discharged back to his facility.  He feels otherwise well.      ED Course as of 12/19/24 1514   Thu Dec 19, 2024   1206 Creatinine(!): 2.02  Patient is at his baseline GFR   [JW]   1206 WBC, Urine(!): >50 [JW]   1206 Budding Yeast, Urine(!): PRESENT  Will obtain a pharmacy consult for next steps in terms of treatment.  Patient currently on doxycycline which is unlikely to treat urinary pathogens [JW]      ED Course User Index  [JW] Rafaela Maya MD         Diagnoses as of 12/19/24 1514   Chronic combined systolic and diastolic congestive heart failure   Urinary retention due to benign prostatic hyperplasia   Urinary catheter in place   Abnormal urinalysis        Patient and or family in agreement and understanding of treatment plan.  All questions answered.      I reviewed the case with the attending ED physician. The attending ED physician agrees with the plan. Patient and/or patient´s representative was counseled regarding labs, imaging, likely diagnosis, and plan. All questions were answered.    ED Medications administered this visit:    Medications   oxygen (O2) therapy (2 L/min inhalation Start 12/19/24 1119)   cefTRIAXone (Rocephin) 1 g in dextrose  (iso) IV 50 mL (has no administration in time range)   acetaminophen (Tylenol) tablet 975 mg (975 mg oral Given 12/19/24 1114)       New Prescriptions from this visit:    New Prescriptions    No medications on file       Follow-up:  No follow-up provider specified.      Final Impression: No diagnosis found.      (Please note that portions of this note were completed with a voice recognition program.  Efforts were made to edit the dictations but occasionally words are mis-transcribed.)     Roger Lorenz MD  Resident  12/19/24 3062

## 2024-12-19 NOTE — PROGRESS NOTES
"Capacity Assessment Tool    \"Capacity\" is the \"ability\" to make a decision.  The decision in question must be specific (one decision), relevant to a patient's current condition (appropriate), and timely (neither prospective nor retrospective).    Capacity varies based on knowledge base (explanation/understanding of clinical information), cognitive processing, acute psychiatric illness, and other clinical conditions.    In order to be deemed \"capacitated\" to make a single decision at one point in time, a patient must demonstrate all 4 of the following elements:    *Ability to consistently communicate a choice (consistent over time with adequate information)  *Ability to understand the relevant information (accurate knowledge of condition)  *Ability to appreciate the situation and its consequences (risks/benefits, pros/cons)  *Ability to reason about treatment options (without undue influence of a person or condition, eg. suicidality or acute psychosis)      Current Decision    Clinical issue:   Urinary retention patient indicates that he wanted to die.    Did the appropriate team address relevant information with the patient:  12/19/2024    If \"NO\" is selected for appropriate team, then please discuss with the appropriate team.  The appropriate team should be encouraged to address relevant information with the patient AND reevaluate capacity when appropriate.    Capacity Evaluation    Patient demonstrates ability to consistently communicate choice:  Yes     Patient demonstrates ability to understand the relevant information:  Yes     Patient demonstrates ability to appreciate the situation and its consequences:  Yes     Patient demonstrates ability to reason about treatment options:  Yes     If ANY of the above items are answered \"NO,\" the patient LACKS CAPACITY for that specific decision at hand, at that specific time.  Further capacity evaluations can be done as needed.         "

## 2024-12-19 NOTE — DISCHARGE INSTRUCTIONS
You were seen emergency department today for evaluation of urinary retention.  We placed a Graham catheter and drained approximately 1 L.  Please keep the Graham catheter in place and follow-up with urology as soon as possible.  If you develop any new pain in your abdomen difficulty urinating blood in your Graham bag please return back to emergency department soon as possible

## 2024-12-20 LAB — BACTERIA UR CULT: NORMAL

## 2024-12-20 NOTE — PROGRESS NOTES
Subjective   Patient ID: Elgin Marin is a 84 y.o. male.    Patient seen and examined on follow-up.  Recently had a renal ultrasound done as well as a bladder scan done that showed significant urinary retention.  Urinary retention from ultrasound imaging showed that patient's bladder was distended with greater than 800 cc of urine.  I did instruct the staff to place a Minaya last night, however patient refused this stating that he was going home today, and he did not want a Minaya catheter inserted.  Patient still is having some abdominal pain, however denies any fevers chills nausea or vomiting.  Discussed with patient the risks of not having a Minaya catheter and that he does need to follow-up with urology, he endorses that he would like to be seen by urology today, to get this sorted out I did inform the patient that he likely would need to be sent to the emergency room in order for this to be evaluated and he stated he wishes to do so as he did not want to do this as an outpatient.  Otherwise states no issues or concerns, overall feels well.        Review of Systems   Constitutional: Negative.    HENT: Negative.     Respiratory: Negative.     Cardiovascular: Negative.    Gastrointestinal:  Positive for abdominal pain.   Genitourinary:  Positive for difficulty urinating.   Musculoskeletal: Negative.    Skin: Negative.    Neurological: Negative.    Psychiatric/Behavioral:  Positive for agitation.        Objective Vitals Reviewed via facility EMR   Physical Exam  Constitutional:       General: He is not in acute distress.     Appearance: He is not ill-appearing.      Comments: Refusing minaya   Eyes:      Pupils: Pupils are equal, round, and reactive to light.   Cardiovascular:      Rate and Rhythm: Normal rate and regular rhythm.      Pulses: Normal pulses.      Heart sounds: No murmur heard.  Pulmonary:      Effort: No respiratory distress.      Breath sounds: No wheezing.   Abdominal:      General: Abdomen is  flat. Bowel sounds are normal. There is distension.      Comments: pain   Musculoskeletal:         General: Tenderness present.      Right lower leg: No edema.      Left lower leg: No edema.   Skin:     General: Skin is warm and dry.   Neurological:      Mental Status: He is alert. Mental status is at baseline.      Cranial Nerves: No cranial nerve deficit.      Motor: Weakness present.   Psychiatric:         Mood and Affect: Mood normal.         Behavior: Behavior normal.         Assessment/Plan   Diagnoses and all orders for this visit:  Permanent atrial fibrillation (Multi)  Chronic combined systolic and diastolic heart failure  Hypertensive kidney disease with CKD (chronic kidney disease), stage 1-4 or unspecified chronic kidney disease  Bilateral edema of lower extremity  Urinary retention  Non-compliance        Patient seen and examined at bedside.  Overall stable still having some belly tenderness.  As per HPI, I suspect that this is related to his bladder distention.  Highly advised Graham.  Explained however he would like to be seen in the emergency room.  Will plan on transferring patient to the ED, discussed care plan with staff.    Reviewed and approved by GAGE STEWART on 12/19/24 at 11:09 PM.     >45 minutes spent in management of pt

## 2024-12-23 ENCOUNTER — PATIENT OUTREACH (OUTPATIENT)
Dept: CARE COORDINATION | Age: 84
End: 2024-12-23
Payer: MEDICARE

## 2024-12-23 ENCOUNTER — NURSING HOME VISIT (OUTPATIENT)
Dept: POST ACUTE CARE | Facility: EXTERNAL LOCATION | Age: 84
End: 2024-12-23
Payer: MEDICARE

## 2024-12-23 DIAGNOSIS — I10 ESSENTIAL HYPERTENSION, BENIGN: ICD-10-CM

## 2024-12-23 DIAGNOSIS — I48.21 PERMANENT ATRIAL FIBRILLATION (MULTI): Primary | ICD-10-CM

## 2024-12-23 DIAGNOSIS — I12.9 HYPERTENSIVE KIDNEY DISEASE WITH CKD (CHRONIC KIDNEY DISEASE), STAGE 1-4 OR UNSPECIFIED CHRONIC KIDNEY DISEASE: ICD-10-CM

## 2024-12-23 DIAGNOSIS — I50.42 CHRONIC COMBINED SYSTOLIC AND DIASTOLIC HEART FAILURE: ICD-10-CM

## 2024-12-23 DIAGNOSIS — R33.9 URINARY RETENTION: ICD-10-CM

## 2024-12-23 PROCEDURE — 99309 SBSQ NF CARE MODERATE MDM 30: CPT | Performed by: STUDENT IN AN ORGANIZED HEALTH CARE EDUCATION/TRAINING PROGRAM

## 2024-12-23 NOTE — LETTER
Patient: Elgin Marin  : 1940    Encounter Date: 2024    Subjective  Patient ID: Elgin Marin is a 84 y.o. male.    Patient seen and examined hospital follow-up.  Regards that he is overall doing well, and recently was sent to emergency room per his request.  Was evaluated, and Graham was subsequently placed.  He endorses that he does have a follow-up with urology upcoming in the coming days.  States that he has been tolerating his Graham catheter without any issues.  Otherwise, endorses that his fluid status is overall stable, does have some lower extremity skin drying which is chronic for him.  Otherwise endorses no additional issues or concerns.        Review of Systems   Constitutional:  Positive for fatigue.   HENT: Negative.     Respiratory: Negative.     Cardiovascular: Negative.    Gastrointestinal: Negative.    Musculoskeletal: Negative.    Skin: Negative.    Neurological:  Positive for weakness.   Psychiatric/Behavioral: Negative.         ObjectiveVitals Reviewed via facility EMR   Physical Exam  Constitutional:       General: He is not in acute distress.     Appearance: He is not ill-appearing.   Eyes:      Pupils: Pupils are equal, round, and reactive to light.   Cardiovascular:      Rate and Rhythm: Normal rate and regular rhythm.      Pulses: Normal pulses.      Heart sounds: No murmur heard.  Pulmonary:      Effort: No respiratory distress.      Breath sounds: No wheezing.   Abdominal:      General: Abdomen is flat. Bowel sounds are normal. There is no distension.   Genitourinary:     Comments: Graham in place   Musculoskeletal:      Right lower leg: No edema.      Left lower leg: No edema.   Skin:     General: Skin is warm and dry.   Neurological:      Mental Status: He is alert. Mental status is at baseline.      Cranial Nerves: No cranial nerve deficit.      Motor: Weakness present.   Psychiatric:         Mood and Affect: Mood normal.         Behavior: Behavior normal.          Assessment/Plan  Diagnoses and all orders for this visit:  Permanent atrial fibrillation (Multi)  Chronic combined systolic and diastolic heart failure  Essential hypertension, benign  Hypertensive kidney disease with CKD (chronic kidney disease), stage 1-4 or unspecified chronic kidney disease  Urinary retention    Patient seen and examined at bedside.  Overall medically stable continue optimization with physical therapy.  Appreciate urology recommendations.  Closely monitor fluid status no change in medications discussed care plan with patient who is agreeable.    Reviewed and approved by JAIR STEWART on 12/26/24 at 10:33 PM.       Electronically Signed By: Jair Stewart DO   12/26/24 10:34 PM

## 2024-12-26 ENCOUNTER — APPOINTMENT (OUTPATIENT)
Dept: UROLOGY | Facility: CLINIC | Age: 84
End: 2024-12-26
Payer: MEDICARE

## 2024-12-26 ASSESSMENT — ENCOUNTER SYMPTOMS
WEAKNESS: 1
CARDIOVASCULAR NEGATIVE: 1
RESPIRATORY NEGATIVE: 1
PSYCHIATRIC NEGATIVE: 1
GASTROINTESTINAL NEGATIVE: 1
MUSCULOSKELETAL NEGATIVE: 1
FATIGUE: 1

## 2024-12-27 NOTE — PROGRESS NOTES
Subjective   Patient ID: Elgin Marin is a 84 y.o. male.    Patient seen and examined hospital follow-up.  Regards that he is overall doing well, and recently was sent to emergency room per his request.  Was evaluated, and Graham was subsequently placed.  He endorses that he does have a follow-up with urology upcoming in the coming days.  States that he has been tolerating his Graham catheter without any issues.  Otherwise, endorses that his fluid status is overall stable, does have some lower extremity skin drying which is chronic for him.  Otherwise endorses no additional issues or concerns.        Review of Systems   Constitutional:  Positive for fatigue.   HENT: Negative.     Respiratory: Negative.     Cardiovascular: Negative.    Gastrointestinal: Negative.    Musculoskeletal: Negative.    Skin: Negative.    Neurological:  Positive for weakness.   Psychiatric/Behavioral: Negative.         Objective Vitals Reviewed via facility EMR   Physical Exam  Constitutional:       General: He is not in acute distress.     Appearance: He is not ill-appearing.   Eyes:      Pupils: Pupils are equal, round, and reactive to light.   Cardiovascular:      Rate and Rhythm: Normal rate and regular rhythm.      Pulses: Normal pulses.      Heart sounds: No murmur heard.  Pulmonary:      Effort: No respiratory distress.      Breath sounds: No wheezing.   Abdominal:      General: Abdomen is flat. Bowel sounds are normal. There is no distension.   Genitourinary:     Comments: Graham in place   Musculoskeletal:      Right lower leg: No edema.      Left lower leg: No edema.   Skin:     General: Skin is warm and dry.   Neurological:      Mental Status: He is alert. Mental status is at baseline.      Cranial Nerves: No cranial nerve deficit.      Motor: Weakness present.   Psychiatric:         Mood and Affect: Mood normal.         Behavior: Behavior normal.         Assessment/Plan   Diagnoses and all orders for this visit:  Permanent  atrial fibrillation (Multi)  Chronic combined systolic and diastolic heart failure  Essential hypertension, benign  Hypertensive kidney disease with CKD (chronic kidney disease), stage 1-4 or unspecified chronic kidney disease  Urinary retention    Patient seen and examined at bedside.  Overall medically stable continue optimization with physical therapy.  Appreciate urology recommendations.  Closely monitor fluid status no change in medications discussed care plan with patient who is agreeable.    Reviewed and approved by GAGE STEWART on 12/26/24 at 10:33 PM.

## 2024-12-30 ENCOUNTER — NURSING HOME VISIT (OUTPATIENT)
Dept: POST ACUTE CARE | Facility: EXTERNAL LOCATION | Age: 84
End: 2024-12-30
Payer: MEDICARE

## 2024-12-30 DIAGNOSIS — I10 ESSENTIAL HYPERTENSION, BENIGN: ICD-10-CM

## 2024-12-30 DIAGNOSIS — R33.9 URINARY RETENTION: ICD-10-CM

## 2024-12-30 DIAGNOSIS — I48.21 PERMANENT ATRIAL FIBRILLATION (MULTI): Primary | ICD-10-CM

## 2024-12-30 DIAGNOSIS — I50.42 CHRONIC COMBINED SYSTOLIC AND DIASTOLIC HEART FAILURE: ICD-10-CM

## 2024-12-30 DIAGNOSIS — R60.0 BILATERAL EDEMA OF LOWER EXTREMITY: ICD-10-CM

## 2024-12-30 DIAGNOSIS — E78.5 DYSLIPIDEMIA: ICD-10-CM

## 2024-12-30 PROCEDURE — 99309 SBSQ NF CARE MODERATE MDM 30: CPT | Performed by: STUDENT IN AN ORGANIZED HEALTH CARE EDUCATION/TRAINING PROGRAM

## 2024-12-30 NOTE — LETTER
Patient: Elgin Marin  : 1940    Encounter Date: 2024    Subjective  Patient ID: Elgin Marin is a 84 y.o. male.    Patient seen and examined at bedside.  He regards he is doing overall well, states that he is somewhat frustrated at this time due to having his Graham catheter.  Wants to see urology however does not have appointment until the spring of the new year.  Otherwise, does have some weeping wounds of the right lower extremity but these are overall stable.  Weight is coming down as well, states no additional issues or concerns.        Review of Systems   Constitutional:  Positive for fatigue.   HENT: Negative.     Respiratory: Negative.     Cardiovascular:  Positive for leg swelling.   Gastrointestinal: Negative.    Musculoskeletal: Negative.    Skin: Negative.    Neurological:  Positive for weakness.   Psychiatric/Behavioral: Negative.         ObjectiveVitals Reviewed via facility EMR   Physical Exam  Constitutional:       General: He is not in acute distress.     Appearance: He is not ill-appearing.   Eyes:      Pupils: Pupils are equal, round, and reactive to light.   Cardiovascular:      Rate and Rhythm: Normal rate and regular rhythm.      Pulses: Normal pulses.      Heart sounds: No murmur heard.  Pulmonary:      Effort: No respiratory distress.      Breath sounds: No wheezing.   Abdominal:      General: Abdomen is flat. Bowel sounds are normal. There is no distension.   Genitourinary:     Comments: Graham in pace  Musculoskeletal:      Right lower leg: Edema present.      Left lower leg: Edema present.      Comments: Slight drainage LE from weeping wound   Skin:     General: Skin is warm and dry.   Neurological:      Mental Status: He is alert. Mental status is at baseline.      Cranial Nerves: No cranial nerve deficit.      Motor: Weakness present.   Psychiatric:         Mood and Affect: Mood normal.         Behavior: Behavior normal.         Assessment/Plan  Diagnoses and all  orders for this visit:  Permanent atrial fibrillation (Multi)  Chronic combined systolic and diastolic heart failure  Dyslipidemia  Essential hypertension, benign  Urinary retention  Bilateral edema of lower extremity      Patient seen and examined at bedside.  Overall medically stable continue optimization with physical therapy.  We will try to get patient into urology sooner, placed referrals to alternative urologist, and discussed care plan with staff.  Otherwise no additional issues at this time, continue wound care of lower extremity continue to monitor weights and heart failure protocol.  Weight is trending down, wound should start clearing up shortly.  Otherwise no additional issues at this time    Reviewed and approved by JAIR STEWART on 12/31/24 at 9:13 PM      Electronically Signed By: Jair Stewart DO   12/31/24  9:13 PM

## 2024-12-31 ASSESSMENT — ENCOUNTER SYMPTOMS
RESPIRATORY NEGATIVE: 1
PSYCHIATRIC NEGATIVE: 1
MUSCULOSKELETAL NEGATIVE: 1
FATIGUE: 1
WEAKNESS: 1
GASTROINTESTINAL NEGATIVE: 1

## 2025-01-01 NOTE — PROGRESS NOTES
Subjective   Patient ID: Elgin Marin is a 84 y.o. male.    Patient seen and examined at bedside.  He regards he is doing overall well, states that he is somewhat frustrated at this time due to having his Graham catheter.  Wants to see urology however does not have appointment until the spring of the new year.  Otherwise, does have some weeping wounds of the right lower extremity but these are overall stable.  Weight is coming down as well, states no additional issues or concerns.        Review of Systems   Constitutional:  Positive for fatigue.   HENT: Negative.     Respiratory: Negative.     Cardiovascular:  Positive for leg swelling.   Gastrointestinal: Negative.    Musculoskeletal: Negative.    Skin: Negative.    Neurological:  Positive for weakness.   Psychiatric/Behavioral: Negative.         Objective Vitals Reviewed via facility EMR   Physical Exam  Constitutional:       General: He is not in acute distress.     Appearance: He is not ill-appearing.   Eyes:      Pupils: Pupils are equal, round, and reactive to light.   Cardiovascular:      Rate and Rhythm: Normal rate and regular rhythm.      Pulses: Normal pulses.      Heart sounds: No murmur heard.  Pulmonary:      Effort: No respiratory distress.      Breath sounds: No wheezing.   Abdominal:      General: Abdomen is flat. Bowel sounds are normal. There is no distension.   Genitourinary:     Comments: Graham in pace  Musculoskeletal:      Right lower leg: Edema present.      Left lower leg: Edema present.      Comments: Slight drainage LE from weeping wound   Skin:     General: Skin is warm and dry.   Neurological:      Mental Status: He is alert. Mental status is at baseline.      Cranial Nerves: No cranial nerve deficit.      Motor: Weakness present.   Psychiatric:         Mood and Affect: Mood normal.         Behavior: Behavior normal.         Assessment/Plan   Diagnoses and all orders for this visit:  Permanent atrial fibrillation (Multi)  Chronic  combined systolic and diastolic heart failure  Dyslipidemia  Essential hypertension, benign  Urinary retention  Bilateral edema of lower extremity      Patient seen and examined at bedside.  Overall medically stable continue optimization with physical therapy.  We will try to get patient into urology sooner, placed referrals to alternative urologist, and discussed care plan with staff.  Otherwise no additional issues at this time, continue wound care of lower extremity continue to monitor weights and heart failure protocol.  Weight is trending down, wound should start clearing up shortly.  Otherwise no additional issues at this time    Reviewed and approved by GAGE STEWART on 12/31/24 at 9:13 PM

## 2025-01-02 ENCOUNTER — NURSING HOME VISIT (OUTPATIENT)
Dept: POST ACUTE CARE | Facility: EXTERNAL LOCATION | Age: 85
End: 2025-01-02
Payer: MEDICARE

## 2025-01-02 DIAGNOSIS — R33.9 URINARY RETENTION: ICD-10-CM

## 2025-01-02 DIAGNOSIS — E78.5 DYSLIPIDEMIA: ICD-10-CM

## 2025-01-02 DIAGNOSIS — I48.21 PERMANENT ATRIAL FIBRILLATION (MULTI): Primary | ICD-10-CM

## 2025-01-02 DIAGNOSIS — I10 ESSENTIAL HYPERTENSION, BENIGN: ICD-10-CM

## 2025-01-02 DIAGNOSIS — R26.2 UNABLE TO WALK: ICD-10-CM

## 2025-01-02 DIAGNOSIS — I12.9 HYPERTENSIVE KIDNEY DISEASE WITH CKD (CHRONIC KIDNEY DISEASE), STAGE 1-4 OR UNSPECIFIED CHRONIC KIDNEY DISEASE: ICD-10-CM

## 2025-01-02 DIAGNOSIS — R60.0 BILATERAL EDEMA OF LOWER EXTREMITY: ICD-10-CM

## 2025-01-02 DIAGNOSIS — I50.42 CHRONIC COMBINED SYSTOLIC AND DIASTOLIC HEART FAILURE: ICD-10-CM

## 2025-01-02 PROCEDURE — 99308 SBSQ NF CARE LOW MDM 20: CPT | Performed by: STUDENT IN AN ORGANIZED HEALTH CARE EDUCATION/TRAINING PROGRAM

## 2025-01-02 ASSESSMENT — ENCOUNTER SYMPTOMS
GASTROINTESTINAL NEGATIVE: 1
ARTHRALGIAS: 1
RESPIRATORY NEGATIVE: 1
CARDIOVASCULAR NEGATIVE: 1
AGITATION: 1
FATIGUE: 1
WEAKNESS: 1

## 2025-01-02 NOTE — LETTER
Patient: Elgin Marin  : 1940    Encounter Date: 2025    Subjective  Patient ID: Elgin Marin is a 84 y.o. male.    Patient seen and examined at bedside.  He regards he is overall doing well, fluid status is improving manage he is getting less leg swelling and subsequently his wounds of the lower extremity are improving as well.  Otherwise, does still have his Graham catheter, unfortunately does not have a follow-up with urology for quite some time.  He also endorses that his insurance is cutting him at the facility.  He regards that he does not feel like he is ready to go home due to his multiple medical core morbidities is going on and is wondering about what to do moving forward.  Otherwise, states no additional issues or concerns and overall feels well.        Review of Systems   Constitutional:  Positive for fatigue.   HENT: Negative.     Respiratory: Negative.     Cardiovascular: Negative.    Gastrointestinal: Negative.    Genitourinary:         Graham in palce   Musculoskeletal:  Positive for arthralgias.   Skin: Negative.    Neurological:  Positive for weakness.   Psychiatric/Behavioral:  Positive for agitation.        ObjectiveVitals Reviewed via facility EMR   Physical Exam  Constitutional:       General: He is not in acute distress.     Appearance: He is not ill-appearing.   Eyes:      Pupils: Pupils are equal, round, and reactive to light.   Cardiovascular:      Rate and Rhythm: Normal rate and regular rhythm.      Pulses: Normal pulses.      Heart sounds: No murmur heard.  Pulmonary:      Effort: No respiratory distress.      Breath sounds: No wheezing.   Abdominal:      General: Abdomen is flat. Bowel sounds are normal. There is no distension.   Genitourinary:     Comments: Graham in place  Musculoskeletal:      Right lower leg: Edema present.      Left lower leg: Edema present.      Comments: Wrapped in kerlex, treated   Skin:     General: Skin is warm and dry.   Neurological:       Mental Status: He is alert. Mental status is at baseline.      Cranial Nerves: No cranial nerve deficit.      Motor: No weakness.   Psychiatric:         Mood and Affect: Mood normal.         Behavior: Behavior normal.         Assessment/Plan  Diagnoses and all orders for this visit:  Permanent atrial fibrillation (Multi)  Chronic combined systolic and diastolic heart failure  Dyslipidemia  Essential hypertension, benign  Urinary retention  Hypertensive kidney disease with CKD (chronic kidney disease), stage 1-4 or unspecified chronic kidney disease  Unable to walk  Bilateral edema of lower extremity      Patient seen and examined at bedside.  Overall medically stable, continue optimization with physical therapy.  Encouraged close follow-up with urology unfortunately cannot get an earlier appointment at this time.  Otherwise, discussed care planning, potentially will need long-term care moving forward due to patient's multiple medical comorbidities.  Appreciate social work management with regards to this.  Discussed care plan with staff.  Otherwise continue to monitor fluid status, no additional issues at this time.    Reviewed and approved by JAIR STEWART on 1/2/25 at 10:41 PM.       Electronically Signed By: Jair Stewart DO   1/2/25 10:41 PM

## 2025-01-03 NOTE — PROGRESS NOTES
Subjective   Patient ID: Elgin Marin is a 84 y.o. male.    Patient seen and examined at bedside.  He regards he is overall doing well, fluid status is improving manage he is getting less leg swelling and subsequently his wounds of the lower extremity are improving as well.  Otherwise, does still have his Graham catheter, unfortunately does not have a follow-up with urology for quite some time.  He also endorses that his insurance is cutting him at the facility.  He regards that he does not feel like he is ready to go home due to his multiple medical core morbidities is going on and is wondering about what to do moving forward.  Otherwise, states no additional issues or concerns and overall feels well.        Review of Systems   Constitutional:  Positive for fatigue.   HENT: Negative.     Respiratory: Negative.     Cardiovascular: Negative.    Gastrointestinal: Negative.    Genitourinary:         Graham in palce   Musculoskeletal:  Positive for arthralgias.   Skin: Negative.    Neurological:  Positive for weakness.   Psychiatric/Behavioral:  Positive for agitation.        Objective Vitals Reviewed via facility EMR   Physical Exam  Constitutional:       General: He is not in acute distress.     Appearance: He is not ill-appearing.   Eyes:      Pupils: Pupils are equal, round, and reactive to light.   Cardiovascular:      Rate and Rhythm: Normal rate and regular rhythm.      Pulses: Normal pulses.      Heart sounds: No murmur heard.  Pulmonary:      Effort: No respiratory distress.      Breath sounds: No wheezing.   Abdominal:      General: Abdomen is flat. Bowel sounds are normal. There is no distension.   Genitourinary:     Comments: Graham in place  Musculoskeletal:      Right lower leg: Edema present.      Left lower leg: Edema present.      Comments: Wrapped in kerlex, treated   Skin:     General: Skin is warm and dry.   Neurological:      Mental Status: He is alert. Mental status is at baseline.      Cranial  Nerves: No cranial nerve deficit.      Motor: No weakness.   Psychiatric:         Mood and Affect: Mood normal.         Behavior: Behavior normal.         Assessment/Plan   Diagnoses and all orders for this visit:  Permanent atrial fibrillation (Multi)  Chronic combined systolic and diastolic heart failure  Dyslipidemia  Essential hypertension, benign  Urinary retention  Hypertensive kidney disease with CKD (chronic kidney disease), stage 1-4 or unspecified chronic kidney disease  Unable to walk  Bilateral edema of lower extremity      Patient seen and examined at bedside.  Overall medically stable, continue optimization with physical therapy.  Encouraged close follow-up with urology unfortunately cannot get an earlier appointment at this time.  Otherwise, discussed care planning, potentially will need long-term care moving forward due to patient's multiple medical comorbidities.  Appreciate social work management with regards to this.  Discussed care plan with staff.  Otherwise continue to monitor fluid status, no additional issues at this time.    Reviewed and approved by GAGE STEWART on 1/2/25 at 10:41 PM.

## 2025-01-07 ENCOUNTER — NURSING HOME VISIT (OUTPATIENT)
Dept: POST ACUTE CARE | Facility: EXTERNAL LOCATION | Age: 85
End: 2025-01-07
Payer: MEDICARE

## 2025-01-07 DIAGNOSIS — I50.42 CHRONIC COMBINED SYSTOLIC AND DIASTOLIC HEART FAILURE: ICD-10-CM

## 2025-01-07 DIAGNOSIS — I10 ESSENTIAL HYPERTENSION, BENIGN: ICD-10-CM

## 2025-01-07 DIAGNOSIS — F43.21 GRIEF REACTION: ICD-10-CM

## 2025-01-07 DIAGNOSIS — I48.21 PERMANENT ATRIAL FIBRILLATION (MULTI): Primary | ICD-10-CM

## 2025-01-07 DIAGNOSIS — R26.2 UNABLE TO WALK: ICD-10-CM

## 2025-01-07 DIAGNOSIS — I12.9 HYPERTENSIVE KIDNEY DISEASE WITH CKD (CHRONIC KIDNEY DISEASE), STAGE 1-4 OR UNSPECIFIED CHRONIC KIDNEY DISEASE: ICD-10-CM

## 2025-01-07 PROBLEM — F43.20 GRIEF REACTION: Status: ACTIVE | Noted: 2025-01-07

## 2025-01-07 PROCEDURE — 99309 SBSQ NF CARE MODERATE MDM 30: CPT | Performed by: STUDENT IN AN ORGANIZED HEALTH CARE EDUCATION/TRAINING PROGRAM

## 2025-01-07 ASSESSMENT — ENCOUNTER SYMPTOMS
GASTROINTESTINAL NEGATIVE: 1
FATIGUE: 1
PSYCHIATRIC NEGATIVE: 1
WEAKNESS: 1
ARTHRALGIAS: 1
SHORTNESS OF BREATH: 1

## 2025-01-07 NOTE — LETTER
Patient: Elgin Marin  : 1940    Encounter Date: 2025    Subjective  Patient ID: Elgin Marin is a 84 y.o. male.    Patient seen and examined on routine evaluation.  Regards that he is overall doing well, however does feel little more fluid overloaded as of late.  States that he is having some more drainage from his lower extremities.  In addition, reports that he is slightly more agitated and frustrated as of late.  Endorses that his wife recently was admitted to the hospital secondary to a stroke.  He is very distraught with regards to this.  Otherwise, regards that he recently had a urology appointment moved up which she is happy about.  Still is wondering about discharge planning.  Endorses no acute issues or concerns.        Review of Systems   Constitutional:  Positive for fatigue.   HENT: Negative.     Respiratory:  Positive for shortness of breath.    Cardiovascular:  Positive for leg swelling.   Gastrointestinal: Negative.    Musculoskeletal:  Positive for arthralgias.   Skin: Negative.    Neurological:  Positive for weakness.   Psychiatric/Behavioral: Negative.         ObjectiveVitals Reviewed via facility EMR   Physical Exam  Constitutional:       General: He is not in acute distress.     Appearance: He is not ill-appearing.   Eyes:      Pupils: Pupils are equal, round, and reactive to light.   Cardiovascular:      Rate and Rhythm: Normal rate and regular rhythm.      Pulses: Normal pulses.      Heart sounds: No murmur heard.  Pulmonary:      Effort: No respiratory distress.      Breath sounds: No wheezing.   Abdominal:      General: Abdomen is flat. Bowel sounds are normal. There is no distension.   Musculoskeletal:      Right lower leg: Edema present.      Left lower leg: Edema present.   Skin:     General: Skin is warm and dry.   Neurological:      Mental Status: He is alert. Mental status is at baseline.      Cranial Nerves: No cranial nerve deficit.      Motor: No weakness.    Psychiatric:         Mood and Affect: Mood normal.         Behavior: Behavior normal.      Comments: Slightly frustrated         Assessment/Plan  Diagnoses and all orders for this visit:  Permanent atrial fibrillation (Multi)  Chronic combined systolic and diastolic heart failure  Hypertensive kidney disease with CKD (chronic kidney disease), stage 1-4 or unspecified chronic kidney disease  Unable to walk  Grief reaction  Essential hypertension, benign      Patient seen and examined at bedside.  Regards that he is overall doing well, however slightly fluid overloaded on previous evaluation.  Recommend, increase of diuretics, will obtain BMP as well as chest x-ray and CMP as patient endorses that he is having some difficulty with ambulation and dyspnea on exertion.  Closely monitor for anxiety and worsening mood secondary to issues with his wife.  Otherwise consider psych consult.  Continue supportive care no additional issues at this time.    Reviewed and approved by JAIR STEWART on 1/7/25 at 10:28 PM.       Electronically Signed By: Jair Stewart DO   1/7/25 10:28 PM

## 2025-01-08 NOTE — PROGRESS NOTES
Subjective   Patient ID: Elgin Marin is a 84 y.o. male.    Patient seen and examined on routine evaluation.  Regards that he is overall doing well, however does feel little more fluid overloaded as of late.  States that he is having some more drainage from his lower extremities.  In addition, reports that he is slightly more agitated and frustrated as of late.  Endorses that his wife recently was admitted to the hospital secondary to a stroke.  He is very distraught with regards to this.  Otherwise, regards that he recently had a urology appointment moved up which she is happy about.  Still is wondering about discharge planning.  Endorses no acute issues or concerns.        Review of Systems   Constitutional:  Positive for fatigue.   HENT: Negative.     Respiratory:  Positive for shortness of breath.    Cardiovascular:  Positive for leg swelling.   Gastrointestinal: Negative.    Musculoskeletal:  Positive for arthralgias.   Skin: Negative.    Neurological:  Positive for weakness.   Psychiatric/Behavioral: Negative.         Objective Vitals Reviewed via facility EMR   Physical Exam  Constitutional:       General: He is not in acute distress.     Appearance: He is not ill-appearing.   Eyes:      Pupils: Pupils are equal, round, and reactive to light.   Cardiovascular:      Rate and Rhythm: Normal rate and regular rhythm.      Pulses: Normal pulses.      Heart sounds: No murmur heard.  Pulmonary:      Effort: No respiratory distress.      Breath sounds: No wheezing.   Abdominal:      General: Abdomen is flat. Bowel sounds are normal. There is no distension.   Musculoskeletal:      Right lower leg: Edema present.      Left lower leg: Edema present.   Skin:     General: Skin is warm and dry.   Neurological:      Mental Status: He is alert. Mental status is at baseline.      Cranial Nerves: No cranial nerve deficit.      Motor: No weakness.   Psychiatric:         Mood and Affect: Mood normal.         Behavior:  Behavior normal.      Comments: Slightly frustrated         Assessment/Plan   Diagnoses and all orders for this visit:  Permanent atrial fibrillation (Multi)  Chronic combined systolic and diastolic heart failure  Hypertensive kidney disease with CKD (chronic kidney disease), stage 1-4 or unspecified chronic kidney disease  Unable to walk  Grief reaction  Essential hypertension, benign      Patient seen and examined at bedside.  Regards that he is overall doing well, however slightly fluid overloaded on previous evaluation.  Recommend, increase of diuretics, will obtain BMP as well as chest x-ray and CMP as patient endorses that he is having some difficulty with ambulation and dyspnea on exertion.  Closely monitor for anxiety and worsening mood secondary to issues with his wife.  Otherwise consider psych consult.  Continue supportive care no additional issues at this time.    Reviewed and approved by GAGE STEWART on 1/7/25 at 10:28 PM.

## 2025-01-10 ENCOUNTER — OFFICE VISIT (OUTPATIENT)
Dept: NEPHROLOGY | Facility: CLINIC | Age: 85
End: 2025-01-10
Payer: MEDICARE

## 2025-01-10 VITALS
BODY MASS INDEX: 28.06 KG/M2 | WEIGHT: 196 LBS | DIASTOLIC BLOOD PRESSURE: 71 MMHG | SYSTOLIC BLOOD PRESSURE: 124 MMHG | HEIGHT: 70 IN | HEART RATE: 97 BPM

## 2025-01-10 DIAGNOSIS — I10 ESSENTIAL HYPERTENSION: ICD-10-CM

## 2025-01-10 DIAGNOSIS — N18.32 HYPERTENSIVE KIDNEY DISEASE WITH STAGE 3B CHRONIC KIDNEY DISEASE (MULTI): ICD-10-CM

## 2025-01-10 DIAGNOSIS — E08.22 DIABETES MELLITUS DUE TO UNDERLYING CONDITION WITH DIABETIC CHRONIC KIDNEY DISEASE, UNSPECIFIED CKD STAGE, UNSPECIFIED WHETHER LONG TERM INSULIN USE: ICD-10-CM

## 2025-01-10 DIAGNOSIS — N18.32 STAGE 3B CHRONIC KIDNEY DISEASE (MULTI): Primary | ICD-10-CM

## 2025-01-10 DIAGNOSIS — I12.9 HYPERTENSIVE KIDNEY DISEASE WITH STAGE 3B CHRONIC KIDNEY DISEASE (MULTI): ICD-10-CM

## 2025-01-10 PROCEDURE — 1159F MED LIST DOCD IN RCRD: CPT | Performed by: INTERNAL MEDICINE

## 2025-01-10 PROCEDURE — 1157F ADVNC CARE PLAN IN RCRD: CPT | Performed by: INTERNAL MEDICINE

## 2025-01-10 PROCEDURE — 3074F SYST BP LT 130 MM HG: CPT | Performed by: INTERNAL MEDICINE

## 2025-01-10 PROCEDURE — 1036F TOBACCO NON-USER: CPT | Performed by: INTERNAL MEDICINE

## 2025-01-10 PROCEDURE — 99203 OFFICE O/P NEW LOW 30 MIN: CPT | Performed by: INTERNAL MEDICINE

## 2025-01-10 PROCEDURE — 3078F DIAST BP <80 MM HG: CPT | Performed by: INTERNAL MEDICINE

## 2025-01-10 NOTE — PROGRESS NOTES
Elgin Marin   84 y.o.      Vitals:    01/10/25 1128   Weight: 88.9 kg (196 lb)      MRN/Room: [id number]/Room/bed info not found      History Of Present Illness  Elgin Marin is a 84 y.o. male presenting with high Cr level.     Past Medical History  He has a past medical history of Hyperlipidemia, unspecified (11/14/2022), Other forms of dyspnea (08/09/2018), Personal history of other diseases of the circulatory system (12/08/2014), Personal history of other endocrine, nutritional and metabolic disease, Personal history of other specified conditions, Presence of intraocular lens (07/20/2019), Presence of intraocular lens (07/20/2019), and Unspecified atrial fibrillation (Multi) (11/14/2022).    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     Social History  He reports that he has never smoked. He has never used smokeless tobacco. He reports that he does not drink alcohol and does not use drugs.    Family History  No family history on file.     Allergies  Metoprolol, Oxycodone-aspirin, Sulfa (sulfonamide antibiotics), Oxycodone-acetaminophen, Sulfur, Tramadol, Lisinopril, and Warfarin      Meds:         Current Outpatient Medications   Medication Sig Dispense Refill    acetaminophen (Tylenol) 325 mg tablet Take 2 tablets (650 mg) by mouth every 4 hours if needed for fever (temp greater than 38.0 C).      acetaminophen (Tylenol) 500 mg tablet Take 2 tablets (1,000 mg) by mouth every 12 hours if needed for mild pain (1 - 3) or moderate pain (4 - 6).      alum-mag hydroxide-simeth (Mylanta) 200-200-20 mg/5 mL oral suspension Take 20 mL by mouth every 4 hours if needed for indigestion or heartburn.      apixaban (Eliquis) 2.5 mg tablet Take 1 tablet (2.5 mg) by  "mouth 2 times a day. 60 tablet 3    bisacodyl (Dulcolax) 10 mg suppository Insert 1 suppository (10 mg) into the rectum 1 time if needed for constipation.      bisoprolol (Zebeta) 5 mg tablet Take 1 tablet (5 mg) by mouth once daily.      cholecalciferol (Vitamin D-3) 50 MCG (2000 UT) tablet Take 1 tablet (50 mcg) by mouth once daily.      empagliflozin (Jardiance) 25 mg Take 0.5 tablets (12.5 mg) by mouth once daily.      guaiFENesin (Robitussin) 100 mg/5 mL syrup Take 15 mL by mouth every 6 hours if needed for cough.      insulin glargine (Lantus U-100 Insulin) 100 unit/mL injection Inject 10 Units under the skin once daily in the morning. 3 mL 0    insulin lispro 100 unit/mL injection Inject 0-10 Units under the skin 4 times a day before meals. Take as directed per insulin instructions.      ipratropium-albuteroL (Duo-Neb) 0.5-2.5 mg/3 mL nebulizer solution Take 3 mL by nebulization 3 times a day. 300 mL 0    ipratropium-albuteroL (Duo-Neb) 0.5-2.5 mg/3 mL nebulizer solution Take 3 mL by nebulization every 2 hours if needed for shortness of breath.      isosorbide dinitrate (Isordil) 10 mg tablet Take 1 tablet (10 mg) by mouth 2 times a day. Hold for SBP <110 Do not fill before November 22, 2024. 60 tablet 0    magnesium hydroxide (Milk of Magnesia) 400 mg/5 mL suspension Take 30 mL by mouth once daily as needed for constipation.      NIFEdipine ER (Adalat CC) 30 mg 24 hr tablet Take 1 tablet (30 mg) by mouth once daily in the morning. Take before meals. Do not crush, chew, or split. Hold for SBP <110      oxygen (O2) gas therapy Inhale 1 each continuously.      pantoprazole (ProtoNix) 40 mg EC tablet Take 1 tablet (40 mg) by mouth once daily at bedtime.      pen needle, diabetic 31 gauge x 5/16\" needle Use to inject insulin 1 to 4 times daily as directed 100 each 1    rosuvastatin (Crestor) 20 mg tablet Take 1 tablet (20 mg) by mouth once daily at bedtime.      spironolactone (Aldactone) 25 mg tablet Take 0.5 " tablets (12.5 mg) by mouth once every 24 hours. 15 tablet 0    tamsulosin (Flomax) 0.4 mg 24 hr capsule Take 1 capsule (0.4 mg) by mouth once daily. 30 capsule 0    torsemide 40 mg tablet Take 80 mg by mouth once daily. 60 tablet 0    white petrolatum (Aquaphor) 41 % ointment ointment Apply 1 Application topically once daily. 7 g 0    hydrALAZINE (Apresoline) 10 mg tablet Take 1 tablet (10 mg) by mouth 2 times a day. Hold for SBP <110 60 tablet 0     No current facility-administered medications for this visit.         ROS:  The patient is awake and oriented. No dizziness or lightheadedness. No chills and no fever. No headaches. No nausea and no vomiting. No shortness of breath. No cough. No chest pain. No abdominal pain. No diarrhea. No hematemesis or hemoptysis. No hematuria. No rectal bleeding. No melena. No epistaxis. No urinary symptoms. No flank pain. + leg edema. No leg pain. No itching. Overall, the rest of the review of systems is also negative.  12 point review of systems otherwise negative as stated in HPI.        Physical Exam:        Vitals:    01/10/25 1128   BP: 124/71   Pulse: 97     General: The patient is awake, oriented, and is not in any distress.  Head and Neck: Normocephalic. No periorbital edema.  Eyes: Not icteric.   Respiratory: Symmetric chest expansion. No respiratory distress.  Skin: No maculopapular rash.  Musculoskeletal: 2+ peripheral edema.  Neuro Exam: Speech is fluent. Moves extremities.        Blood Labs:  No results found for this or any previous visit (from the past 24 hours).   Lab Results   Component Value Date    GLUCOSE 175 (H) 12/19/2024    CALCIUM 8.7 12/19/2024     12/19/2024    K 4.2 12/19/2024    CO2 34 (H) 12/19/2024    CL 96 (L) 12/19/2024    BUN 64 (H) 12/19/2024    CREATININE 2.02 (H) 12/19/2024       Imaging:  === 12/18/24 ===    US RENAL COMPLETE    - Impression -  Findings suggestive of chronic urinary retention, similar to prior CT  nearly 2 years ago.  Unchanged mild distension of the renal pelves.  Patient unable to void despite distension of the urinary bladder up  to 828 mL. The bladder was distended to similar volume on prior CT.    MACRO:  None    Signed by: Dieudonne Pacheco 12/18/2024 10:32 AM  Dictation workstation:   [id number]      Assessment and Plan:  #1 chronic kidney disease stage III.  Long history of diabetes, hypertension, and congestive heart failure.  Last creatinine was 2.0.  I asked for microscopic urinalysis and spot urine protein to creatinine ratio.  Basic metabolic panel, PTH, and 25-hydroxy vitamin D level will be checked.    2.  Hypertension.  Blood pressure is under control.    3.  Diabetes.  I asked for spot urine protein to creatinine ratio.    4.  Congestive heart failure.    I will see him in about 2 to 3 weeks for follow-up.    Pavan Stauffer MD  Senior Attending Physician  Director of Onco-Nephrology Program  Division of Nephrology & Hypertension  [facility]

## 2025-01-14 ENCOUNTER — NURSING HOME VISIT (OUTPATIENT)
Dept: POST ACUTE CARE | Facility: EXTERNAL LOCATION | Age: 85
End: 2025-01-14
Payer: MEDICARE

## 2025-01-14 DIAGNOSIS — I50.42 CHRONIC COMBINED SYSTOLIC AND DIASTOLIC HEART FAILURE: ICD-10-CM

## 2025-01-14 DIAGNOSIS — E78.5 DYSLIPIDEMIA: ICD-10-CM

## 2025-01-14 DIAGNOSIS — I10 ESSENTIAL HYPERTENSION, BENIGN: ICD-10-CM

## 2025-01-14 DIAGNOSIS — R33.9 URINARY RETENTION: ICD-10-CM

## 2025-01-14 DIAGNOSIS — I12.9 HYPERTENSIVE KIDNEY DISEASE WITH CKD (CHRONIC KIDNEY DISEASE), STAGE 1-4 OR UNSPECIFIED CHRONIC KIDNEY DISEASE: ICD-10-CM

## 2025-01-14 DIAGNOSIS — I48.21 PERMANENT ATRIAL FIBRILLATION (MULTI): Primary | ICD-10-CM

## 2025-01-14 PROCEDURE — 99308 SBSQ NF CARE LOW MDM 20: CPT | Performed by: STUDENT IN AN ORGANIZED HEALTH CARE EDUCATION/TRAINING PROGRAM

## 2025-01-14 NOTE — LETTER
Patient: Elgin Marin  : 1940    Encounter Date: 2025    Subjective  Patient ID: Elgin Marin is a 84 y.o. male.    Patient seen and examined at bedside.  He regards that he is overall doing well, fluid status does seem to be a bit more stable.  Otherwise, endorses no acute issues or concerns.  Does have follow-up with urology upcoming and is tolerating his Graham catheter.  Otherwise, states no additional issues at this time.  Feels overall well.  Is awaiting to hear back with regards to his appeal.  States no additional issues at this time.        Review of Systems   Constitutional:  Positive for fatigue.   HENT: Negative.     Respiratory: Negative.     Cardiovascular: Negative.    Gastrointestinal: Negative.    Musculoskeletal: Negative.    Skin: Negative.    Neurological:  Positive for weakness.   Psychiatric/Behavioral: Negative.         ObjectiveVitals Reviewed via facility EMR   Physical Exam  Constitutional:       General: He is not in acute distress.     Appearance: He is not ill-appearing.      Comments: euvolemia   Eyes:      Pupils: Pupils are equal, round, and reactive to light.   Cardiovascular:      Rate and Rhythm: Normal rate and regular rhythm.      Pulses: Normal pulses.      Heart sounds: No murmur heard.  Pulmonary:      Effort: No respiratory distress.      Breath sounds: No wheezing.   Abdominal:      General: Abdomen is flat. Bowel sounds are normal. There is no distension.   Musculoskeletal:      Right lower leg: No edema.      Left lower leg: No edema.   Skin:     General: Skin is warm and dry.   Neurological:      Mental Status: He is alert. Mental status is at baseline.      Cranial Nerves: No cranial nerve deficit.      Motor: Weakness present.   Psychiatric:         Mood and Affect: Mood normal.         Behavior: Behavior normal.         Assessment/Plan  Diagnoses and all orders for this visit:  Permanent atrial fibrillation (Multi)  Chronic combined systolic and  diastolic heart failure  Dyslipidemia  Essential hypertension, benign  Hypertensive kidney disease with CKD (chronic kidney disease), stage 1-4 or unspecified chronic kidney disease  Urinary retention      Patient seen and examined at bedside.  Overall medically stable, fluid status is much improved since previous evaluation.  Otherwise, continue supportive care, no additional issues at this time.  Appreciate neurology recommendations, continue optimize ADLs.    Reviewed and approved by JAIR STEWART on 1/15/25 at 11:59 AM.       Electronically Signed By: Jair Stewart DO   1/15/25 11:59 AM

## 2025-01-15 ASSESSMENT — ENCOUNTER SYMPTOMS
RESPIRATORY NEGATIVE: 1
WEAKNESS: 1
GASTROINTESTINAL NEGATIVE: 1
FATIGUE: 1
PSYCHIATRIC NEGATIVE: 1
CARDIOVASCULAR NEGATIVE: 1
MUSCULOSKELETAL NEGATIVE: 1

## 2025-01-15 NOTE — PROGRESS NOTES
Subjective   Patient ID: Elgin Marin is a 84 y.o. male.    Patient seen and examined at bedside.  He regards that he is overall doing well, fluid status does seem to be a bit more stable.  Otherwise, endorses no acute issues or concerns.  Does have follow-up with urology upcoming and is tolerating his Graham catheter.  Otherwise, states no additional issues at this time.  Feels overall well.  Is awaiting to hear back with regards to his appeal.  States no additional issues at this time.        Review of Systems   Constitutional:  Positive for fatigue.   HENT: Negative.     Respiratory: Negative.     Cardiovascular: Negative.    Gastrointestinal: Negative.    Musculoskeletal: Negative.    Skin: Negative.    Neurological:  Positive for weakness.   Psychiatric/Behavioral: Negative.         Objective Vitals Reviewed via facility EMR   Physical Exam  Constitutional:       General: He is not in acute distress.     Appearance: He is not ill-appearing.      Comments: euvolemia   Eyes:      Pupils: Pupils are equal, round, and reactive to light.   Cardiovascular:      Rate and Rhythm: Normal rate and regular rhythm.      Pulses: Normal pulses.      Heart sounds: No murmur heard.  Pulmonary:      Effort: No respiratory distress.      Breath sounds: No wheezing.   Abdominal:      General: Abdomen is flat. Bowel sounds are normal. There is no distension.   Musculoskeletal:      Right lower leg: No edema.      Left lower leg: No edema.   Skin:     General: Skin is warm and dry.   Neurological:      Mental Status: He is alert. Mental status is at baseline.      Cranial Nerves: No cranial nerve deficit.      Motor: Weakness present.   Psychiatric:         Mood and Affect: Mood normal.         Behavior: Behavior normal.         Assessment/Plan   Diagnoses and all orders for this visit:  Permanent atrial fibrillation (Multi)  Chronic combined systolic and diastolic heart failure  Dyslipidemia  Essential hypertension,  benign  Hypertensive kidney disease with CKD (chronic kidney disease), stage 1-4 or unspecified chronic kidney disease  Urinary retention      Patient seen and examined at bedside.  Overall medically stable, fluid status is much improved since previous evaluation.  Otherwise, continue supportive care, no additional issues at this time.  Appreciate neurology recommendations, continue optimize ADLs.    Reviewed and approved by GAGE STEWART on 1/15/25 at 11:59 AM.

## 2025-01-18 ENCOUNTER — APPOINTMENT (OUTPATIENT)
Dept: OPHTHALMOLOGY | Facility: CLINIC | Age: 85
End: 2025-01-18
Payer: MEDICARE

## 2025-01-20 ENCOUNTER — HOSPITAL ENCOUNTER (EMERGENCY)
Facility: HOSPITAL | Age: 85
Discharge: HOME | End: 2025-01-20
Attending: EMERGENCY MEDICINE
Payer: MEDICARE

## 2025-01-20 ENCOUNTER — APPOINTMENT (OUTPATIENT)
Dept: CARDIOLOGY | Facility: HOSPITAL | Age: 85
End: 2025-01-20
Payer: MEDICARE

## 2025-01-20 ENCOUNTER — APPOINTMENT (OUTPATIENT)
Dept: RADIOLOGY | Facility: HOSPITAL | Age: 85
End: 2025-01-20
Payer: MEDICARE

## 2025-01-20 VITALS
WEIGHT: 194 LBS | DIASTOLIC BLOOD PRESSURE: 50 MMHG | HEART RATE: 65 BPM | TEMPERATURE: 98.4 F | HEIGHT: 70 IN | OXYGEN SATURATION: 98 % | SYSTOLIC BLOOD PRESSURE: 108 MMHG | BODY MASS INDEX: 27.77 KG/M2 | RESPIRATION RATE: 18 BRPM

## 2025-01-20 DIAGNOSIS — M79.89 LEG SWELLING: Primary | ICD-10-CM

## 2025-01-20 LAB
ALBUMIN SERPL BCP-MCNC: 3.1 G/DL (ref 3.4–5)
ALP SERPL-CCNC: 90 U/L (ref 33–136)
ALT SERPL W P-5'-P-CCNC: 8 U/L (ref 10–52)
ANION GAP SERPL CALC-SCNC: 11 MMOL/L
AST SERPL W P-5'-P-CCNC: 12 U/L (ref 9–39)
BASOPHILS # BLD AUTO: 0.04 X10*3/UL (ref 0–0.1)
BASOPHILS NFR BLD AUTO: 0.5 %
BILIRUB SERPL-MCNC: 0.6 MG/DL (ref 0–1.2)
BUN SERPL-MCNC: 81 MG/DL (ref 6–23)
CALCIUM SERPL-MCNC: 8.3 MG/DL (ref 8.6–10.3)
CHLORIDE SERPL-SCNC: 95 MMOL/L (ref 98–107)
CO2 SERPL-SCNC: 29 MMOL/L (ref 21–32)
CREAT SERPL-MCNC: 2.54 MG/DL (ref 0.5–1.3)
EGFRCR SERPLBLD CKD-EPI 2021: 24 ML/MIN/1.73M*2
EOSINOPHIL # BLD AUTO: 0.1 X10*3/UL (ref 0–0.4)
EOSINOPHIL NFR BLD AUTO: 1.3 %
ERYTHROCYTE [DISTWIDTH] IN BLOOD BY AUTOMATED COUNT: 14.2 % (ref 11.5–14.5)
GLUCOSE BLD MANUAL STRIP-MCNC: 329 MG/DL (ref 74–99)
GLUCOSE SERPL-MCNC: 347 MG/DL (ref 74–99)
HCT VFR BLD AUTO: 34.2 % (ref 41–52)
HGB BLD-MCNC: 10.3 G/DL (ref 13.5–17.5)
IMM GRANULOCYTES # BLD AUTO: 0.04 X10*3/UL (ref 0–0.5)
IMM GRANULOCYTES NFR BLD AUTO: 0.5 % (ref 0–0.9)
INR PPP: 1.4 (ref 0.9–1.1)
LYMPHOCYTES # BLD AUTO: 0.55 X10*3/UL (ref 0.8–3)
LYMPHOCYTES NFR BLD AUTO: 7.1 %
MCH RBC QN AUTO: 26.8 PG (ref 26–34)
MCHC RBC AUTO-ENTMCNC: 30.1 G/DL (ref 32–36)
MCV RBC AUTO: 89 FL (ref 80–100)
MONOCYTES # BLD AUTO: 0.62 X10*3/UL (ref 0.05–0.8)
MONOCYTES NFR BLD AUTO: 8 %
NEUTROPHILS # BLD AUTO: 6.42 X10*3/UL (ref 1.6–5.5)
NEUTROPHILS NFR BLD AUTO: 82.6 %
NRBC BLD-RTO: 0 /100 WBCS (ref 0–0)
PLATELET # BLD AUTO: 182 X10*3/UL (ref 150–450)
POTASSIUM SERPL-SCNC: 4.5 MMOL/L (ref 3.5–5.3)
PROT SERPL-MCNC: 5.9 G/DL (ref 6.4–8.2)
PROTHROMBIN TIME: 15.9 SECONDS (ref 9.8–12.8)
Q ONSET: 213 MS
QRS COUNT: 11 BEATS
QRS DURATION: 98 MS
QT INTERVAL: 436 MS
QTC CALCULATION(BAZETT): 453 MS
QTC FREDERICIA: 447 MS
R AXIS: 112 DEGREES
RBC # BLD AUTO: 3.84 X10*6/UL (ref 4.5–5.9)
SODIUM SERPL-SCNC: 130 MMOL/L (ref 136–145)
T AXIS: 264 DEGREES
T OFFSET: 431 MS
VENTRICULAR RATE: 65 BPM
WBC # BLD AUTO: 7.8 X10*3/UL (ref 4.4–11.3)

## 2025-01-20 PROCEDURE — 85025 COMPLETE CBC W/AUTO DIFF WBC: CPT

## 2025-01-20 PROCEDURE — 96360 HYDRATION IV INFUSION INIT: CPT

## 2025-01-20 PROCEDURE — 82947 ASSAY GLUCOSE BLOOD QUANT: CPT

## 2025-01-20 PROCEDURE — 71045 X-RAY EXAM CHEST 1 VIEW: CPT | Mod: FOREIGN READ | Performed by: RADIOLOGY

## 2025-01-20 PROCEDURE — 2500000004 HC RX 250 GENERAL PHARMACY W/ HCPCS (ALT 636 FOR OP/ED)

## 2025-01-20 PROCEDURE — 93005 ELECTROCARDIOGRAM TRACING: CPT

## 2025-01-20 PROCEDURE — 2500000005 HC RX 250 GENERAL PHARMACY W/O HCPCS: Performed by: EMERGENCY MEDICINE

## 2025-01-20 PROCEDURE — 99285 EMERGENCY DEPT VISIT HI MDM: CPT | Performed by: EMERGENCY MEDICINE

## 2025-01-20 PROCEDURE — 85610 PROTHROMBIN TIME: CPT

## 2025-01-20 PROCEDURE — 36415 COLL VENOUS BLD VENIPUNCTURE: CPT

## 2025-01-20 PROCEDURE — 80053 COMPREHEN METABOLIC PANEL: CPT

## 2025-01-20 PROCEDURE — 96361 HYDRATE IV INFUSION ADD-ON: CPT

## 2025-01-20 PROCEDURE — 71045 X-RAY EXAM CHEST 1 VIEW: CPT

## 2025-01-20 RX ORDER — LIDOCAINE 560 MG/1
1 PATCH PERCUTANEOUS; TOPICAL; TRANSDERMAL DAILY
COMMUNITY

## 2025-01-20 RX ORDER — ACETAMINOPHEN 500 MG
1000 TABLET ORAL 3 TIMES DAILY
COMMUNITY

## 2025-01-20 RX ORDER — PRENATAL VIT 91/IRON/FOLIC/DHA 28-975-200
1 COMBINATION PACKAGE (EA) ORAL 2 TIMES DAILY
COMMUNITY

## 2025-01-20 RX ORDER — HYDROCORTISONE 1 %
1 CREAM (GRAM) TOPICAL 2 TIMES DAILY
COMMUNITY

## 2025-01-20 RX ORDER — DOCUSATE SODIUM 100 MG/1
100 CAPSULE, LIQUID FILLED ORAL 2 TIMES DAILY
COMMUNITY

## 2025-01-20 RX ADMIN — SODIUM CHLORIDE 1000 ML: 9 INJECTION, SOLUTION INTRAVENOUS at 12:01

## 2025-01-20 RX ADMIN — Medication 2 L/MIN: at 10:54

## 2025-01-20 ASSESSMENT — PAIN DESCRIPTION - FREQUENCY
FREQUENCY: CONSTANT/CONTINUOUS
FREQUENCY: INTERMITTENT

## 2025-01-20 ASSESSMENT — COLUMBIA-SUICIDE SEVERITY RATING SCALE - C-SSRS
1. IN THE PAST MONTH, HAVE YOU WISHED YOU WERE DEAD OR WISHED YOU COULD GO TO SLEEP AND NOT WAKE UP?: NO
2. HAVE YOU ACTUALLY HAD ANY THOUGHTS OF KILLING YOURSELF?: NO
6. HAVE YOU EVER DONE ANYTHING, STARTED TO DO ANYTHING, OR PREPARED TO DO ANYTHING TO END YOUR LIFE?: NO

## 2025-01-20 ASSESSMENT — PAIN DESCRIPTION - PAIN TYPE: TYPE: CHRONIC PAIN

## 2025-01-20 ASSESSMENT — PAIN SCALES - GENERAL
PAINLEVEL_OUTOF10: 4
PAINLEVEL_OUTOF10: 2

## 2025-01-20 ASSESSMENT — PAIN DESCRIPTION - ORIENTATION
ORIENTATION: RIGHT
ORIENTATION: RIGHT

## 2025-01-20 ASSESSMENT — PAIN DESCRIPTION - ONSET
ONSET: ONGOING
ONSET: ONGOING

## 2025-01-20 ASSESSMENT — PAIN - FUNCTIONAL ASSESSMENT: PAIN_FUNCTIONAL_ASSESSMENT: 0-10

## 2025-01-20 ASSESSMENT — PAIN DESCRIPTION - DESCRIPTORS
DESCRIPTORS: DISCOMFORT
DESCRIPTORS: ACHING;DULL

## 2025-01-20 ASSESSMENT — PAIN DESCRIPTION - LOCATION: LOCATION: FOOT

## 2025-01-20 NOTE — ED TRIAGE NOTES
Patient to [facility] via Kris Blake; patient stated that [facility] ordered an ultrasound of his RLE due to pain; Ultrasound showed full arterial occlusion; Dr Blankenship to bedside to assess patient; cart in locked, lowest position; no needs voiced at this time.

## 2025-01-20 NOTE — DISCHARGE INSTRUCTIONS
Please follow-up with vascular surgery with Dr. Langford in 1 to 2 weeks for additional vascular testing in your legs.  Please continue to keep your legs elevated, to reduce swelling.  Please return to closest ED if develop any worsening chest pain, shortness of breath, lightheadedness, dizziness, pale/cool extremities, or loss of function.

## 2025-01-20 NOTE — ED PROVIDER NOTES
HPI   Chief Complaint   Patient presents with    Lower Extremity Issue     Patient stated that the nursing home had completed an ultrasound of his  right lower foot due to pain and that they found that there was a full arterial occlusion       Patient is a 84-year-old male with history of A-fib on Eliquis, hyperlipidemia, hypertension, diabetes, CAD status post PCI 10/6/2015, CABG 2015, cholecystectomy September 2015 presenting to [facility] ED for concern of artery occlusion in the left lower leg found incidentally on ultrasound exam completed on Friday.  Ultrasound was ordered by nursing home doctor for concern of DVT, increased leg swelling in the left lower extremity.  Per outpatient imaging report obtained on Friday, patient had no flow in the right PTA, left PTA and left peroneal artery occluded.  Patient denies any element of new chest pain, shortness of breath, nausea, vomiting, diarrhea, abdominal pain, fever, chills, dizziness, headache, weakness.              Patient History   Past Medical History:   Diagnosis Date    Hyperlipidemia, unspecified 11/14/2022    Dyslipidemia    Other forms of dyspnea 08/09/2018    Dyspnea on exertion    Personal history of other diseases of the circulatory system 12/08/2014    History of hypertension    Personal history of other endocrine, nutritional and metabolic disease     History of diabetes mellitus    Personal history of other specified conditions     History of syncope    Presence of intraocular lens 07/20/2019    Pseudophakia of right eye    Presence of intraocular lens 07/20/2019    Pseudophakia of left eye    Unspecified atrial fibrillation (Multi) 11/14/2022    Atrial fibrillation     Past Surgical History:   Procedure Laterality Date    CATARACT EXTRACTION      CHOLECYSTECTOMY  09/09/2015    Cholecystectomy    CORNEAL TRANSPLANT      CORONARY ANGIOPLASTY WITH STENT PLACEMENT  10/06/2015    Cath Stent Placement    CORONARY ARTERY BYPASS GRAFT  10/06/2015    CABG     OTHER SURGICAL HISTORY  09/09/2015    Wrist Carpectomy    ROTATOR CUFF REPAIR  09/09/2015    Rotator Cuff Repair     No family history on file.  Social History     Tobacco Use    Smoking status: Never    Smokeless tobacco: Never   Substance Use Topics    Alcohol use: Never    Drug use: Never       Physical Exam   ED Triage Vitals [01/20/25 1105]   Temperature Heart Rate Respirations BP   37.2 °C (99 °F) 62 18 112/57      Pulse Ox Temp Source Heart Rate Source Patient Position   94 % Temporal Monitor Sitting      BP Location FiO2 (%)     Left arm --       Physical Exam  Constitutional:       Appearance: Normal appearance.   HENT:      Head: Normocephalic and atraumatic.      Nose: Nose normal.      Mouth/Throat:      Mouth: Mucous membranes are moist.      Pharynx: Oropharynx is clear.   Eyes:      Extraocular Movements: Extraocular movements intact.      Conjunctiva/sclera: Conjunctivae normal.      Pupils: Pupils are equal, round, and reactive to light.   Cardiovascular:      Rate and Rhythm: Normal rate and regular rhythm.      Pulses: Normal pulses.      Heart sounds: Normal heart sounds.   Pulmonary:      Effort: Pulmonary effort is normal.      Breath sounds: Normal breath sounds.   Abdominal:      General: Abdomen is flat. Bowel sounds are normal.      Palpations: Abdomen is soft.   Musculoskeletal:         General: Normal range of motion.      Cervical back: Normal range of motion and neck supple.      Right lower leg: Edema present.      Left lower leg: Edema present.      Comments: No open ulcerative wounds noted on bilateral lower extremity exam.  Bedside ultrasound Doppler exam revealed proximal DP pulses in bilateral lower extremities.  Patient bilateral distal lower extremities not cool, not pale.   Skin:     General: Skin is warm and dry.      Capillary Refill: Capillary refill takes less than 2 seconds.   Neurological:      General: No focal deficit present.      Mental Status: He is alert and  oriented to person, place, and time. Mental status is at baseline.   Psychiatric:         Mood and Affect: Mood normal.         Behavior: Behavior normal.           ED Course & MDM   ED Course as of 01/20/25 2021 Mon Jan 20, 2025   1219 EKG: Atrial fibrillation with a ventricular rate of 65 bpm.  QRS 98.  QTc 453.  Right axis deviation.  Baseline artifact is present without obvious ST elevation or acute ischemic pattern [PS]   1400 Dr. Langford evaluated patient at bedside, and confirmed PT/DP pulse signals in bilateral lower extremities.  Recommended no acute vascular intervention at this time.  Recommend outpatient follow-up for TANISHA/PVRs. [MI]      ED Course User Index  [MI] Zaira Lujan MD  [PS] Leonel Blankenship DO         Diagnoses as of 01/20/25 2021   Leg swelling                 No data recorded     [place] Coma Scale Score: 15 (01/20/25 1113 : Torie Goldstein RN)                           Medical Decision Making  Patient is a 84 y.o. male who presents to [facility] ED for Lower Extremity Issue (Patient stated that the nursing home had completed an ultrasound of his  right lower foot due to pain and that they found that there was a full arterial occlusion). On initial ED evaluation, patient found to be in no acute distress. Per HPI, concern to evaluate and treat for differential diagnosis including, but not limited to lower extremity arterial occlusion.  Obtaining basic preoperative lab workup and hematologic lab workup.  Consulting vascular surgery on-call, Dr. Langford regarding obtaining CT imaging.  CBC showed no concerning anemia or leukocytosis.  Patient INR was 1.4.  CMP showed no significant ISIAH or electrolyte abnormality from patient baseline.  Dr. Langford evaluated patient at bedside, and confirmed PT and DP pulse signals in bilateral lower extremities.  Recommended no acute vascular intervention at this time.  Patient denies any chest pain, shortness of breath.  Patient has no increased oxygen  need.  Dr. Ludwig recommends outpatient follow-up for PVR/ABIs.  Patient stable for discharge.    Diagnostic findings and treatment plan discussed with patient/family. They are amenable to plan. Rx given for N/A. Patient to follow up with PCP, vascular surgery outpatient,referrals provided. Anticipatory guidance and return precautions provided.  Patient otherwise stable for discharge.          Procedure  Procedures     Zaira Lujan MD  Resident  01/20/25 2025

## 2025-01-21 NOTE — ED NOTES
Attempted to call report to facility at [phone] and there was no answer.      Skyla Sotelo RN  01/20/25 2007

## 2025-01-30 ENCOUNTER — APPOINTMENT (OUTPATIENT)
Dept: NEPHROLOGY | Facility: CLINIC | Age: 85
End: 2025-01-30
Payer: MEDICARE

## 2025-02-10 ENCOUNTER — APPOINTMENT (OUTPATIENT)
Dept: RADIOLOGY | Facility: HOSPITAL | Age: 85
DRG: 291 | End: 2025-02-10
Payer: MEDICARE

## 2025-02-10 ENCOUNTER — APPOINTMENT (OUTPATIENT)
Dept: CARDIOLOGY | Facility: HOSPITAL | Age: 85
DRG: 291 | End: 2025-02-10
Payer: MEDICARE

## 2025-02-10 ENCOUNTER — HOSPITAL ENCOUNTER (INPATIENT)
Facility: HOSPITAL | Age: 85
DRG: 291 | End: 2025-02-10
Attending: STUDENT IN AN ORGANIZED HEALTH CARE EDUCATION/TRAINING PROGRAM | Admitting: INTERNAL MEDICINE
Payer: MEDICARE

## 2025-02-10 DIAGNOSIS — I50.43 ACUTE ON CHRONIC COMBINED SYSTOLIC AND DIASTOLIC CONGESTIVE HEART FAILURE: ICD-10-CM

## 2025-02-10 DIAGNOSIS — E08.621 DIABETES MELLITUS DUE TO UNDERLYING CONDITION WITH FOOT ULCER (CODE): ICD-10-CM

## 2025-02-10 DIAGNOSIS — R33.8 ACUTE URINARY RETENTION: ICD-10-CM

## 2025-02-10 DIAGNOSIS — J44.1 COPD EXACERBATION (MULTI): Primary | ICD-10-CM

## 2025-02-10 DIAGNOSIS — E87.5 HYPERKALEMIA: ICD-10-CM

## 2025-02-10 DIAGNOSIS — T88.7XXA SIDE EFFECT OF MEDICATION: ICD-10-CM

## 2025-02-10 DIAGNOSIS — E87.1 HYPONATREMIA: ICD-10-CM

## 2025-02-10 DIAGNOSIS — J81.0 ACUTE PULMONARY EDEMA: ICD-10-CM

## 2025-02-10 DIAGNOSIS — E11.65 TYPE 2 DIABETES MELLITUS WITH HYPERGLYCEMIA, WITH LONG-TERM CURRENT USE OF INSULIN: ICD-10-CM

## 2025-02-10 DIAGNOSIS — R06.09 DYSPNEA ON EXERTION: ICD-10-CM

## 2025-02-10 DIAGNOSIS — R53.1 GENERALIZED WEAKNESS: ICD-10-CM

## 2025-02-10 DIAGNOSIS — R41.0 DELIRIUM: ICD-10-CM

## 2025-02-10 DIAGNOSIS — Z79.4 TYPE 2 DIABETES MELLITUS WITH HYPERGLYCEMIA, WITH LONG-TERM CURRENT USE OF INSULIN: ICD-10-CM

## 2025-02-10 PROBLEM — N39.0 UTI (URINARY TRACT INFECTION): Status: ACTIVE | Noted: 2025-02-10

## 2025-02-10 LAB
ALBUMIN SERPL BCP-MCNC: 3.1 G/DL (ref 3.4–5)
ALP SERPL-CCNC: 84 U/L (ref 33–136)
ALT SERPL W P-5'-P-CCNC: 7 U/L (ref 10–52)
ANION GAP SERPL CALC-SCNC: 14 MMOL/L (ref 10–20)
APPEARANCE UR: CLEAR
AST SERPL W P-5'-P-CCNC: 18 U/L (ref 9–39)
BASOPHILS # BLD AUTO: 0.05 X10*3/UL (ref 0–0.1)
BASOPHILS NFR BLD AUTO: 0.7 %
BILIRUB SERPL-MCNC: 1 MG/DL (ref 0–1.2)
BILIRUB UR STRIP.AUTO-MCNC: NEGATIVE MG/DL
BNP SERPL-MCNC: 969 PG/ML (ref 0–99)
BUN SERPL-MCNC: 49 MG/DL (ref 6–23)
CALCIUM SERPL-MCNC: 8.5 MG/DL (ref 8.6–10.3)
CARDIAC TROPONIN I PNL SERPL HS: 190 NG/L (ref 0–20)
CARDIAC TROPONIN I PNL SERPL HS: 211 NG/L (ref 0–20)
CHLORIDE SERPL-SCNC: 102 MMOL/L (ref 98–107)
CO2 SERPL-SCNC: 23 MMOL/L (ref 21–32)
COLOR UR: YELLOW
CREAT SERPL-MCNC: 2.13 MG/DL (ref 0.5–1.3)
EGFRCR SERPLBLD CKD-EPI 2021: 30 ML/MIN/1.73M*2
EOSINOPHIL # BLD AUTO: 0.04 X10*3/UL (ref 0–0.4)
EOSINOPHIL NFR BLD AUTO: 0.6 %
ERYTHROCYTE [DISTWIDTH] IN BLOOD BY AUTOMATED COUNT: 14.9 % (ref 11.5–14.5)
GLUCOSE SERPL-MCNC: 101 MG/DL (ref 74–99)
GLUCOSE UR STRIP.AUTO-MCNC: ABNORMAL MG/DL
HCT VFR BLD AUTO: 41.2 % (ref 41–52)
HGB BLD-MCNC: 12.1 G/DL (ref 13.5–17.5)
HYALINE CASTS #/AREA URNS AUTO: ABNORMAL /LPF
IMM GRANULOCYTES # BLD AUTO: 0.02 X10*3/UL (ref 0–0.5)
IMM GRANULOCYTES NFR BLD AUTO: 0.3 % (ref 0–0.9)
KETONES UR STRIP.AUTO-MCNC: NEGATIVE MG/DL
LEUKOCYTE ESTERASE UR QL STRIP.AUTO: ABNORMAL
LYMPHOCYTES # BLD AUTO: 0.71 X10*3/UL (ref 0.8–3)
LYMPHOCYTES NFR BLD AUTO: 10.4 %
MCH RBC QN AUTO: 26 PG (ref 26–34)
MCHC RBC AUTO-ENTMCNC: 29.4 G/DL (ref 32–36)
MCV RBC AUTO: 89 FL (ref 80–100)
MONOCYTES # BLD AUTO: 1.12 X10*3/UL (ref 0.05–0.8)
MONOCYTES NFR BLD AUTO: 16.4 %
MUCOUS THREADS #/AREA URNS AUTO: ABNORMAL /LPF
NEUTROPHILS # BLD AUTO: 4.91 X10*3/UL (ref 1.6–5.5)
NEUTROPHILS NFR BLD AUTO: 71.6 %
NITRITE UR QL STRIP.AUTO: NEGATIVE
NRBC BLD-RTO: 0 /100 WBCS (ref 0–0)
PH UR STRIP.AUTO: 5 [PH]
PLATELET # BLD AUTO: 163 X10*3/UL (ref 150–450)
POTASSIUM SERPL-SCNC: 4.8 MMOL/L (ref 3.5–5.3)
PROT SERPL-MCNC: 6.1 G/DL (ref 6.4–8.2)
PROT UR STRIP.AUTO-MCNC: NEGATIVE MG/DL
RBC # BLD AUTO: 4.65 X10*6/UL (ref 4.5–5.9)
RBC # UR STRIP.AUTO: ABNORMAL MG/DL
RBC #/AREA URNS AUTO: ABNORMAL /HPF
SODIUM SERPL-SCNC: 134 MMOL/L (ref 136–145)
SP GR UR STRIP.AUTO: 1.01
UROBILINOGEN UR STRIP.AUTO-MCNC: NORMAL MG/DL
WBC # BLD AUTO: 6.9 X10*3/UL (ref 4.4–11.3)
WBC #/AREA URNS AUTO: >50 /HPF
WBC CLUMPS #/AREA URNS AUTO: ABNORMAL /HPF

## 2025-02-10 PROCEDURE — 87086 URINE CULTURE/COLONY COUNT: CPT | Mod: STJLAB

## 2025-02-10 PROCEDURE — 93010 ELECTROCARDIOGRAM REPORT: CPT | Performed by: INTERNAL MEDICINE

## 2025-02-10 PROCEDURE — 36415 COLL VENOUS BLD VENIPUNCTURE: CPT | Performed by: STUDENT IN AN ORGANIZED HEALTH CARE EDUCATION/TRAINING PROGRAM

## 2025-02-10 PROCEDURE — 2500000004 HC RX 250 GENERAL PHARMACY W/ HCPCS (ALT 636 FOR OP/ED): Performed by: STUDENT IN AN ORGANIZED HEALTH CARE EDUCATION/TRAINING PROGRAM

## 2025-02-10 PROCEDURE — 93010 ELECTROCARDIOGRAM REPORT: CPT | Performed by: STUDENT IN AN ORGANIZED HEALTH CARE EDUCATION/TRAINING PROGRAM

## 2025-02-10 PROCEDURE — 84484 ASSAY OF TROPONIN QUANT: CPT | Performed by: STUDENT IN AN ORGANIZED HEALTH CARE EDUCATION/TRAINING PROGRAM

## 2025-02-10 PROCEDURE — 94640 AIRWAY INHALATION TREATMENT: CPT | Mod: 59

## 2025-02-10 PROCEDURE — 93005 ELECTROCARDIOGRAM TRACING: CPT

## 2025-02-10 PROCEDURE — 85025 COMPLETE CBC W/AUTO DIFF WBC: CPT | Performed by: STUDENT IN AN ORGANIZED HEALTH CARE EDUCATION/TRAINING PROGRAM

## 2025-02-10 PROCEDURE — 80053 COMPREHEN METABOLIC PANEL: CPT | Performed by: STUDENT IN AN ORGANIZED HEALTH CARE EDUCATION/TRAINING PROGRAM

## 2025-02-10 PROCEDURE — 51702 INSERT TEMP BLADDER CATH: CPT

## 2025-02-10 PROCEDURE — 81001 URINALYSIS AUTO W/SCOPE: CPT

## 2025-02-10 PROCEDURE — 2500000002 HC RX 250 W HCPCS SELF ADMINISTERED DRUGS (ALT 637 FOR MEDICARE OP, ALT 636 FOR OP/ED)

## 2025-02-10 PROCEDURE — 99285 EMERGENCY DEPT VISIT HI MDM: CPT | Mod: 25 | Performed by: STUDENT IN AN ORGANIZED HEALTH CARE EDUCATION/TRAINING PROGRAM

## 2025-02-10 PROCEDURE — 71045 X-RAY EXAM CHEST 1 VIEW: CPT | Performed by: RADIOLOGY

## 2025-02-10 PROCEDURE — 71045 X-RAY EXAM CHEST 1 VIEW: CPT

## 2025-02-10 PROCEDURE — 99285 EMERGENCY DEPT VISIT HI MDM: CPT | Performed by: STUDENT IN AN ORGANIZED HEALTH CARE EDUCATION/TRAINING PROGRAM

## 2025-02-10 PROCEDURE — 51798 US URINE CAPACITY MEASURE: CPT

## 2025-02-10 PROCEDURE — 96365 THER/PROPH/DIAG IV INF INIT: CPT | Mod: 59

## 2025-02-10 PROCEDURE — 83880 ASSAY OF NATRIURETIC PEPTIDE: CPT

## 2025-02-10 RX ORDER — CEFTRIAXONE 1 G/50ML
1 INJECTION, SOLUTION INTRAVENOUS ONCE
Status: COMPLETED | OUTPATIENT
Start: 2025-02-10 | End: 2025-02-10

## 2025-02-10 RX ORDER — ACETAMINOPHEN 325 MG/1
975 TABLET ORAL ONCE
Status: COMPLETED | OUTPATIENT
Start: 2025-02-10 | End: 2025-02-11

## 2025-02-10 RX ORDER — IPRATROPIUM BROMIDE AND ALBUTEROL SULFATE 2.5; .5 MG/3ML; MG/3ML
3 SOLUTION RESPIRATORY (INHALATION) ONCE
Status: COMPLETED | OUTPATIENT
Start: 2025-02-10 | End: 2025-02-10

## 2025-02-10 RX ADMIN — CEFTRIAXONE SODIUM 1 G: 1 INJECTION, SOLUTION INTRAVENOUS at 23:17

## 2025-02-10 RX ADMIN — IPRATROPIUM BROMIDE AND ALBUTEROL SULFATE 3 ML: 2.5; .5 SOLUTION RESPIRATORY (INHALATION) at 21:42

## 2025-02-10 ASSESSMENT — LIFESTYLE VARIABLES
HAVE PEOPLE ANNOYED YOU BY CRITICIZING YOUR DRINKING: NO
HAVE YOU EVER FELT YOU SHOULD CUT DOWN ON YOUR DRINKING: NO
TOTAL SCORE: 0
EVER HAD A DRINK FIRST THING IN THE MORNING TO STEADY YOUR NERVES TO GET RID OF A HANGOVER: NO
EVER FELT BAD OR GUILTY ABOUT YOUR DRINKING: NO

## 2025-02-10 ASSESSMENT — PAIN SCALES - GENERAL
PAINLEVEL_OUTOF10: 8
PAINLEVEL_OUTOF10: 0 - NO PAIN

## 2025-02-10 ASSESSMENT — PAIN - FUNCTIONAL ASSESSMENT: PAIN_FUNCTIONAL_ASSESSMENT: 0-10

## 2025-02-11 PROBLEM — R53.1 WEAKNESS: Status: RESOLVED | Noted: 2025-02-11 | Resolved: 2025-02-11

## 2025-02-11 PROBLEM — R00.0 TACHYCARDIA: Status: RESOLVED | Noted: 2022-04-04 | Resolved: 2025-02-11

## 2025-02-11 PROBLEM — Z86.39 HISTORY OF DIABETES MELLITUS: Status: RESOLVED | Noted: 2025-02-11 | Resolved: 2025-02-11

## 2025-02-11 PROBLEM — N39.0 UTI (URINARY TRACT INFECTION): Status: ACTIVE | Noted: 2025-02-11

## 2025-02-11 PROBLEM — I83.019 VENOUS STASIS ULCER OF RIGHT LOWER LEG WITH EDEMA OF RIGHT LOWER LEG: Status: RESOLVED | Noted: 2025-01-30 | Resolved: 2025-02-11

## 2025-02-11 PROBLEM — M62.81 MUSCLE WEAKNESS: Status: RESOLVED | Noted: 2023-09-07 | Resolved: 2025-02-11

## 2025-02-11 PROBLEM — R10.84 GENERALIZED ABDOMINAL PAIN: Status: RESOLVED | Noted: 2023-04-15 | Resolved: 2025-02-11

## 2025-02-11 PROBLEM — R11.2 NAUSEA AND VOMITING: Status: RESOLVED | Noted: 2025-02-11 | Resolved: 2025-02-11

## 2025-02-11 PROBLEM — R22.43 LOCALIZED SWELLING, MASS AND LUMP, LOWER LIMB, BILATERAL: Status: RESOLVED | Noted: 2024-11-21 | Resolved: 2025-02-11

## 2025-02-11 PROBLEM — R60.0 VENOUS STASIS ULCER OF RIGHT LOWER LEG WITH EDEMA OF RIGHT LOWER LEG: Status: RESOLVED | Noted: 2025-01-30 | Resolved: 2025-02-11

## 2025-02-11 PROBLEM — R21 RASH: Status: RESOLVED | Noted: 2022-12-01 | Resolved: 2025-02-11

## 2025-02-11 PROBLEM — J96.11 CHRONIC RESPIRATORY FAILURE WITH HYPOXIA (MULTI): Status: ACTIVE | Noted: 2025-02-02

## 2025-02-11 PROBLEM — R07.89 ATYPICAL CHEST PAIN: Status: RESOLVED | Noted: 2023-04-15 | Resolved: 2025-02-11

## 2025-02-11 PROBLEM — Z96.1 PSEUDOPHAKIA: Status: ACTIVE | Noted: 2025-02-11

## 2025-02-11 PROBLEM — R79.89 OTHER SPECIFIED ABNORMAL FINDINGS OF BLOOD CHEMISTRY: Status: RESOLVED | Noted: 2024-11-13 | Resolved: 2025-02-11

## 2025-02-11 PROBLEM — R07.9 CHEST PAIN: Status: RESOLVED | Noted: 2023-03-18 | Resolved: 2025-02-11

## 2025-02-11 PROBLEM — L03.115 BILATERAL LOWER LEG CELLULITIS: Status: RESOLVED | Noted: 2024-01-25 | Resolved: 2025-02-11

## 2025-02-11 PROBLEM — R29.6 REPEATED FALLS: Status: RESOLVED | Noted: 2024-11-21 | Resolved: 2025-02-11

## 2025-02-11 PROBLEM — I87.2 VENOUS INSUFFICIENCY OF BOTH LOWER EXTREMITIES: Status: ACTIVE | Noted: 2022-12-01

## 2025-02-11 PROBLEM — I50.23 ACUTE ON CHRONIC SYSTOLIC HEART FAILURE: Status: ACTIVE | Noted: 2025-02-11

## 2025-02-11 PROBLEM — N41.9 PROSTATITIS: Status: ACTIVE | Noted: 2025-02-11

## 2025-02-11 PROBLEM — N39.0 UTI (URINARY TRACT INFECTION): Status: RESOLVED | Noted: 2025-02-10 | Resolved: 2025-02-11
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
## 2025-02-11 PROBLEM — Z99.89 DEPENDENT ON WALKER FOR AMBULATION: Status: ACTIVE | Noted: 2023-09-07

## 2025-02-11 PROBLEM — Z98.890 HISTORY OF CARDIAC CATHETERIZATION: Status: RESOLVED | Noted: 2022-04-01 | Resolved: 2025-02-11

## 2025-02-11 PROBLEM — I50.20 HEART FAILURE WITH REDUCED EJECTION FRACTION: Status: RESOLVED | Noted: 2023-04-14 | Resolved: 2025-02-11

## 2025-02-11 PROBLEM — H18.609 KERATOCONUS: Status: RESOLVED | Noted: 2025-02-11 | Resolved: 2025-02-11

## 2025-02-11 PROBLEM — E78.5 HYPERLIPIDEMIA: Chronic | Status: ACTIVE | Noted: 2023-04-15

## 2025-02-11 PROBLEM — I51.5 MYOCARDIAL DISORDER (MULTI): Status: RESOLVED | Noted: 2025-02-11 | Resolved: 2025-02-11

## 2025-02-11 PROBLEM — L03.116 BILATERAL LOWER LEG CELLULITIS: Status: RESOLVED | Noted: 2024-01-25 | Resolved: 2025-02-11

## 2025-02-11 PROBLEM — L97.919 VENOUS STASIS ULCER OF RIGHT LOWER LEG WITH EDEMA OF RIGHT LOWER LEG: Status: RESOLVED | Noted: 2025-01-30 | Resolved: 2025-02-11

## 2025-02-11 PROBLEM — S81.809A OPEN WOUNDS INVOLVING MULTIPLE REGIONS OF LOWER EXTREMITY: Status: RESOLVED | Noted: 2023-04-15 | Resolved: 2025-02-11

## 2025-02-11 PROBLEM — M79.643 HAND PAIN: Status: RESOLVED | Noted: 2025-02-11 | Resolved: 2025-02-11

## 2025-02-11 PROBLEM — J44.9 CHRONIC OBSTRUCTIVE PULMONARY DISEASE, UNSPECIFIED: Status: RESOLVED | Noted: 2024-11-13 | Resolved: 2025-02-11

## 2025-02-11 PROBLEM — J44.1 COPD EXACERBATION (MULTI): Status: ACTIVE | Noted: 2025-02-11

## 2025-02-11 PROBLEM — E11.65 TYPE 2 DIABETES MELLITUS WITH HYPERGLYCEMIA (MULTI): Status: ACTIVE | Noted: 2024-02-22

## 2025-02-11 PROBLEM — I83.891 VENOUS STASIS ULCER OF RIGHT LOWER LEG WITH EDEMA OF RIGHT LOWER LEG: Status: RESOLVED | Noted: 2025-01-30 | Resolved: 2025-02-11

## 2025-02-11 PROBLEM — R26.81 GAIT INSTABILITY: Status: RESOLVED | Noted: 2023-09-07 | Resolved: 2025-02-11

## 2025-02-11 LAB
ALBUMIN SERPL BCP-MCNC: 2.8 G/DL (ref 3.4–5)
ANION GAP SERPL CALC-SCNC: 14 MMOL/L (ref 10–20)
BASOPHILS # BLD AUTO: 0.05 X10*3/UL (ref 0–0.1)
BASOPHILS NFR BLD AUTO: 0.8 %
BUN SERPL-MCNC: 47 MG/DL (ref 6–23)
CALCIUM SERPL-MCNC: 8.3 MG/DL (ref 8.6–10.3)
CARDIAC TROPONIN I PNL SERPL HS: 214 NG/L (ref 0–20)
CHLORIDE SERPL-SCNC: 103 MMOL/L (ref 98–107)
CO2 SERPL-SCNC: 22 MMOL/L (ref 21–32)
CREAT SERPL-MCNC: 2.1 MG/DL (ref 0.5–1.3)
EGFRCR SERPLBLD CKD-EPI 2021: 30 ML/MIN/1.73M*2
EOSINOPHIL # BLD AUTO: 0.03 X10*3/UL (ref 0–0.4)
EOSINOPHIL NFR BLD AUTO: 0.5 %
ERYTHROCYTE [DISTWIDTH] IN BLOOD BY AUTOMATED COUNT: 14.9 % (ref 11.5–14.5)
GLUCOSE BLD MANUAL STRIP-MCNC: 121 MG/DL (ref 74–99)
GLUCOSE BLD MANUAL STRIP-MCNC: 141 MG/DL (ref 74–99)
GLUCOSE BLD MANUAL STRIP-MCNC: 190 MG/DL (ref 74–99)
GLUCOSE BLD MANUAL STRIP-MCNC: 67 MG/DL (ref 74–99)
GLUCOSE BLD MANUAL STRIP-MCNC: 85 MG/DL (ref 74–99)
GLUCOSE SERPL-MCNC: 78 MG/DL (ref 74–99)
HCT VFR BLD AUTO: 39 % (ref 41–52)
HGB BLD-MCNC: 11.3 G/DL (ref 13.5–17.5)
HOLD SPECIMEN: NORMAL
IMM GRANULOCYTES # BLD AUTO: 0.04 X10*3/UL (ref 0–0.5)
IMM GRANULOCYTES NFR BLD AUTO: 0.7 % (ref 0–0.9)
LYMPHOCYTES # BLD AUTO: 0.51 X10*3/UL (ref 0.8–3)
LYMPHOCYTES NFR BLD AUTO: 8.7 %
MAGNESIUM SERPL-MCNC: 2.38 MG/DL (ref 1.6–2.4)
MCH RBC QN AUTO: 25.7 PG (ref 26–34)
MCHC RBC AUTO-ENTMCNC: 29 G/DL (ref 32–36)
MCV RBC AUTO: 89 FL (ref 80–100)
MONOCYTES # BLD AUTO: 1 X10*3/UL (ref 0.05–0.8)
MONOCYTES NFR BLD AUTO: 17 %
NEUTROPHILS # BLD AUTO: 4.26 X10*3/UL (ref 1.6–5.5)
NEUTROPHILS NFR BLD AUTO: 72.3 %
NRBC BLD-RTO: 0 /100 WBCS (ref 0–0)
PHOSPHATE SERPL-MCNC: 3.8 MG/DL (ref 2.5–4.9)
PLATELET # BLD AUTO: 135 X10*3/UL (ref 150–450)
POTASSIUM SERPL-SCNC: 4.7 MMOL/L (ref 3.5–5.3)
RBC # BLD AUTO: 4.39 X10*6/UL (ref 4.5–5.9)
SODIUM SERPL-SCNC: 134 MMOL/L (ref 136–145)
WBC # BLD AUTO: 5.9 X10*3/UL (ref 4.4–11.3)

## 2025-02-11 PROCEDURE — 2500000002 HC RX 250 W HCPCS SELF ADMINISTERED DRUGS (ALT 637 FOR MEDICARE OP, ALT 636 FOR OP/ED)

## 2025-02-11 PROCEDURE — 82947 ASSAY GLUCOSE BLOOD QUANT: CPT

## 2025-02-11 PROCEDURE — 2500000001 HC RX 250 WO HCPCS SELF ADMINISTERED DRUGS (ALT 637 FOR MEDICARE OP): Performed by: STUDENT IN AN ORGANIZED HEALTH CARE EDUCATION/TRAINING PROGRAM

## 2025-02-11 PROCEDURE — 97530 THERAPEUTIC ACTIVITIES: CPT | Mod: GP

## 2025-02-11 PROCEDURE — 80069 RENAL FUNCTION PANEL: CPT

## 2025-02-11 PROCEDURE — 97165 OT EVAL LOW COMPLEX 30 MIN: CPT | Mod: GO

## 2025-02-11 PROCEDURE — 84484 ASSAY OF TROPONIN QUANT: CPT

## 2025-02-11 PROCEDURE — 83735 ASSAY OF MAGNESIUM: CPT

## 2025-02-11 PROCEDURE — 36415 COLL VENOUS BLD VENIPUNCTURE: CPT

## 2025-02-11 PROCEDURE — 97161 PT EVAL LOW COMPLEX 20 MIN: CPT | Mod: GP

## 2025-02-11 PROCEDURE — 1100000001 HC PRIVATE ROOM DAILY

## 2025-02-11 PROCEDURE — 99223 1ST HOSP IP/OBS HIGH 75: CPT

## 2025-02-11 PROCEDURE — 2500000001 HC RX 250 WO HCPCS SELF ADMINISTERED DRUGS (ALT 637 FOR MEDICARE OP)

## 2025-02-11 PROCEDURE — 97112 NEUROMUSCULAR REEDUCATION: CPT | Mod: GO

## 2025-02-11 PROCEDURE — 2500000004 HC RX 250 GENERAL PHARMACY W/ HCPCS (ALT 636 FOR OP/ED)

## 2025-02-11 PROCEDURE — 99222 1ST HOSP IP/OBS MODERATE 55: CPT

## 2025-02-11 PROCEDURE — 85025 COMPLETE CBC W/AUTO DIFF WBC: CPT

## 2025-02-11 RX ORDER — LATANOPROST 50 UG/ML
1 SOLUTION/ DROPS OPHTHALMIC NIGHTLY
COMMUNITY

## 2025-02-11 RX ORDER — INSULIN GLARGINE 100 [IU]/ML
5 INJECTION, SOLUTION SUBCUTANEOUS NIGHTLY
Status: DISCONTINUED | OUTPATIENT
Start: 2025-02-11 | End: 2025-02-11

## 2025-02-11 RX ORDER — ACETAMINOPHEN 325 MG/1
650 TABLET ORAL ONCE
Status: COMPLETED | OUTPATIENT
Start: 2025-02-11 | End: 2025-02-11

## 2025-02-11 RX ORDER — INSULIN GLARGINE 100 [IU]/ML
15 INJECTION, SOLUTION SUBCUTANEOUS 2 TIMES DAILY
COMMUNITY
End: 2025-02-21 | Stop reason: HOSPADM

## 2025-02-11 RX ORDER — LATANOPROST 50 UG/ML
1 SOLUTION/ DROPS OPHTHALMIC NIGHTLY
Status: DISCONTINUED | OUTPATIENT
Start: 2025-02-11 | End: 2025-02-21 | Stop reason: HOSPADM

## 2025-02-11 RX ORDER — AMOXICILLIN AND CLAVULANATE POTASSIUM 500; 125 MG/1; MG/1
1 TABLET, FILM COATED ORAL EVERY 12 HOURS SCHEDULED
Status: DISCONTINUED | OUTPATIENT
Start: 2025-02-11 | End: 2025-02-12

## 2025-02-11 RX ORDER — INSULIN LISPRO 100 [IU]/ML
0-5 INJECTION, SOLUTION INTRAVENOUS; SUBCUTANEOUS
Status: DISCONTINUED | OUTPATIENT
Start: 2025-02-11 | End: 2025-02-11

## 2025-02-11 RX ORDER — DEXTROSE 50 % IN WATER (D50W) INTRAVENOUS SYRINGE
25
Status: DISCONTINUED | OUTPATIENT
Start: 2025-02-11 | End: 2025-02-21 | Stop reason: HOSPADM

## 2025-02-11 RX ORDER — FINASTERIDE 5 MG/1
5 TABLET, FILM COATED ORAL DAILY
Status: DISCONTINUED | OUTPATIENT
Start: 2025-02-11 | End: 2025-02-21 | Stop reason: HOSPADM

## 2025-02-11 RX ORDER — INSULIN GLARGINE 100 [IU]/ML
10 INJECTION, SOLUTION SUBCUTANEOUS 2 TIMES DAILY
Status: DISCONTINUED | OUTPATIENT
Start: 2025-02-11 | End: 2025-02-21 | Stop reason: HOSPADM

## 2025-02-11 RX ORDER — TAMSULOSIN HYDROCHLORIDE 0.4 MG/1
0.4 CAPSULE ORAL DAILY
Status: DISCONTINUED | OUTPATIENT
Start: 2025-02-11 | End: 2025-02-21 | Stop reason: HOSPADM

## 2025-02-11 RX ORDER — INSULIN LISPRO 100 [IU]/ML
4-6 INJECTION, SOLUTION INTRAVENOUS; SUBCUTANEOUS
COMMUNITY
End: 2025-02-21 | Stop reason: HOSPADM

## 2025-02-11 RX ORDER — PANTOPRAZOLE SODIUM 40 MG/1
40 TABLET, DELAYED RELEASE ORAL NIGHTLY
Status: DISCONTINUED | OUTPATIENT
Start: 2025-02-11 | End: 2025-02-21 | Stop reason: HOSPADM

## 2025-02-11 RX ORDER — TORSEMIDE 20 MG/1
80 TABLET ORAL DAILY
Status: DISCONTINUED | OUTPATIENT
Start: 2025-02-11 | End: 2025-02-21 | Stop reason: HOSPADM

## 2025-02-11 RX ORDER — BISOPROLOL FUMARATE 5 MG/1
5 TABLET, FILM COATED ORAL DAILY
Status: DISCONTINUED | OUTPATIENT
Start: 2025-02-11 | End: 2025-02-21 | Stop reason: HOSPADM

## 2025-02-11 RX ORDER — CHLORHEXIDINE GLUCONATE 40 MG/ML
1 SOLUTION TOPICAL DAILY PRN
COMMUNITY

## 2025-02-11 RX ORDER — DEXTROSE 50 % IN WATER (D50W) INTRAVENOUS SYRINGE
12.5
Status: DISCONTINUED | OUTPATIENT
Start: 2025-02-11 | End: 2025-02-21 | Stop reason: HOSPADM

## 2025-02-11 RX ORDER — FUROSEMIDE 10 MG/ML
100 INJECTION INTRAMUSCULAR; INTRAVENOUS ONCE
Status: COMPLETED | OUTPATIENT
Start: 2025-02-11 | End: 2025-02-11

## 2025-02-11 RX ORDER — CEFTRIAXONE 1 G/50ML
1 INJECTION, SOLUTION INTRAVENOUS EVERY 24 HOURS
Status: DISCONTINUED | OUTPATIENT
Start: 2025-02-11 | End: 2025-02-11

## 2025-02-11 RX ORDER — SPIRONOLACTONE 25 MG/1
12.5 TABLET ORAL
Status: DISCONTINUED | OUTPATIENT
Start: 2025-02-11 | End: 2025-02-21 | Stop reason: HOSPADM

## 2025-02-11 RX ORDER — INSULIN LISPRO 100 [IU]/ML
0-10 INJECTION, SOLUTION INTRAVENOUS; SUBCUTANEOUS EVERY 4 HOURS
Status: DISCONTINUED | OUTPATIENT
Start: 2025-02-11 | End: 2025-02-16

## 2025-02-11 RX ORDER — FINASTERIDE 5 MG/1
5 TABLET, FILM COATED ORAL DAILY
COMMUNITY

## 2025-02-11 RX ORDER — BENZONATATE 100 MG/1
100 CAPSULE ORAL 3 TIMES DAILY PRN
Status: DISCONTINUED | OUTPATIENT
Start: 2025-02-11 | End: 2025-02-11

## 2025-02-11 RX ADMIN — PANTOPRAZOLE SODIUM 40 MG: 40 TABLET, DELAYED RELEASE ORAL at 21:02

## 2025-02-11 RX ADMIN — INSULIN GLARGINE 10 UNITS: 100 INJECTION, SOLUTION SUBCUTANEOUS at 13:40

## 2025-02-11 RX ADMIN — AMOXICILLIN AND CLAVULANATE POTASSIUM 1 TABLET: 500; 125 TABLET, FILM COATED ORAL at 13:30

## 2025-02-11 RX ADMIN — INSULIN LISPRO 2 UNITS: 100 INJECTION, SOLUTION INTRAVENOUS; SUBCUTANEOUS at 21:38

## 2025-02-11 RX ADMIN — ACETAMINOPHEN 650 MG: 325 TABLET ORAL at 06:28

## 2025-02-11 RX ADMIN — TAMSULOSIN HYDROCHLORIDE 0.4 MG: 0.4 CAPSULE ORAL at 13:36

## 2025-02-11 RX ADMIN — APIXABAN 2.5 MG: 2.5 TABLET, FILM COATED ORAL at 21:02

## 2025-02-11 RX ADMIN — APIXABAN 2.5 MG: 2.5 TABLET, FILM COATED ORAL at 10:33

## 2025-02-11 RX ADMIN — INSULIN LISPRO 2 UNITS: 100 INJECTION, SOLUTION INTRAVENOUS; SUBCUTANEOUS at 13:27

## 2025-02-11 RX ADMIN — FINASTERIDE 5 MG: 5 TABLET, FILM COATED ORAL at 13:37

## 2025-02-11 RX ADMIN — ACETAMINOPHEN 975 MG: 325 TABLET ORAL at 00:16

## 2025-02-11 RX ADMIN — BISOPROLOL FUMARATE 5 MG: 5 TABLET ORAL at 13:37

## 2025-02-11 RX ADMIN — SPIRONOLACTONE 12.5 MG: 25 TABLET ORAL at 13:35

## 2025-02-11 RX ADMIN — FUROSEMIDE 100 MG: 10 INJECTION, SOLUTION INTRAMUSCULAR; INTRAVENOUS at 10:38

## 2025-02-11 RX ADMIN — APIXABAN 2.5 MG: 2.5 TABLET, FILM COATED ORAL at 02:04

## 2025-02-11 SDOH — SOCIAL STABILITY: SOCIAL INSECURITY: ARE YOU MARRIED, WIDOWED, DIVORCED, SEPARATED, NEVER MARRIED, OR LIVING WITH A PARTNER?: PATIENT DECLINED

## 2025-02-11 SDOH — HEALTH STABILITY: MENTAL HEALTH: HOW OFTEN DO YOU HAVE A DRINK CONTAINING ALCOHOL?: PATIENT DECLINED

## 2025-02-11 SDOH — ECONOMIC STABILITY: FOOD INSECURITY: HOW HARD IS IT FOR YOU TO PAY FOR THE VERY BASICS LIKE FOOD, HOUSING, MEDICAL CARE, AND HEATING?: NOT HARD AT ALL

## 2025-02-11 SDOH — SOCIAL STABILITY: SOCIAL NETWORK: HOW OFTEN DO YOU ATTEND CHURCH OR RELIGIOUS SERVICES?: PATIENT DECLINED

## 2025-02-11 SDOH — ECONOMIC STABILITY: FOOD INSECURITY: WITHIN THE PAST 12 MONTHS, THE FOOD YOU BOUGHT JUST DIDN'T LAST AND YOU DIDN'T HAVE MONEY TO GET MORE.: NEVER TRUE

## 2025-02-11 SDOH — SOCIAL STABILITY: SOCIAL INSECURITY: WITHIN THE LAST YEAR, HAVE YOU BEEN AFRAID OF YOUR PARTNER OR EX-PARTNER?: PATIENT DECLINED

## 2025-02-11 SDOH — HEALTH STABILITY: MENTAL HEALTH: HOW MANY DRINKS CONTAINING ALCOHOL DO YOU HAVE ON A TYPICAL DAY WHEN YOU ARE DRINKING?: PATIENT DECLINED

## 2025-02-11 SDOH — SOCIAL STABILITY: SOCIAL INSECURITY: DO YOU FEEL ANYONE HAS EXPLOITED OR TAKEN ADVANTAGE OF YOU FINANCIALLY OR OF YOUR PERSONAL PROPERTY?: UNABLE TO ASSESS

## 2025-02-11 SDOH — HEALTH STABILITY: MENTAL HEALTH: HOW OFTEN DO YOU HAVE SIX OR MORE DRINKS ON ONE OCCASION?: PATIENT DECLINED

## 2025-02-11 SDOH — SOCIAL STABILITY: SOCIAL INSECURITY
WITHIN THE LAST YEAR, HAVE YOU BEEN HUMILIATED OR EMOTIONALLY ABUSED IN OTHER WAYS BY YOUR PARTNER OR EX-PARTNER?: PATIENT DECLINED

## 2025-02-11 SDOH — SOCIAL STABILITY: SOCIAL INSECURITY: ABUSE: ADULT

## 2025-02-11 SDOH — HEALTH STABILITY: MENTAL HEALTH
DO YOU FEEL STRESS - TENSE, RESTLESS, NERVOUS, OR ANXIOUS, OR UNABLE TO SLEEP AT NIGHT BECAUSE YOUR MIND IS TROUBLED ALL THE TIME - THESE DAYS?: PATIENT DECLINED

## 2025-02-11 SDOH — SOCIAL STABILITY: SOCIAL INSECURITY
WITHIN THE LAST YEAR, HAVE YOU BEEN KICKED, HIT, SLAPPED, OR OTHERWISE PHYSICALLY HURT BY YOUR PARTNER OR EX-PARTNER?: PATIENT DECLINED

## 2025-02-11 SDOH — ECONOMIC STABILITY: HOUSING INSECURITY: IN THE PAST 12 MONTHS, HOW MANY TIMES HAVE YOU MOVED WHERE YOU WERE LIVING?: 1

## 2025-02-11 SDOH — SOCIAL STABILITY: SOCIAL NETWORK: HOW OFTEN DO YOU ATTEND MEETINGS OF THE CLUBS OR ORGANIZATIONS YOU BELONG TO?: PATIENT DECLINED

## 2025-02-11 SDOH — SOCIAL STABILITY: SOCIAL INSECURITY
WITHIN THE LAST YEAR, HAVE YOU BEEN RAPED OR FORCED TO HAVE ANY KIND OF SEXUAL ACTIVITY BY YOUR PARTNER OR EX-PARTNER?: PATIENT DECLINED

## 2025-02-11 SDOH — SOCIAL STABILITY: SOCIAL NETWORK: HOW OFTEN DO YOU GET TOGETHER WITH FRIENDS OR RELATIVES?: PATIENT DECLINED

## 2025-02-11 SDOH — HEALTH STABILITY: PHYSICAL HEALTH
ON AVERAGE, HOW MANY DAYS PER WEEK DO YOU ENGAGE IN MODERATE TO STRENUOUS EXERCISE (LIKE A BRISK WALK)?: PATIENT DECLINED

## 2025-02-11 SDOH — SOCIAL STABILITY: SOCIAL INSECURITY: WERE YOU ABLE TO COMPLETE ALL THE BEHAVIORAL HEALTH SCREENINGS?: NO

## 2025-02-11 SDOH — ECONOMIC STABILITY: TRANSPORTATION INSECURITY: IN THE PAST 12 MONTHS, HAS LACK OF TRANSPORTATION KEPT YOU FROM MEDICAL APPOINTMENTS OR FROM GETTING MEDICATIONS?: NO

## 2025-02-11 SDOH — ECONOMIC STABILITY: INCOME INSECURITY
IN THE PAST 12 MONTHS HAS THE ELECTRIC, GAS, OIL, OR WATER COMPANY THREATENED TO SHUT OFF SERVICES IN YOUR HOME?: PATIENT DECLINED

## 2025-02-11 SDOH — SOCIAL STABILITY: SOCIAL NETWORK: IN A TYPICAL WEEK, HOW MANY TIMES DO YOU TALK ON THE PHONE WITH FAMILY, FRIENDS, OR NEIGHBORS?: PATIENT DECLINED

## 2025-02-11 SDOH — HEALTH STABILITY: PHYSICAL HEALTH
HOW OFTEN DO YOU NEED TO HAVE SOMEONE HELP YOU WHEN YOU READ INSTRUCTIONS, PAMPHLETS, OR OTHER WRITTEN MATERIAL FROM YOUR DOCTOR OR PHARMACY?: PATIENT DECLINES TO RESPOND

## 2025-02-11 SDOH — ECONOMIC STABILITY: HOUSING INSECURITY: AT ANY TIME IN THE PAST 12 MONTHS, WERE YOU HOMELESS OR LIVING IN A SHELTER (INCLUDING NOW)?: NO

## 2025-02-11 SDOH — HEALTH STABILITY: MENTAL HEALTH: EXPERIENCED ANY OF THE FOLLOWING LIFE EVENTS: OTHER (COMMENT)

## 2025-02-11 SDOH — SOCIAL STABILITY: SOCIAL INSECURITY: HAS ANYONE EVER THREATENED TO HURT YOUR FAMILY OR YOUR PETS?: UNABLE TO ASSESS

## 2025-02-11 SDOH — SOCIAL STABILITY: SOCIAL INSECURITY: DOES ANYONE TRY TO KEEP YOU FROM HAVING/CONTACTING OTHER FRIENDS OR DOING THINGS OUTSIDE YOUR HOME?: UNABLE TO ASSESS

## 2025-02-11 SDOH — ECONOMIC STABILITY: HOUSING INSECURITY: IN THE LAST 12 MONTHS, WAS THERE A TIME WHEN YOU WERE NOT ABLE TO PAY THE MORTGAGE OR RENT ON TIME?: NO

## 2025-02-11 SDOH — ECONOMIC STABILITY: FOOD INSECURITY: WITHIN THE PAST 12 MONTHS, YOU WORRIED THAT YOUR FOOD WOULD RUN OUT BEFORE YOU GOT THE MONEY TO BUY MORE.: NEVER TRUE

## 2025-02-11 SDOH — SOCIAL STABILITY: SOCIAL NETWORK
DO YOU BELONG TO ANY CLUBS OR ORGANIZATIONS SUCH AS CHURCH GROUPS, UNIONS, FRATERNAL OR ATHLETIC GROUPS, OR SCHOOL GROUPS?: PATIENT DECLINED

## 2025-02-11 SDOH — SOCIAL STABILITY: SOCIAL INSECURITY: ARE THERE ANY APPARENT SIGNS OF INJURIES/BEHAVIORS THAT COULD BE RELATED TO ABUSE/NEGLECT?: UNABLE TO ASSESS

## 2025-02-11 SDOH — HEALTH STABILITY: PHYSICAL HEALTH: ON AVERAGE, HOW MANY MINUTES DO YOU ENGAGE IN EXERCISE AT THIS LEVEL?: PATIENT DECLINED

## 2025-02-11 SDOH — SOCIAL STABILITY: SOCIAL INSECURITY: POSSIBLE ABUSE REPORTED TO:: OTHER (COMMENT)

## 2025-02-11 SDOH — SOCIAL STABILITY: SOCIAL INSECURITY: ARE YOU OR HAVE YOU BEEN THREATENED OR ABUSED PHYSICALLY, EMOTIONALLY, OR SEXUALLY BY ANYONE?: UNABLE TO ASSESS

## 2025-02-11 SDOH — SOCIAL STABILITY: SOCIAL INSECURITY: DO YOU FEEL UNSAFE GOING BACK TO THE PLACE WHERE YOU ARE LIVING?: UNABLE TO ASSESS

## 2025-02-11 SDOH — SOCIAL STABILITY: SOCIAL INSECURITY: HAVE YOU HAD ANY THOUGHTS OF HARMING ANYONE ELSE?: UNABLE TO ASSESS

## 2025-02-11 SDOH — SOCIAL STABILITY: SOCIAL INSECURITY

## 2025-02-11 ASSESSMENT — ACTIVITIES OF DAILY LIVING (ADL)
DRESSING YOURSELF: NEEDS ASSISTANCE
FEEDING YOURSELF: NEEDS ASSISTANCE
HEARING - LEFT EAR: DIFFICULTY WITH NOISE
FEEDING YOURSELF: NEEDS ASSISTANCE
BATHING_ASSISTANCE: MAXIMAL
HEARING - RIGHT EAR: DIFFICULTY WITH NOISE
LACK_OF_TRANSPORTATION: NO
DRESSING YOURSELF: NEEDS ASSISTANCE
WALKS IN HOME: NEEDS ASSISTANCE
BATHING: NEEDS ASSISTANCE
JUDGMENT_ADEQUATE_SAFELY_COMPLETE_DAILY_ACTIVITIES: YES
ADEQUATE_TO_COMPLETE_ADL: NO
HEARING - LEFT EAR: DIFFICULTY WITH NOISE
HEARING - RIGHT EAR: DIFFICULTY WITH NOISE
GROOMING: NEEDS ASSISTANCE
TOILETING: NEEDS ASSISTANCE
ASSISTIVE_DEVICE: WALKER
ADEQUATE_TO_COMPLETE_ADL: NO
ASSISTIVE_DEVICE: WALKER
TOILETING: NEEDS ASSISTANCE
BATHING: NEEDS ASSISTANCE
PATIENT'S MEMORY ADEQUATE TO SAFELY COMPLETE DAILY ACTIVITIES?: YES
JUDGMENT_ADEQUATE_SAFELY_COMPLETE_DAILY_ACTIVITIES: YES
GROOMING: NEEDS ASSISTANCE
WALKS IN HOME: NEEDS ASSISTANCE
PATIENT'S MEMORY ADEQUATE TO SAFELY COMPLETE DAILY ACTIVITIES?: YES

## 2025-02-11 ASSESSMENT — COGNITIVE AND FUNCTIONAL STATUS - GENERAL
EATING MEALS: A LITTLE
STANDING UP FROM CHAIR USING ARMS: A LOT
MOVING TO AND FROM BED TO CHAIR: A LITTLE
HELP NEEDED FOR BATHING: A LOT
MOBILITY SCORE: 17
PERSONAL GROOMING: A LITTLE
DRESSING REGULAR LOWER BODY CLOTHING: A LITTLE
TOILETING: A LITTLE
DRESSING REGULAR UPPER BODY CLOTHING: A LITTLE
EATING MEALS: A LITTLE
TURNING FROM BACK TO SIDE WHILE IN FLAT BAD: A LITTLE
HELP NEEDED FOR BATHING: A LITTLE
DRESSING REGULAR UPPER BODY CLOTHING: A LOT
WALKING IN HOSPITAL ROOM: A LOT
WALKING IN HOSPITAL ROOM: A LOT
HELP NEEDED FOR BATHING: A LITTLE
WALKING IN HOSPITAL ROOM: A LOT
DRESSING REGULAR LOWER BODY CLOTHING: A LITTLE
TOILETING: A LITTLE
DRESSING REGULAR UPPER BODY CLOTHING: A LITTLE
DAILY ACTIVITIY SCORE: 18
PERSONAL GROOMING: A LITTLE
TURNING FROM BACK TO SIDE WHILE IN FLAT BAD: A LITTLE
MOVING TO AND FROM BED TO CHAIR: A LITTLE
HELP NEEDED FOR BATHING: A LITTLE
MOVING FROM LYING ON BACK TO SITTING ON SIDE OF FLAT BED WITH BEDRAILS: A LITTLE
TOILETING: A LITTLE
PERSONAL GROOMING: A LOT
MOVING TO AND FROM BED TO CHAIR: A LITTLE
DAILY ACTIVITIY SCORE: 13
PATIENT BASELINE BEDBOUND: NO
CLIMB 3 TO 5 STEPS WITH RAILING: TOTAL
EATING MEALS: A LITTLE
MOBILITY SCORE: 14
MOVING FROM LYING ON BACK TO SITTING ON SIDE OF FLAT BED WITH BEDRAILS: A LITTLE
TURNING FROM BACK TO SIDE WHILE IN FLAT BAD: A LOT
TURNING FROM BACK TO SIDE WHILE IN FLAT BAD: A LITTLE
EATING MEALS: A LITTLE
CLIMB 3 TO 5 STEPS WITH RAILING: A LOT
MOBILITY SCORE: 14
WALKING IN HOSPITAL ROOM: TOTAL
DRESSING REGULAR UPPER BODY CLOTHING: A LITTLE
MOBILITY SCORE: 9
DAILY ACTIVITIY SCORE: 18
STANDING UP FROM CHAIR USING ARMS: A LITTLE
STANDING UP FROM CHAIR USING ARMS: A LOT
PERSONAL GROOMING: A LITTLE
DRESSING REGULAR LOWER BODY CLOTHING: A LOT
MOVING FROM LYING ON BACK TO SITTING ON SIDE OF FLAT BED WITH BEDRAILS: A LOT
CLIMB 3 TO 5 STEPS WITH RAILING: TOTAL
DRESSING REGULAR LOWER BODY CLOTHING: A LITTLE
STANDING UP FROM CHAIR USING ARMS: A LOT
DAILY ACTIVITIY SCORE: 18
TOILETING: A LOT
CLIMB 3 TO 5 STEPS WITH RAILING: TOTAL
MOVING TO AND FROM BED TO CHAIR: TOTAL

## 2025-02-11 ASSESSMENT — PAIN SCALES - GENERAL
PAINLEVEL_OUTOF10: 0 - NO PAIN
PAINLEVEL_OUTOF10: 7
PAINLEVEL_OUTOF10: 8

## 2025-02-11 ASSESSMENT — LIFESTYLE VARIABLES
HOW OFTEN DO YOU HAVE A DRINK CONTAINING ALCOHOL: PATIENT DECLINED
HOW OFTEN DO YOU HAVE 6 OR MORE DRINKS ON ONE OCCASION: PATIENT DECLINED
AUDIT-C TOTAL SCORE: -1
HOW MANY STANDARD DRINKS CONTAINING ALCOHOL DO YOU HAVE ON A TYPICAL DAY: PATIENT DECLINED
AUDIT-C TOTAL SCORE: -1
AUDIT-C TOTAL SCORE: -1
SKIP TO QUESTIONS 9-10: 0
SKIP TO QUESTIONS 9-10: 0

## 2025-02-11 ASSESSMENT — PAIN SCALES - WONG BAKER
WONGBAKER_NUMERICALRESPONSE: HURTS LITTLE MORE
WONGBAKER_NUMERICALRESPONSE: HURTS LITTLE MORE

## 2025-02-11 ASSESSMENT — ENCOUNTER SYMPTOMS
HEMATURIA: 0
DYSURIA: 1
DIARRHEA: 0
ABDOMINAL PAIN: 0
FREQUENCY: 1
DIFFICULTY URINATING: 1
CHILLS: 1
FEVER: 0
CONSTIPATION: 1
APPETITE CHANGE: 1
VOMITING: 0

## 2025-02-11 ASSESSMENT — PAIN DESCRIPTION - LOCATION
LOCATION: LEG
LOCATION: BACK

## 2025-02-11 ASSESSMENT — PAIN - FUNCTIONAL ASSESSMENT
PAIN_FUNCTIONAL_ASSESSMENT: 0-10

## 2025-02-11 ASSESSMENT — PAIN SCALES - PAIN ASSESSMENT IN ADVANCED DEMENTIA (PAINAD): BREATHING: NORMAL

## 2025-02-11 NOTE — PROGRESS NOTES
Placed a call to patient's son to discuss skilled care per patient request. Left a message, awaiting return call.

## 2025-02-11 NOTE — PROGRESS NOTES
"Occupational Therapy    Occupational Therapy    Evaluation    Patient Name: Elgin Marin  MRN: [id number]  Today's Date: 2/11/2025  Time Calculation  Start Time: 1033  Stop Time: 1049  Time Calculation (min): 16 min  3102/3102-A    Assessment  IP OT Assessment  OT Assessment: Pt pleasant and cooperative. Pt appears with some confusion at times. Pt upset he has not been allowed to eat (was NPO for possible cardiac procedure). Pt also upset with multiple co morbidities, and wants \"this all to stop\", and to \"just die\". Pt requires increased assist with ADL' and mobility. Recommend SNF to increase safety and independence with ADL's  Prognosis: Good  End of Session Communication: Bedside nurse  End of Session Patient Position: Alarm on (sitting EOB)    Plan:  Treatment Interventions: ADL retraining, Functional transfer training, UE strengthening/ROM, Endurance training, Neuromuscular reeducation  OT Frequency: 3 times per week  OT Discharge Recommendations: Moderate intensity level of continued care (SNF)  Equipment Recommended upon Discharge: Wheeled walker  OT Recommended Transfer Status: Maximum assist, Assist of 2  OT - OK to Discharge: Yes (to next level of care)    Subjective     Current Problem:  1. COPD exacerbation (Multi)        2. Acute urinary retention        3. Generalized weakness            General:  General  Reason for Referral: ADL's;  Referred By: Eun Lovell  Co-Treatment: PT  Co-Treatment Reason: safety  Prior to Session Communication: Bedside nurse  Patient Position Received: Alarm on (sitting EOB; RN in room)    Per EMR: pt presented with shortness of breath, lower abdominal pain and dysuria. He stated that the shortness of breath has been going for the past few weeks. Patient stated that the shortness of breath is worse with exertion. He endorses dysuria and difficulty of urinating for the past few days. He also stated he has difficulty initiating streams and feels like he is not emptying his " "bladder completely. He also endorses lower abdominal pain. Otherwise he denies fever, headache, chest pain, and cough. He denies nausea, vomiting, diarrhea.   Of note patient was admitted on 11/15/2024-11/21/2024 for acute on chronic combined systolic and diastolic heart failure, chronic troponinemia and possible  pneumonia.   He was treated with breathing treatments, steroids, IV antibiotics and IV diuresis then he was discharged to SNF after he was transitioned to p.o. diuretics and completing the antibiotics. Cardiology was consulted while he was in the hospital and recommended not to treat him with MRA, ACEI/ARB/ARNI given his renal insufficiency.    Precautions:  Medical Precautions: Fall precautions  Precautions Comment: bed/chair alarm    Pain:  Pain Assessment  Pain Assessment: 0-10  0-10 (Numeric) Pain Score:  (pt reports 10/10 BLE pain and 10/10 back pain; does not appear to correlate with presentation)    Objective     Cognition:  Overall Cognitive Status: Within Functional Limits  Orientation Level: Oriented X4    Home Living/PLOF:  Difficult to obtain entire home set up/PLOF 2/2 pt going off on tangents.  Pt lives in house with wife and son.  States he has 13 RIOS with rail but states he and his wife do not leave the house because of this.  He states his wife is bed/WC bound and his son is her caregiver.  Has tub shower but states he only sponge bathes because he cannot get in/out.  Ambulates using rollator.  Per pt he was discharged from SNF at the end of January after being able to \"walk 95 ft multiple times\" but also states that insurance cut him off.  Unsure pt's level of mobility at home prior to coming into hospital 2/2 pt being fixated on talking about other issues during session.     ADL:  Eating Assistance: Stand by (setup)  Grooming Assistance: Minimal  Bathing Assistance: Maximal  UE Dressing Assistance: Moderate  LE Dressing Assistance: Maximal  Toileting Assistance with Device: " Maximal    Activity Tolerance:  Endurance: Decreased tolerance for upright activites    Functional Mobility:  Bed mobility  NT 2/2 sitting EOB on arrival      Transfers  Sit to stand: Max A x2; able to stand at walker for ~8-10 seconds with B knee flexion; unable to reach fully erect posture despite Vcs; decreased tolerance for standing     Stand to sit: Max A x2; Vcs for hand placement with poor follow through; poor eccentric control     Unable to attempt transfer to chair 2/2 decreased endurance/activity tolerance      Ambulation/Stairs  Unable to attempt ambulation at this time    Sitting Balance:  Static Sitting Balance  Static Sitting-Comment/Number of Minutes: good  Dynamic Sitting Balance  Dynamic Sitting-Comments: fair+    Standing Balance:  Static Standing Balance  Static Standing-Comment/Number of Minutes: poor  Dynamic Standing Balance  Dynamic Standing-Comments: poor (not able to come to a complete stand)    Strength:  Strength Comments: decreased-limited AROM    Extremities: RUE   RUE : Exceptions to WFL (AROM shoulder flexion about 90 degrees) and LUE   LUE:  (AROM shoulder flexion about 45 degrees)    Outcome Measures: [facility] Daily Activity  Putting on and taking off regular lower body clothing: A lot  Bathing (including washing, rinsing, drying): A lot  Putting on and taking off regular upper body clothing: A lot  Toileting, which includes using toilet, bedpan or urinal: A lot  Taking care of personal grooming such as brushing teeth: A lot  Eating Meals: A little  Daily Activity - Total Score: 13                       EDUCATION:  Education  Individual(s) Educated: Patient  Education Provided:  (safety)  Education Documentation  Body Mechanics, taught by Donna Veras OT at 2/11/2025  4:02 PM.  Learner: Patient  Readiness: Acceptance  Method: Explanation  Response: Verbalizes Understanding    Precautions, taught by Donna Veras OT at 2/11/2025  4:02 PM.  Learner: Patient  Readiness:  Acceptance  Method: Explanation  Response: Verbalizes Understanding    Education Comments  No comments found.        Goals:   Encounter Problems       Encounter Problems (Active)       OT Goals       OT Goal 1 (Progressing)       Start:  02/11/25    Expected End:  02/25/25       Pt will complete all bed mobility with SBA safely            OT Goal 2 (Progressing)       Start:  02/11/25    Expected End:  02/25/25       Pt will complete ADL's and mobility with fair stand balance            OT Goal 3 (Progressing)       Start:  02/11/25    Expected End:  02/25/25       Pt with complete all transfers safely with Min A           OT Goal 4 (Progressing)       Start:  02/11/25    Expected End:  02/25/25       Pt will complete UB dressing ADL's with CGA using adaptive device(s) as needed                Treatment: Pt was sitting EOB upon therapy entering room. Pt with good sit balance on EOB. Pt completed all transfers with CGA. Pt with difficulty to attempt standing. With Max A of 2, pt was not able to come to a complete stand. Pt was not appropriate for OOB at this time. Pt returned to EOB, and remained there with bed alarm on and call light within reach.

## 2025-02-11 NOTE — HOSPITAL COURSE
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 He stated that the shortness of breath has been going for the past few weeks. Patient stated that the shortness of breath is worse with exertion. He endorses dysuria and difficulty of urinating for the past few days. He also stated he has difficulty initiating streams and feels like he is not emptying his bladder completely. He also endorses lower abdominal pain. Otherwise he denies fever, headache, chest pain, and cough. He denies nausea, vomiting, diarrhea.   Of note patient was admitted on 11/15/2024-11/21/2024 for acute on chronic combined systolic and diastolic heart failure, chronic troponinemia and possible COPD exacerbation with pneumonia.  He was treated with breathing treatments, steroids, IV antibiotics and IV diuresis then he was discharged to SNF after he was transitioned to p.o. diuretics and completing the antibiotics.

## 2025-02-11 NOTE — CARE PLAN
The patient's goals for the shift include GET SOME REST    The clinical goals for the shift include URINE WILL CLEAR UP    Problem: Skin  Goal: Decreased wound size/increased tissue granulation at next dressing change  2/11/2025 0536 by Jena Amaro RN  Outcome: Progressing  Flowsheets (Taken 2/11/2025 0536)  Decreased wound size/increased tissue granulation at next dressing change:   Promote sleep for wound healing   Protective dressings over bony prominences  2/11/2025 0532 by Jena Amaro RN  Outcome: Progressing  Goal: Participates in plan/prevention/treatment measures  2/11/2025 0536 by Jena Amaro RN  Outcome: Progressing  2/11/2025 0532 by Jena Amaro RN  Outcome: Progressing  Goal: Prevent/manage excess moisture  Outcome: Progressing  Goal: Prevent/minimize sheer/friction injuries  Outcome: Progressing  Goal: Promote/optimize nutrition  Outcome: Progressing  Goal: Promote skin healing  Outcome: Progressing     Problem: Respiratory  Goal: Clear secretions with interventions this shift  Outcome: Progressing  Goal: Minimize anxiety/maximize coping throughout shift  Outcome: Progressing  Goal: Minimal/no exertional discomfort or dyspnea this shift  Outcome: Progressing  Goal: No signs of respiratory distress (eg. Use of accessory muscles. Peds grunting)  Outcome: Progressing  Goal: Patent airway maintained this shift  Outcome: Progressing  Goal: Tolerate mechanical ventilation evidenced by VS/agitation level this shift  Outcome: Progressing  Goal: Tolerate pulmonary toileting this shift  Outcome: Progressing  Goal: Verbalize decreased shortness of breath this shift  Outcome: Progressing  Goal: Wean oxygen to maintain O2 saturation per order/standard this shift  Outcome: Progressing  Goal: Increase self care and/or family involvement in next 24 hours  Outcome: Progressing     Problem: Fall/Injury  Goal: Not fall by end of shift  Outcome: Progressing  Goal: Be free from injury by end of the shift  Outcome:  Progressing  Goal: Verbalize understanding of personal risk factors for fall in the hospital  Outcome: Progressing  Goal: Verbalize understanding of risk factor reduction measures to prevent injury from fall in the home  Outcome: Progressing  Goal: Use assistive devices by end of the shift  Outcome: Progressing  Goal: Pace activities to prevent fatigue by end of the shift  Outcome: Progressing     Problem: Pain - Adult  Goal: Verbalizes/displays adequate comfort level or baseline comfort level  Outcome: Progressing     Problem: Safety - Adult  Goal: Free from fall injury  Outcome: Progressing     Problem: Discharge Planning  Goal: Discharge to home or other facility with appropriate resources  Outcome: Progressing

## 2025-02-11 NOTE — ASSESSMENT & PLAN NOTE
# Acute urine retention  # UTI   - He presented with lower abdominal pain, difficulty urinating and dysuria for the past few days  - UA is suggestive of UTI  - He was recently treated with antibiotics for UTI.   - S/P minaya catheter placement in the ED  - Patient stated that in the past minaya catheter was placed for urine retention but it was removed as the minaya catheter was causing him pain  - CT abd/pel in 12/19/24 didn't show any hydronephrosis but showed Circumferential wall thickening of the urinary bladder with tiny amount of intraluminal gas.    Plan  - Admit to the floor  - Continue ceftriaxone  - Monitor vital signs  - Urine culture pending    # Shortness of breath  # chronic combined systolic and diastolic heart failure  # Troponinemia  # CAD status post CABG ( April 2022)  # Afib on eliquis  - Patient has shortness of breath for the past few months  - Patient has chronically elevated troponin, troponin on admission 190->211->214  - TTE on 11/16/2024 showed LVEF of 42%  - BNP on admission elevated at 969 ( 941 on 12/19/24).  - trend troponin and follow for any chest pain  - EKG as needed      # Mild hyponatremia  # CKD  - Baseline creatinine was around 2.0 with admission Cr of 2.13  - Admission Na 134  - Will continue monitoring the RFP.  - Monitor input/output  - Avoid nephrotoxic medications    # Normocytic Anemia   - Baseline Hb was around 10.3-11.4, Hb on admission 12.1.  - Could be multifactorial, anemia of CKD and chronic illness  - outpatient work-up for anemia

## 2025-02-11 NOTE — PROGRESS NOTES
Spoke to patient to explain my role in discharge planning. Patient states he lives at home and to call his son, Duane, if I need anything. Attempt to call Duane, no answer. Left message for him to call back.

## 2025-02-11 NOTE — NURSING NOTE
PT. SLEPT AT SHORT INTERVALS. WAS MEDICATED W/ TYLENOL X 2 DOSE THIS SHIFT, 975MG AROUND 0100 AND AT 0630 TYKENOL 650MG FOR C/O BACK PAIN. BILA LEG DSG. MAINTAINED. BED ALARM ON.

## 2025-02-11 NOTE — CONSULTS
Wound Care Consult     Visit Date: 2/11/2025      Patient Name: Elgin Marin         MRN: [id number]           YOB: 1940     Reason for Consult: Coccyx and bilateral lower legs        Wound History: Non pressure injury, blisters, edema      Pertinent Labs:   Albumin   Date Value Ref Range Status   02/11/2025 2.8 (L) 3.4 - 5.0 g/dL Final       Wound Assessment:  Wound 02/11/25 Pretibial Right (Active)   Wound Image   02/11/25 0111       Wound Dorsal foot;Right (Active)   Wound Image   02/11/25 0113       Wound 02/11/25 Dorsal foot;Left (Active)   Wound Image   02/11/25 0114       Wound Tibial Dorsal;Left (Active)   Wound Image   02/11/25 0115       Wound 02/11/25 Coccyx (Active)   Wound Image   02/11/25 0116       Wound Team Summary Assessment: Received consult to see patient for multiple wounds. Chart and pictures reviewed. Coccyx and bilateral buttocks pink and tissue blanches, no open areas noted. Dressings removed from BLEs. RLE anterior aspect wound with red wound bed, measures approximately 6 cm x 6 cm x 0.1 cm,  no drainage noted, wound edges well defined. Left lower leg posterior aspect with multiple dry stable scabbed areas, no drainage noted. Nonpitting edema noted in BLEs. Left dorsal foot noted to have what appears to be a deflated blister with white intact tissue with red tissue underneath. Wound edges well-defined, surrounding tissue red as well as all toes area red, with 3rd tie noted to have an intact purple area. Right foot with all toes noted to have edema and red, 3rd and 4th with purple areas with serosanguinous drainage. All toes wit poorly defined toe nails. Pedal pulses faintly palpable , + with Doppler. Dressings reapplied.      Wound Team Plan: Recommend Podiatry consult. Recommend daily dressing changes of Xeroform, dry dressing, Kerlix and ace wraps. Will follow.      Peyton Schwab RN  2/11/2025  12:49 PM

## 2025-02-11 NOTE — ED PROVIDER NOTES
EMERGENCY DEPARTMENT ENCOUNTER      Pt Name: Elgin Marin  MRN: [id number]  Birthdate 1940  Date of evaluation: 2/10/2025  Provider: Meir James MD    CHIEF COMPLAINT       Chief Complaint   Patient presents with    Shortness of Breath      Pt from home has had fever and flu-like s/s x2 weeks; was at SNF w/kidney/bladder infection; pt is audibly wheezy. Pt has had hallucinations/inc/ confusion as well. Pt is feeling weak.    Flu Symptoms         HISTORY OF PRESENT ILLNESS    HPI  Patient is an 84-year-old male with a history of A-fib on Eliquis, CAD, HTN, HLD presenting with shortness of breath and difficulty pain.  Shortness of breath is been ongoing for the past few weeks.  It is worse with exertion.  It is not worse when lying flat.  He has had difficulty urinating for the past few days.  There is burning with urination but not blood.  He has difficulty initiating a stream and feels like he is not emptying his bladder completely.  He denies abdominal pain, nausea, vomiting, diarrhea, constipation, dark or bloody stools, chest pain, fever, sweats, chills.    Nursing Notes were reviewed.    PAST MEDICAL HISTORY     Past Medical History:   Diagnosis Date    Hyperlipidemia, unspecified 11/14/2022    Dyslipidemia    Other forms of dyspnea 08/09/2018    Dyspnea on exertion    Personal history of other diseases of the circulatory system 12/08/2014    History of hypertension    Personal history of other endocrine, nutritional and metabolic disease     History of diabetes mellitus    Personal history of other specified conditions     History of syncope    Presence of intraocular lens 07/20/2019    Pseudophakia of right eye    Presence of intraocular lens 07/20/2019    Pseudophakia of left eye    Unspecified atrial fibrillation (Multi) 11/14/2022    Atrial fibrillation         SURGICAL HISTORY       Past Surgical History:   Procedure Laterality Date    CATARACT EXTRACTION      CHOLECYSTECTOMY  09/09/2015     Cholecystectomy    CORNEAL TRANSPLANT      CORONARY ANGIOPLASTY WITH STENT PLACEMENT  10/06/2015    Cath Stent Placement    CORONARY ARTERY BYPASS GRAFT  10/06/2015    CABG    OTHER SURGICAL HISTORY  09/09/2015    Wrist Carpectomy    ROTATOR CUFF REPAIR  09/09/2015    Rotator Cuff Repair         CURRENT MEDICATIONS       Current Discharge Medication List        CONTINUE these medications which have NOT CHANGED    Details   !! acetaminophen (Tylenol) 325 mg tablet Take 2 tablets (650 mg) by mouth every 4 hours if needed for fever (temp greater than 38.0 C).      !! acetaminophen (Tylenol) 500 mg tablet Take 2 tablets (1,000 mg) by mouth every 12 hours if needed for mild pain (1 - 3) or moderate pain (4 - 6).      !! acetaminophen (Tylenol) 500 mg tablet Take 2 tablets (1,000 mg) by mouth 3 times a day.      alum-mag hydroxide-simeth (Mylanta) 200-200-20 mg/5 mL oral suspension Take 20 mL by mouth every 4 hours if needed for indigestion or heartburn.      apixaban (Eliquis) 2.5 mg tablet Take 1 tablet (2.5 mg) by mouth 2 times a day.  Qty: 60 tablet, Refills: 3    Comments: Patient needs follow up apt with Dr. Amador  Associated Diagnoses: Atrial fibrillation, unspecified type (Multi)      bisacodyl (Dulcolax) 10 mg suppository Insert 1 suppository (10 mg) into the rectum 1 time if needed for constipation.      bisoprolol (Zebeta) 5 mg tablet Take 1 tablet (5 mg) by mouth once daily.      cholecalciferol (Vitamin D-3) 50 MCG (2000 UT) tablet Take 1 tablet (50 mcg) by mouth once daily.      docusate sodium (Colace) 100 mg capsule Take 1 capsule (100 mg) by mouth 2 times a day.      empagliflozin (Jardiance) 25 mg Take 1 tablet (25 mg) by mouth once daily.      guaiFENesin (Robitussin) 100 mg/5 mL syrup Take 15 mL by mouth every 6 hours if needed for cough.      hydrALAZINE (Apresoline) 10 mg tablet Take 1 tablet (10 mg) by mouth 2 times a day. Hold for SBP <110  Qty: 60 tablet, Refills: 0    Associated Diagnoses:  Essential hypertension, benign      hydrocortisone 1 % cream Apply 1 Application topically 2 times a day.      insulin glargine (Lantus U-100 Insulin) 100 unit/mL injection Inject 10 Units under the skin once daily in the morning.  Qty: 3 mL, Refills: 0    Associated Diagnoses: Type 2 diabetes mellitus with stage 3 chronic kidney disease, with long-term current use of insulin, unspecified whether stage 3a or 3b CKD (Multi)      insulin lispro 100 unit/mL injection Inject 0-10 Units under the skin 4 times a day before meals. Take as directed per insulin instructions.    Associated Diagnoses: Type 2 diabetes mellitus without complication, with long-term current use of insulin (Multi)      !! ipratropium-albuteroL (Duo-Neb) 0.5-2.5 mg/3 mL nebulizer solution Take 3 mL by nebulization 3 times a day.  Qty: 300 mL, Refills: 0    Associated Diagnoses: Dyspnea on exertion      !! ipratropium-albuteroL (Duo-Neb) 0.5-2.5 mg/3 mL nebulizer solution Take 3 mL by nebulization every 2 hours if needed for shortness of breath.    Associated Diagnoses: Chronic obstructive pulmonary disease, unspecified COPD type (Multi)      isosorbide dinitrate (Isordil) 10 mg tablet Take 1 tablet (10 mg) by mouth 2 times a day. Hold for SBP <110 Do not fill before November 22, 2024.  Qty: 60 tablet, Refills: 0    Associated Diagnoses: Chronic combined systolic and diastolic heart failure      lidocaine (AsperFlex, lidocaine,) 4 % patch Place 1 patch on the skin once daily. Remove & discard patch within 12 hours or as directed by MD.      magnesium hydroxide (Milk of Magnesia) 400 mg/5 mL suspension Take 30 mL by mouth once daily as needed for constipation.      mineral oil-hydrophilic petrolatum (Aquaphor) ointment Apply 1 Application topically once daily.      NIFEdipine ER (Adalat CC) 30 mg 24 hr tablet Take 1 tablet (30 mg) by mouth once daily in the morning. Take before meals. Do not crush, chew, or split. Hold for SBP <110    Associated  "Diagnoses: Essential hypertension, benign      oxygen (O2) gas therapy Inhale 1 each continuously.    Associated Diagnoses: Shortness of breath      pantoprazole (ProtoNix) 40 mg EC tablet Take 1 tablet (40 mg) by mouth once daily at bedtime.      pen needle, diabetic 31 gauge x 5/16\" needle Use to inject insulin 1 to 4 times daily as directed  Qty: 100 each, Refills: 1    Comments: Patient being changed to insulin pens.  Discussed with the provider.  Associated Diagnoses: Hyperglycemia      rosuvastatin (Crestor) 20 mg tablet Take 1 tablet (20 mg) by mouth once daily at bedtime.      sodium chloride (Ocean) 0.65 % nasal spray Administer 1 spray into each nostril 2 times a day.      spironolactone (Aldactone) 25 mg tablet Take 0.5 tablets (12.5 mg) by mouth once every 24 hours.  Qty: 15 tablet, Refills: 0    Associated Diagnoses: Chronic combined systolic and diastolic heart failure      tamsulosin (Flomax) 0.4 mg 24 hr capsule Take 1 capsule (0.4 mg) by mouth once daily.  Qty: 30 capsule, Refills: 0    Associated Diagnoses: Chronic combined systolic and diastolic congestive heart failure; Urinary retention due to benign prostatic hyperplasia; Urinary catheter in place      terbinafine (LamISIL) 1 % cream Apply 1 Application topically 2 times a day. End date:2/21/25      torsemide 40 mg tablet Take 80 mg by mouth once daily.  Qty: 60 tablet, Refills: 0    Associated Diagnoses: Chronic combined systolic and diastolic heart failure      white petrolatum (Aquaphor) 41 % ointment ointment Apply 1 Application topically once daily.  Qty: 7 g, Refills: 0    Associated Diagnoses: Dry skin       !! - Potential duplicate medications found. Please discuss with provider.          ALLERGIES     Metoprolol, Oxycodone-aspirin, Sulfa (sulfonamide antibiotics), Sulfur, Tramadol, Lisinopril, and Warfarin    FAMILY HISTORY     No family history on file.       SOCIAL HISTORY       Social History     Socioeconomic History    Marital " status:    Tobacco Use    Smoking status: Never    Smokeless tobacco: Never   Substance and Sexual Activity    Alcohol use: Never    Drug use: Never     Social Drivers of Health     Financial Resource Strain: Low Risk  (2/11/2025)    Overall Financial Resource Strain (CARDIA)     Difficulty of Paying Living Expenses: Not hard at all   Food Insecurity: No Food Insecurity (2/11/2025)    Hunger Vital Sign     Worried About Running Out of Food in the Last Year: Never true     Ran Out of Food in the Last Year: Never true   Transportation Needs: No Transportation Needs (2/11/2025)    PRAPARE - Transportation     Lack of Transportation (Medical): No     Lack of Transportation (Non-Medical): No   Physical Activity: Patient Declined (2/11/2025)    Exercise Vital Sign     Days of Exercise per Week: Patient declined     Minutes of Exercise per Session: Patient declined   Stress: Patient Declined (2/11/2025)    [employer] - Occupational Stress Questionnaire     Feeling of Stress : Patient declined   Social Connections: Patient Declined (2/11/2025)    Social Connection and Isolation Panel [NHANES]     Frequency of Communication with Friends and Family: Patient declined     Frequency of Social Gatherings with Friends and Family: Patient declined     Attends Zoroastrian Services: Patient declined     Active Member of Clubs or Organizations: Patient declined     Attends Club or Organization Meetings: Patient declined     Marital Status: Patient declined   Intimate Partner Violence: Patient Declined (2/11/2025)    Humiliation, Afraid, Rape, and Kick questionnaire     Fear of Current or Ex-Partner: Patient declined     Emotionally Abused: Patient declined     Physically Abused: Patient declined     Sexually Abused: Patient declined   Housing Stability: Low Risk  (2/11/2025)    Housing Stability Vital Sign     Unable to Pay for Housing in the Last Year: No     Number of Times Moved in the Last Year:  1     Homeless in the Last Year: No       SCREENINGS                        PHYSICAL EXAM    (up to 7 for level 4, 8 or more for level 5)     ED Triage Vitals [02/10/25 2039]   Temperature Heart Rate Respirations BP   36.6 °C (97.9 °F) 83 (!) 35 117/58      Pulse Ox Temp Source Heart Rate Source Patient Position   96 % Temporal Monitor --      BP Location FiO2 (%)     -- --       Physical Exam  Constitutional:       General: He is not in acute distress.     Appearance: He is not toxic-appearing.   HENT:      Head: Normocephalic and atraumatic.      Mouth/Throat:      Mouth: Mucous membranes are moist.      Pharynx: Oropharynx is clear.   Eyes:      Extraocular Movements: Extraocular movements intact.      Pupils: Pupils are equal, round, and reactive to light.   Cardiovascular:      Rate and Rhythm: Normal rate and regular rhythm.   Pulmonary:      Effort: Pulmonary effort is normal.      Comments: Quiet expiratory wheezes bilaterally  Abdominal:      Palpations: Abdomen is soft.      Tenderness: There is no abdominal tenderness.   Musculoskeletal:      Cervical back: Normal range of motion and neck supple.      Right lower leg: No tenderness. No edema.      Left lower leg: No tenderness. No edema.   Skin:     General: Skin is warm and dry.      Capillary Refill: Capillary refill takes less than 2 seconds.   Neurological:      General: No focal deficit present.          DIAGNOSTIC RESULTS     LABS:  Labs Reviewed   CBC WITH AUTO DIFFERENTIAL - Abnormal       Result Value    WBC 6.9      nRBC 0.0      RBC 4.65      Hemoglobin 12.1 (*)     Hematocrit 41.2      MCV 89      MCH 26.0      MCHC 29.4 (*)     RDW 14.9 (*)     Platelets 163      Neutrophils % 71.6      Immature Granulocytes %, Automated 0.3      Lymphocytes % 10.4      Monocytes % 16.4      Eosinophils % 0.6      Basophils % 0.7      Neutrophils Absolute 4.91      Immature Granulocytes Absolute, Automated 0.02      Lymphocytes Absolute 0.71 (*)     Monocytes  Absolute 1.12 (*)     Eosinophils Absolute 0.04      Basophils Absolute 0.05     COMPREHENSIVE METABOLIC PANEL - Abnormal    Glucose 101 (*)     Sodium 134 (*)     Potassium 4.8      Chloride 102      Bicarbonate 23      Anion Gap 14      Urea Nitrogen 49 (*)     Creatinine 2.13 (*)     eGFR 30 (*)     Calcium 8.5 (*)     Albumin 3.1 (*)     Alkaline Phosphatase 84      Total Protein 6.1 (*)     AST 18      Bilirubin, Total 1.0      ALT 7 (*)    SERIAL TROPONIN-INITIAL - Abnormal    Troponin I, High Sensitivity 190 (*)     Narrative:     Less than 99th percentile of normal range cutoff-  Female and children under 18 years old <14 ng/L; Male <21 ng/L: Negative  Repeat testing should be performed if clinically indicated.     Female and children under 18 years old 14-50 ng/L; Male 21-50 ng/L:  Consistent with possible cardiac damage and possible increased clinical   risk. Serial measurements may help to assess extent of myocardial damage.     >50 ng/L: Consistent with cardiac damage, increased clinical risk and  myocardial infarction. Serial measurements may help assess extent of   myocardial damage.      NOTE: Children less than 1 year old may have higher baseline troponin   levels and results should be interpreted in conjunction with the overall   clinical context.     NOTE: Troponin I testing is performed using a different   testing methodology at [facility] than at other   [facility]. Direct result comparisons should only   be made within the same method.   SERIAL TROPONIN, 1 HOUR - Abnormal    Troponin I, High Sensitivity 211 (*)     Narrative:     Less than 99th percentile of normal range cutoff-  Female and children under 18 years old <14 ng/L; Male <21 ng/L: Negative  Repeat testing should be performed if clinically indicated.     Female and children under 18 years old 14-50 ng/L; Male 21-50 ng/L:  Consistent with possible cardiac damage and possible increased clinical   risk. Serial  measurements may help to assess extent of myocardial damage.     >50 ng/L: Consistent with cardiac damage, increased clinical risk and  myocardial infarction. Serial measurements may help assess extent of   myocardial damage.      NOTE: Children less than 1 year old may have higher baseline troponin   levels and results should be interpreted in conjunction with the overall   clinical context.     NOTE: Troponin I testing is performed using a different   testing methodology at [facility] than at other   [facility]. Direct result comparisons should only   be made within the same method.   B-TYPE NATRIURETIC PEPTIDE - Abnormal     (*)     Narrative:        <100 pg/mL - Heart failure unlikely  100-299 pg/mL - Intermediate probability of acute heart                  failure exacerbation. Correlate with clinical                  context and patient history.    >=300 pg/mL - Heart Failure likely. Correlate with clinical                  context and patient history.    BNP testing is performed using different testing methodology at [facility] than at other [facility]. Direct result comparisons should only be made within the same method.      URINALYSIS WITH REFLEX CULTURE AND MICROSCOPIC - Abnormal    Color, Urine Yellow      Appearance, Urine Clear      Specific Gravity, Urine 1.011      pH, Urine 5.0      Protein, Urine NEGATIVE      Glucose, Urine 500 (3+) (*)     Blood, Urine 0.03 (TRACE) (*)     Ketones, Urine NEGATIVE      Bilirubin, Urine NEGATIVE      Urobilinogen, Urine Normal      Nitrite, Urine NEGATIVE      Leukocyte Esterase, Urine 250 Allie/uL (*)    MICROSCOPIC ONLY, URINE - Abnormal    WBC, Urine >50 (*)     WBC Clumps, Urine RARE      RBC, Urine 1-2      Mucus, Urine FEW      Hyaline Casts, Urine OCCASIONAL (*)    CBC WITH AUTO DIFFERENTIAL - Abnormal    WBC 5.9      nRBC 0.0      RBC 4.39 (*)     Hemoglobin 11.3 (*)     Hematocrit 39.0 (*)     MCV 89      MCH  25.7 (*)     MCHC 29.0 (*)     RDW 14.9 (*)     Platelets 135 (*)     Neutrophils % 72.3      Immature Granulocytes %, Automated 0.7      Lymphocytes % 8.7      Monocytes % 17.0      Eosinophils % 0.5      Basophils % 0.8      Neutrophils Absolute 4.26      Immature Granulocytes Absolute, Automated 0.04      Lymphocytes Absolute 0.51 (*)     Monocytes Absolute 1.00 (*)     Eosinophils Absolute 0.03      Basophils Absolute 0.05     TROPONIN I, HIGH SENSITIVITY - Abnormal    Troponin I, High Sensitivity 214 (*)     Narrative:     Less than 99th percentile of normal range cutoff-  Female and children under 18 years old <14 ng/L; Male <21 ng/L: Negative  Repeat testing should be performed if clinically indicated.     Female and children under 18 years old 14-50 ng/L; Male 21-50 ng/L:  Consistent with possible cardiac damage and possible increased clinical   risk. Serial measurements may help to assess extent of myocardial damage.     >50 ng/L: Consistent with cardiac damage, increased clinical risk and  myocardial infarction. Serial measurements may help assess extent of   myocardial damage.      NOTE: Children less than 1 year old may have higher baseline troponin   levels and results should be interpreted in conjunction with the overall   clinical context.     NOTE: Troponin I testing is performed using a different   testing methodology at [facility] than at other   [facility]. Direct result comparisons should only   be made within the same method.   POCT GLUCOSE - Normal    POCT Glucose 85     URINE CULTURE   TROPONIN SERIES- (INITIAL, 1 HR)    Narrative:     The following orders were created for panel order Troponin I Series, High Sensitivity (0, 1 HR).  Procedure                               Abnormality         Status                     ---------                               -----------         ------                     Troponin I, High Sensiti...[[id number]]  Abnormal            Final result                Troponin, High Sensitivi...[[id number]]  Abnormal            Final result                 Please view results for these tests on the individual orders.   URINALYSIS WITH REFLEX CULTURE AND MICROSCOPIC    Narrative:     The following orders were created for panel order Urinalysis with Reflex Culture and Microscopic.  Procedure                               Abnormality         Status                     ---------                               -----------         ------                     Urinalysis with Reflex C...[[id number]]  Abnormal            Final result               Extra Urine Gray Tube[[id number]]                            In process                   Please view results for these tests on the individual orders.   EXTRA URINE GRAY TUBE   RENAL FUNCTION PANEL   MAGNESIUM   POCT GLUCOSE METER   POCT GLUCOSE METER   POCT GLUCOSE METER   POCT GLUCOSE METER       All other labs were within normal range or not returned as of this dictation.    Imaging  XR chest 1 view   Final Result   CHF.        MACRO:   None        Signed by: Duane Clark 2/10/2025 11:52 PM   Dictation workstation:   [id number]           Procedures  Procedures     EMERGENCY DEPARTMENT COURSE/MDM:     ED Course as of 02/11/25 0620   Mon Feb 10, 2025   2242 EKG taken at 9:45 PM on 2/10/2025 showing rate controlled A-fib with a rate of 88, right axis deviation, unable to determine PA interval secondary to A-fib, narrow complex QRS, QTc 467, no acute ST elevation or depression [DS]      ED Course User Index  [DS] Steve Gonsales MD         Diagnoses as of 02/11/25 0620   COPD exacerbation (Multi)   Acute urinary retention   Generalized weakness        Medical Decision Making  History obtained from the patient.  Records including labs, imaging, notes independently reviewed by me.  Patient with acute urinary retention for which a Graham was placed.  Urine with 250 leukocyte esterase, white and red cells consistent with UTI for which he  was given Rocephin.  No shortness of breath, cardiac labs were sent.  Troponin significantly elevated at 214 which appears to be chronic in the setting of his known CKD with creatinine of 2.13.  No profound electrolyte abnormalities.  EKG without evidence of acute injury pattern.  Low suspicion for ACS at this time.  CBC with stable chronic anemia of 11.3.  BNP elevated at 969.  Small effusions on chest x-ray.  Patient was given a duo nebulizer treatment with some improvement in his symptoms.  Patient was previously in a nursing facility but was removed due to poor behavior.  He is not safe to discharge home at this time.  He is actively admitted to the medicine service for placement purposes.    EKG demonstrates A-fib at a rate of 84, QTc normal at 439.  No acute injury pattern.    Patient and or family in agreement and understanding of treatment plan.  All questions answered.      I reviewed the case with the attending ED physician. The attending ED physician agrees with the plan. Patient and/or patient´s representative was counseled regarding labs, imaging, likely diagnosis, and plan. All questions were answered.    ED Medications administered this visit:    Medications   glucagon (Glucagen) injection 1 mg (has no administration in time range)   dextrose 50 % injection 25 g (has no administration in time range)   glucagon (Glucagen) injection 1 mg (has no administration in time range)   dextrose 50 % injection 12.5 g (has no administration in time range)   insulin glargine (Lantus) injection 5 Units (5 Units subcutaneous Not Given 2/11/25 0154)   insulin lispro injection 0-5 Units (has no administration in time range)   cefTRIAXone (Rocephin) 1 g in dextrose (iso) IV 50 mL (has no administration in time range)   apixaban (Eliquis) tablet 2.5 mg (2.5 mg oral Given 2/11/25 0204)   acetaminophen (Tylenol) tablet 650 mg (has no administration in time range)   ipratropium-albuteroL (Duo-Neb) 0.5-2.5 mg/3 mL nebulizer  solution 3 mL (3 mL nebulization Given 2/10/25 9151)   cefTRIAXone (Rocephin) 1 g in dextrose (iso) IV 50 mL (0 g intravenous Stopped 2/10/25 2350)   acetaminophen (Tylenol) tablet 975 mg (975 mg oral Given 2/11/25 0016)       New Prescriptions from this visit:    Current Discharge Medication List          Follow-up:  No follow-up provider specified.      Final Impression:   1. COPD exacerbation (Multi)    2. Acute urinary retention    3. Generalized weakness          (Please note that portions of this note were completed with a voice recognition program.  Efforts were made to edit the dictations but occasionally words are mis-transcribed.)     Meir James MD  Resident  02/11/25 [id number]

## 2025-02-11 NOTE — CARE PLAN
The patient's goals for the shift include GET SOME REST    The clinical goals for the shift include URINE WILL CLEAR UP

## 2025-02-11 NOTE — H&P
Internal Medicine    Admission H&P      Patient Elgin Marin PCP Tony Hadley MD    MRN [id number]  Admission Date 2/10/2025      Chief Complaint/Reason for Admission:  Elgin is a 84 y.o. male who presented today with shortness of breath, lower abdominal pain and dysuria    Subjective    Subjective   History of Presenting Illness  Mr Elgin Marin is an 84 year old male patient with previous medical history of CAD status post CABG in April 2022, atrial fibrillation on Eliquis, chronic combined systolic and diastolic heart failure, HTN, CKD with baseline Cr ~2.0,dyslipidemia, cholecystectomy (10/2015) presented with shortness of breath, lower abdominal pain and dysuria. He stated that the shortness of breath has been going for the past few weeks. Patient stated that the shortness of breath is worse with exertion. He endorses dysuria and difficulty of urinating for the past few days. He also stated he has difficulty initiating streams and feels like he is not emptying his bladder completely. He also endorses lower abdominal pain. Otherwise he denies fever, headache, chest pain, and cough. He denies nausea, vomiting, diarrhea.   Of note patient was admitted on 11/15/2024-11/21/2024 for acute on chronic combined systolic and diastolic heart failure, chronic troponinemia and possible  pneumonia.   He was treated with breathing treatments, steroids, IV antibiotics and IV diuresis then he was discharged to SNF after he was transitioned to p.o. diuretics and completing the antibiotics. Cardiology was consulted while he was in the hospital and recommended not to treat him with MRA, ACEI/ARB/ARNI given his renal insufficiency.    ED course:  V/S: /58, WY 83, RR- 35, SpO2 96% on RA.    Labs: CBC significant for anemia with H/H of 12.1, no leukocytosis. CMP shows mild hyponatremia with Na of 134, otherwise normal electrolytes, Cr of 2.13 ( baseline Cr ~2.0) and BUN of 49. Normal liver enzymes. BNP elevated at  969 ( 941 on 12/19/24). Troponin 190->211.  UA shows leukocyte esterase 250,  WBC >50, RBC 1-2 and negative nitrites.     Imaging: CXR- Mild cardiomegaly and vascular congestion. Probable small effusions.  No pneumothorax.    Intervention: Ceftriaxone 1 g IV    Past Medical History  Active Ambulatory Problems     Diagnosis Date Noted    Abrasion of conjunctiva 01/12/2024    Arthritis 01/12/2024    Atrial fibrillation (Multi) 01/12/2024    Bilateral edema of lower extremity 01/12/2024    Coronary artery disease involving coronary bypass graft of native heart 01/12/2024    Dry eyes 01/12/2024    Dyslipidemia 01/12/2024    Entropion of right lower eyelid 01/12/2024    Glaucoma suspect, both eyes 01/12/2024    Hypertensive kidney disease with CKD (chronic kidney disease), stage 1-4 or unspecified chronic kidney disease 01/12/2024    Low tension glaucoma of right eye, 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    Obstructive sleep apnea 01/12/2024    Pseudophakia of both eyes 01/12/2024    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    Dyspnea on exertion 01/15/2024    Essential hypertension, benign 01/15/2024    Chronic combined systolic and diastolic heart failure 01/15/2024    Hyperglycemia 01/21/2024    Unable to walk 11/10/2024    Troponin level elevated 11/15/2024    Balanitis 12/11/2024    Acute cystitis without hematuria 12/14/2024    Urinary retention 12/19/2024    Non-compliance 12/19/2024    Grief reaction 01/07/2025     Resolved Ambulatory Problems     Diagnosis Date Noted    Elevated troponin 11/10/2024    Acute pulmonary edema 11/10/2024    Shortness of breath 11/15/2024     Past Medical History:   Diagnosis Date    Hyperlipidemia, unspecified 11/14/2022    Other forms of dyspnea 08/09/2018    Personal history of other diseases of the circulatory system 12/08/2014    Personal history of other endocrine, nutritional and metabolic disease     Personal history of other specified  conditions     Presence of intraocular lens 07/20/2019    Presence of intraocular lens 07/20/2019    Unspecified atrial fibrillation (Multi) 11/14/2022       Home Medication:  Prior to Admission medications    Medication Sig Start Date End Date Taking? Authorizing Provider   acetaminophen (Tylenol) 325 mg tablet Take 2 tablets (650 mg) by mouth every 4 hours if needed for fever (temp greater than 38.0 C).    Historical Provider, MD   acetaminophen (Tylenol) 500 mg tablet Take 2 tablets (1,000 mg) by mouth every 12 hours if needed for mild pain (1 - 3) or moderate pain (4 - 6).    Historical Provider, MD   acetaminophen (Tylenol) 500 mg tablet Take 2 tablets (1,000 mg) by mouth 3 times a day.    Historical Provider, MD   alum-mag hydroxide-simeth (Mylanta) 200-200-20 mg/5 mL oral suspension Take 20 mL by mouth every 4 hours if needed for indigestion or heartburn.    Historical Provider, MD   apixaban (Eliquis) 2.5 mg tablet Take 1 tablet (2.5 mg) by mouth 2 times a day. 10/17/24   Lewis Amador MD   bisacodyl (Dulcolax) 10 mg suppository Insert 1 suppository (10 mg) into the rectum 1 time if needed for constipation.    Historical Provider, MD   bisoprolol (Zebeta) 5 mg tablet Take 1 tablet (5 mg) by mouth once daily. 10/6/23   Historical Provider, MD   cholecalciferol (Vitamin D-3) 50 MCG (2000 UT) tablet Take 1 tablet (50 mcg) by mouth once daily.    Historical Provider, MD   docusate sodium (Colace) 100 mg capsule Take 1 capsule (100 mg) by mouth 2 times a day.    Historical Provider, MD   empagliflozin (Jardiance) 25 mg Take 1 tablet (25 mg) by mouth once daily.    Historical Provider, MD   guaiFENesin (Robitussin) 100 mg/5 mL syrup Take 15 mL by mouth every 6 hours if needed for cough.    Historical Provider, MD   hydrALAZINE (Apresoline) 10 mg tablet Take 1 tablet (10 mg) by mouth 2 times a day. Hold for SBP <110  Patient not taking: Reported on 1/20/2025 11/21/24 12/21/24  Brit Boland DO   hydrocortisone  1 % cream Apply 1 Application topically 2 times a day.    Historical Provider, MD   insulin glargine (Lantus U-100 Insulin) 100 unit/mL injection Inject 10 Units under the skin once daily in the morning. 11/12/24   Robert Diaz MD   insulin lispro 100 unit/mL injection Inject 0-10 Units under the skin 4 times a day before meals. Take as directed per insulin instructions.  Patient taking differently: Inject under the skin 4 times a day before meals. Sliding Scale:  150-200= 2 units  201-250= 4 units  251-300= 6 units  301-350= 8 units  351-100= 10 units  >400 Call provider 11/21/24   Brit Boland DO   ipratropium-albuteroL (Duo-Neb) 0.5-2.5 mg/3 mL nebulizer solution Take 3 mL by nebulization 3 times a day. 11/13/24   Robert Diaz MD   ipratropium-albuteroL (Duo-Neb) 0.5-2.5 mg/3 mL nebulizer solution Take 3 mL by nebulization every 2 hours if needed for shortness of breath. 11/21/24   Brit Boland, DO   isosorbide dinitrate (Isordil) 10 mg tablet Take 1 tablet (10 mg) by mouth 2 times a day. Hold for SBP <110 Do not fill before November 22, 2024. 11/22/24 1/10/25  Brit Boland DO   lidocaine (AsperFlex, lidocaine,) 4 % patch Place 1 patch on the skin once daily. Remove & discard patch within 12 hours or as directed by MD.    Historical Provider, MD   magnesium hydroxide (Milk of Magnesia) 400 mg/5 mL suspension Take 30 mL by mouth once daily as needed for constipation.    Historical Provider, MD   mineral oil-hydrophilic petrolatum (Aquaphor) ointment Apply 1 Application topically once daily.    Historical Provider, MD   NIFEdipine ER (Adalat CC) 30 mg 24 hr tablet Take 1 tablet (30 mg) by mouth once daily in the morning. Take before meals. Do not crush, chew, or split. Hold for SBP <110 11/21/24   Brit Boland DO   oxygen (O2) gas therapy Inhale 1 each continuously. 11/21/24   Brit Boland DO   pantoprazole (ProtoNix) 40 mg EC tablet Take 1 tablet (40 mg) by mouth once daily at  "bedtime.    Historical Provider, MD   pen needle, diabetic 31 gauge x 5/16\" needle Use to inject insulin 1 to 4 times daily as directed 1/24/24   Lacey Salgado MD   rosuvastatin (Crestor) 20 mg tablet Take 1 tablet (20 mg) by mouth once daily at bedtime. 8/21/23   Historical Provider, MD   sodium chloride (Ocean) 0.65 % nasal spray Administer 1 spray into each nostril 2 times a day.    Historical Provider, MD   spironolactone (Aldactone) 25 mg tablet Take 0.5 tablets (12.5 mg) by mouth once every 24 hours.  Patient taking differently: Take 1 tablet (25 mg) by mouth once daily. 11/22/24 1/10/25  Brit Boland DO   tamsulosin (Flomax) 0.4 mg 24 hr capsule Take 1 capsule (0.4 mg) by mouth once daily. 12/19/24   Johnny Rutledge MD   terbinafine (LamISIL) 1 % cream Apply 1 Application topically 2 times a day. End date:2/21/25    Historical Provider, MD   torsemide 40 mg tablet Take 80 mg by mouth once daily. 11/13/24 1/10/25  Robert Diaz MD   white petrolatum (Aquaphor) 41 % ointment ointment Apply 1 Application topically once daily. 11/22/24   Brit Boland DO       Allergies:  Allergies   Allergen Reactions    Metoprolol Hives    Oxycodone-Aspirin Dizziness, Syncope and Nausea/vomiting    Sulfa (Sulfonamide Antibiotics) Hives    Sulfur Swelling    Tramadol Hallucinations    Lisinopril Cough    Warfarin Unknown        Review of System:  Pertinent positives and negatives as per history of present illness. Remainder of 10 point review of systems is negative.    Objective    Objective   Vital Signs:  Visit Vitals  /55   Pulse 82   Temp 36.6 °C (97.9 °F) (Temporal)   Resp (!) 46   Ht 1.778 m (5' 10\")   Wt 86.2 kg (190 lb)   SpO2 96%   BMI 27.26 kg/m²   Smoking Status Never   BSA 2.06 m²        Physical Examination:    Constitutional: A&Ox4, NAD,   Head and Face: Atraumatic, normocephalic   Eyes: Normal external exam, EOMI  ENT: Normal external inspection of ears and nose. Oropharynx " normal.  Cardiovascular:  S1/S2, no murmurs, rubs, or gallops,   Pulmonary:  no respiratory distress, rales or rhonchi, on RA  Abdomen: +BS, soft, mild suprapubic tenderness, nondistended, no guarding rigidity or rebound tenderness  MSK: Bilateral pitting edema   Neuro: No focal deficits, normal motor function, normal sensation    Laboratory Data:    Results from last 7 days   Lab Units 02/10/25  2057   WBC AUTO x10*3/uL 6.9   RBC AUTO x10*6/uL 4.65   HEMOGLOBIN g/dL 12.1*     Results from last 7 days   Lab Units 02/10/25  2057   SODIUM mmol/L 134*   POTASSIUM mmol/L 4.8   CHLORIDE mmol/L 102   CO2 mmol/L 23   BUN mg/dL 49*   CREATININE mg/dL 2.13*   CALCIUM mg/dL 8.5*   BILIRUBIN TOTAL mg/dL 1.0   ALT U/L 7*   AST U/L 18       Imaging:  XR chest 1 view    Result Date: 2/10/2025  Interpreted By:  Duane Clark, STUDY: XR CHEST 1 VIEW;  2/10/2025 10:12 pm   INDICATION: Signs/Symptoms:Chest Pain.   COMPARISON: 01/20/2025   ACCESSION NUMBER(S): [id number]   ORDERING CLINICIAN: GAUTAM KOCH   FINDINGS: Mild cardiomegaly and vascular congestion. Probable small effusions. No pneumothorax.       CHF.   MACRO: None   Signed by: Duane Clark 2/10/2025 11:52 PM Dictation workstation:   [id number]      Medications:  Scheduled medications  apixaban, 2.5 mg, oral, BID  cefTRIAXone, 1 g, intravenous, q24h  insulin glargine, 5 Units, subcutaneous, Nightly  insulin lispro, 0-5 Units, subcutaneous, TID AC      Continuous medications     PRN medications  PRN medications: dextrose, dextrose, glucagon, glucagon    Assessment    Assessment & Plan   Mr Elgin Marin is an 84 year old male patient with previous medical history of CAD status post CABG in April 2022, atrial fibrillation on Eliquis, chronic combined systolic and diastolic heart failure, HTN, CKD with baseline Cr ~2.0,dyslipidemia, cholecystectomy (10/2015) admitted with a diagnosis of UTI. He received rocephin in the ED.   Assessment & Plan  UTI (urinary tract  infection)  # Acute urine retention  # UTI   - He presented with lower abdominal pain, difficulty urinating and dysuria for the past few days  - UA is suggestive of UTI  - He was recently treated with antibiotics for UTI.   - S/P minaya catheter placement in the ED  - Patient stated that in the past minaya catheter was placed for urine retention but it was removed as the minaya catheter was causing him pain  - CT abd/pel in 12/19/24 didn't show any hydronephrosis but showed Circumferential wall thickening of the urinary bladder with tiny amount of intraluminal gas.    Plan  - Admit to the floor  - Continue ceftriaxone  - Monitor vital signs  - Urine culture pending    # Shortness of breath  # chronic combined systolic and diastolic heart failure  # Troponinemia  # CAD status post CABG ( April 2022)  # Afib on eliquis  - Patient has shortness of breath for the past few months  - Patient has chronically elevated troponin, troponin on admission 190->211->214  - TTE on 11/16/2024 showed LVEF of 42%  - BNP on admission elevated at 969 ( 941 on 12/19/24).  - trend troponin and follow for any chest pain  - EKG as needed      # Mild hyponatremia  # CKD  - Baseline creatinine was around 2.0 with admission Cr of 2.13  - Admission Na 134  - Will continue monitoring the RFP.  - Monitor input/output  - Avoid nephrotoxic medications    # Normocytic Anemia   - Baseline Hb was around 10.3-11.4, Hb on admission 12.1.  - Could be multifactorial, anemia of CKD and chronic illness  - outpatient work-up for anemia        CHRONIC PROBLEMS:  # HTN  # HLD  # Cholecystectomy (10/2015)  # Right foot wound- wound care consulted    - Continue home meds appropriately after med rec      Global Plan of Care  Fluid: PRN  Electrolytes: PRN  Nutrition:   Cardiac  DVT Prophylaxis: Eliquis  GI Prophylaxis:  Code Status: DNR and No Intubation     Emergency Contact: Extended Emergency Contact Information  Primary Emergency Contact: Meenakshi Marin  Phone: [phone]  Relation: Spouse  Secondary Emergency Contact: FAVIOLA VINSON  Mobile Phone: [phone]  Relation: Son  Preferred language: English   needed? No    Patient to be staffed with attending physician  Signature: Artemio Rivas MD PGY I Internal Medicine resident  Date: February 11, 2025  This note has been transcribed using Dragon voice recognition system and there is a possibility of unintentional typing misprints.  Any information found to be copied from previous providers is done in the best interest of the patient to provide accurate, quality, and continuity of care.

## 2025-02-11 NOTE — CONSULTS
Inpatient consult to Urology  Consult performed by: Purvi Contreras PA-C  Consult ordered by: Eun Lovell MD  Reason for consult: UTI vs prostatitis, urinary retention        History Of Present Illness  Elgin Marin is a 84 y.o. male with a PMH of CAD status post CABG in April 2022, atrial fibrillation on Eliquis, chronic combined systolic and diastolic heart failure, HTN, CKD with baseline Cr ~2.0, dyslipidemia, cholecystectomy (10/2015), diabetes who presented to the ED with SOB and difficulties voiding.  Patient was found to be retaining urine and a Graham was inserted for 300ccs UOP.  Urinalysis was also suspicious of a UTI.  He was admitted for [facility] of this and received Rocephin in the ED.      Per chart review, he was in the ED on 12/19 and a Graham was placed with 1 liter of UOP.  He then saw [facility] as an outpatient on 01/23/25 for a TOV.  Patient did not void but was sent back to SNF with instructions to bladder scan for recurrence of retention.  Patient has since had difficulties voiding and initiating stream.  Describes stream as weak.  Reports nocturia 4x with nocturnal enuresis.  Admits to urge incontinence during the day.  He describes a pain when voiding but not necessarily burning.  Reports pain in his prostate but describes that it starts in suprapubic area while voiding then travels through his urethra to the tip of his penis and the perineum.  Reports he has not had a BM in 2 days.  Since Graham was inserted yesterday in the ED his pain has improved.  He is on Flomax and Finasteride.  Urine culture is pending.     Past Medical History  Past Medical History:   Diagnosis Date    Atypical chest pain 04/15/2023    Bilateral lower leg cellulitis 01/25/2024    Chest pain 03/18/2023    Chronic obstructive pulmonary disease, unspecified 11/13/2024    Fall 03/21/2023    Generalized abdominal pain 04/15/2023    Hand pain 02/11/2025    Heart failure with reduced ejection fraction 04/14/2023     History of cardiac catheterization 04/01/2022    History of diabetes mellitus 02/11/2025    Hyperlipidemia, unspecified 11/14/2022    Dyslipidemia    Localized swelling, mass and lump, lower limb, bilateral 11/21/2024    Muscle weakness 09/07/2023    Myocardial disorder (Multi) 02/11/2025    Nausea and vomiting 02/11/2025    Open wounds involving multiple regions of lower extremity 04/15/2023    Other forms of dyspnea 08/09/2018    Dyspnea on exertion    Other specified abnormal findings of blood chemistry 11/13/2024    Personal history of other diseases of the circulatory system 12/08/2014    History of hypertension    Personal history of other endocrine, nutritional and metabolic disease     History of diabetes mellitus    Personal history of other specified conditions     History of syncope    Presence of intraocular lens 07/20/2019    Pseudophakia of right eye    Presence of intraocular lens 07/20/2019    Pseudophakia of left eye    Repeated falls 11/21/2024    Tachycardia 04/04/2022    Unspecified atrial fibrillation (Multi) 11/14/2022    Atrial fibrillation    Weakness 02/11/2025        Surgical History  Past Surgical History:   Procedure Laterality Date    CATARACT EXTRACTION      CHOLECYSTECTOMY  09/09/2015    Cholecystectomy    CORNEAL TRANSPLANT      CORONARY ANGIOPLASTY WITH STENT PLACEMENT  10/06/2015    Cath Stent Placement    CORONARY ARTERY BYPASS GRAFT  10/06/2015    CABG    OTHER SURGICAL HISTORY  09/09/2015    Wrist Carpectomy    ROTATOR CUFF REPAIR  09/09/2015    Rotator Cuff Repair         Social History  Social Drivers of Health     Tobacco Use: Low Risk  (2/10/2025)    Patient History     Smoking Tobacco Use: Never     Smokeless Tobacco Use: Never     Passive Exposure: Not on file   Alcohol Use: Patient Declined (2/11/2025)    AUDIT-C     Frequency of Alcohol Consumption: Patient declined     Average Number of Drinks: Patient declined     Frequency of Binge Drinking: Patient declined    Financial Resource Strain: Low Risk  (2/11/2025)    Overall Financial Resource Strain (CARDIA)     Difficulty of Paying Living Expenses: Not hard at all   Food Insecurity: No Food Insecurity (2/11/2025)    Hunger Vital Sign     Worried About Running Out of Food in the Last Year: Never true     Ran Out of Food in the Last Year: Never true   Transportation Needs: No Transportation Needs (2/11/2025)    PRAPARE - Transportation     Lack of Transportation (Medical): No     Lack of Transportation (Non-Medical): No   Physical Activity: Patient Declined (2/11/2025)    Exercise Vital Sign     Days of Exercise per Week: Patient declined     Minutes of Exercise per Session: Patient declined   Stress: Patient Declined (2/11/2025)    [employer] - Occupational Stress Questionnaire     Feeling of Stress : Patient declined   Social Connections: Patient Declined (2/11/2025)    Social Connection and Isolation Panel [NHANES]     Frequency of Communication with Friends and Family: Patient declined     Frequency of Social Gatherings with Friends and Family: Patient declined     Attends Protestant Services: Patient declined     Active Member of Clubs or Organizations: Patient declined     Attends Club or Organization Meetings: Patient declined     Marital Status: Patient declined   Intimate Partner Violence: Patient Declined (2/11/2025)    Humiliation, Afraid, Rape, and Kick questionnaire     Fear of Current or Ex-Partner: Patient declined     Emotionally Abused: Patient declined     Physically Abused: Patient declined     Sexually Abused: Patient declined   Depression: Not at risk (11/15/2024)    PHQ-2     PHQ-2 Score: 0   Housing Stability: Low Risk  (2/11/2025)    Housing Stability Vital Sign     Unable to Pay for Housing in the Last Year: No     Number of Times Moved in the Last Year: 1     Homeless in the Last Year: No   Utilities: Patient Declined (2/11/2025)    [facility] Utilities     Threatened with loss  of utilities: Patient declined   Digital Equity: Not on file   Health Literacy: Patient Declined (2/11/2025)     Health Literacy     Frequency of need for help with medical instructions: Patient declines to respond        Family History  No family history on file.     Home Medications  Prior to Admission medications    Medication Sig Start Date End Date Taking? Authorizing Provider   apixaban (Eliquis) 2.5 mg tablet Take 1 tablet (2.5 mg) by mouth 2 times a day. 10/17/24  Yes Lewis Amador MD   bisoprolol (Zebeta) 5 mg tablet Take 1 tablet (5 mg) by mouth once daily. 10/6/23  Yes Historical Provider, MD   chlorhexidine (Hibiclens) 4 % external liquid Apply 1 Application topically once daily as needed for wound care.   Yes Historical Provider, MD   empagliflozin (Jardiance) 25 mg Take 1 tablet (25 mg) by mouth once daily.   Yes Historical Provider, MD   finasteride (Proscar) 5 mg tablet Take 1 tablet (5 mg) by mouth once daily.   Yes Historical Provider, MD   insulin glargine (Lantus U-100 Insulin) 100 unit/mL injection Inject 15 Units under the skin 2 times a day.   Yes Historical Provider, MD   insulin lispro 100 unit/mL injection Inject 4-6 Units under the skin 3 times a day before meals. 4 units with breakfast, 4 units with lunch, and 6 units with dinner   Yes Historical Provider, MD   latanoprost (Xalatan) 0.005 % ophthalmic solution Administer 1 drop into both eyes once daily at bedtime.   Yes Historical Provider, MD   pantoprazole (ProtoNix) 40 mg EC tablet Take 1 tablet (40 mg) by mouth once daily at bedtime.   Yes Historical Provider, MD   spironolactone (Aldactone) 25 mg tablet Take 0.5 tablets (12.5 mg) by mouth once every 24 hours. 11/22/24  Yes Brit Boland DO   tamsulosin (Flomax) 0.4 mg 24 hr capsule Take 1 capsule (0.4 mg) by mouth once daily. 12/19/24  Yes Johnny Rutledge MD   terbinafine (LamISIL) 1 % cream Apply 1 Application topically 2 times a day.  2/21/25 Yes Historical Provider, MD    torsemide 40 mg tablet Take 80 mg by mouth once daily. 11/13/24  Yes Robert Diaz MD   white petrolatum (Aquaphor) 41 % ointment ointment Apply 1 Application topically once daily. 11/22/24  Yes Brit Boland DO   acetaminophen (Tylenol) 500 mg tablet Take 2 tablets (1,000 mg) by mouth 2 times a day as needed for mild pain (1 - 3).    Historical Provider, MD   hydrALAZINE (Apresoline) 10 mg tablet Take 1 tablet (10 mg) by mouth 2 times a day. Hold for SBP <110  Patient not taking: Reported on 2/11/2025 11/21/24   Brit Boland DO   insulin glargine (Lantus U-100 Insulin) 100 unit/mL injection Inject 10 Units under the skin once daily in the morning.  Patient not taking: Reported on 2/11/2025 11/12/24   Robert Diaz MD   insulin lispro 100 unit/mL injection Inject 0-10 Units under the skin 4 times a day before meals. Take as directed per insulin instructions.  Patient not taking: Reported on 2/11/2025 11/21/24   Brit Boland DO   ipratropium-albuteroL (Duo-Neb) 0.5-2.5 mg/3 mL nebulizer solution Take 3 mL by nebulization 3 times a day.  Patient not taking: Reported on 2/11/2025 11/13/24   Robert Diaz MD   ipratropium-albuteroL (Duo-Neb) 0.5-2.5 mg/3 mL nebulizer solution Take 3 mL by nebulization every 2 hours if needed for shortness of breath.  Patient not taking: Reported on 2/11/2025 11/21/24   Brit Boland DO   isosorbide dinitrate (Isordil) 10 mg tablet Take 1 tablet (10 mg) by mouth 2 times a day. Hold for SBP <110 Do not fill before November 22, 2024.  Patient not taking: Reported on 2/11/2025 11/22/24   Brit Boland DO   NIFEdipine ER (Adalat CC) 30 mg 24 hr tablet Take 1 tablet (30 mg) by mouth once daily in the morning. Take before meals. Do not crush, chew, or split. Hold for SBP <110  Patient not taking: Reported on 2/11/2025 11/21/24   Brit Boland DO   oxygen (O2) gas therapy Inhale 1 each continuously. 11/21/24   Brit Boland, DO   pen needle,  "diabetic 31 gauge x 5/16\" needle Use to inject insulin 1 to 4 times daily as directed 1/24/24   Lacey Salgado MD   acetaminophen (Tylenol) 325 mg tablet Take 2 tablets (650 mg) by mouth every 4 hours if needed for fever (temp greater than 38.0 C).  2/11/25  Historical Provider, MD   acetaminophen (Tylenol) 500 mg tablet Take 2 tablets (1,000 mg) by mouth every 12 hours if needed for mild pain (1 - 3) or moderate pain (4 - 6).  2/11/25  Historical Provider, MD   alum-mag hydroxide-simeth (Mylanta) 200-200-20 mg/5 mL oral suspension Take 20 mL by mouth every 4 hours if needed for indigestion or heartburn.  2/11/25  Historical Provider, MD   bisacodyl (Dulcolax) 10 mg suppository Insert 1 suppository (10 mg) into the rectum 1 time if needed for constipation.  2/11/25  Historical Provider, MD   cholecalciferol (Vitamin D-3) 50 MCG (2000 UT) tablet Take 1 tablet (50 mcg) by mouth once daily.  2/11/25  Historical Provider, MD   docusate sodium (Colace) 100 mg capsule Take 1 capsule (100 mg) by mouth 2 times a day.  2/11/25  Historical Provider, MD   guaiFENesin (Robitussin) 100 mg/5 mL syrup Take 15 mL by mouth every 6 hours if needed for cough.  2/11/25  Historical Provider, MD   hydrocortisone 1 % cream Apply 1 Application topically 2 times a day.  2/11/25  Historical Provider, MD   lidocaine (AsperFlex, lidocaine,) 4 % patch Place 1 patch on the skin once daily. Remove & discard patch within 12 hours or as directed by MD.  2/11/25  Historical Provider, MD   magnesium hydroxide (Milk of Magnesia) 400 mg/5 mL suspension Take 30 mL by mouth once daily as needed for constipation.  2/11/25  Historical Provider, MD   mineral oil-hydrophilic petrolatum (Aquaphor) ointment Apply 1 Application topically once daily.  2/11/25  Historical Provider, MD   rosuvastatin (Crestor) 20 mg tablet Take 1 tablet (20 mg) by mouth once daily at bedtime. 8/21/23 2/11/25  Historical Provider, MD   sodium chloride (Ocean) 0.65 % nasal spray " Administer 1 spray into each nostril 2 times a day.  2/11/25  Historical Provider, MD        Allergies  Metoprolol, Oxycodone-aspirin, Sulfa (sulfonamide antibiotics), Lisinopril, Sulfur, Tramadol, and Warfarin    Review of Systems  Review of Systems   Constitutional:  Positive for appetite change and chills. Negative for fever.   Gastrointestinal:  Positive for constipation. Negative for abdominal pain, diarrhea and vomiting.   Genitourinary:  Positive for difficulty urinating, dysuria, enuresis and frequency. Negative for hematuria.      Physical Exam  Physical Exam  Vitals reviewed.   Constitutional:       General: He is awake. He is not in acute distress.  HENT:      Right Ear: Decreased hearing noted.      Left Ear: Decreased hearing noted.   Cardiovascular:      Rate and Rhythm: Normal rate.   Pulmonary:      Effort: Pulmonary effort is normal.   Abdominal:      General: There is no distension.      Palpations: Abdomen is soft.      Tenderness: There is no abdominal tenderness.   Genitourinary:     Penis: Uncircumcised.       Comments: Graham catheter to continuous drainage with urine yellow and clear.  Neurological:      Mental Status: He is alert.   Psychiatric:         Mood and Affect: Mood normal.         Behavior: Behavior normal. Behavior is cooperative.       Medications  Scheduled medications  amoxicillin-pot clavulanate, 1 tablet, oral, q12h BRE  apixaban, 2.5 mg, oral, BID  bisoprolol, 5 mg, oral, Daily  [Held by provider] empagliflozin, 25 mg, oral, Daily  finasteride, 5 mg, oral, Daily  insulin glargine, 10 Units, subcutaneous, BID  insulin lispro, 0-10 Units, subcutaneous, q4h  latanoprost, 1 drop, Both Eyes, Nightly  pantoprazole, 40 mg, oral, Nightly  spironolactone, 12.5 mg, oral, q24h BRE  tamsulosin, 0.4 mg, oral, Daily  [Held by provider] torsemide, 80 mg, oral, Daily      Continuous medications     PRN medications  PRN medications: dextrose, dextrose, glucagon, glucagon     Last Recorded  "Vitals  Heart Rate:  [77-95]   Temp:  [36.2 °C (97.2 °F)-36.6 °C (97.9 °F)]   Resp:  [18-46]   BP: (102-125)/(55-65)   Height:  [177.8 cm (5' 10\")]   Weight:  [86.2 kg (190 lb)]   SpO2:  [95 %-98 %]      Input/Output    Intake/Output Summary (Last 24 hours) at 2/11/2025 1444  Last data filed at 2/11/2025 1418  Gross per 24 hour   Intake 730 ml   Output 1225 ml   Net -495 ml        Labs  Results for orders placed or performed during the hospital encounter of 02/10/25 (from the past 24 hours)   CBC with Differential   Result Value Ref Range    WBC 6.9 4.4 - 11.3 x10*3/uL    nRBC 0.0 0.0 - 0.0 /100 WBCs    RBC 4.65 4.50 - 5.90 x10*6/uL    Hemoglobin 12.1 (L) 13.5 - 17.5 g/dL    Hematocrit 41.2 41.0 - 52.0 %    MCV 89 80 - 100 fL    MCH 26.0 26.0 - 34.0 pg    MCHC 29.4 (L) 32.0 - 36.0 g/dL    RDW 14.9 (H) 11.5 - 14.5 %    Platelets 163 150 - 450 x10*3/uL    Neutrophils % 71.6 40.0 - 80.0 %    Immature Granulocytes %, Automated 0.3 0.0 - 0.9 %    Lymphocytes % 10.4 13.0 - 44.0 %    Monocytes % 16.4 2.0 - 10.0 %    Eosinophils % 0.6 0.0 - 6.0 %    Basophils % 0.7 0.0 - 2.0 %    Neutrophils Absolute 4.91 1.60 - 5.50 x10*3/uL    Immature Granulocytes Absolute, Automated 0.02 0.00 - 0.50 x10*3/uL    Lymphocytes Absolute 0.71 (L) 0.80 - 3.00 x10*3/uL    Monocytes Absolute 1.12 (H) 0.05 - 0.80 x10*3/uL    Eosinophils Absolute 0.04 0.00 - 0.40 x10*3/uL    Basophils Absolute 0.05 0.00 - 0.10 x10*3/uL   Comprehensive Metabolic Panel   Result Value Ref Range    Glucose 101 (H) 74 - 99 mg/dL    Sodium 134 (L) 136 - 145 mmol/L    Potassium 4.8 3.5 - 5.3 mmol/L    Chloride 102 98 - 107 mmol/L    Bicarbonate 23 21 - 32 mmol/L    Anion Gap 14 10 - 20 mmol/L    Urea Nitrogen 49 (H) 6 - 23 mg/dL    Creatinine 2.13 (H) 0.50 - 1.30 mg/dL    eGFR 30 (L) >60 mL/min/1.73m*2    Calcium 8.5 (L) 8.6 - 10.3 mg/dL    Albumin 3.1 (L) 3.4 - 5.0 g/dL    Alkaline Phosphatase 84 33 - 136 U/L    Total Protein 6.1 (L) 6.4 - 8.2 g/dL    AST 18 9 - 39 U/L "    Bilirubin, Total 1.0 0.0 - 1.2 mg/dL    ALT 7 (L) 10 - 52 U/L   Troponin I, High Sensitivity, Initial   Result Value Ref Range    Troponin I, High Sensitivity 190 (HH) 0 - 20 ng/L   B-Type Natriuretic Peptide   Result Value Ref Range     (H) 0 - 99 pg/mL   ECG 12 lead   Result Value Ref Range    Ventricular Rate 88 BPM    QRS Duration 102 ms    QT Interval 386 ms    QTC Calculation(Bazett) 467 ms    R Axis 106 degrees    T Axis 241 degrees    QRS Count 15 beats    Q Onset 211 ms    T Offset 404 ms    QTC Fredericia 438 ms   Troponin, High Sensitivity, 1 Hour   Result Value Ref Range    Troponin I, High Sensitivity 211 (HH) 0 - 20 ng/L   Urinalysis with Reflex Culture and Microscopic   Result Value Ref Range    Color, Urine Yellow Light-Yellow, Yellow, Dark-Yellow    Appearance, Urine Clear Clear    Specific Gravity, Urine 1.011 1.005 - 1.035    pH, Urine 5.0 5.0, 5.5, 6.0, 6.5, 7.0, 7.5, 8.0    Protein, Urine NEGATIVE NEGATIVE, 10 (TRACE), 20 (TRACE) mg/dL    Glucose, Urine 500 (3+) (A) Normal mg/dL    Blood, Urine 0.03 (TRACE) (A) NEGATIVE mg/dL    Ketones, Urine NEGATIVE NEGATIVE mg/dL    Bilirubin, Urine NEGATIVE NEGATIVE mg/dL    Urobilinogen, Urine Normal Normal mg/dL    Nitrite, Urine NEGATIVE NEGATIVE    Leukocyte Esterase, Urine 250 Allie/uL (A) NEGATIVE   Extra Urine Gray Tube   Result Value Ref Range    Extra Tube Hold for add-ons.    Microscopic Only, Urine   Result Value Ref Range    WBC, Urine >50 (A) 1-5, NONE /HPF    WBC Clumps, Urine RARE Reference range not established. /HPF    RBC, Urine 1-2 NONE, 1-2, 3-5 /HPF    Mucus, Urine FEW Reference range not established. /LPF    Hyaline Casts, Urine OCCASIONAL (A) NONE /LPF   Troponin I, High Sensitivity   Result Value Ref Range    Troponin I, High Sensitivity 214 (HH) 0 - 20 ng/L   POCT GLUCOSE   Result Value Ref Range    POCT Glucose 85 74 - 99 mg/dL   CBC and Auto Differential   Result Value Ref Range    WBC 5.9 4.4 - 11.3 x10*3/uL    nRBC 0.0  0.0 - 0.0 /100 WBCs    RBC 4.39 (L) 4.50 - 5.90 x10*6/uL    Hemoglobin 11.3 (L) 13.5 - 17.5 g/dL    Hematocrit 39.0 (L) 41.0 - 52.0 %    MCV 89 80 - 100 fL    MCH 25.7 (L) 26.0 - 34.0 pg    MCHC 29.0 (L) 32.0 - 36.0 g/dL    RDW 14.9 (H) 11.5 - 14.5 %    Platelets 135 (L) 150 - 450 x10*3/uL    Neutrophils % 72.3 40.0 - 80.0 %    Immature Granulocytes %, Automated 0.7 0.0 - 0.9 %    Lymphocytes % 8.7 13.0 - 44.0 %    Monocytes % 17.0 2.0 - 10.0 %    Eosinophils % 0.5 0.0 - 6.0 %    Basophils % 0.8 0.0 - 2.0 %    Neutrophils Absolute 4.26 1.60 - 5.50 x10*3/uL    Immature Granulocytes Absolute, Automated 0.04 0.00 - 0.50 x10*3/uL    Lymphocytes Absolute 0.51 (L) 0.80 - 3.00 x10*3/uL    Monocytes Absolute 1.00 (H) 0.05 - 0.80 x10*3/uL    Eosinophils Absolute 0.03 0.00 - 0.40 x10*3/uL    Basophils Absolute 0.05 0.00 - 0.10 x10*3/uL   Renal function panel   Result Value Ref Range    Glucose 78 74 - 99 mg/dL    Sodium 134 (L) 136 - 145 mmol/L    Potassium 4.7 3.5 - 5.3 mmol/L    Chloride 103 98 - 107 mmol/L    Bicarbonate 22 21 - 32 mmol/L    Anion Gap 14 10 - 20 mmol/L    Urea Nitrogen 47 (H) 6 - 23 mg/dL    Creatinine 2.10 (H) 0.50 - 1.30 mg/dL    eGFR 30 (L) >60 mL/min/1.73m*2    Calcium 8.3 (L) 8.6 - 10.3 mg/dL    Phosphorus 3.8 2.5 - 4.9 mg/dL    Albumin 2.8 (L) 3.4 - 5.0 g/dL   Magnesium   Result Value Ref Range    Magnesium 2.38 1.60 - 2.40 mg/dL   POCT GLUCOSE   Result Value Ref Range    POCT Glucose 67 (L) 74 - 99 mg/dL   POCT GLUCOSE   Result Value Ref Range    POCT Glucose 121 (H) 74 - 99 mg/dL   POCT GLUCOSE   Result Value Ref Range    POCT Glucose 190 (H) 74 - 99 mg/dL     Plan  1. Urinary retention  -Recurrent issue, failed previous TOV.  -Maintain Graham catheter upon discharge.  -Continue Flomax and Finasteride as directed.  -Should keep upcoming appt with [facility].    2. Possible UTI  -Urine culture pending, continue abx per Medicine.  -Low suspicion of prostatitis, will hold on further imaging at this  time.  -If culture positive, tx complicated UTI for 7 days based on sensitivities.    Plan of care discussed with Lizet Estrella CNP and Dr. Jorge MD.  Urology will sign-off, please reach out with questions/concerns.    Purvi Contreras PA-C

## 2025-02-11 NOTE — PROGRESS NOTES
Attempt to meet with patient at bedside. Patient currently with nursing staff. Will attempt again at a later time.

## 2025-02-11 NOTE — PROGRESS NOTES
Physical Therapy    Physical Therapy Evaluation    Patient Name: Elgin Marin  MRN: [id number]  Today's Date: 2/11/2025   Time Calculation  Start Time: 1033  Stop Time: 1048  Time Calculation (min): 15 min  3102/3102-A    Assessment/Plan   PT Assessment: Pt demonstrates impairments listed below.  Pt appears below baseline level of function and based on current level of function, pt would benefit from continued skilled therapy while in the hospital to ensure safety, decrease risk of falls, and regain strength/mobility back to baseline.  Once stable enough for discharge, pt would benefit from moderate intensity therapy prior to returning home.     PT Assessment Results: Decreased strength, Decreased range of motion, Decreased endurance, Impaired balance, Decreased mobility, Impaired judgement, Decreased safety awareness, Pain  Rehab Prognosis: Fair  Barriers to Discharge Home: Caregiver assistance, Physical needs  Caregiver Assistance: Caregiver assistance needed per identified barriers - however, level of patient's required assistance exceeds assistance available at home  Physical Needs: 24hr mobility assistance needed, 24hr ADL assistance needed, High falls risk due to function or environment, Stair navigation into home limited by function/safety  Evaluation/Treatment Tolerance: Patient limited by fatigue  Medical Staff Made Aware: Yes  End of Session Communication: Bedside nurse  End of Session Patient Position: Alarm on (sitting EOB)  IP OR SWING BED PT PLAN  Inpatient or Swing Bed: Inpatient  PT Plan  Treatment/Interventions: Bed mobility, Transfer training, Gait training, Stair training, Balance training, Neuromuscular re-education, Strengthening, Endurance training, Range of motion, Therapeutic exercise, Therapeutic activity, Home exercise program  PT Plan: Ongoing PT  PT Frequency: 3 times per week  PT Discharge Recommendations: Moderate intensity level of continued care  Equipment Recommended upon  Discharge: Wheeled walker  PT Recommended Transfer Status: Assist x2 (Max A)  PT - OK to Discharge: Yes - To next level of care when cleared by medical team    Subjective     Current Problem:  1. COPD exacerbation (Multi)        2. Acute urinary retention        3. Generalized weakness            Past Medical History:  Patient Active Problem List   Diagnosis    Abrasion of conjunctiva    Arthritis    Atrial fibrillation (Multi)    Bilateral edema of lower extremity    Coronary artery disease involving coronary bypass graft of native heart    Dry eyes    Dyslipidemia    Entropion of right lower eyelid    Glaucoma suspect, both eyes    Hypertensive kidney disease with CKD (chronic kidney disease), stage 1-4 or unspecified chronic kidney disease    Low tension glaucoma of right eye, mild stage    Obstructive sleep apnea    Pseudophakia of both eyes    Rejection of corneal graft    S/P PKP (penetrating keratoplasty)    Dyspnea on exertion    Essential hypertension, benign    Chronic combined systolic and diastolic heart failure    Hyperglycemia    Unable to walk    Troponin level elevated    Balanitis    Acute cystitis without hematuria    Urinary retention    Non-compliance    Grief reaction    Chronic respiratory failure with hypoxia (Multi)    Dependent on walker for ambulation    Hyperlipidemia    Pseudophakia    Type 2 diabetes mellitus with hyperglycemia (Multi)    Venous insufficiency of both lower extremities    Acute on chronic systolic heart failure    UTI (urinary tract infection)    Prostatitis       General Visit Information:  Per EMR: pt presented with shortness of breath, lower abdominal pain and dysuria. He stated that the shortness of breath has been going for the past few weeks. Patient stated that the shortness of breath is worse with exertion. He endorses dysuria and difficulty of urinating for the past few days. He also stated he has difficulty initiating streams and feels like he is not emptying  "his bladder completely. He also endorses lower abdominal pain. Otherwise he denies fever, headache, chest pain, and cough. He denies nausea, vomiting, diarrhea.   Of note patient was admitted on 11/15/2024-11/21/2024 for acute on chronic combined systolic and diastolic heart failure, chronic troponinemia and possible  pneumonia.   He was treated with breathing treatments, steroids, IV antibiotics and IV diuresis then he was discharged to SNF after he was transitioned to p.o. diuretics and completing the antibiotics. Cardiology was consulted while he was in the hospital and recommended not to treat him with MRA, ACEI/ARB/ARNI given his renal insufficiency.    On arrival, pt sitting EOB.  Pt in no apparent distress and agreeable to therapy.    General  Reason for Referral: impaired mobility  Referred By: Eun Lovell  Co-Treatment: OT  Prior to Session Communication: Bedside nurse  Patient Position Received: Alarm on (sitting EOB; RN in room)    Home Living/PLOF:  Difficult to obtain entire home set up/PLOF 2/2 pt going off on tangents.  Pt lives in house with wife and son.  States he has 13 RIOS with rail but states he and his wife do not leave the house because of this.  He states his wife is bed/WC bound and his son is her caregiver.  Has tub shower but states he only sponge bathes because he cannot get in/out.  Ambulates using rollator.  Per pt he was discharged from SNF at the end of January after being able to \"walk 95 ft multiple times\" but also states that insurance cut him off.  Unsure pt's level of mobility at home prior to coming into hospital 2/2 pt being fixated on talking about other issues during session.     Precautions:  Precautions  Medical Precautions: Fall precautions     Objective     Pain:  Pain Assessment  Pain Assessment: 0-10  0-10 (Numeric) Pain Score:  (pt reports 10/10 BLE pain and 10/10 back pain; does not appear to correlate with presentation)  Pain Interventions: " Repositioned    Cognition:  Cognition  Overall Cognitive Status: Within Functional Limits  Orientation Level: Oriented X4  Insight: Mild    General Assessments:      Activity Tolerance  Endurance: Decreased tolerance for upright activites    Static Sitting Balance  Static Sitting-Comment/Number of Minutes: good  Dynamic Sitting Balance  Dynamic Sitting-Comments: fair plus  Static Standing Balance  Static Standing-Comment/Number of Minutes: poor  Dynamic Standing Balance  Dynamic Standing-Comments: poor    Extremity/Trunk Assessments:  BLE strength: minimal ability to DF bilateral ankles and extend bilateral knees in sitting; able to wiggle toes bilaterally     Functional Mobility:  Bed mobility  NT 2/2 sitting EOB on arrival     Transfers  Sit to stand: Max A x2; able to stand at walker for ~8-10 seconds with B knee flexion; unable to reach fully erect posture despite Vcs; decreased tolerance for standing    Stand to sit: Max A x2; Vcs for hand placement with poor follow through; poor eccentric control    Unable to attempt transfer to chair 2/2 decreased endurance/activity tolerance     Ambulation/Stairs  Unable to attempt ambulation at this time    Outcome Measures:  [facility] Basic Mobility  Turning from your back to your side while in a flat bed without using bedrails: A lot  Moving from lying on your back to sitting on the side of a flat bed without using bedrails: A lot  Moving to and from bed to chair (including a wheelchair): Total  Standing up from a chair using your arms (e.g. wheelchair or bedside chair): A lot  To walk in hospital room: Total  Climbing 3-5 steps with railing: Total  Basic Mobility - Total Score: 9    Goals:  Encounter Problems       Encounter Problems (Active)       PT Problem       Pt will be able to perform all bed mobility tasks with Min A.  (Progressing)       Start:  02/11/25    Expected End:  02/25/25            Pt will perform all transfers with Min A and FWW with proper safety  mechanics.   (Progressing)       Start:  02/11/25    Expected End:  02/25/25            Pt will ambulate 30 ft with Mod A using FWW for improved functional independence.  (Progressing)       Start:  02/11/25    Expected End:  02/25/25            Pt will be able to negotiate 13 steps with 1 HR with Mod A.  (Progressing)       Start:  02/11/25    Expected End:  02/25/25                 Education Documentation  Home Exercise Program, taught by Shea Perez, PT at 2/11/2025  2:13 PM.  Learner: Patient  Readiness: Acceptance  Method: Explanation  Response: Verbalizes Understanding    Body Mechanics, taught by Shea Perez, PT at 2/11/2025  2:13 PM.  Learner: Patient  Readiness: Acceptance  Method: Explanation  Response: Verbalizes Understanding    Mobility Training, taught by Shea Perez, PT at 2/11/2025  2:13 PM.  Learner: Patient  Readiness: Acceptance  Method: Explanation  Response: Verbalizes Understanding    Education Comments  No comments found.

## 2025-02-11 NOTE — CARE PLAN
The patient's goals for the shift include GET SOME REST    The clinical goals for the shift include URINE WILL CLEAR UP    Problem: Skin  Goal: Decreased wound size/increased tissue granulation at next dressing change  Outcome: Progressing  Goal: Participates in plan/prevention/treatment measures  Outcome: Progressing  Goal: Prevent/manage excess moisture  Outcome: Progressing  Goal: Prevent/minimize sheer/friction injuries  Outcome: Progressing  Goal: Promote/optimize nutrition  Outcome: Progressing  Goal: Promote skin healing  Outcome: Progressing     Problem: Respiratory  Goal: Clear secretions with interventions this shift  Outcome: Progressing  Goal: Minimize anxiety/maximize coping throughout shift  Outcome: Progressing  Goal: Minimal/no exertional discomfort or dyspnea this shift  Outcome: Progressing  Goal: No signs of respiratory distress (eg. Use of accessory muscles. Peds grunting)  Outcome: Progressing  Goal: Patent airway maintained this shift  Outcome: Progressing  Goal: Tolerate mechanical ventilation evidenced by VS/agitation level this shift  Outcome: Progressing  Goal: Tolerate pulmonary toileting this shift  Outcome: Progressing  Goal: Verbalize decreased shortness of breath this shift  Outcome: Progressing  Goal: Wean oxygen to maintain O2 saturation per order/standard this shift  Outcome: Progressing  Goal: Increase self care and/or family involvement in next 24 hours  Outcome: Progressing     Problem: Fall/Injury  Goal: Not fall by end of shift  Outcome: Progressing  Goal: Be free from injury by end of the shift  Outcome: Progressing  Goal: Verbalize understanding of personal risk factors for fall in the hospital  Outcome: Progressing  Goal: Verbalize understanding of risk factor reduction measures to prevent injury from fall in the home  Outcome: Progressing  Goal: Use assistive devices by end of the shift  Outcome: Progressing  Goal: Pace activities to prevent fatigue by end of the  shift  Outcome: Progressing     Problem: Pain - Adult  Goal: Verbalizes/displays adequate comfort level or baseline comfort level  Outcome: Progressing     Problem: Safety - Adult  Goal: Free from fall injury  Outcome: Progressing     Problem: Discharge Planning  Goal: Discharge to home or other facility with appropriate resources  Outcome: Progressing

## 2025-02-12 PROBLEM — N41.9 PROSTATITIS: Status: RESOLVED | Noted: 2025-02-11 | Resolved: 2025-02-12

## 2025-02-12 PROBLEM — N39.0 UTI (URINARY TRACT INFECTION): Status: RESOLVED | Noted: 2025-02-11 | Resolved: 2025-02-12

## 2025-02-12 PROBLEM — J44.1 COPD EXACERBATION (MULTI): Status: RESOLVED | Noted: 2025-02-11 | Resolved: 2025-02-12

## 2025-02-12 LAB
ALBUMIN SERPL BCP-MCNC: 2.8 G/DL (ref 3.4–5)
ANION GAP SERPL CALC-SCNC: 14 MMOL/L (ref 10–20)
BACTERIA UR CULT: NORMAL
BASOPHILS # BLD AUTO: 0.04 X10*3/UL (ref 0–0.1)
BASOPHILS NFR BLD AUTO: 0.6 %
BUN SERPL-MCNC: 50 MG/DL (ref 6–23)
CALCIUM SERPL-MCNC: 7.8 MG/DL (ref 8.6–10.3)
CHLORIDE SERPL-SCNC: 99 MMOL/L (ref 98–107)
CO2 SERPL-SCNC: 22 MMOL/L (ref 21–32)
CREAT SERPL-MCNC: 2.26 MG/DL (ref 0.5–1.3)
EGFRCR SERPLBLD CKD-EPI 2021: 28 ML/MIN/1.73M*2
EOSINOPHIL # BLD AUTO: 0.05 X10*3/UL (ref 0–0.4)
EOSINOPHIL NFR BLD AUTO: 0.8 %
ERYTHROCYTE [DISTWIDTH] IN BLOOD BY AUTOMATED COUNT: 15.1 % (ref 11.5–14.5)
EST. AVERAGE GLUCOSE BLD GHB EST-MCNC: 240 MG/DL
GLUCOSE BLD MANUAL STRIP-MCNC: 105 MG/DL (ref 74–99)
GLUCOSE BLD MANUAL STRIP-MCNC: 108 MG/DL (ref 74–99)
GLUCOSE BLD MANUAL STRIP-MCNC: 109 MG/DL (ref 74–99)
GLUCOSE BLD MANUAL STRIP-MCNC: 156 MG/DL (ref 74–99)
GLUCOSE BLD MANUAL STRIP-MCNC: 192 MG/DL (ref 74–99)
GLUCOSE BLD MANUAL STRIP-MCNC: 203 MG/DL (ref 74–99)
GLUCOSE BLD MANUAL STRIP-MCNC: 239 MG/DL (ref 74–99)
GLUCOSE SERPL-MCNC: 103 MG/DL (ref 74–99)
HBA1C MFR BLD: 10 %
HCT VFR BLD AUTO: 37.6 % (ref 41–52)
HGB BLD-MCNC: 11 G/DL (ref 13.5–17.5)
IMM GRANULOCYTES # BLD AUTO: 0.03 X10*3/UL (ref 0–0.5)
IMM GRANULOCYTES NFR BLD AUTO: 0.5 % (ref 0–0.9)
LYMPHOCYTES # BLD AUTO: 0.92 X10*3/UL (ref 0.8–3)
LYMPHOCYTES NFR BLD AUTO: 14.7 %
MAGNESIUM SERPL-MCNC: 2.3 MG/DL (ref 1.6–2.4)
MCH RBC QN AUTO: 25.4 PG (ref 26–34)
MCHC RBC AUTO-ENTMCNC: 29.3 G/DL (ref 32–36)
MCV RBC AUTO: 87 FL (ref 80–100)
MONOCYTES # BLD AUTO: 1.19 X10*3/UL (ref 0.05–0.8)
MONOCYTES NFR BLD AUTO: 19 %
NEUTROPHILS # BLD AUTO: 4.03 X10*3/UL (ref 1.6–5.5)
NEUTROPHILS NFR BLD AUTO: 64.4 %
NRBC BLD-RTO: 0 /100 WBCS (ref 0–0)
PHOSPHATE SERPL-MCNC: 3.9 MG/DL (ref 2.5–4.9)
PLATELET # BLD AUTO: 136 X10*3/UL (ref 150–450)
POTASSIUM SERPL-SCNC: 5.1 MMOL/L (ref 3.5–5.3)
Q ONSET: 211 MS
QRS COUNT: 15 BEATS
QRS DURATION: 102 MS
QT INTERVAL: 386 MS
QTC CALCULATION(BAZETT): 467 MS
QTC FREDERICIA: 438 MS
R AXIS: 106 DEGREES
RBC # BLD AUTO: 4.33 X10*6/UL (ref 4.5–5.9)
SODIUM SERPL-SCNC: 130 MMOL/L (ref 136–145)
T AXIS: 241 DEGREES
T OFFSET: 404 MS
VENTRICULAR RATE: 88 BPM
WBC # BLD AUTO: 6.3 X10*3/UL (ref 4.4–11.3)

## 2025-02-12 PROCEDURE — 80069 RENAL FUNCTION PANEL: CPT

## 2025-02-12 PROCEDURE — 2500000001 HC RX 250 WO HCPCS SELF ADMINISTERED DRUGS (ALT 637 FOR MEDICARE OP)

## 2025-02-12 PROCEDURE — 99233 SBSQ HOSP IP/OBS HIGH 50: CPT | Performed by: STUDENT IN AN ORGANIZED HEALTH CARE EDUCATION/TRAINING PROGRAM

## 2025-02-12 PROCEDURE — 1100000001 HC PRIVATE ROOM DAILY

## 2025-02-12 PROCEDURE — 2500000002 HC RX 250 W HCPCS SELF ADMINISTERED DRUGS (ALT 637 FOR MEDICARE OP, ALT 636 FOR OP/ED)

## 2025-02-12 PROCEDURE — 2500000004 HC RX 250 GENERAL PHARMACY W/ HCPCS (ALT 636 FOR OP/ED)

## 2025-02-12 PROCEDURE — 97530 THERAPEUTIC ACTIVITIES: CPT | Mod: GO,CO

## 2025-02-12 PROCEDURE — 2500000001 HC RX 250 WO HCPCS SELF ADMINISTERED DRUGS (ALT 637 FOR MEDICARE OP): Performed by: STUDENT IN AN ORGANIZED HEALTH CARE EDUCATION/TRAINING PROGRAM

## 2025-02-12 PROCEDURE — 83036 HEMOGLOBIN GLYCOSYLATED A1C: CPT | Mod: STJLAB

## 2025-02-12 PROCEDURE — 82947 ASSAY GLUCOSE BLOOD QUANT: CPT

## 2025-02-12 PROCEDURE — 85025 COMPLETE CBC W/AUTO DIFF WBC: CPT

## 2025-02-12 PROCEDURE — 36415 COLL VENOUS BLD VENIPUNCTURE: CPT

## 2025-02-12 PROCEDURE — 83735 ASSAY OF MAGNESIUM: CPT

## 2025-02-12 PROCEDURE — 97535 SELF CARE MNGMENT TRAINING: CPT | Mod: GO,CO

## 2025-02-12 PROCEDURE — 97530 THERAPEUTIC ACTIVITIES: CPT | Mod: GP,CQ

## 2025-02-12 RX ORDER — HYDROXYZINE HYDROCHLORIDE 25 MG/1
25 TABLET, FILM COATED ORAL ONCE
Status: COMPLETED | OUTPATIENT
Start: 2025-02-12 | End: 2025-02-12

## 2025-02-12 RX ORDER — FUROSEMIDE 10 MG/ML
100 INJECTION INTRAMUSCULAR; INTRAVENOUS ONCE
Status: COMPLETED | OUTPATIENT
Start: 2025-02-12 | End: 2025-02-12

## 2025-02-12 RX ORDER — IPRATROPIUM BROMIDE AND ALBUTEROL SULFATE 2.5; .5 MG/3ML; MG/3ML
3 SOLUTION RESPIRATORY (INHALATION) EVERY 4 HOURS PRN
Status: DISCONTINUED | OUTPATIENT
Start: 2025-02-12 | End: 2025-02-15

## 2025-02-12 RX ORDER — ACETAMINOPHEN 325 MG/1
650 TABLET ORAL EVERY 6 HOURS PRN
Status: DISCONTINUED | OUTPATIENT
Start: 2025-02-12 | End: 2025-02-21 | Stop reason: HOSPADM

## 2025-02-12 RX ADMIN — BISOPROLOL FUMARATE 5 MG: 5 TABLET ORAL at 09:17

## 2025-02-12 RX ADMIN — AMOXICILLIN AND CLAVULANATE POTASSIUM 1 TABLET: 500; 125 TABLET, FILM COATED ORAL at 00:57

## 2025-02-12 RX ADMIN — INSULIN LISPRO 2 UNITS: 100 INJECTION, SOLUTION INTRAVENOUS; SUBCUTANEOUS at 00:54

## 2025-02-12 RX ADMIN — FINASTERIDE 5 MG: 5 TABLET, FILM COATED ORAL at 09:18

## 2025-02-12 RX ADMIN — TAMSULOSIN HYDROCHLORIDE 0.4 MG: 0.4 CAPSULE ORAL at 09:18

## 2025-02-12 RX ADMIN — FUROSEMIDE 100 MG: 10 INJECTION, SOLUTION INTRAVENOUS at 11:27

## 2025-02-12 RX ADMIN — HYDROXYZINE HYDROCHLORIDE 25 MG: 25 TABLET ORAL at 02:34

## 2025-02-12 RX ADMIN — SPIRONOLACTONE 12.5 MG: 25 TABLET ORAL at 09:17

## 2025-02-12 RX ADMIN — APIXABAN 2.5 MG: 2.5 TABLET, FILM COATED ORAL at 22:32

## 2025-02-12 RX ADMIN — AMOXICILLIN AND CLAVULANATE POTASSIUM 1 TABLET: 500; 125 TABLET, FILM COATED ORAL at 11:27

## 2025-02-12 RX ADMIN — APIXABAN 2.5 MG: 2.5 TABLET, FILM COATED ORAL at 09:18

## 2025-02-12 RX ADMIN — PANTOPRAZOLE SODIUM 40 MG: 40 TABLET, DELAYED RELEASE ORAL at 22:32

## 2025-02-12 RX ADMIN — INSULIN LISPRO 4 UNITS: 100 INJECTION, SOLUTION INTRAVENOUS; SUBCUTANEOUS at 22:33

## 2025-02-12 RX ADMIN — INSULIN LISPRO 4 UNITS: 100 INJECTION, SOLUTION INTRAVENOUS; SUBCUTANEOUS at 17:08

## 2025-02-12 ASSESSMENT — COGNITIVE AND FUNCTIONAL STATUS - GENERAL
HELP NEEDED FOR BATHING: A LOT
STANDING UP FROM CHAIR USING ARMS: A LOT
MOVING FROM LYING ON BACK TO SITTING ON SIDE OF FLAT BED WITH BEDRAILS: A LOT
TOILETING: TOTAL
DRESSING REGULAR UPPER BODY CLOTHING: TOTAL
MOVING TO AND FROM BED TO CHAIR: TOTAL
EATING MEALS: A LITTLE
MOVING FROM LYING ON BACK TO SITTING ON SIDE OF FLAT BED WITH BEDRAILS: A LOT
DRESSING REGULAR LOWER BODY CLOTHING: TOTAL
MOBILITY SCORE: 9
MOBILITY SCORE: 12
DRESSING REGULAR LOWER BODY CLOTHING: A LOT
TOILETING: TOTAL
HELP NEEDED FOR BATHING: TOTAL
WALKING IN HOSPITAL ROOM: A LOT
MOVING TO AND FROM BED TO CHAIR: A LOT
STANDING UP FROM CHAIR USING ARMS: A LOT
DRESSING REGULAR UPPER BODY CLOTHING: A LITTLE
TURNING FROM BACK TO SIDE WHILE IN FLAT BAD: A LOT
EATING MEALS: A LITTLE
DAILY ACTIVITIY SCORE: 14
TURNING FROM BACK TO SIDE WHILE IN FLAT BAD: A LOT
CLIMB 3 TO 5 STEPS WITH RAILING: A LOT
PERSONAL GROOMING: A LITTLE
WALKING IN HOSPITAL ROOM: TOTAL
PERSONAL GROOMING: TOTAL
DAILY ACTIVITIY SCORE: 8
CLIMB 3 TO 5 STEPS WITH RAILING: TOTAL

## 2025-02-12 ASSESSMENT — PAIN SCALES - GENERAL
PAINLEVEL_OUTOF10: 0 - NO PAIN

## 2025-02-12 ASSESSMENT — PAIN - FUNCTIONAL ASSESSMENT
PAIN_FUNCTIONAL_ASSESSMENT: 0-10

## 2025-02-12 ASSESSMENT — ACTIVITIES OF DAILY LIVING (ADL)
BATHING_LEVEL_OF_ASSISTANCE: MAXIMUM ASSISTANCE
HOME_MANAGEMENT_TIME_ENTRY: 30
LACK_OF_TRANSPORTATION: NO

## 2025-02-12 NOTE — CONSULTS
Reason For Consult  Goals of care ; hospice consideration    History Of Present Illness  Elgin Marin is a 84 y.o. male presenting with previous medical history of CAD status post CABG in April 2022, atrial fibrillation on Eliquis, chronic combined systolic and diastolic heart failure, HTN, CKD with baseline Cr ~2.0,dyslipidemia, cholecystectomy (10/2015) presented with shortness of breath, lower abdominal pain and dysuria. Palliative and hospice consulted for goals of care.     Past Medical History  He has a past medical history of Atypical chest pain (04/15/2023), Bilateral lower leg cellulitis (01/25/2024), Chest pain (03/18/2023), Chronic obstructive pulmonary disease, unspecified (11/13/2024), Fall (03/21/2023), Generalized abdominal pain (04/15/2023), Hand pain (02/11/2025), Heart failure with reduced ejection fraction (04/14/2023), History of cardiac catheterization (04/01/2022), History of diabetes mellitus (02/11/2025), Hyperlipidemia, unspecified (11/14/2022), Localized swelling, mass and lump, lower limb, bilateral (11/21/2024), Muscle weakness (09/07/2023), Myocardial disorder (Multi) (02/11/2025), Nausea and vomiting (02/11/2025), Open wounds involving multiple regions of lower extremity (04/15/2023), Other forms of dyspnea (08/09/2018), Other specified abnormal findings of blood chemistry (11/13/2024), Personal history of other diseases of the circulatory system (12/08/2014), Personal history of other endocrine, nutritional and metabolic disease, Personal history of other specified conditions, Presence of intraocular lens (07/20/2019), Presence of intraocular lens (07/20/2019), Repeated falls (11/21/2024), Tachycardia (04/04/2022), Unspecified atrial fibrillation (Multi) (11/14/2022), and Weakness (02/11/2025).       Assessment/Plan     Met with pt bedside, he is up to the chair. Confused at this time. Phone call placed to pts son Duane. Duane states that he is very sick right now and cannot come to  "the hospital. Introduced palliative care and need for goals of care. Also addressed the recommendation for hospice for the pt. All questions answered regarding the two services. Duane states that he is in no condition to make a decision for hospice at this time. He needs to process the information but also is hoping to make any decision for hospice care when he feels better. Duane is agreeable to palliative follow up call tomorrow to continue this discussion, however he does not want to arrange an informational meeting right now. He is also considering SNF stay for the pt. All questions answered.    Son does not wish to meet with hospice at this time. He is in agreement with continuing goals of care discussion tomorrow and is agreeable to outpatient palliative care if he decides to have pt go to SNF. Duane cannot come to the hospital \"at all\" he states, as he also cares for his mother who he cannot leave alone.    Palliative to continue to follow        Mimi Cook RN    "

## 2025-02-12 NOTE — DOCUMENTATION CLARIFICATION NOTE
"    PATIENT:               NORRIS VINSON  ACCT #:                  [id number]  MRN:                       [id number]  :                       1940  ADMIT DATE:       2/10/2025 8:35 PM  DISCH DATE:  RESPONDING PROVIDER #:        [id number]          PROVIDER RESPONSE TEXT:    Acute on Chronic combined systolic/diastolic Congestive Heart Failure    CDI QUERY TEXT:    Clarification    Instruction:    Based on your assessment of the patient and the clinical information, please provide the requested documentation by clicking on the appropriate radio button and enter any additional information if prompted.    Question: Please further clarify the type and acuity of congestive heart failure    When answering this query, please exercise your independent professional judgment. The fact that a question is being asked, does not imply that any particular answer is desired or expected.    The patient's clinical indicators include:  Clinical Information:  84M presents for SOB, urinary retention; found to have UTI, possible CHF exac    Clinical Indicators:  - BNP, 2/10: 969  - CXR, 2/10: Mild cardiomegaly and vascular congestion. Probable small effusions...CHF  - ED, 2/10, James: \"BNP elevated at 969.  Small effusions on chest x-ray\"  - HP, , Evelyn/Nas: \"Of note patient was admitted on 11/15/2024-2024 for acute on chronic combined systolic and diastolic heart failure...Patient also complaining of dyspnea on exertion and LE edema...On exam, patient does appear to have conversational dyspnea...Patient admitted for ?dysuria and ?prostate pain as well as AMBRIZ c/f acute on chronic systolic HF...Patient also is very clearly volume overloaded. He is on 80mg torsemide daily at home, unclear adherence as patient c/o frequent urination. Will diurese today with 100mg IV lasix and reassess.\"    Treatment:  Lasix 100mg IV x2 (-), BNP, EKG    Risk Factors:  Chronic combined systolic and diastolic heart failure, HTN, " AFib  Options provided:  -- Acute on Chronic combined systolic/diastolic Congestive Heart Failure  -- Chronic combined systolic/diastolic Congestive Heart Failure  -- Other - I will add my own diagnosis  -- Refer to Clinical Documentation Reviewer    Query created by: Ada Mensah on 2/12/2025 12:38 PM      Electronically signed by:  JONATHON PEREZ MD 2/12/2025 12:47 PM

## 2025-02-12 NOTE — PROGRESS NOTES
Occupational Therapy    OT Treatment    Patient Name: Elgin Marin  MRN: [id number]  Today's Date: 2/12/2025  Time Calculation  Start Time: 0901  Stop Time: 0946  Time Calculation (min): 45 min       3117/3117-A    Assessment:  Prognosis: Good  Evaluation/Treatment Tolerance: Patient limited by fatigue, Patient limited by pain  End of Session Communication: Bedside nurse  End of Session Patient Position: Up in chair, Alarm on  OT Assessment Results: Decreased ADL status, Decreased upper extremity range of motion, Decreased upper extremity strength, Decreased endurance, Decreased functional mobility  Prognosis: Good  Evaluation/Treatment Tolerance: Patient limited by fatigue, Patient limited by pain    Plan:  Treatment Interventions: ADL retraining, Functional transfer training  OT Frequency: 3 times per week  OT Discharge Recommendations: Moderate intensity level of continued care  Equipment Recommended upon Discharge: Wheeled walker  Treatment Interventions: ADL retraining, Functional transfer training  Subjective     Outcome Measures:[facility] Daily Activity  Putting on and taking off regular lower body clothing: A lot  Bathing (including washing, rinsing, drying): A lot  Putting on and taking off regular upper body clothing: A little  Toileting, which includes using toilet, bedpan or urinal: Total  Taking care of personal grooming such as brushing teeth: A little  Eating Meals: A little  Daily Activity - Total Score: 14       02/12/25 0901   OT Last Visit   OT Received On 02/12/25   General   Prior to Session Communication Bedside nurse   Patient Position Received Up in chair;Alarm on   Preferred Learning Style verbal;visual   General Comment Pt agreeable to washing up. Pt with audible wheezing   Precautions   Medical Precautions Fall precautions   Precautions Comment bed/chair alarm   Vital Signs   SpO2   (on 2L)   Pain Assessment   Pain Location   (pt reports pain in BLE's but did not rate)   Cognition    Orientation Level Disoriented to place;Disoriented to time;Disoriented to situation   Grooming   Grooming Level of Assistance Setup   Grooming Where Assessed Chair   Grooming Comments washed face   UE Bathing   UE Bathing Level of Assistance Minimal verbal cues   UE Bathing Where Assessed   (chair)   UE Bathing Comments washed under arms and chest   LE Bathing   LE Bathing Level of Assistance Maximum assistance   LE Bathing Where Assessed   (chair)   LE Bathing Comments therapist washed behind the legs   UE Dressing   UE Dressing Level of Assistance Minimum assistance   UE Dressing Where Assessed Chair   UE Dressing Comments chaged gown   Toileting   Toileting Level of Assistance Dependent   Toileting Comments pt has indwelling catheter   Transfers   Transfer Yes   Transfer 1   Transfer From 1 Sit to;Stand to   Transfer Level of Assistance 1 Moderate assistance   Trials/Comments 1 Pt pulled self up on the sink counter top   IP OT Assessment   Prognosis Good   Evaluation/Treatment Tolerance Patient limited by fatigue;Patient limited by pain   End of Session Communication Bedside nurse   End of Session Patient Position Up in chair;Alarm on   OT Assessment   OT Assessment Results Decreased ADL status;Decreased upper extremity range of motion;Decreased upper extremity strength;Decreased endurance;Decreased functional mobility   Education   Individual(s) Educated Patient   Education Provided Fall precautons   Patient Response to Education Patient/Caregiver Verbalized Understanding of Information   Education Comment Pt would benefit from continued reinforcement   Inpatient Plan   Treatment Interventions ADL retraining;Functional transfer training   OT Frequency 3 times per week   OT Discharge Recommendations Moderate intensity level of continued care   Equipment Recommended upon Discharge Wheeled walker           Goals:  Encounter Problems       Encounter Problems (Active)       OT Goals       OT Goal 1 (Progressing)        Start:  02/11/25    Expected End:  02/25/25       Pt will complete all bed mobility with SBA safely            OT Goal 2 (Progressing)       Start:  02/11/25    Expected End:  02/25/25       Pt will complete ADL's and mobility with fair stand balance            OT Goal 3 (Progressing)       Start:  02/11/25    Expected End:  02/25/25       Pt with complete all transfers safely with Min A           OT Goal 4 (Progressing)       Start:  02/11/25    Expected End:  02/25/25       Pt will complete UB dressing ADL's with CGA using adaptive device(s) as needed

## 2025-02-12 NOTE — DOCUMENTATION CLARIFICATION NOTE
PATIENT:               NORRIS VINSON  ACCT #:                  [id number]  MRN:                       [id number]  :                       1940  ADMIT DATE:       2/10/2025 8:35 PM  DISCH DATE:  RESPONDING PROVIDER #:        [id number]          PROVIDER RESPONSE TEXT:    Low platelets not requiring treatment or evaluation    CDI QUERY TEXT:    Clarification    Instruction:    Based on your assessment of the patient and the clinical information, please provide the requested documentation by clicking on the appropriate radio button and enter any additional information if prompted.    Question: Is there a diagnosis indicative of the lab values or image study    When answering this query, please exercise your independent professional judgment. The fact that a question is being asked, does not imply that any particular answer is desired or expected.    The patient's clinical indicators include:  Clinical Information:  84M presents for SOB, urinary retention; found to have UTI, possible CHF exac    Clinical Indicators:  - Plt, 2/10-: [id number]    Treatment:  Serial CBC    Risk Factors:  Chronic anemia, CKD3  Options provided:  -- Thrombocytopenia is clinically significant and required treatment/monitoring  -- Low platelets not requiring treatment or evaluation  -- Other - I will add my own diagnosis  -- Refer to Clinical Documentation Reviewer    Query created by: Ada Mensah on 2025 12:42 PM      Electronically signed by:  JONATHON PEREZ MD 2025 12:47 PM

## 2025-02-12 NOTE — PROGRESS NOTES
Elgin Marin is a 84 y.o. male on day 1 of admission presenting with Acute on chronic systolic heart failure.    Subjective   Overnight, patient became acutely agitated requiring one-time dose of agitation.  Gentle redirection was able to calm the patient.    Seen and examined this morning.  This morning he is complaining of pain below the knees bilaterally.  States his shortness of breath is equivocal to yesterday.  He endorses no abdominal pain, penile pain, angina or other new symptoms.    On late morning rounds with attending, extensive discussion had regarding patient's clinical status regarding his heart failure and increased used of diuretics despite already being on high-dose diuretic therapy and GDMT outpatient.  Patient continues to express that he does not want to continue being short of breath.  He does endorse that he has been experiencing hallucinations, however after confirming with nursing, no documented complaints of auditory or visual hallucinations have been documented during this hospitalization.    It should be noted that in early morning evaluation, he was saturating mid 90%s on room air, and subsequently developed acute hypoxia to 88% requiring 2 L of nasal cannula with O2 saturations improvement to high 90%s.    Objective     Physical Exam  VITALS: I have reviewed the vital signs.   GENERAL: Elderly, ill-appearing male on room air, subsequently increased to 2 L nasal cannula 2/2 O2 desaturation to 88%.    NEURO: Alert and oriented x3, able to answer questions and follow commands.  Moderately [place].  No motor or sensory deficits. Moves all extremities. Face is symmetric and expressive. No tremor.  EYES: PERRL. No scleral icterus or conjunctival injection. No discharge.   HENT: Normocephalic, atraumatic. Hearing is grossly intact. Nares grossly patent and without discharge. Mucous membranes moist.   NECK: No JVD. Patient moves neck without restriction.   CARDIO: Rhythm regular. Normal rate.  "No murmur, rub, or gallop. Pulses equal bilaterally in the upper and lower extremity.  Lower extremities wrapped in Ace bandaging.  Still appreciated approximately 2+ pitting edema through Ace bandage and   PULM: Lungs with scant wheezing posterior lung fields bilaterally.  Mild crackles heard throughout lung fields predominantly in bases.  Mild conversational dyspnea. No splinting, stridor, or accessory muscle use.    GI: Abdomen is soft and non-distended. No tenderness to palpation. No guarding or rigidity. Normoactive bowel sounds.   : No suprapubic tenderness. No CVA tenderness.  EXTREMITIES: Symmetric muscle bulk. No joint swelling. No clubbing, cyanosis, or deformity. Muscle strength 5/5 in b/l upper and lower extremities  SKIN: Warm and dry. Normal turgor. No rash or lesions appreciated  PSYCH: Mood, affect, and interaction is appropriate to the setting. Not internally stimulated.  Last Recorded Vitals  Blood pressure 111/56, pulse 75, temperature 36.3 °C (97.3 °F), temperature source Temporal, resp. rate 14, height 1.778 m (5' 10\"), weight 86.2 kg (190 lb), SpO2 98%.  Intake/Output last 3 Shifts:  I/O last 3 completed shifts:  In: 980 (11.4 mL/kg) [P.O.:980]  Out: 2725 (31.6 mL/kg) [Urine:2725 (0.9 mL/kg/hr)]  Weight: 86.2 kg     Relevant Results  Scheduled medications  apixaban, 2.5 mg, oral, BID  bisoprolol, 5 mg, oral, Daily  empagliflozin, 25 mg, oral, Daily  finasteride, 5 mg, oral, Daily  [Held by provider] insulin glargine, 10 Units, subcutaneous, BID  insulin lispro, 0-10 Units, subcutaneous, q4h  latanoprost, 1 drop, Both Eyes, Nightly  pantoprazole, 40 mg, oral, Nightly  spironolactone, 12.5 mg, oral, q24h BRE  tamsulosin, 0.4 mg, oral, Daily  [Held by provider] torsemide, 80 mg, oral, Daily      Continuous medications     PRN medications  PRN medications: acetaminophen, dextrose, dextrose, glucagon, glucagon, ipratropium-albuteroL  Results from last 7 days   Lab Units 02/12/25  0609 " 02/11/25  0524 02/10/25  2057   WBC AUTO x10*3/uL 6.3 5.9 6.9   RBC AUTO x10*6/uL 4.33* 4.39* 4.65   HEMOGLOBIN g/dL 11.0* 11.3* 12.1*     Results from last 7 days   Lab Units 02/12/25  0609 02/11/25  0524 02/10/25  2057   SODIUM mmol/L 130* 134* 134*   POTASSIUM mmol/L 5.1 4.7 4.8   CHLORIDE mmol/L 99 103 102   CO2 mmol/L 22 22 23   BUN mg/dL 50* 47* 49*   CREATININE mg/dL 2.26* 2.10* 2.13*   CALCIUM mg/dL 7.8* 8.3* 8.5*   PHOSPHORUS mg/dL 3.9 3.8  --    MAGNESIUM mg/dL 2.30 2.38  --    BILIRUBIN TOTAL mg/dL  --   --  1.0   ALT U/L  --   --  7*   AST U/L  --   --  18       ECG 12 lead    Result Date: 2/11/2025  Atrial fibrillation Rightward axis Low voltage QRS Septal infarct (cited on or before 10-FEB-2025) T wave abnormality, consider inferolateral ischemia Abnormal ECG When compared with ECG of 10-FEB-2025 21:36, No significant change was found    XR chest 1 view    Result Date: 2/10/2025  Interpreted By:  Duane Clark, STUDY: XR CHEST 1 VIEW;  2/10/2025 10:12 pm   INDICATION: Signs/Symptoms:Chest Pain.   COMPARISON: 01/20/2025   ACCESSION NUMBER(S): [id number]   ORDERING CLINICIAN: GAUTAM KOCH   FINDINGS: Mild cardiomegaly and vascular congestion. Probable small effusions. No pneumothorax.       CHF.   MACRO: None   Signed by: Duane Clark 2/10/2025 11:52 PM Dictation workstation:   [id number]      Assessment/Plan   Assessment & Plan  Acute on chronic systolic heart failure    UTI (urinary tract infection)    Prostatitis    COPD exacerbation (Multi)    # Acute hypoxia  # CHF exacerbation  # History of combined systolic and diastolic heart failure  -Additional dose of 100 mg IV Lasix today.  Diuresis of 1 to 2 L/day  -Creatinine slightly up trended, though not meeting ISIAH criteria  -Now on 2 L nasal cannula for desaturation on room air to 88%  -Most recent TTE 11/16/2024 with LVEF of 42% with global LV hypokinesis, elevated left atrial pressure, severely reduced RV SF, mildly enlarged RV, moderately  dilated LA and RA, RVSP 46, severely dilated IVC, moderately increased septal and moderately increased posterior left ventricular wall thickness  -GDMT continued: Bisoprolol, Jardiance, Aldactone  -Palliative and hospice care consulted after discussion with patient and son regarding progression of chronic disease.  Conversation had with patient's home cardiologist, Dr. Amador, who agreed the patient was appropriate for evaluation for hospice    # Chronic urinary retention  -UCX without growth  -Antibiotics discontinued  -Patient to follow with outpatient [facility] urology  -Evaluated urology during this hospitalization with low suspicion for prostatitis  -Patient to be discharged with Graham catheter in place given chronic retention  -To continue finasteride and tamsulosin on DC      Other chronic conditions:  # CAD s/p CABG (4/2022)  # A-fib on Eliquis      Patient seen and discussed with attending physician    Aman Hurtado, DO  Internal Medicine, PGY-III

## 2025-02-12 NOTE — PROGRESS NOTES
Physical Therapy    Physical Therapy Treatment    Patient Name: Elgin Marin  MRN: [id number]  Today's Date: 2/12/2025  Time Calculation  Start Time: 1540  Stop Time: 1600  Time Calculation (min): 20 min     3117/3117-A     02/12/25 1540   PT  Visit   PT Received On 02/12/25   Response to Previous Treatment Patient with no complaints from previous session.   General   Reason for Referral impaired mobility   Referred By Eun Lovell   Prior to Session Communication Bedside nurse   Patient Position Received Alarm on  (sitting EOB; RN in room)   Preferred Learning Style verbal;visual   General Comment Pt. was seated in the chair when I arrived. He was agreeable at first but he declined to performed activities. C/o pain in left foot, B LE wrapped with ACE wrap.   Precautions   Medical Precautions Fall precautions   Precautions Comment bed/chair alarm   Cognition   Orientation Level Oriented X4   Therapeutic Exercise   Therapeutic Exercise Performed Yes   Therapeutic Exercise Activity 1 Seated Ex's AP, QS x 10  reps. LAQ, Seated  Marches x10 reps.   Transfers   Transfer Yes   Transfer 1   Transfer From 1 Sit to;Stand to   Technique 1 Sit to stand;Stand to sit   Transfer Device 1 Walker   Transfer Level of Assistance 1 Moderate assistance   Trials/Comments 1 Pt. perfromed half stand. c/o being weak, Pain left foot with standing atteptes/   Activity Tolerance   Endurance Decreased tolerance for upright activites   PT Assessment   PT Assessment Results Decreased strength;Decreased range of motion;Decreased endurance;Impaired balance;Decreased mobility;Impaired judgement;Decreased safety awareness;Pain   Rehab Prognosis Fair   Barriers to Discharge Home Caregiver assistance;Physical needs   Caregiver Assistance Caregiver assistance needed per identified barriers - however, level of patient's required assistance exceeds assistance available at home   Physical Needs 24hr mobility assistance needed;24hr ADL assistance  needed;High falls risk due to function or environment;Stair navigation into home limited by function/safety   End of Session Communication Bedside nurse   End of Session Patient Position Alarm on  (sitting EOB)   PT Plan   PT Plan Ongoing PT   PT Frequency 3 times per week   PT Discharge Recommendations Moderate intensity level of continued care   Equipment Recommended upon Discharge Wheeled walker       Assessment/Plan   PT Assessment  PT Assessment Results: Decreased strength, Decreased range of motion, Decreased endurance, Impaired balance, Decreased mobility, Impaired judgement, Decreased safety awareness, Pain  Rehab Prognosis: Fair  Barriers to Discharge Home: Caregiver assistance, Physical needs  Caregiver Assistance: Caregiver assistance needed per identified barriers - however, level of patient's required assistance exceeds assistance available at home  Physical Needs: 24hr mobility assistance needed, 24hr ADL assistance needed, High falls risk due to function or environment, Stair navigation into home limited by function/safety  End of Session Communication: Bedside nurse  End of Session Patient Position: Alarm on (sitting EOB)     PT Plan  Treatment/Interventions: Bed mobility, Transfer training, Gait training, Stair training, Balance training, Neuromuscular re-education, Strengthening, Endurance training, Range of motion, Therapeutic exercise, Therapeutic activity, Home exercise program  PT Plan: Ongoing PT  PT Frequency: 3 times per week  PT Discharge Recommendations: Moderate intensity level of continued care  Equipment Recommended upon Discharge: Wheeled walker  PT Recommended Transfer Status: Assist x2 (Max A)  PT - OK to Discharge: Yes    Outcome Measures:  [facility] Basic Mobility  Turning from your back to your side while in a flat bed without using bedrails: A lot  Moving from lying on your back to sitting on the side of a flat bed without using bedrails: A lot  Moving to and from bed to chair  (including a wheelchair): Total  Standing up from a chair using your arms (e.g. wheelchair or bedside chair): A lot  To walk in hospital room: Total  Climbing 3-5 steps with railing: Total  Basic Mobility - Total Score: 9                             EDUCATION:     Education Documentation  No documentation found.  Education Comments  No comments found.        GOALS:  Encounter Problems       Encounter Problems (Active)       PT Problem       Pt will be able to perform all bed mobility tasks with Min A.  (Progressing)       Start:  02/11/25    Expected End:  02/25/25            Pt will perform all transfers with Min A and FWW with proper safety mechanics.   (Progressing)       Start:  02/11/25    Expected End:  02/25/25            Pt will ambulate 30 ft with Mod A using FWW for improved functional independence.  (Progressing)       Start:  02/11/25    Expected End:  02/25/25            Pt will be able to negotiate 13 steps with 1 HR with Mod A.  (Progressing)       Start:  02/11/25    Expected End:  02/25/25               Pain - Adult

## 2025-02-12 NOTE — NURSING NOTE
PT. SLEPT AT SHORT INTERVLAS, WAS A LITTLE AGIATED AND GOT DISORIENTED AROUND 0140. TEACHING SERVICE WAS NOTIFIED AND ORDERED THE ATARAX 25 MG. BED ALARM MAINTAINED.JEA  LE DSG . REMAINAS DRY AND INTACT.

## 2025-02-12 NOTE — NURSING NOTE
PT. GETTING CONFUSED AND DISORIENTED TO WHERE HE IS SINCE HE WAS TRANSFERRED FROM RM 3102 TO 3117. DR. BATSHEVA VILLEGAS WAS MESSAGES FOR SOMETHING SO HE CAN CALM DOWN.

## 2025-02-12 NOTE — CARE PLAN
The patient's goals for the shift include GET SOME REST    The clinical goals for the shift include no falls.    Over the shift, the patient did not make progress toward the following goals- the pt does not like his legs up/elevated to help decrease swelling in his legs. He needs to constantly be reminded to keep legs elevated while sitting up in the chair, and that he may not get up without assistance.

## 2025-02-12 NOTE — CARE PLAN
The patient's goals for the shift include GET SOME REST    The clinical goals for the shift include no falls

## 2025-02-12 NOTE — PROGRESS NOTES
02/12/25 1159   Discharge Planning   Living Arrangements Spouse/significant other;Children   Support Systems Spouse/significant other;Children   Assistance Needed yes   Type of Residence Private residence   Home or Post Acute Services None   Expected Discharge Disposition Home   Financial Resource Strain   How hard is it for you to pay for the very basics like food, housing, medical care, and heating? Not hard   Housing Stability   In the last 12 months, was there a time when you were not able to pay the mortgage or rent on time? N   At any time in the past 12 months, were you homeless or living in a shelter (including now)? N   Transportation Needs   In the past 12 months, has lack of transportation kept you from medical appointments or from getting medications? no   In the past 12 months, has lack of transportation kept you from meetings, work, or from getting things needed for daily living? No   Intensity of Service   Intensity of Service 0-30 min     Received call from patient's son (Duane). He states patient lives at home with him and patient's wife. Son is primary caregiver to both. Spoke to Duane regarding therapy recommendations and he stated he would like to see how he is doing once he is closer to d/c prior to making a decision regarding SNF. Updated TCC and LSW that have patient today.

## 2025-02-13 LAB
ALBUMIN SERPL BCP-MCNC: 2.8 G/DL (ref 3.4–5)
ANION GAP SERPL CALC-SCNC: 11 MMOL/L (ref 10–20)
BASOPHILS # BLD AUTO: 0.02 X10*3/UL (ref 0–0.1)
BASOPHILS NFR BLD AUTO: 0.3 %
BUN SERPL-MCNC: 53 MG/DL (ref 6–23)
CALCIUM SERPL-MCNC: 7.8 MG/DL (ref 8.6–10.3)
CHLORIDE SERPL-SCNC: 100 MMOL/L (ref 98–107)
CO2 SERPL-SCNC: 25 MMOL/L (ref 21–32)
CREAT SERPL-MCNC: 2.39 MG/DL (ref 0.5–1.3)
EGFRCR SERPLBLD CKD-EPI 2021: 26 ML/MIN/1.73M*2
EOSINOPHIL # BLD AUTO: 0.07 X10*3/UL (ref 0–0.4)
EOSINOPHIL NFR BLD AUTO: 1.2 %
ERYTHROCYTE [DISTWIDTH] IN BLOOD BY AUTOMATED COUNT: 14.9 % (ref 11.5–14.5)
GLUCOSE BLD MANUAL STRIP-MCNC: 159 MG/DL (ref 74–99)
GLUCOSE BLD MANUAL STRIP-MCNC: 172 MG/DL (ref 74–99)
GLUCOSE BLD MANUAL STRIP-MCNC: 193 MG/DL (ref 74–99)
GLUCOSE BLD MANUAL STRIP-MCNC: 81 MG/DL (ref 74–99)
GLUCOSE BLD MANUAL STRIP-MCNC: 86 MG/DL (ref 74–99)
GLUCOSE BLD MANUAL STRIP-MCNC: 89 MG/DL (ref 74–99)
GLUCOSE SERPL-MCNC: 72 MG/DL (ref 74–99)
HCT VFR BLD AUTO: 39.1 % (ref 41–52)
HGB BLD-MCNC: 11.2 G/DL (ref 13.5–17.5)
IMM GRANULOCYTES # BLD AUTO: 0.03 X10*3/UL (ref 0–0.5)
IMM GRANULOCYTES NFR BLD AUTO: 0.5 % (ref 0–0.9)
LYMPHOCYTES # BLD AUTO: 0.75 X10*3/UL (ref 0.8–3)
LYMPHOCYTES NFR BLD AUTO: 12.5 %
MAGNESIUM SERPL-MCNC: 2.28 MG/DL (ref 1.6–2.4)
MCH RBC QN AUTO: 25.5 PG (ref 26–34)
MCHC RBC AUTO-ENTMCNC: 28.6 G/DL (ref 32–36)
MCV RBC AUTO: 89 FL (ref 80–100)
MONOCYTES # BLD AUTO: 1.01 X10*3/UL (ref 0.05–0.8)
MONOCYTES NFR BLD AUTO: 16.9 %
NEUTROPHILS # BLD AUTO: 4.1 X10*3/UL (ref 1.6–5.5)
NEUTROPHILS NFR BLD AUTO: 68.6 %
NRBC BLD-RTO: 0 /100 WBCS (ref 0–0)
PHOSPHATE SERPL-MCNC: 5.1 MG/DL (ref 2.5–4.9)
PLATELET # BLD AUTO: 126 X10*3/UL (ref 150–450)
POTASSIUM SERPL-SCNC: 5.3 MMOL/L (ref 3.5–5.3)
RBC # BLD AUTO: 4.39 X10*6/UL (ref 4.5–5.9)
SODIUM SERPL-SCNC: 131 MMOL/L (ref 136–145)
WBC # BLD AUTO: 6 X10*3/UL (ref 4.4–11.3)

## 2025-02-13 PROCEDURE — 85025 COMPLETE CBC W/AUTO DIFF WBC: CPT

## 2025-02-13 PROCEDURE — 80069 RENAL FUNCTION PANEL: CPT

## 2025-02-13 PROCEDURE — 83735 ASSAY OF MAGNESIUM: CPT

## 2025-02-13 PROCEDURE — 36415 COLL VENOUS BLD VENIPUNCTURE: CPT

## 2025-02-13 PROCEDURE — 2500000001 HC RX 250 WO HCPCS SELF ADMINISTERED DRUGS (ALT 637 FOR MEDICARE OP)

## 2025-02-13 PROCEDURE — 2500000002 HC RX 250 W HCPCS SELF ADMINISTERED DRUGS (ALT 637 FOR MEDICARE OP, ALT 636 FOR OP/ED)

## 2025-02-13 PROCEDURE — 99233 SBSQ HOSP IP/OBS HIGH 50: CPT | Performed by: STUDENT IN AN ORGANIZED HEALTH CARE EDUCATION/TRAINING PROGRAM

## 2025-02-13 PROCEDURE — 1100000001 HC PRIVATE ROOM DAILY

## 2025-02-13 PROCEDURE — 82947 ASSAY GLUCOSE BLOOD QUANT: CPT

## 2025-02-13 RX ADMIN — TAMSULOSIN HYDROCHLORIDE 0.4 MG: 0.4 CAPSULE ORAL at 09:05

## 2025-02-13 RX ADMIN — INSULIN LISPRO 2 UNITS: 100 INJECTION, SOLUTION INTRAVENOUS; SUBCUTANEOUS at 16:23

## 2025-02-13 RX ADMIN — APIXABAN 2.5 MG: 2.5 TABLET, FILM COATED ORAL at 09:05

## 2025-02-13 RX ADMIN — INSULIN LISPRO 2 UNITS: 100 INJECTION, SOLUTION INTRAVENOUS; SUBCUTANEOUS at 01:25

## 2025-02-13 RX ADMIN — FINASTERIDE 5 MG: 5 TABLET, FILM COATED ORAL at 09:05

## 2025-02-13 RX ADMIN — BISOPROLOL FUMARATE 5 MG: 5 TABLET ORAL at 09:05

## 2025-02-13 RX ADMIN — APIXABAN 2.5 MG: 2.5 TABLET, FILM COATED ORAL at 21:54

## 2025-02-13 RX ADMIN — INSULIN LISPRO 2 UNITS: 100 INJECTION, SOLUTION INTRAVENOUS; SUBCUTANEOUS at 21:55

## 2025-02-13 RX ADMIN — ACETAMINOPHEN 650 MG: 325 TABLET ORAL at 15:26

## 2025-02-13 RX ADMIN — PANTOPRAZOLE SODIUM 40 MG: 40 TABLET, DELAYED RELEASE ORAL at 21:54

## 2025-02-13 ASSESSMENT — COGNITIVE AND FUNCTIONAL STATUS - GENERAL
DRESSING REGULAR UPPER BODY CLOTHING: A LITTLE
MOVING TO AND FROM BED TO CHAIR: A LITTLE
HELP NEEDED FOR BATHING: A LITTLE
TURNING FROM BACK TO SIDE WHILE IN FLAT BAD: A LITTLE
STANDING UP FROM CHAIR USING ARMS: A LITTLE
PERSONAL GROOMING: A LITTLE
DAILY ACTIVITIY SCORE: 18
WALKING IN HOSPITAL ROOM: A LOT
TOILETING: A LITTLE
DRESSING REGULAR LOWER BODY CLOTHING: A LITTLE
MOBILITY SCORE: 17
EATING MEALS: A LITTLE
CLIMB 3 TO 5 STEPS WITH RAILING: A LOT

## 2025-02-13 ASSESSMENT — PAIN SCALES - GENERAL
PAINLEVEL_OUTOF10: 0 - NO PAIN
PAINLEVEL_OUTOF10: 2
PAINLEVEL_OUTOF10: 0 - NO PAIN
PAINLEVEL_OUTOF10: 0 - NO PAIN
PAINLEVEL_OUTOF10: 3

## 2025-02-13 ASSESSMENT — PAIN DESCRIPTION - LOCATION: LOCATION: FOOT

## 2025-02-13 ASSESSMENT — PAIN DESCRIPTION - ORIENTATION: ORIENTATION: LEFT

## 2025-02-13 NOTE — CARE PLAN
Problem: Skin  Goal: Decreased wound size/increased tissue granulation at next dressing change  Outcome: Progressing  Goal: Participates in plan/prevention/treatment measures  Outcome: Progressing  Goal: Prevent/manage excess moisture  Outcome: Progressing  Goal: Prevent/minimize sheer/friction injuries  Outcome: Progressing  Goal: Promote/optimize nutrition  Outcome: Progressing  Goal: Promote skin healing  Outcome: Progressing     Problem: Respiratory  Goal: Clear secretions with interventions this shift  Outcome: Progressing  Goal: Minimize anxiety/maximize coping throughout shift  Outcome: Progressing  Goal: Minimal/no exertional discomfort or dyspnea this shift  Outcome: Progressing  Goal: No signs of respiratory distress (eg. Use of accessory muscles. Peds grunting)  Outcome: Progressing  Goal: Patent airway maintained this shift  Outcome: Progressing  Goal: Verbalize decreased shortness of breath this shift  Outcome: Progressing  Goal: Wean oxygen to maintain O2 saturation per order/standard this shift  Outcome: Progressing  Goal: Increase self care and/or family involvement in next 24 hours  Outcome: Progressing     Problem: Fall/Injury  Goal: Not fall by end of shift  Outcome: Progressing  Goal: Be free from injury by end of the shift  Outcome: Progressing  Goal: Verbalize understanding of personal risk factors for fall in the hospital  Outcome: Progressing  Goal: Verbalize understanding of risk factor reduction measures to prevent injury from fall in the home  Outcome: Progressing  Goal: Use assistive devices by end of the shift  Outcome: Progressing  Goal: Pace activities to prevent fatigue by end of the shift  Outcome: Progressing     Problem: Pain - Adult  Goal: Verbalizes/displays adequate comfort level or baseline comfort level  Outcome: Progressing     Problem: Safety - Adult  Goal: Free from fall injury  Outcome: Progressing     Problem: Discharge Planning  Goal: Discharge to home or other  facility with appropriate resources  Outcome: Progressing     Problem: Chronic Conditions and Co-morbidities  Goal: Patient's chronic conditions and co-morbidity symptoms are monitored and maintained or improved  Outcome: Progressing     Problem: Nutrition  Goal: Nutrient intake appropriate for maintaining nutritional needs  Outcome: Progressing     Problem: Pain  Goal: Takes deep breaths with improved pain control throughout the shift  Outcome: Progressing  Goal: Turns in bed with improved pain control throughout the shift  Outcome: Progressing  Goal: Walks with improved pain control throughout the shift  Outcome: Progressing  Goal: Performs ADL's with improved pain control throughout shift  Outcome: Progressing  Goal: Participates in PT with improved pain control throughout the shift  Outcome: Progressing  Goal: Free from opioid side effects throughout the shift  Outcome: Progressing  Goal: Free from acute confusion related to pain meds throughout the shift  Outcome: Progressing     Problem: Heart Failure  Goal: Improved gas exchange this shift  Outcome: Progressing  Goal: Improved urinary output this shift  Outcome: Progressing  Goal: Reduction in peripheral edema within 24 hours  Outcome: Progressing  Goal: Report improvement of dyspnea/breathlessness this shift  Outcome: Progressing  Goal: Weight from fluid excess reduced over 2-3 days, then stabilize  Outcome: Progressing  Goal: Increase self care and/or family involvement in 24 hours  Outcome: Progressing   The patient's goals for the shift include GET SOME REST    The clinical goals for the shift include pt will be free from worsening sob t/o the shift

## 2025-02-13 NOTE — PROGRESS NOTES
"Elgin Marin is a 84 y.o. male on day 2 of admission presenting with Acute on chronic systolic heart failure.    Subjective   Patient was seen and examined today in the morning at bedside.  No acute events overnight.  He seemed sleepy in the morning was reluctant to answer questions and he stated that he would like to sleep more.  On questioning regarding for hospice, patient is still does not fully comprehend what hospice means and stated that he is probably terminal.    Objective     Physical Exam  Constitutional: ANO x 2 and on 3 L nasal cannula  Head and Face: Atraumatic, normocephalic   Eyes: Normal external exam, EOMI  ENT: Normal external inspection of ears and nose. Oropharynx normal.  Cardiovascular:  S1/S2, no murmurs, rubs, or gallops, no JVD extension  Pulmonary: Equal air entry bilateral with inspiratory crackles with no wheezes or rhonchi.  Abdomen: +BS, soft, mild suprapubic tenderness, nondistended, no guarding rigidity or rebound tenderness  MSK: Chronic bilateral wounds, compression wraps in place up to the knees.  Unable to assess any peripheral edema.  Capillary fill less than 2 seconds  Neuro: No focal deficits, normal motor function, normal sensation  Last Recorded Vitals  Blood pressure 105/52, pulse 84, temperature 36.4 °C (97.5 °F), temperature source Temporal, resp. rate 20, height 1.778 m (5' 10\"), weight 86.2 kg (190 lb), SpO2 93%.  Intake/Output last 3 Shifts:  I/O last 3 completed shifts:  In: 800 (9.3 mL/kg) [P.O.:800]  Out: 3150 (36.6 mL/kg) [Urine:3150 (1 mL/kg/hr)]  Weight: 86.2 kg     Relevant Results  Scheduled medications  apixaban, 2.5 mg, oral, BID  bisoprolol, 5 mg, oral, Daily  [Held by provider] empagliflozin, 25 mg, oral, Daily  finasteride, 5 mg, oral, Daily  [Held by provider] insulin glargine, 10 Units, subcutaneous, BID  insulin lispro, 0-10 Units, subcutaneous, q4h  latanoprost, 1 drop, Both Eyes, Nightly  pantoprazole, 40 mg, oral, Nightly  [Held by provider] " spironolactone, 12.5 mg, oral, q24h BRE  tamsulosin, 0.4 mg, oral, Daily  [Held by provider] torsemide, 80 mg, oral, Daily      Continuous medications     PRN medications  PRN medications: acetaminophen, dextrose, dextrose, glucagon, glucagon, ipratropium-albuteroL  Results from last 7 days   Lab Units 02/13/25  0616 02/12/25  0609 02/11/25  0524   WBC AUTO x10*3/uL 6.0 6.3 5.9   RBC AUTO x10*6/uL 4.39* 4.33* 4.39*   HEMOGLOBIN g/dL 11.2* 11.0* 11.3*     Results from last 7 days   Lab Units 02/13/25  0616 02/12/25  0609 02/11/25  0524 02/10/25  2057   SODIUM mmol/L 131* 130* 134* 134*   POTASSIUM mmol/L 5.3 5.1 4.7 4.8   CHLORIDE mmol/L 100 99 103 102   CO2 mmol/L 25 22 22 23   BUN mg/dL 53* 50* 47* 49*   CREATININE mg/dL 2.39* 2.26* 2.10* 2.13*   CALCIUM mg/dL 7.8* 7.8* 8.3* 8.5*   PHOSPHORUS mg/dL 5.1* 3.9 3.8  --    MAGNESIUM mg/dL 2.28 2.30 2.38  --    BILIRUBIN TOTAL mg/dL  --   --   --  1.0   ALT U/L  --   --   --  7*   AST U/L  --   --   --  18       No results found.     Assessment/Plan   Assessment & Plan  Acute on chronic systolic heart failure    Mr Elgin Marin is an 84 year old male patient with previous medical history of CAD status post CABG in April 2022, atrial fibrillation on Eliquis, chronic combined systolic and diastolic heart failure, HTN, CKD with baseline Cr ~2.0,dyslipidemia, cholecystectomy (10/2015) admitted with a diagnosis of UTI. He received rocephin in the ED.     Patient is medically ready for discharge once pre-CERT and SNF placement    Update 2/13  -On morning labs showed uptrending renal function.    -His input and output is net -2.0 L and he is net -3 L 0.7 since admission  -Regarding hospice, the patient neither the son wishing to proceed with any further hospice or palliative management.  -Multiple attempts to reach the son was unsuccessful.  -Referral for palliative care will be sent as an outpatient.  -Will give diuresis holiday and we reconsider on 2/14  -On discharge  will discontinue Jardiance and spironolactone.  -Per wound care nurse, podiatry consult was added for chronic bilateral wounds.    # Acute hypoxia  # CHF exacerbation  # History of combined systolic and diastolic heart failure  -Status post 2 dose of 100 mg IV Lasix. he is net -3 L 0.7 since admission  -Creatinine slightly up trended, though not meeting ISIAH criteria  -Currently 3 L nasal cannula for desaturation on room air to 88%  -Most recent TTE 11/16/2024 with LVEF of 42% with global LV hypokinesis, elevated left atrial pressure, severely reduced RV SF, mildly enlarged RV, moderately dilated LA and RA, RVSP 46, severely dilated IVC, moderately increased septal and moderately increased posterior left ventricular wall thickness  -GDMT continued: Bisoprolol, Jardiance, Aldactone  -Palliative and hospice care consulted after discussion with patient and son regarding progression of chronic disease.  Conversation had with patient's home cardiologist, Dr. Amador, who agreed the patient was appropriate for evaluation for hospice    # Chronic urinary retention  -UCX without growth  -Antibiotics discontinued  -Patient to follow with outpatient [facility] urology  -Evaluated urology during this hospitalization with low suspicion for prostatitis  -Patient to be discharged with Graham catheter in place given chronic retention  -To continue finasteride and tamsulosin on DC    # Chronic bilateral wounds  -Regular daily dressing  -Podiatry consult  -Wound care consult    Other chronic conditions:  # CAD s/p CABG (4/2022)  # A-fib on Eliquis  -Resume home meds with appropriate holding parameters.    Global Plan of Care  Fluid: PRN  Electrolytes: PRN  Nutrition:   Cardiac  DVT Prophylaxis: Eliquis  GI Prophylaxis:  Code Status: DNR and No Intubation     Dispo: Patient is medically clear for discharge pending pre-CERT and SNF placement    Assessment and plan discussed with my attending.   Jerry Farr MD   Internal Medicine, PGY-2 .

## 2025-02-13 NOTE — PROGRESS NOTES
Spiritual Care Visit  Spiritual Care Request    Reason for Visit:        Request Received From:       Focus of Care:  Visited With: Patient not available         Refer to :          Spiritual Care Assessment    Spiritual Assessment:                      Care Provided:       Sense of Community and or Lutheran Affiliation:  Mandaen         Addressed Needs/Concerns and/or Devika Through:          Outcome:        Advance Directives:         Spiritual Care Annotation    Annotation:  He was asleep when I visited. I'll try again.

## 2025-02-13 NOTE — DOCUMENTATION CLARIFICATION NOTE
"    PATIENT:               NORRIS VINSON  ACCT #:                  [id number]  MRN:                       [id number]  :                       1940  ADMIT DATE:       2/10/2025 8:35 PM  DISCH DATE:  RESPONDING PROVIDER #:        [id number]          PROVIDER RESPONSE TEXT:    Acute Hypoxemic Respiratory Failure    CDI QUERY TEXT:    Clarification    Instruction:    Based on your assessment of the patient and the clinical information, please provide the requested documentation by clicking on the appropriate radio button and enter any additional information if prompted.    Question: Is there a diagnosis indicative of the clinical information    When answering this query, please exercise your independent professional judgment. The fact that a question is being asked, does not imply that any particular answer is desired or expected.    The patient's clinical indicators include:  Clinical Information:  84M presents for SOB, urinary retention; found to have CHF exac    Clinical Indicators:  - SpO2 on admission, 2/10: 96%RA  - SpO2,  0845: 98% 2L  - Med, , Bryant/Nas: \"early morning evaluation, he was saturating mid 90%s on room air, and subsequently developed acute hypoxia to 88% requiring 2 L of nasal cannula with O2 saturations improvement to high 90%s...PULM: Lungs with scant wheezing posterior lung fields bilaterally.  Mild crackles heard throughout lung fields predominantly in bases.  Mild conversational dyspnea. Dx: Acute hypoxia...Now on 2 L nasal cannula for desaturation on room air to 88%\"    Treatment:  O2 therapy, frequent SpO2 monitoring    Risk Factors:  Chronic combined systolic and diastolic heart failure, HTN  Options provided:  -- Acute Hypoxemic Respiratory Failure  -- No acute respiratory failure  -- Other - I will add my own diagnosis  -- Refer to Clinical Documentation Reviewer    Query created by: Ada Mensah on 2025 2:35 PM      Electronically signed by:  JONATHON PEREZ MD " 2/13/2025 4:48 PM

## 2025-02-13 NOTE — NURSING NOTE
Pt does not wish to have hospice care at this time, per discussion with primary team. Agreeable to SNF and outpatient palliative follow up at discharge with possibility of transitioning to hospice care in the near future. Referral placed to [facility] palliative care for outpatient follow up.    No further palliative needs, will sign off.

## 2025-02-13 NOTE — CARE PLAN
Problem: Skin  Goal: Decreased wound size/increased tissue granulation at next dressing change  Outcome: Progressing  Goal: Participates in plan/prevention/treatment measures  Outcome: Progressing  Goal: Prevent/manage excess moisture  Outcome: Progressing  Goal: Prevent/minimize sheer/friction injuries  Outcome: Progressing  Goal: Promote/optimize nutrition  Outcome: Progressing  Goal: Promote skin healing  Outcome: Progressing     Problem: Respiratory  Goal: Clear secretions with interventions this shift  Outcome: Progressing  Goal: Minimize anxiety/maximize coping throughout shift  Outcome: Progressing  Goal: Minimal/no exertional discomfort or dyspnea this shift  Outcome: Progressing  Goal: No signs of respiratory distress (eg. Use of accessory muscles. Peds grunting)  Outcome: Progressing  Goal: Patent airway maintained this shift  Outcome: Progressing  Goal: Tolerate mechanical ventilation evidenced by VS/agitation level this shift  Outcome: Progressing  Goal: Tolerate pulmonary toileting this shift  Outcome: Progressing  Goal: Verbalize decreased shortness of breath this shift  Outcome: Progressing  Goal: Wean oxygen to maintain O2 saturation per order/standard this shift  Outcome: Progressing  Goal: Increase self care and/or family involvement in next 24 hours  Outcome: Progressing     Problem: Fall/Injury  Goal: Not fall by end of shift  Outcome: Progressing  Goal: Be free from injury by end of the shift  Outcome: Progressing  Goal: Verbalize understanding of personal risk factors for fall in the hospital  Outcome: Progressing  Goal: Verbalize understanding of risk factor reduction measures to prevent injury from fall in the home  Outcome: Progressing  Goal: Use assistive devices by end of the shift  Outcome: Progressing  Goal: Pace activities to prevent fatigue by end of the shift  Outcome: Progressing     Problem: Pain - Adult  Goal: Verbalizes/displays adequate comfort level or baseline comfort  level  Outcome: Progressing     Problem: Safety - Adult  Goal: Free from fall injury  Outcome: Progressing     Problem: Discharge Planning  Goal: Discharge to home or other facility with appropriate resources  Outcome: Progressing     Problem: Chronic Conditions and Co-morbidities  Goal: Patient's chronic conditions and co-morbidity symptoms are monitored and maintained or improved  Outcome: Progressing     Problem: Nutrition  Goal: Nutrient intake appropriate for maintaining nutritional needs  Outcome: Progressing     Problem: Pain  Goal: Takes deep breaths with improved pain control throughout the shift  Outcome: Progressing  Goal: Turns in bed with improved pain control throughout the shift  Outcome: Progressing  Goal: Walks with improved pain control throughout the shift  Outcome: Progressing  Goal: Performs ADL's with improved pain control throughout shift  Outcome: Progressing  Goal: Participates in PT with improved pain control throughout the shift  Outcome: Progressing  Goal: Free from opioid side effects throughout the shift  Outcome: Progressing  Goal: Free from acute confusion related to pain meds throughout the shift  Outcome: Progressing   The patient's goals for the shift include GET SOME REST    The clinical goals for the shift include pt will remain free from fall/injury

## 2025-02-14 ENCOUNTER — APPOINTMENT (OUTPATIENT)
Dept: RADIOLOGY | Facility: HOSPITAL | Age: 85
DRG: 291 | End: 2025-02-14
Payer: MEDICARE

## 2025-02-14 LAB
ALBUMIN SERPL BCP-MCNC: 2.7 G/DL (ref 3.4–5)
ANION GAP BLDA CALCULATED.4IONS-SCNC: 9 MMO/L (ref 10–25)
ANION GAP SERPL CALC-SCNC: 11 MMOL/L (ref 10–20)
BASE EXCESS BLDA CALC-SCNC: -2.3 MMOL/L (ref -2–3)
BASE EXCESS BLDA CALC-SCNC: -3 MMOL/L (ref -2–3)
BODY TEMPERATURE: ABNORMAL
BODY TEMPERATURE: ABNORMAL
BUN SERPL-MCNC: 54 MG/DL (ref 6–23)
CA-I BLDA-SCNC: 1.08 MMOL/L (ref 1.1–1.33)
CALCIUM SERPL-MCNC: 7.7 MG/DL (ref 8.6–10.3)
CARDIAC TROPONIN I PNL SERPL HS: 282 NG/L (ref 0–20)
CHLORIDE BLDA-SCNC: 93 MMOL/L (ref 98–107)
CHLORIDE SERPL-SCNC: 97 MMOL/L (ref 98–107)
CO2 SERPL-SCNC: 25 MMOL/L (ref 21–32)
CREAT SERPL-MCNC: 2.38 MG/DL (ref 0.5–1.3)
CRITICAL CALL TIME: 2333
CRITICAL CALLED BY: ABNORMAL
CRITICAL CALLED TO: ABNORMAL
CRITICAL READ BACK: ABNORMAL
CRP SERPL-MCNC: 3.06 MG/DL
EGFRCR SERPLBLD CKD-EPI 2021: 26 ML/MIN/1.73M*2
ERYTHROCYTE [DISTWIDTH] IN BLOOD BY AUTOMATED COUNT: 14.8 % (ref 11.5–14.5)
ERYTHROCYTE [SEDIMENTATION RATE] IN BLOOD BY WESTERGREN METHOD: 33 MM/H (ref 0–20)
GLUCOSE BLD MANUAL STRIP-MCNC: 134 MG/DL (ref 74–99)
GLUCOSE BLD MANUAL STRIP-MCNC: 136 MG/DL (ref 74–99)
GLUCOSE BLD MANUAL STRIP-MCNC: 153 MG/DL (ref 74–99)
GLUCOSE BLD MANUAL STRIP-MCNC: 182 MG/DL (ref 74–99)
GLUCOSE BLD MANUAL STRIP-MCNC: 194 MG/DL (ref 74–99)
GLUCOSE BLD MANUAL STRIP-MCNC: 194 MG/DL (ref 74–99)
GLUCOSE BLD MANUAL STRIP-MCNC: 240 MG/DL (ref 74–99)
GLUCOSE BLDA-MCNC: 220 MG/DL (ref 74–99)
GLUCOSE SERPL-MCNC: 134 MG/DL (ref 74–99)
HCO3 BLDA-SCNC: 24.4 MMOL/L (ref 22–26)
HCO3 BLDA-SCNC: 25.3 MMOL/L (ref 22–26)
HCT VFR BLD AUTO: 38.1 % (ref 41–52)
HCT VFR BLD EST: 36 % (ref 41–52)
HGB BLD-MCNC: 11 G/DL (ref 13.5–17.5)
HGB BLDA-MCNC: 12.1 G/DL (ref 13.5–17.5)
INHALED O2 CONCENTRATION: 15 %
INHALED O2 CONCENTRATION: 15 %
LACTATE BLDA-SCNC: 1.4 MMOL/L (ref 0.4–2)
MAGNESIUM SERPL-MCNC: 2.35 MG/DL (ref 1.6–2.4)
MCH RBC QN AUTO: 25.5 PG (ref 26–34)
MCHC RBC AUTO-ENTMCNC: 28.9 G/DL (ref 32–36)
MCV RBC AUTO: 88 FL (ref 80–100)
NRBC BLD-RTO: 0 /100 WBCS (ref 0–0)
OXYHGB MFR BLDA: 57.1 % (ref 94–98)
OXYHGB MFR BLDA: 96.9 % (ref 94–98)
PCO2 BLDA: 52 MM HG (ref 38–42)
PCO2 BLDA: 55 MM HG (ref 38–42)
PH BLDA: 7.27 PH (ref 7.38–7.42)
PH BLDA: 7.28 PH (ref 7.38–7.42)
PHOSPHATE SERPL-MCNC: 4.5 MG/DL (ref 2.5–4.9)
PLATELET # BLD AUTO: 113 X10*3/UL (ref 150–450)
PO2 BLDA: 172 MM HG (ref 85–95)
PO2 BLDA: 35 MM HG (ref 85–95)
POTASSIUM BLDA-SCNC: 5.2 MMOL/L (ref 3.5–5.3)
POTASSIUM SERPL-SCNC: 5.3 MMOL/L (ref 3.5–5.3)
RBC # BLD AUTO: 4.31 X10*6/UL (ref 4.5–5.9)
SAO2 % BLDA: 100 % (ref 94–100)
SAO2 % BLDA: 59 % (ref 94–100)
SODIUM BLDA-SCNC: 122 MMOL/L (ref 136–145)
SODIUM SERPL-SCNC: 128 MMOL/L (ref 136–145)
SPECIMEN DRAWN FROM PATIENT: ABNORMAL
WBC # BLD AUTO: 6 X10*3/UL (ref 4.4–11.3)

## 2025-02-14 PROCEDURE — 87077 CULTURE AEROBIC IDENTIFY: CPT | Mod: STJLAB | Performed by: PODIATRIST

## 2025-02-14 PROCEDURE — 36415 COLL VENOUS BLD VENIPUNCTURE: CPT

## 2025-02-14 PROCEDURE — 82947 ASSAY GLUCOSE BLOOD QUANT: CPT

## 2025-02-14 PROCEDURE — 85652 RBC SED RATE AUTOMATED: CPT

## 2025-02-14 PROCEDURE — 97530 THERAPEUTIC ACTIVITIES: CPT | Mod: GO,CO

## 2025-02-14 PROCEDURE — 84484 ASSAY OF TROPONIN QUANT: CPT

## 2025-02-14 PROCEDURE — 83735 ASSAY OF MAGNESIUM: CPT

## 2025-02-14 PROCEDURE — 87040 BLOOD CULTURE FOR BACTERIA: CPT | Mod: STJLAB

## 2025-02-14 PROCEDURE — 2500000004 HC RX 250 GENERAL PHARMACY W/ HCPCS (ALT 636 FOR OP/ED): Mod: JZ

## 2025-02-14 PROCEDURE — 2500000004 HC RX 250 GENERAL PHARMACY W/ HCPCS (ALT 636 FOR OP/ED)

## 2025-02-14 PROCEDURE — 86140 C-REACTIVE PROTEIN: CPT

## 2025-02-14 PROCEDURE — 71045 X-RAY EXAM CHEST 1 VIEW: CPT

## 2025-02-14 PROCEDURE — 2500000001 HC RX 250 WO HCPCS SELF ADMINISTERED DRUGS (ALT 637 FOR MEDICARE OP)

## 2025-02-14 PROCEDURE — 73630 X-RAY EXAM OF FOOT: CPT | Mod: RT

## 2025-02-14 PROCEDURE — 82805 BLOOD GASES W/O2 SATURATION: CPT

## 2025-02-14 PROCEDURE — 2500000002 HC RX 250 W HCPCS SELF ADMINISTERED DRUGS (ALT 637 FOR MEDICARE OP, ALT 636 FOR OP/ED)

## 2025-02-14 PROCEDURE — 99233 SBSQ HOSP IP/OBS HIGH 50: CPT | Performed by: STUDENT IN AN ORGANIZED HEALTH CARE EDUCATION/TRAINING PROGRAM

## 2025-02-14 PROCEDURE — 97535 SELF CARE MNGMENT TRAINING: CPT | Mod: GO,CO

## 2025-02-14 PROCEDURE — 97530 THERAPEUTIC ACTIVITIES: CPT | Mod: GP,CQ

## 2025-02-14 PROCEDURE — 84132 ASSAY OF SERUM POTASSIUM: CPT

## 2025-02-14 PROCEDURE — 94640 AIRWAY INHALATION TREATMENT: CPT

## 2025-02-14 PROCEDURE — 1100000001 HC PRIVATE ROOM DAILY

## 2025-02-14 PROCEDURE — 85027 COMPLETE CBC AUTOMATED: CPT

## 2025-02-14 PROCEDURE — 80069 RENAL FUNCTION PANEL: CPT

## 2025-02-14 PROCEDURE — 71045 X-RAY EXAM CHEST 1 VIEW: CPT | Performed by: RADIOLOGY

## 2025-02-14 PROCEDURE — 85018 HEMOGLOBIN: CPT

## 2025-02-14 PROCEDURE — 99221 1ST HOSP IP/OBS SF/LOW 40: CPT | Performed by: PODIATRIST

## 2025-02-14 RX ORDER — FUROSEMIDE 10 MG/ML
80 INJECTION INTRAMUSCULAR; INTRAVENOUS ONCE
Status: COMPLETED | OUTPATIENT
Start: 2025-02-14 | End: 2025-02-14

## 2025-02-14 RX ORDER — FUROSEMIDE 10 MG/ML
40 INJECTION INTRAMUSCULAR; INTRAVENOUS ONCE
Status: COMPLETED | OUTPATIENT
Start: 2025-02-14 | End: 2025-02-14

## 2025-02-14 RX ORDER — GUAIFENESIN 600 MG/1
600 TABLET, EXTENDED RELEASE ORAL ONCE
Status: COMPLETED | OUTPATIENT
Start: 2025-02-14 | End: 2025-02-14

## 2025-02-14 RX ORDER — VENLAFAXINE 37.5 MG/1
37.5 TABLET ORAL ONCE
Status: DISCONTINUED | OUTPATIENT
Start: 2025-02-14 | End: 2025-02-16

## 2025-02-14 RX ORDER — ONDANSETRON HYDROCHLORIDE 2 MG/ML
4 INJECTION, SOLUTION INTRAVENOUS ONCE
Status: COMPLETED | OUTPATIENT
Start: 2025-02-14 | End: 2025-02-14

## 2025-02-14 RX ORDER — ISOSORBIDE DINITRATE 10 MG/1
10 TABLET ORAL
Status: DISCONTINUED | OUTPATIENT
Start: 2025-02-14 | End: 2025-02-21 | Stop reason: HOSPADM

## 2025-02-14 RX ORDER — VANCOMYCIN HYDROCHLORIDE 1 G/20ML
INJECTION, POWDER, LYOPHILIZED, FOR SOLUTION INTRAVENOUS DAILY PRN
Status: DISCONTINUED | OUTPATIENT
Start: 2025-02-14 | End: 2025-02-16

## 2025-02-14 RX ORDER — DULOXETIN HYDROCHLORIDE 30 MG/1
30 CAPSULE, DELAYED RELEASE ORAL ONCE
Status: DISCONTINUED | OUTPATIENT
Start: 2025-02-14 | End: 2025-02-14

## 2025-02-14 RX ADMIN — INSULIN LISPRO 4 UNITS: 100 INJECTION, SOLUTION INTRAVENOUS; SUBCUTANEOUS at 21:39

## 2025-02-14 RX ADMIN — TAMSULOSIN HYDROCHLORIDE 0.4 MG: 0.4 CAPSULE ORAL at 08:54

## 2025-02-14 RX ADMIN — ISOSORBIDE DINITRATE 10 MG: 10 TABLET ORAL at 17:41

## 2025-02-14 RX ADMIN — PANTOPRAZOLE SODIUM 40 MG: 40 TABLET, DELAYED RELEASE ORAL at 21:34

## 2025-02-14 RX ADMIN — METHYLPREDNISOLONE SODIUM SUCCINATE 40 MG: 40 INJECTION, POWDER, FOR SOLUTION INTRAMUSCULAR; INTRAVENOUS at 23:06

## 2025-02-14 RX ADMIN — BISOPROLOL FUMARATE 5 MG: 5 TABLET ORAL at 08:55

## 2025-02-14 RX ADMIN — IPRATROPIUM BROMIDE AND ALBUTEROL SULFATE 3 ML: 2.5; .5 SOLUTION RESPIRATORY (INHALATION) at 04:11

## 2025-02-14 RX ADMIN — INSULIN LISPRO 2 UNITS: 100 INJECTION, SOLUTION INTRAVENOUS; SUBCUTANEOUS at 12:25

## 2025-02-14 RX ADMIN — ONDANSETRON 4 MG: 2 INJECTION INTRAMUSCULAR; INTRAVENOUS at 23:06

## 2025-02-14 RX ADMIN — FUROSEMIDE 40 MG: 10 INJECTION, SOLUTION INTRAVENOUS at 23:11

## 2025-02-14 RX ADMIN — APIXABAN 2.5 MG: 2.5 TABLET, FILM COATED ORAL at 08:54

## 2025-02-14 RX ADMIN — VANCOMYCIN HYDROCHLORIDE 1500 MG: 1.5 INJECTION, POWDER, LYOPHILIZED, FOR SOLUTION INTRAVENOUS at 21:43

## 2025-02-14 RX ADMIN — APIXABAN 2.5 MG: 2.5 TABLET, FILM COATED ORAL at 21:34

## 2025-02-14 RX ADMIN — PIPERACILLIN SODIUM AND TAZOBACTAM SODIUM 3.38 G: 3; .375 INJECTION, SOLUTION INTRAVENOUS at 17:41

## 2025-02-14 RX ADMIN — IPRATROPIUM BROMIDE AND ALBUTEROL SULFATE 3 ML: 2.5; .5 SOLUTION RESPIRATORY (INHALATION) at 23:01

## 2025-02-14 RX ADMIN — LATANOPROST 1 DROP: 50 SOLUTION OPHTHALMIC at 21:35

## 2025-02-14 RX ADMIN — FUROSEMIDE 80 MG: 10 INJECTION, SOLUTION INTRAVENOUS at 13:35

## 2025-02-14 RX ADMIN — INSULIN LISPRO 2 UNITS: 100 INJECTION, SOLUTION INTRAVENOUS; SUBCUTANEOUS at 16:47

## 2025-02-14 RX ADMIN — FINASTERIDE 5 MG: 5 TABLET, FILM COATED ORAL at 08:58

## 2025-02-14 RX ADMIN — GUAIFENESIN 600 MG: 600 TABLET ORAL at 22:00

## 2025-02-14 ASSESSMENT — COGNITIVE AND FUNCTIONAL STATUS - GENERAL
MOVING FROM LYING ON BACK TO SITTING ON SIDE OF FLAT BED WITH BEDRAILS: A LOT
PERSONAL GROOMING: A LITTLE
MOVING TO AND FROM BED TO CHAIR: A LOT
TURNING FROM BACK TO SIDE WHILE IN FLAT BAD: A LITTLE
DRESSING REGULAR UPPER BODY CLOTHING: A LITTLE
DRESSING REGULAR LOWER BODY CLOTHING: A LITTLE
TOILETING: A LOT
MOBILITY SCORE: 10
DRESSING REGULAR UPPER BODY CLOTHING: A LITTLE
CLIMB 3 TO 5 STEPS WITH RAILING: TOTAL
CLIMB 3 TO 5 STEPS WITH RAILING: TOTAL
TURNING FROM BACK TO SIDE WHILE IN FLAT BAD: A LOT
DAILY ACTIVITIY SCORE: 16
HELP NEEDED FOR BATHING: A LITTLE
WALKING IN HOSPITAL ROOM: A LOT
STANDING UP FROM CHAIR USING ARMS: A LOT
MOVING TO AND FROM BED TO CHAIR: A LOT
TOILETING: A LOT
EATING MEALS: A LITTLE
HELP NEEDED FOR BATHING: A LOT
STANDING UP FROM CHAIR USING ARMS: A LOT
PERSONAL GROOMING: A LITTLE
WALKING IN HOSPITAL ROOM: TOTAL
MOBILITY SCORE: 14
DRESSING REGULAR LOWER BODY CLOTHING: A LOT
DAILY ACTIVITIY SCORE: 17

## 2025-02-14 ASSESSMENT — PAIN SCALES - GENERAL
PAINLEVEL_OUTOF10: 0 - NO PAIN
PAINLEVEL_OUTOF10: 0 - NO PAIN

## 2025-02-14 ASSESSMENT — ACTIVITIES OF DAILY LIVING (ADL): HOME_MANAGEMENT_TIME_ENTRY: 15

## 2025-02-14 NOTE — PROGRESS NOTES
Physical Therapy    Physical Therapy Treatment    Patient Name: Elgin Marin  MRN: [id number]  Today's Date: 2/14/2025  Time Calculation  Start Time: 1115  Stop Time: 1140  Time Calculation (min): 25 min     3117/3117-A     02/14/25 1115   PT  Visit   PT Received On 02/14/25   Response to Previous Treatment Patient with no complaints from previous session.   General   Reason for Referral impaired mobility   Referred By Eun Lovell   Prior to Session Communication Bedside nurse   Patient Position Received Alarm on  (sitting EOB; RN in room)   Preferred Learning Style verbal;visual   General Comment Pt. was seated in the chair when I arrived. He was agreeable at first but he declined to performed activities. C/o pain in left foot, B LE wrapped with ACE wrap.   Precautions   Medical Precautions Fall precautions   Precautions Comment bed/chair alarm   Vital Signs   Vital Signs Comment SpO2 95% on RA - canula was under pt chin.Breathing was labored, Inpiratory wheeze. Audible consgestion. No productive cough.   Pain Assessment   0-10 (Numeric) Pain Score   (No c/o pain at rest. Pt. c/o foot left worse than right and knee pain in standing. Pt right knee buckling in standing.)   Cognition   Orientation Level Oriented X4   Bed Mobility 1   Bed Mobility Comments 1 Pt. sitting on the EOB when I arrived. Mod assist to scoot to the EOB and get feet on the floor.   Transfers   Transfer Yes   Transfer 1   Transfer From 1 Bed to   Transfer to 1 Commode-standard   Technique 1 Sit to stand;Stand to sit   Transfer Device 1 Walker   Transfer Level of Assistance 1 Moderate assistance   Trials/Comments 1 Cues and assist to move bed to BSC. Assist to steady. Posture- forward flexed.   Transfers 2   Transfer From 2 Commode-standard to   Transfer to 2 Chair with arms   Technique 2 Sit to stand   Transfer Device 2 Walker   Transfer Level of Assistance 2 Moderate assistance;+2   Trials/Comments 2 Static standing with verbal cues and  Min/mod assist. Unsteady instanding. Left knee buckling.   Activity Tolerance   Endurance Decreased tolerance for upright activites   PT Assessment   PT Assessment Results Decreased strength;Decreased range of motion;Decreased endurance;Impaired balance;Decreased mobility;Impaired judgement;Decreased safety awareness;Pain   Rehab Prognosis Fair   Barriers to Discharge Home Caregiver assistance;Physical needs   Caregiver Assistance Caregiver assistance needed per identified barriers - however, level of patient's required assistance exceeds assistance available at home   Physical Needs 24hr mobility assistance needed;24hr ADL assistance needed;High falls risk due to function or environment;Stair navigation into home limited by function/safety   End of Session Communication Bedside nurse   End of Session Patient Position Alarm on  (sitting EOB)   PT Plan   PT Plan Ongoing PT   PT Frequency 3 times per week   PT Discharge Recommendations Moderate intensity level of continued care   Equipment Recommended upon Discharge Wheeled walker         Assessment/Plan   PT Assessment  PT Assessment Results: Decreased strength, Decreased range of motion, Decreased endurance, Impaired balance, Decreased mobility, Impaired judgement, Decreased safety awareness, Pain  Rehab Prognosis: Fair  Barriers to Discharge Home: Caregiver assistance, Physical needs  Caregiver Assistance: Caregiver assistance needed per identified barriers - however, level of patient's required assistance exceeds assistance available at home  Physical Needs: 24hr mobility assistance needed, 24hr ADL assistance needed, High falls risk due to function or environment, Stair navigation into home limited by function/safety  End of Session Communication: Bedside nurse  End of Session Patient Position: Alarm on (sitting EOB)     PT Plan  Treatment/Interventions: Bed mobility, Transfer training, Gait training, Stair training, Balance training, Neuromuscular re-education,  Strengthening, Endurance training, Range of motion, Therapeutic exercise, Therapeutic activity, Home exercise program  PT Plan: Ongoing PT  PT Frequency: 3 times per week  PT Discharge Recommendations: Moderate intensity level of continued care  Equipment Recommended upon Discharge: Wheeled walker  PT Recommended Transfer Status: Assist x2 (Max A)  PT - OK to Discharge: Yes    Outcome Measures:  [facility] Basic Mobility  Turning from your back to your side while in a flat bed without using bedrails: A lot  Moving from lying on your back to sitting on the side of a flat bed without using bedrails: A lot  Moving to and from bed to chair (including a wheelchair): A lot  Standing up from a chair using your arms (e.g. wheelchair or bedside chair): A lot  To walk in hospital room: Total  Climbing 3-5 steps with railing: Total  Basic Mobility - Total Score: 10                             EDUCATION:     Education Documentation  Body Mechanics, taught by Phil Castano PTA at 2/14/2025 12:02 PM.  Learner: Patient  Readiness: Acceptance  Method: Explanation, Demonstration  Response: Verbalizes Understanding, Needs Reinforcement    Mobility Training, taught by Phil Castano PTA at 2/14/2025 12:02 PM.  Learner: Patient  Readiness: Acceptance  Method: Explanation, Demonstration  Response: Verbalizes Understanding, Needs Reinforcement    Education Comments  No comments found.        GOALS:  Encounter Problems       Encounter Problems (Active)       PT Problem       Pt will be able to perform all bed mobility tasks with Min A.  (Progressing)       Start:  02/11/25    Expected End:  02/25/25            Pt will perform all transfers with Min A and FWW with proper safety mechanics.   (Progressing)       Start:  02/11/25    Expected End:  02/25/25            Pt will ambulate 30 ft with Mod A using FWW for improved functional independence.  (Progressing)       Start:  02/11/25    Expected End:  02/25/25            Pt will be able to  negotiate 13 steps with 1 HR with Mod A.  (Progressing)       Start:  02/11/25    Expected End:  02/25/25               Pain - Adult

## 2025-02-14 NOTE — PROGRESS NOTES
Per Palliative Care, family and pt are not interested in hospice at this time.  Plan is snf placement with outpatient PC.  Spoke with pt's son, Duane.  He is home sick with Covid.  Duane states that hospice does not feel like the right answer at this time, but is agreeable with snf placement.  He states pt has been to [facility] in the past and liked it.  Plan to meet with pt to discuss.    Met with pt and discussed snf placement.  Pt tells this worker his last multiple months of snf placement and doctor appointments, and does not want to repeat this cycle.  Although pt states he does not want snf placement, he is agreeable with this worker sending to [facility] to see their response.  Request to [facility] to send snf referral to [facility].    [facility] is not able to accept.  Called pt's son, Duane.  Provided update that pt does not seem interested in snf placement at this time (and that [facility] was sent a referral and declined). Duane states that pt cannot return home-especially with him being sick.  He plans to discuss with pt and requests a snf list be sent to [email or web address].  List sent.

## 2025-02-14 NOTE — CARE PLAN
Problem: Skin  Goal: Decreased wound size/increased tissue granulation at next dressing change  Outcome: Progressing  Goal: Participates in plan/prevention/treatment measures  Outcome: Progressing  Goal: Prevent/manage excess moisture  Outcome: Progressing  Goal: Prevent/minimize sheer/friction injuries  Outcome: Progressing  Flowsheets (Taken 2/14/2025 0313)  Prevent/minimize sheer/friction injuries: Use pull sheet  Goal: Promote/optimize nutrition  Outcome: Progressing  Goal: Promote skin healing  Outcome: Progressing     Problem: Respiratory  Goal: Clear secretions with interventions this shift  Outcome: Progressing  Goal: Minimize anxiety/maximize coping throughout shift  Outcome: Progressing  Goal: Minimal/no exertional discomfort or dyspnea this shift  Outcome: Progressing  Goal: No signs of respiratory distress (eg. Use of accessory muscles. Peds grunting)  Outcome: Progressing  Goal: Patent airway maintained this shift  Outcome: Progressing  Goal: Verbalize decreased shortness of breath this shift  Outcome: Progressing  Goal: Wean oxygen to maintain O2 saturation per order/standard this shift  Outcome: Progressing  Goal: Increase self care and/or family involvement in next 24 hours  Outcome: Progressing     Problem: Fall/Injury  Goal: Not fall by end of shift  Outcome: Progressing  Goal: Be free from injury by end of the shift  Outcome: Progressing  Goal: Verbalize understanding of personal risk factors for fall in the hospital  Outcome: Progressing  Goal: Verbalize understanding of risk factor reduction measures to prevent injury from fall in the home  Outcome: Progressing  Goal: Use assistive devices by end of the shift  Outcome: Progressing  Goal: Pace activities to prevent fatigue by end of the shift  Outcome: Progressing     Problem: Pain - Adult  Goal: Verbalizes/displays adequate comfort level or baseline comfort level  Outcome: Progressing     Problem: Safety - Adult  Goal: Free from fall  injury  Outcome: Progressing     Problem: Discharge Planning  Goal: Discharge to home or other facility with appropriate resources  Outcome: Progressing     Problem: Chronic Conditions and Co-morbidities  Goal: Patient's chronic conditions and co-morbidity symptoms are monitored and maintained or improved  Outcome: Progressing     Problem: Nutrition  Goal: Nutrient intake appropriate for maintaining nutritional needs  Outcome: Progressing     Problem: Pain  Goal: Takes deep breaths with improved pain control throughout the shift  Outcome: Progressing  Goal: Turns in bed with improved pain control throughout the shift  Outcome: Progressing  Goal: Walks with improved pain control throughout the shift  Outcome: Progressing  Goal: Performs ADL's with improved pain control throughout shift  Outcome: Progressing  Goal: Participates in PT with improved pain control throughout the shift  Outcome: Progressing  Goal: Free from opioid side effects throughout the shift  Outcome: Progressing  Goal: Free from acute confusion related to pain meds throughout the shift  Outcome: Progressing     Problem: Heart Failure  Goal: Improved gas exchange this shift  Outcome: Progressing  Goal: Improved urinary output this shift  Outcome: Progressing  Goal: Reduction in peripheral edema within 24 hours  Outcome: Progressing  Goal: Report improvement of dyspnea/breathlessness this shift  Outcome: Progressing  Goal: Weight from fluid excess reduced over 2-3 days, then stabilize  Outcome: Progressing  Goal: Increase self care and/or family involvement in 24 hours  Outcome: Progressing   The patient's goals for the shift include GET SOME REST    The clinical goals for the shift include Pt will remain hemodynamically stable

## 2025-02-14 NOTE — CONSULTS
Consults    Reason For Consult  Has bilateral foot and leg wounds.    History Of Present Illness  Elgin Marin is a 84 y.o. male presenting with bilateral foot and leg wounds.  Patient states she has had the wounds for a long period of time but is very vague..     Past Medical History  He has a past medical history of Atypical chest pain (04/15/2023), Bilateral lower leg cellulitis (01/25/2024), Chest pain (03/18/2023), Chronic obstructive pulmonary disease, unspecified (11/13/2024), Fall (03/21/2023), Generalized abdominal pain (04/15/2023), Hand pain (02/11/2025), Heart failure with reduced ejection fraction (04/14/2023), History of cardiac catheterization (04/01/2022), History of diabetes mellitus (02/11/2025), Hyperlipidemia, unspecified (11/14/2022), Localized swelling, mass and lump, lower limb, bilateral (11/21/2024), Muscle weakness (09/07/2023), Myocardial disorder (Multi) (02/11/2025), Nausea and vomiting (02/11/2025), Open wounds involving multiple regions of lower extremity (04/15/2023), Other forms of dyspnea (08/09/2018), Other specified abnormal findings of blood chemistry (11/13/2024), Personal history of other diseases of the circulatory system (12/08/2014), Personal history of other endocrine, nutritional and metabolic disease, Personal history of other specified conditions, Presence of intraocular lens (07/20/2019), Presence of intraocular lens (07/20/2019), Repeated falls (11/21/2024), Tachycardia (04/04/2022), Unspecified atrial fibrillation (Multi) (11/14/2022), and Weakness (02/11/2025).    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     Social History  He reports that he has never smoked. He has never used smokeless tobacco. He reports  that he does not drink alcohol and does not use drugs.    Family History  No family history on file.     Allergies  Metoprolol, Oxycodone-aspirin, Sulfa (sulfonamide antibiotics), Lisinopril, Sulfur, Tramadol, and Warfarin    Review of Systems    REVIEW OF SYSTEMS  GENERAL:  Negative for malaise, significant weight loss, fever  HEENT:  No changes in hearing or vision, no nose bleeds or other nasal problems and Negative for frequent or significant headaches  NECK:  Negative for lumps, goiter, pain and significant neck swelling  RESPIRATORY:  Negative for cough, wheezing and shortness of breath  CARDIOVASCULAR:  Negative for chest pain, leg swelling and palpitations  GI:  Negative for abdominal discomfort, blood in stools or black stools and change in bowel habits  :  Negative for dysuria, frequency and incontinence  MUSCULOSKELETAL:  Negative for joint pain or swelling, back pain, and muscle pain.  SKIN:  Negative for lesions, rash, and itching  PSYCH:  Negative for sleep disturbance, mood disorder and recent psychosocial stressors  HEMATOLOGY/LYMPHOLOGY:  Negative for prolonged bleeding, bruising easily, and swollen nodes.  ENDOCRINE:  Negative for cold or heat intolerance, polyuria, polydipsia and goiter  NEURO: Negative, denies any burning, tingling or numbness     Objective:   Vasc: DP and PT pulses are none palpable bilateral.  CFT is delayed l.  Skin temperature is warm to cool proximal to distal bilateral.  Toes 2 3 and 4 on the right foot are cool to touch.  Legs are erythematous.  There is some pedal edema.    Neuro:  Light touch is  decreased  to the foot bilateral.  Protective sensation is intact to the foot when tested with the 5.07 SWM bilateral.  There is no clonus noted.  The hallux is downgoing bilateral.      Derm: Patient has an area that appears to be demarcating on the right foot at the level of the metatarsophalangeal joints 2, 3 and 4.  This area is macerated and cool to touch.  It appears to  "be wet gangrene.  On the dorsum of the left foot, patient has a large blister that is draining gray drainage.  There is no jordyn pus.  LIYAH blister is warm to touch.  It is erythematous.  Ortho: Muscle strength is 5/5 for all pedal groups tested.  Ankle joint, subtalar joint, 1st MPJ and lesser MPJ ROM is full and without pain or crepitus.  The foot type is rectus bilateral off weight bearing.  There are no structural deformities noted.       Physical Exam     Last Recorded Vitals  Blood pressure 113/57, pulse 72, temperature 36.9 °C (98.4 °F), resp. rate 19, height 1.778 m (5' 10\"), weight 86.2 kg (190 lb), SpO2 99%.    Relevant Results  I have ordered a culture and x-ray.     Assessment/Plan     Patient appears to have an infected left foot ulcer.  As well I believe patient has early gangrenous changes to the right toes 2 3 and 4.  Will her plain film x-ray of the right foot.  A culture is done of the left foot ulcer.  I am not sure how aggressive to be with the patient.  Patient is considering hospice.  Did discuss case with primary care physician.    I spent 40 minutes in the professional and overall care of this patient.          "

## 2025-02-14 NOTE — CARE PLAN
Problem: Skin  Goal: Decreased wound size/increased tissue granulation at next dressing change  Outcome: Progressing  Goal: Participates in plan/prevention/treatment measures  Outcome: Progressing  Goal: Prevent/manage excess moisture  Outcome: Progressing  Goal: Prevent/minimize sheer/friction injuries  Outcome: Progressing  Goal: Promote/optimize nutrition  Outcome: Progressing  Goal: Promote skin healing  Outcome: Progressing     Problem: Respiratory  Goal: Clear secretions with interventions this shift  Outcome: Progressing  Goal: Minimize anxiety/maximize coping throughout shift  Outcome: Progressing  Goal: Minimal/no exertional discomfort or dyspnea this shift  Outcome: Progressing  Goal: No signs of respiratory distress (eg. Use of accessory muscles. Peds grunting)  Outcome: Progressing  Goal: Patent airway maintained this shift  Outcome: Progressing  Goal: Verbalize decreased shortness of breath this shift  Outcome: Progressing  Goal: Wean oxygen to maintain O2 saturation per order/standard this shift  Outcome: Progressing  Goal: Increase self care and/or family involvement in next 24 hours  Outcome: Progressing     Problem: Fall/Injury  Goal: Not fall by end of shift  Outcome: Progressing  Goal: Be free from injury by end of the shift  Outcome: Progressing  Goal: Verbalize understanding of personal risk factors for fall in the hospital  Outcome: Progressing  Goal: Verbalize understanding of risk factor reduction measures to prevent injury from fall in the home  Outcome: Progressing  Goal: Use assistive devices by end of the shift  Outcome: Progressing  Goal: Pace activities to prevent fatigue by end of the shift  Outcome: Progressing     Problem: Pain - Adult  Goal: Verbalizes/displays adequate comfort level or baseline comfort level  Outcome: Progressing     Problem: Safety - Adult  Goal: Free from fall injury  Outcome: Progressing     Problem: Discharge Planning  Goal: Discharge to home or other  facility with appropriate resources  Outcome: Progressing     Problem: Chronic Conditions and Co-morbidities  Goal: Patient's chronic conditions and co-morbidity symptoms are monitored and maintained or improved  Outcome: Progressing     Problem: Nutrition  Goal: Nutrient intake appropriate for maintaining nutritional needs  Outcome: Progressing     Problem: Pain  Goal: Takes deep breaths with improved pain control throughout the shift  Outcome: Progressing  Goal: Turns in bed with improved pain control throughout the shift  Outcome: Progressing  Goal: Walks with improved pain control throughout the shift  Outcome: Progressing  Goal: Performs ADL's with improved pain control throughout shift  Outcome: Progressing  Goal: Participates in PT with improved pain control throughout the shift  Outcome: Progressing  Goal: Free from opioid side effects throughout the shift  Outcome: Progressing  Goal: Free from acute confusion related to pain meds throughout the shift  Outcome: Progressing     Problem: Heart Failure  Goal: Improved gas exchange this shift  Outcome: Progressing  Goal: Improved urinary output this shift  Outcome: Progressing  Goal: Reduction in peripheral edema within 24 hours  Outcome: Progressing  Goal: Report improvement of dyspnea/breathlessness this shift  Outcome: Progressing  Goal: Weight from fluid excess reduced over 2-3 days, then stabilize  Outcome: Progressing  Goal: Increase self care and/or family involvement in 24 hours  Outcome: Progressing      The clinical goals for the shift include pt will be free from worsening sob t/o the shift

## 2025-02-14 NOTE — CONSULTS
Vancomycin Dosing by Pharmacy- INITIAL    Elgin Marin is a 84 y.o. year old male who Pharmacy has been consulted for vancomycin dosing for other Diabetic Foot Infection . Based on the patient's indication and renal status this patient will be dosed based on a goal trough/random level of 15-20.     Renal function is currently ISIAH. Patient CKD with baseline Scr ~2.     Visit Vitals  /57   Pulse 72   Temp 36.9 °C (98.4 °F)   Resp 19        Lab Results   Component Value Date    CREATININE 2.38 (H) 2025    CREATININE 2.39 (H) 2025    CREATININE 2.26 (H) 2025    CREATININE 2.10 (H) 2025        Patient weight is as follows:   Vitals:    25 0000   Weight: 86.2 kg (190 lb)       Cultures:  No results found for the encounter in last 14 days.        I/O last 3 completed shifts:  In: 1520 (17.6 mL/kg) [P.O.:1520]  Out: 2601 (30.2 mL/kg) [Urine:2600 (0.8 mL/kg/hr); Stool:1]  Weight: 86.2 kg   I/O during current shift:  I/O this shift:  In: 480 [P.O.:480]  Out: 350 [Urine:350]    Temp (24hrs), Av.7 °C (98 °F), Min:36.1 °C (97 °F), Max:37.4 °C (99.3 °F)         Assessment/Plan     Patient will receive Vanco 1.5 g x1 dose. Since patient in ISIAH will dose by level and follow up on renal function.    Follow-up level will be ordered on 2/15 at 1700 unless clinically indicated sooner.  Will continue to monitor renal function daily while on vancomycin and order serum creatinine at least every 48 hours if not already ordered.  Follow for continued vancomycin needs, clinical response, and signs/symptoms of toxicity.       Leda Hunter, PharmD

## 2025-02-14 NOTE — PROGRESS NOTES
"Elgin Marin is a 84 y.o. male on day 3 of admission presenting with Acute on chronic systolic heart failure.    Subjective   NAEON    Seen and examined this morning. Still feels SOB, though on my evaluation, was eating lunch without O2 on. Comfortable appearing. Still endorsing concerns about going to SNF, as he has concerns about his wife being at home and his son being sick. Tells me the palliative/hospice conversation is \"not the right time in his life\".  When home health care services with home PT was brought up to the patient, he still elects to not have any services at home as he does not find them helpful for him.  He has no angina, cough, N/V.  He is tolerating p.o. intake.  No diarrhea.    Objective     Physical Exam  VITALS: I have reviewed the vital signs.   GENERAL: Elderly, chronically ill adult male.  Resting comfortably in bed.  On 2 L nasal cannula (however during my evaluation in the early afternoon, on room air eating lunch).  Comfortable appearing without tachypnea, worsening conversational dyspnea.  NEURO: Alert and oriented x2, able to answer questions and follow commands. No motor or sensory deficits. Moves all extremities. Face is symmetric and expressive. No tremor.  EYES: PERRL. No scleral icterus or conjunctival injection. No discharge.   HENT: Normocephalic, atraumatic. Hearing is grossly intact. Nares grossly patent and without discharge. Mucous membranes moist.   NECK: No JVD. Patient moves neck without restriction.   CARDIO: Rhythm regular. Normal rate. No murmur, rub, or gallop. Pulses equal bilaterally in the upper and lower extremity.  Plus bilateral pitting edema to the knee/distal thigh  PULM: Improved crackles throughout bilateral lung fields. No wheezes, rales, or rhonchi.  Mild conversational dyspnea. No splinting, stridor, or accessory muscle use.    GI: Abdomen is soft and non-distended. No tenderness to palpation. No guarding or rigidity. Normoactive bowel sounds. " "  SKIN: Warm and dry. Normal turgor. No rash or lesions appreciated.   PSYCH: Mood, affect, and interaction is appropriate to the setting. Not internally stimulated.  Last Recorded Vitals  Blood pressure 114/50, pulse 78, temperature 37.4 °C (99.3 °F), resp. rate 20, height 1.778 m (5' 10\"), weight 86.2 kg (190 lb), SpO2 95%.  Intake/Output last 3 Shifts:  I/O last 3 completed shifts:  In: 1520 (17.6 mL/kg) [P.O.:1520]  Out: 2601 (30.2 mL/kg) [Urine:2600 (0.8 mL/kg/hr); Stool:1]  Weight: 86.2 kg     Relevant Results  Scheduled medications  apixaban, 2.5 mg, oral, BID  bisoprolol, 5 mg, oral, Daily  [Held by provider] empagliflozin, 25 mg, oral, Daily  finasteride, 5 mg, oral, Daily  furosemide, 80 mg, intravenous, Once  [Held by provider] insulin glargine, 10 Units, subcutaneous, BID  insulin lispro, 0-10 Units, subcutaneous, q4h  latanoprost, 1 drop, Both Eyes, Nightly  pantoprazole, 40 mg, oral, Nightly  [Held by provider] spironolactone, 12.5 mg, oral, q24h BRE  tamsulosin, 0.4 mg, oral, Daily  [Held by provider] torsemide, 80 mg, oral, Daily  venlafaxine, 37.5 mg, oral, Once      Continuous medications     PRN medications  PRN medications: acetaminophen, dextrose, dextrose, glucagon, glucagon, ipratropium-albuteroL  Results from last 7 days   Lab Units 02/14/25  0556 02/13/25  0616 02/12/25  0609   WBC AUTO x10*3/uL 6.0 6.0 6.3   RBC AUTO x10*6/uL 4.31* 4.39* 4.33*   HEMOGLOBIN g/dL 11.0* 11.2* 11.0*     Results from last 7 days   Lab Units 02/14/25  0556 02/13/25  0616 02/12/25  0609 02/11/25  0524 02/10/25  2057   SODIUM mmol/L 128* 131* 130*   < > 134*   POTASSIUM mmol/L 5.3 5.3 5.1   < > 4.8   CHLORIDE mmol/L 97* 100 99   < > 102   CO2 mmol/L 25 25 22   < > 23   BUN mg/dL 54* 53* 50*   < > 49*   CREATININE mg/dL 2.38* 2.39* 2.26*   < > 2.13*   CALCIUM mg/dL 7.7* 7.8* 7.8*   < > 8.5*   PHOSPHORUS mg/dL 4.5 5.1* 3.9   < >  --    MAGNESIUM mg/dL 2.35 2.28 2.30   < >  --    BILIRUBIN TOTAL mg/dL  --   --   --   " --  1.0   ALT U/L  --   --   --   --  7*   AST U/L  --   --   --   --  18    < > = values in this interval not displayed.       No results found.     Assessment/Plan   Assessment & Plan  Acute on chronic systolic heart failure    Mr Elgin Marin is an 84 year old male patient with previous medical history of CAD status post CABG in April 2022, atrial fibrillation on Eliquis, chronic combined systolic and diastolic heart failure, HTN, CKD with baseline Cr ~2.0,dyslipidemia, cholecystectomy (10/2015) admitted with a diagnosis of UTI. He received rocephin in the ED. Patient is medically ready for discharge once pre-CERT and SNF placement            # CHF exacerbation  # History of combined systolic and diastolic heart failure  -24-hour  cc.  Diuretic holiday 2/13.  Currently net -2.9 L since admission  -Creatinine equivocal today at 2.38  -Remains on nasal cannula 2 L NC  -Improved aeration bilateral lung fields.  Entertain 80 mg IV Lasix today  -Most recent TTE 11/16/2024 with LVEF of 42% with global LV hypokinesis, elevated left atrial pressure, severely reduced RV SF, mildly enlarged RV, moderately dilated LA and RA, RVSP 46, severely dilated IVC, moderately increased septal and moderately increased posterior left ventricular wall thickness  -GDMT continued: Bisoprolol  -Jardiance and Aldactone to be discontinued on DC  -Palliative care following.  After discussion with patient and son, they are not wanting to entertain hospice services at this time and follow with palliative outpatient.  Still deciding disposition pending SNF versus home     # Chronic urinary retention  -UCX without growth. Antibiotics discontinued  -Patient to follow with outpatient [facility] urology  -Evaluated urology during this hospitalization with low suspicion for prostatitis  -Patient to be discharged with Graham catheter in place given chronic retention  -To continue finasteride and tamsulosin on DC     # Chronic bilateral  wounds  -Regular daily dressing  -Podiatry consult  -Wound care consult     Other chronic conditions:  # CAD s/p CABG (4/2022)  # A-fib on Eliquis  -Resume home meds with appropriate holding parameters.          Patient seen and discussed with attending physician    Aman Hurtado, DO  Internal Medicine, PGY-III

## 2025-02-14 NOTE — PROGRESS NOTES
Occupational Therapy    OT Treatment    Patient Name: Elgin Marin  MRN: [id number]  Today's Date: 2/14/2025  Time Calculation  Start Time: 1113  Stop Time: 1201  Time Calculation (min): 48 min       3117/3117-A    Assessment:  OT Assessment: Pt pleasant and cooperative. Pt appears with some confusion at times  Prognosis: Good  Evaluation/Treatment Tolerance: Patient limited by fatigue, Patient limited by pain  End of Session Communication: Bedside nurse  End of Session Patient Position: Up in chair, Alarm on  OT Assessment Results: Decreased ADL status, Decreased upper extremity range of motion, Decreased upper extremity strength, Decreased endurance, Decreased functional mobility  Prognosis: Good  Evaluation/Treatment Tolerance: Patient limited by fatigue, Patient limited by pain    Plan:  Treatment Interventions: ADL retraining, Functional transfer training  OT Frequency: 3 times per week  OT Discharge Recommendations: Moderate intensity level of continued care  Equipment Recommended upon Discharge: Wheeled walker  Treatment Interventions: ADL retraining, Functional transfer training  Subjective     Outcome Measures:[facility] Daily Activity  Putting on and taking off regular lower body clothing: A lot  Bathing (including washing, rinsing, drying): A lot  Putting on and taking off regular upper body clothing: A little  Toileting, which includes using toilet, bedpan or urinal: A lot  Taking care of personal grooming such as brushing teeth: A little  Eating Meals: None  Daily Activity - Total Score: 16       02/14/25 1113   OT Last Visit   OT Received On 02/14/25   General   Co-Treatment OT   Co-Treatment Reason safety   Prior to Session Communication Bedside nurse   Patient Position Received   (seated EOB)   Preferred Learning Style verbal;visual   General Comment Pt agreeable to therapy.Pt asking for the BSC   Precautions   Medical Precautions Fall precautions   Precautions Comment bed/chair alarm   Vital Signs    SpO2 95 %  (2L)   Pain Assessment   0-10 (Numeric) Pain Score 0 - No pain   Cognition   Orientation Level Disoriented to time   Grooming   Grooming Level of Assistance Setup   Grooming Where Assessed Chair   Grooming Comments washed face   UE Bathing   UE Bathing Level of Assistance Minimal verbal cues   UE Bathing Where Assessed   (chair)   UE Bathing Comments washed under arms and chest   LE Bathing   LE Bathing Comments Pt lower legs wrapped   UE Dressing   UE Dressing Level of Assistance Minimum assistance   UE Dressing Where Assessed Chair   UE Dressing Comments changed gown   Toileting   Toileting Level of Assistance Maximum assistance   Where Assessed Bedside commode   Toileting Comments one person to steady in stance and anothr peron to complete posterior samaria are   Transfers   Transfer Yes   Transfer 1   Transfer From 1 Bed to   Transfer to 1 Commode-standard   Transfer Device 1 Walker   Transfer Level of Assistance 1 Moderate assistance;+2   Transfers 2   Transfer From 2 Commode-standard to   Transfer to 2 Chair with arms   Technique 2 Sit to stand   Transfer Device 2 Walker   Transfer Level of Assistance 2 Moderate assistance;+2   Trials/Comments 2 pt stood and chair was pulled behind pt.,   IP OT Assessment   OT Assessment Pt pleasant and cooperative. Pt appears with some confusion at times   Prognosis Good   End of Session Communication Bedside nurse   End of Session Patient Position Up in chair;Alarm on   OT Assessment   OT Assessment Results Decreased ADL status;Decreased upper extremity range of motion;Decreased upper extremity strength;Decreased endurance;Decreased functional mobility   Education   Individual(s) Educated Patient   Education Provided Fall precautons   Patient Response to Education Patient/Caregiver Verbalized Understanding of Information   Education Comment Pt would benefit from continued reinforcement   Inpatient Plan   Treatment Interventions ADL retraining;Functional transfer  training   OT Frequency 3 times per week   OT Discharge Recommendations Moderate intensity level of continued care   Equipment Recommended upon Discharge Wheeled walker           Goals:  Encounter Problems       Encounter Problems (Active)       OT Goals       OT Goal 1 (Progressing)       Start:  02/11/25    Expected End:  02/25/25       Pt will complete all bed mobility with SBA safely            OT Goal 2 (Progressing)       Start:  02/11/25    Expected End:  02/25/25       Pt will complete ADL's and mobility with fair stand balance            OT Goal 3 (Progressing)       Start:  02/11/25    Expected End:  02/25/25       Pt with complete all transfers safely with Min A           OT Goal 4 (Progressing)       Start:  02/11/25    Expected End:  02/25/25       Pt will complete UB dressing ADL's with CGA using adaptive device(s) as needed

## 2025-02-15 ENCOUNTER — APPOINTMENT (OUTPATIENT)
Dept: CARDIOLOGY | Facility: HOSPITAL | Age: 85
DRG: 291 | End: 2025-02-15
Payer: MEDICARE

## 2025-02-15 PROBLEM — J16.0 CAP (COMMUNITY ACQUIRED PNEUMONIA) DUE TO CHLAMYDIA SPECIES: Status: ACTIVE | Noted: 2025-02-15

## 2025-02-15 LAB
ACANTHOCYTES BLD QL SMEAR: NORMAL
ALBUMIN SERPL BCP-MCNC: 2.5 G/DL (ref 3.4–5)
ALBUMIN SERPL BCP-MCNC: 2.7 G/DL (ref 3.4–5)
ALBUMIN SERPL BCP-MCNC: 2.7 G/DL (ref 3.4–5)
ANION GAP SERPL CALC-SCNC: 13 MMOL/L (ref 10–20)
ANION GAP SERPL CALC-SCNC: 13 MMOL/L (ref 10–20)
ANION GAP SERPL CALC-SCNC: 14 MMOL/L (ref 10–20)
BASO STIPL BLD QL SMEAR: PRESENT
BUN SERPL-MCNC: 55 MG/DL (ref 6–23)
BUN SERPL-MCNC: 61 MG/DL (ref 6–23)
BUN SERPL-MCNC: 64 MG/DL (ref 6–23)
CALCIUM SERPL-MCNC: 7.4 MG/DL (ref 8.6–10.3)
CALCIUM SERPL-MCNC: 7.5 MG/DL (ref 8.6–10.3)
CALCIUM SERPL-MCNC: 7.7 MG/DL (ref 8.6–10.3)
CHLORIDE SERPL-SCNC: 93 MMOL/L (ref 98–107)
CHLORIDE SERPL-SCNC: 94 MMOL/L (ref 98–107)
CHLORIDE SERPL-SCNC: 95 MMOL/L (ref 98–107)
CHLORIDE UR-SCNC: 35 MMOL/L
CHLORIDE/CREATININE (MMOL/G) IN URINE: 79 MMOL/G CREAT (ref 23–275)
CO2 SERPL-SCNC: 23 MMOL/L (ref 21–32)
CO2 SERPL-SCNC: 24 MMOL/L (ref 21–32)
CO2 SERPL-SCNC: 25 MMOL/L (ref 21–32)
CREAT SERPL-MCNC: 2.39 MG/DL (ref 0.5–1.3)
CREAT SERPL-MCNC: 2.67 MG/DL (ref 0.5–1.3)
CREAT SERPL-MCNC: 2.7 MG/DL (ref 0.5–1.3)
CREAT UR-MCNC: 44.4 MG/DL (ref 20–370)
DACRYOCYTES BLD QL SMEAR: NORMAL
EGFRCR SERPLBLD CKD-EPI 2021: 22 ML/MIN/1.73M*2
EGFRCR SERPLBLD CKD-EPI 2021: 23 ML/MIN/1.73M*2
EGFRCR SERPLBLD CKD-EPI 2021: 26 ML/MIN/1.73M*2
ERYTHROCYTE [DISTWIDTH] IN BLOOD BY AUTOMATED COUNT: 15 % (ref 11.5–14.5)
GLUCOSE BLD MANUAL STRIP-MCNC: 139 MG/DL (ref 74–99)
GLUCOSE BLD MANUAL STRIP-MCNC: 183 MG/DL (ref 74–99)
GLUCOSE BLD MANUAL STRIP-MCNC: 314 MG/DL (ref 74–99)
GLUCOSE BLD MANUAL STRIP-MCNC: 366 MG/DL (ref 74–99)
GLUCOSE BLD MANUAL STRIP-MCNC: 366 MG/DL (ref 74–99)
GLUCOSE BLD MANUAL STRIP-MCNC: 395 MG/DL (ref 74–99)
GLUCOSE SERPL-MCNC: 181 MG/DL (ref 74–99)
GLUCOSE SERPL-MCNC: 262 MG/DL (ref 74–99)
GLUCOSE SERPL-MCNC: 415 MG/DL (ref 74–99)
HCT VFR BLD AUTO: 37.6 % (ref 41–52)
HGB BLD-MCNC: 11.1 G/DL (ref 13.5–17.5)
HOLD SPECIMEN: NORMAL
MAGNESIUM SERPL-MCNC: 2.06 MG/DL (ref 1.6–2.4)
MCH RBC QN AUTO: 26 PG (ref 26–34)
MCHC RBC AUTO-ENTMCNC: 29.5 G/DL (ref 32–36)
MCV RBC AUTO: 88 FL (ref 80–100)
NRBC BLD-RTO: 0 /100 WBCS (ref 0–0)
OSMOLALITY SERPL: 289 MOSM/KG (ref 280–300)
OSMOLALITY UR: 401 MOSM/KG (ref 200–1200)
OVALOCYTES BLD QL SMEAR: NORMAL
PHOSPHATE SERPL-MCNC: 4.4 MG/DL (ref 2.5–4.9)
PHOSPHATE SERPL-MCNC: 4.8 MG/DL (ref 2.5–4.9)
PHOSPHATE SERPL-MCNC: 5 MG/DL (ref 2.5–4.9)
PLATELET # BLD AUTO: 92 X10*3/UL (ref 150–450)
POTASSIUM SERPL-SCNC: 5.5 MMOL/L (ref 3.5–5.3)
POTASSIUM SERPL-SCNC: 5.7 MMOL/L (ref 3.5–5.3)
POTASSIUM UR-SCNC: 21 MMOL/L
POTASSIUM/CREAT UR-RTO: 47 MMOL/G CREAT
RBC # BLD AUTO: 4.27 X10*6/UL (ref 4.5–5.9)
RBC MORPH BLD: NORMAL
SODIUM SERPL-SCNC: 124 MMOL/L (ref 136–145)
SODIUM SERPL-SCNC: 126 MMOL/L (ref 136–145)
SODIUM SERPL-SCNC: 126 MMOL/L (ref 136–145)
SODIUM UR-SCNC: 35 MMOL/L
SODIUM/CREAT UR-RTO: 79 MMOL/G CREAT
VANCOMYCIN SERPL-MCNC: 8 UG/ML (ref 5–20)
WBC # BLD AUTO: 8.2 X10*3/UL (ref 4.4–11.3)

## 2025-02-15 PROCEDURE — 84133 ASSAY OF URINE POTASSIUM: CPT

## 2025-02-15 PROCEDURE — 93922 UPR/L XTREMITY ART 2 LEVELS: CPT

## 2025-02-15 PROCEDURE — 2500000001 HC RX 250 WO HCPCS SELF ADMINISTERED DRUGS (ALT 637 FOR MEDICARE OP)

## 2025-02-15 PROCEDURE — 2500000002 HC RX 250 W HCPCS SELF ADMINISTERED DRUGS (ALT 637 FOR MEDICARE OP, ALT 636 FOR OP/ED)

## 2025-02-15 PROCEDURE — 85027 COMPLETE CBC AUTOMATED: CPT

## 2025-02-15 PROCEDURE — 2500000004 HC RX 250 GENERAL PHARMACY W/ HCPCS (ALT 636 FOR OP/ED): Performed by: INTERNAL MEDICINE

## 2025-02-15 PROCEDURE — 2500000004 HC RX 250 GENERAL PHARMACY W/ HCPCS (ALT 636 FOR OP/ED)

## 2025-02-15 PROCEDURE — 1200000002 HC GENERAL ROOM WITH TELEMETRY DAILY

## 2025-02-15 PROCEDURE — 84132 ASSAY OF SERUM POTASSIUM: CPT

## 2025-02-15 PROCEDURE — P9045 ALBUMIN (HUMAN), 5%, 250 ML: HCPCS | Mod: JZ | Performed by: INTERNAL MEDICINE

## 2025-02-15 PROCEDURE — 80069 RENAL FUNCTION PANEL: CPT

## 2025-02-15 PROCEDURE — 80202 ASSAY OF VANCOMYCIN: CPT

## 2025-02-15 PROCEDURE — 36415 COLL VENOUS BLD VENIPUNCTURE: CPT

## 2025-02-15 PROCEDURE — 2500000005 HC RX 250 GENERAL PHARMACY W/O HCPCS

## 2025-02-15 PROCEDURE — 36600 WITHDRAWAL OF ARTERIAL BLOOD: CPT

## 2025-02-15 PROCEDURE — 82570 ASSAY OF URINE CREATININE: CPT

## 2025-02-15 PROCEDURE — 83735 ASSAY OF MAGNESIUM: CPT

## 2025-02-15 PROCEDURE — 93922 UPR/L XTREMITY ART 2 LEVELS: CPT | Performed by: INTERNAL MEDICINE

## 2025-02-15 PROCEDURE — 99233 SBSQ HOSP IP/OBS HIGH 50: CPT | Performed by: STUDENT IN AN ORGANIZED HEALTH CARE EDUCATION/TRAINING PROGRAM

## 2025-02-15 PROCEDURE — 82947 ASSAY GLUCOSE BLOOD QUANT: CPT

## 2025-02-15 PROCEDURE — 83930 ASSAY OF BLOOD OSMOLALITY: CPT | Mod: STJLAB

## 2025-02-15 PROCEDURE — 94640 AIRWAY INHALATION TREATMENT: CPT

## 2025-02-15 PROCEDURE — 83935 ASSAY OF URINE OSMOLALITY: CPT | Mod: STJLAB

## 2025-02-15 RX ORDER — DEXTROSE 50 % IN WATER (D50W) INTRAVENOUS SYRINGE
25 ONCE
Status: DISCONTINUED | OUTPATIENT
Start: 2025-02-15 | End: 2025-02-15

## 2025-02-15 RX ORDER — SODIUM BICARBONATE 1 MEQ/ML
50 SYRINGE (ML) INTRAVENOUS ONCE
Status: COMPLETED | OUTPATIENT
Start: 2025-02-15 | End: 2025-02-16

## 2025-02-15 RX ORDER — IPRATROPIUM BROMIDE AND ALBUTEROL SULFATE 2.5; .5 MG/3ML; MG/3ML
3 SOLUTION RESPIRATORY (INHALATION) EVERY 2 HOUR PRN
Status: DISCONTINUED | OUTPATIENT
Start: 2025-02-15 | End: 2025-02-21 | Stop reason: HOSPADM

## 2025-02-15 RX ORDER — IPRATROPIUM BROMIDE AND ALBUTEROL SULFATE 2.5; .5 MG/3ML; MG/3ML
3 SOLUTION RESPIRATORY (INHALATION)
Status: DISCONTINUED | OUTPATIENT
Start: 2025-02-15 | End: 2025-02-19

## 2025-02-15 RX ORDER — SODIUM POLYSTYRENE SULFONATE 15 G/60ML
30 SUSPENSION ORAL; RECTAL ONCE
Status: COMPLETED | OUTPATIENT
Start: 2025-02-15 | End: 2025-02-15

## 2025-02-15 RX ORDER — SODIUM BICARBONATE 1 MEQ/ML
50 SYRINGE (ML) INTRAVENOUS ONCE
Status: DISCONTINUED | OUTPATIENT
Start: 2025-02-15 | End: 2025-02-15

## 2025-02-15 RX ORDER — ALBUTEROL SULFATE 0.83 MG/ML
20 SOLUTION RESPIRATORY (INHALATION) ONCE
Status: COMPLETED | OUTPATIENT
Start: 2025-02-15 | End: 2025-02-15

## 2025-02-15 RX ORDER — FUROSEMIDE 10 MG/ML
20 INJECTION INTRAMUSCULAR; INTRAVENOUS ONCE
Status: COMPLETED | OUTPATIENT
Start: 2025-02-15 | End: 2025-02-15

## 2025-02-15 RX ORDER — ATORVASTATIN CALCIUM 40 MG/1
40 TABLET, FILM COATED ORAL NIGHTLY
Status: DISCONTINUED | OUTPATIENT
Start: 2025-02-15 | End: 2025-02-21 | Stop reason: HOSPADM

## 2025-02-15 RX ORDER — DEXTROSE MONOHYDRATE 100 MG/ML
50 INJECTION, SOLUTION INTRAVENOUS CONTINUOUS
Status: DISCONTINUED | OUTPATIENT
Start: 2025-02-15 | End: 2025-02-15

## 2025-02-15 RX ORDER — ALBUMIN HUMAN 50 G/1000ML
25 SOLUTION INTRAVENOUS ONCE
Status: DISCONTINUED | OUTPATIENT
Start: 2025-02-15 | End: 2025-02-15

## 2025-02-15 RX ORDER — ALBUMIN HUMAN 50 G/1000ML
25 SOLUTION INTRAVENOUS ONCE
Status: COMPLETED | OUTPATIENT
Start: 2025-02-15 | End: 2025-02-16

## 2025-02-15 RX ORDER — INSULIN GLARGINE 100 [IU]/ML
5 INJECTION, SOLUTION SUBCUTANEOUS EVERY 24 HOURS
Status: DISCONTINUED | OUTPATIENT
Start: 2025-02-15 | End: 2025-02-16

## 2025-02-15 RX ADMIN — Medication 15 L/MIN: at 01:00

## 2025-02-15 RX ADMIN — FINASTERIDE 5 MG: 5 TABLET, FILM COATED ORAL at 09:16

## 2025-02-15 RX ADMIN — IPRATROPIUM BROMIDE AND ALBUTEROL SULFATE 3 ML: 2.5; .5 SOLUTION RESPIRATORY (INHALATION) at 08:52

## 2025-02-15 RX ADMIN — INSULIN LISPRO 10 UNITS: 100 INJECTION, SOLUTION INTRAVENOUS; SUBCUTANEOUS at 18:43

## 2025-02-15 RX ADMIN — ISOSORBIDE DINITRATE 10 MG: 10 TABLET ORAL at 14:35

## 2025-02-15 RX ADMIN — PIPERACILLIN SODIUM AND TAZOBACTAM SODIUM 3.38 G: 3; .375 INJECTION, SOLUTION INTRAVENOUS at 18:33

## 2025-02-15 RX ADMIN — SODIUM ZIRCONIUM CYCLOSILICATE 10 G: 10 POWDER, FOR SUSPENSION ORAL at 21:07

## 2025-02-15 RX ADMIN — LATANOPROST 1 DROP: 50 SOLUTION OPHTHALMIC at 20:30

## 2025-02-15 RX ADMIN — Medication 4 L/MIN: at 21:14

## 2025-02-15 RX ADMIN — PANTOPRAZOLE SODIUM 40 MG: 40 TABLET, DELAYED RELEASE ORAL at 20:29

## 2025-02-15 RX ADMIN — INSULIN LISPRO 2 UNITS: 100 INJECTION, SOLUTION INTRAVENOUS; SUBCUTANEOUS at 00:04

## 2025-02-15 RX ADMIN — APIXABAN 2.5 MG: 2.5 TABLET, FILM COATED ORAL at 20:30

## 2025-02-15 RX ADMIN — Medication 4 L/MIN: at 09:18

## 2025-02-15 RX ADMIN — Medication 4 L/MIN: at 08:00

## 2025-02-15 RX ADMIN — ACETAMINOPHEN 650 MG: 325 TABLET ORAL at 01:21

## 2025-02-15 RX ADMIN — PIPERACILLIN SODIUM AND TAZOBACTAM SODIUM 3.38 G: 3; .375 INJECTION, SOLUTION INTRAVENOUS at 09:16

## 2025-02-15 RX ADMIN — INSULIN GLARGINE 5 UNITS: 100 INJECTION, SOLUTION SUBCUTANEOUS at 20:25

## 2025-02-15 RX ADMIN — Medication 4 L/MIN: at 05:00

## 2025-02-15 RX ADMIN — PIPERACILLIN SODIUM AND TAZOBACTAM SODIUM 3.38 G: 3; .375 INJECTION, SOLUTION INTRAVENOUS at 01:22

## 2025-02-15 RX ADMIN — ALBUTEROL SULFATE 20 MG: 2.5 SOLUTION RESPIRATORY (INHALATION) at 21:59

## 2025-02-15 RX ADMIN — FUROSEMIDE 20 MG: 10 INJECTION, SOLUTION INTRAVENOUS at 01:22

## 2025-02-15 RX ADMIN — FUROSEMIDE 10 MG/HR: 10 INJECTION, SOLUTION INTRAMUSCULAR; INTRAVENOUS at 14:35

## 2025-02-15 RX ADMIN — TAMSULOSIN HYDROCHLORIDE 0.4 MG: 0.4 CAPSULE ORAL at 09:16

## 2025-02-15 RX ADMIN — BISOPROLOL FUMARATE 5 MG: 5 TABLET ORAL at 09:16

## 2025-02-15 RX ADMIN — INSULIN LISPRO 8 UNITS: 100 INJECTION, SOLUTION INTRAVENOUS; SUBCUTANEOUS at 20:25

## 2025-02-15 RX ADMIN — ATORVASTATIN CALCIUM 40 MG: 40 TABLET, FILM COATED ORAL at 20:30

## 2025-02-15 RX ADMIN — INSULIN LISPRO 2 UNITS: 100 INJECTION, SOLUTION INTRAVENOUS; SUBCUTANEOUS at 09:09

## 2025-02-15 RX ADMIN — ISOSORBIDE DINITRATE 10 MG: 10 TABLET ORAL at 09:16

## 2025-02-15 RX ADMIN — APIXABAN 2.5 MG: 2.5 TABLET, FILM COATED ORAL at 09:16

## 2025-02-15 RX ADMIN — ACETAMINOPHEN 650 MG: 325 TABLET ORAL at 11:34

## 2025-02-15 RX ADMIN — Medication 4 L/MIN: at 21:13

## 2025-02-15 RX ADMIN — ALBUMIN (HUMAN) 25 G: 12.5 SOLUTION INTRAVENOUS at 14:34

## 2025-02-15 RX ADMIN — INSULIN LISPRO 10 UNITS: 100 INJECTION, SOLUTION INTRAVENOUS; SUBCUTANEOUS at 14:43

## 2025-02-15 RX ADMIN — SODIUM POLYSTYRENE SULFONATE 30 G: 15 SUSPENSION ORAL; RECTAL at 23:42

## 2025-02-15 RX ADMIN — VANCOMYCIN HYDROCHLORIDE 750 MG: 750 INJECTION, POWDER, LYOPHILIZED, FOR SOLUTION INTRAVENOUS at 22:56

## 2025-02-15 ASSESSMENT — PAIN - FUNCTIONAL ASSESSMENT
PAIN_FUNCTIONAL_ASSESSMENT: 0-10

## 2025-02-15 ASSESSMENT — COGNITIVE AND FUNCTIONAL STATUS - GENERAL
EATING MEALS: A LITTLE
DRESSING REGULAR UPPER BODY CLOTHING: A LOT
PERSONAL GROOMING: A LITTLE
DRESSING REGULAR LOWER BODY CLOTHING: A LOT
MOVING TO AND FROM BED TO CHAIR: A LOT
HELP NEEDED FOR BATHING: A LOT
DRESSING REGULAR UPPER BODY CLOTHING: A LOT
CLIMB 3 TO 5 STEPS WITH RAILING: TOTAL
TOILETING: A LOT
EATING MEALS: A LITTLE
DRESSING REGULAR LOWER BODY CLOTHING: A LOT
MOBILITY SCORE: 13
CLIMB 3 TO 5 STEPS WITH RAILING: TOTAL
TOILETING: A LOT
WALKING IN HOSPITAL ROOM: A LOT
MOVING FROM LYING ON BACK TO SITTING ON SIDE OF FLAT BED WITH BEDRAILS: A LOT
MOBILITY SCORE: 11
HELP NEEDED FOR BATHING: A LOT
PERSONAL GROOMING: A LITTLE
MOVING TO AND FROM BED TO CHAIR: A LOT
MOVING FROM LYING ON BACK TO SITTING ON SIDE OF FLAT BED WITH BEDRAILS: A LITTLE
TURNING FROM BACK TO SIDE WHILE IN FLAT BAD: A LITTLE
TURNING FROM BACK TO SIDE WHILE IN FLAT BAD: A LOT
STANDING UP FROM CHAIR USING ARMS: A LOT
DAILY ACTIVITIY SCORE: 14
WALKING IN HOSPITAL ROOM: A LOT
DAILY ACTIVITIY SCORE: 14
STANDING UP FROM CHAIR USING ARMS: A LOT

## 2025-02-15 ASSESSMENT — PAIN DESCRIPTION - LOCATION
LOCATION: FOOT
LOCATION: BACK

## 2025-02-15 ASSESSMENT — PAIN SCALES - GENERAL
PAINLEVEL_OUTOF10: 10 - WORST POSSIBLE PAIN
PAINLEVEL_OUTOF10: 4
PAINLEVEL_OUTOF10: 0 - NO PAIN
PAINLEVEL_OUTOF10: 8

## 2025-02-15 ASSESSMENT — PAIN SCALES - PAIN ASSESSMENT IN ADVANCED DEMENTIA (PAINAD): TOTALSCORE: MEDICATION (SEE MAR)

## 2025-02-15 ASSESSMENT — PAIN DESCRIPTION - DESCRIPTORS: DESCRIPTORS: ACHING;SHARP

## 2025-02-15 NOTE — PROGRESS NOTES
Elgin Marin is a 84 y.o. male on day 4 of admission presenting with Acute on chronic systolic heart failure.    Subjective   Overnight, nighttime resident called to bedside for patient in respiratory distress.  Per nursing, he was reportedly receiving vancomycin infusion and became short of breath with diffuse wheezes.  Upon resident evaluation, he was in respiratory distress with labored breathing, sternal retractions and overt wheezing on exam.  He received 40 mg of Solu-Medrol IV, a total of 60 mg IV Lasix, Zofran, placed on nonrebreather.  CXR collected demonstrating worsening of vascular congestion.  After 30 to 45 minutes s/p treatment as listed, patient was weaned back to nasal cannula.    He was seen and examined today.  Overall, he feels equivocal to yesterday.  He still complains of breathing discomfort.  States that he saw podiatry yesterday Dr. Quevedo, who attended to his wounds and received imaging for concern for infection.  Patient is currently concerned that since his legs have been rewrapped he has not had his socks placed back on. He is complaining of bilateral lower extremity pain.  He remains on nasal cannula at 4 L this morning.  He is tolerating p.o. intake appropriately.  Graham catheter is in place draining clear yellow urine.  He has no new symptoms at this time otherwise stated above.    Yesterday, podiatry was consulted due to concern for bilateral lower extremity wounds.  Evaluation demonstrating infected left foot ulcer as well as possible gangrenous changes to the right toes (2, 3, 4).  Requesting plain from x-ray of the right foot.  Cultures were done at bedside.    Objective     Physical Exam  VITALS: I have reviewed the vital signs.   GENERAL: Elderly, chronically ill adult male.  Resting comfortably in bed.  On 4 L nasal cannula. Comfortable appearing without tachypnea, worsening conversational dyspnea.  NEURO: Alert and oriented x2, able to answer questions and follow  "commands. Moves all extremities. Face is symmetric and expressive. No tremor.  EYES: PERRL. No scleral icterus or conjunctival injection. No discharge.   HENT: Normocephalic, atraumatic. Hearing is grossly intact, though he is [place]. Nares grossly patent and without discharge. Mucous membranes moist.    CARDIO: Rhythm regular. Normal rate. No murmur, rub, or gallop.  Given significant bilateral lower extremity edema, pedal pulses are appreciated at 1-2+ with use of Doppler.  Difficult to appreciate bilateral posterior tibial pulses bilaterally with Doppler.  Continues to have bilateral pitting edema to the knee/distal thigh.  PULM: Improved crackles throughout bilateral lung fields. No wheezes, rales, or rhonchi.  Mild conversational dyspnea. No splinting, stridor, or accessory muscle use.    GI: Abdomen is soft and non-distended. No tenderness to palpation. No guarding or rigidity. Normoactive bowel sounds.   SKIN: Warm and dry. Normal turgor.  Bilateral lower extremities grossly edematous with chronic lower extremity wounds.  Gangrenous appearing toes on bilateral lower extremity.  Left dorsal foot with macerated wound and now broken open fluid collection.  No obvious pus formation.  PSYCH: Mood, affect, and interaction is appropriate to the setting. Not internally stimulated.  Last Recorded Vitals  Blood pressure 114/67, pulse 88, temperature 36.4 °C (97.5 °F), temperature source Temporal, resp. rate 18, height 1.778 m (5' 10\"), weight 86.4 kg (190 lb 7.6 oz), SpO2 95%.  Intake/Output last 3 Shifts:  I/O last 3 completed shifts:  In: 1990 (23.1 mL/kg) [P.O.:1640; IV Piggyback:350]  Out: 3300 (38.3 mL/kg) [Urine:3300 (1.1 mL/kg/hr)]  Weight: 86.2 kg     Relevant Results  Scheduled medications  albumin human, 25 g, intravenous, Once  apixaban, 2.5 mg, oral, BID  bisoprolol, 5 mg, oral, Daily  [Held by provider] empagliflozin, 25 mg, oral, Daily  finasteride, 5 mg, oral, Daily  [Held by provider] insulin glargine, 10 " Units, subcutaneous, BID  insulin lispro, 0-10 Units, subcutaneous, q4h  ipratropium-albuteroL, 3 mL, nebulization, q6h  isosorbide dinitrate, 10 mg, oral, BID  latanoprost, 1 drop, Both Eyes, Nightly  oxygen, , inhalation, Continuous - Inhalation  oxygen, , inhalation, Continuous - Inhalation  pantoprazole, 40 mg, oral, Nightly  piperacillin-tazobactam, 3.375 g, intravenous, q8h  [Held by provider] spironolactone, 12.5 mg, oral, q24h BRE  tamsulosin, 0.4 mg, oral, Daily  [Held by provider] torsemide, 80 mg, oral, Daily  venlafaxine, 37.5 mg, oral, Once      Continuous medications  furosemide, 10 mg/hr      PRN medications  PRN medications: acetaminophen, dextrose, dextrose, glucagon, glucagon, ipratropium-albuteroL, vancomycin  Results from last 7 days   Lab Units 02/15/25  0705 02/14/25  0556 02/13/25  0616   WBC AUTO x10*3/uL 8.2 6.0 6.0   RBC AUTO x10*6/uL 4.27* 4.31* 4.39*   HEMOGLOBIN g/dL 11.1* 11.0* 11.2*     Results from last 7 days   Lab Units 02/15/25  0705 02/14/25  0556 02/13/25  0616 02/11/25  0524 02/10/25  2057   SODIUM mmol/L 126* 128* 131*   < > 134*   POTASSIUM mmol/L 5.5* 5.3 5.3   < > 4.8   CHLORIDE mmol/L 95* 97* 100   < > 102   CO2 mmol/L 24 25 25   < > 23   BUN mg/dL 55* 54* 53*   < > 49*   CREATININE mg/dL 2.39* 2.38* 2.39*   < > 2.13*   CALCIUM mg/dL 7.7* 7.7* 7.8*   < > 8.5*   PHOSPHORUS mg/dL 4.4 4.5 5.1*   < >  --    MAGNESIUM mg/dL 2.06 2.35 2.28   < >  --    BILIRUBIN TOTAL mg/dL  --   --   --   --  1.0   ALT U/L  --   --   --   --  7*   AST U/L  --   --   --   --  18    < > = values in this interval not displayed.       XR chest 1 view    Result Date: 2/15/2025  Interpreted By:  Meaghan Ba, STUDY: XR CHEST 1 VIEW;  2/14/2025 11:59 pm   INDICATION: Signs/Symptoms:Hypoxia     COMPARISON: Chest x-ray 02/10/2025   ACCESSION NUMBER(S): [id number]   ORDERING CLINICIAN: AMNA WARREN   TECHNIQUE: Portable upright frontal view of the chest was obtained .   FINDINGS: The heart is  enlarged. The patient is status post open heart surgery. There is interval increase in the vascular congestion.   There are small bilateral pleural effusions. There are bibasilar airspace opacities. No pneumothorax.   Suture anchors are noted at the right humeral head.       1.  Cardiomegaly. Interval increase in the vascular congestion. Small bilateral pleural effusions. Bibasilar airspace opacities, may be secondary to atelectasis or pneumonia.       MACRO: None.   Signed by: Meaghan Ba 2/15/2025 1:20 AM Dictation workstation:   [id number]    XR foot right 3+ views    Result Date: 2/14/2025  These images are not reportable by radiology and will not be interpreted by  Radiologists.      Assessment/Plan   Assessment & Plan  Acute on chronic systolic heart failure    CAP (community acquired pneumonia) due to Chlamydia species    Mr Elgin Marin is an 84 year old male patient with previous medical history of CAD status post CABG in April 2022, atrial fibrillation on Eliquis, chronic combined systolic and diastolic heart failure, HTN, CKD with baseline Cr ~2.0,dyslipidemia, cholecystectomy (10/2015) admitted with a diagnosis of UTI. He received rocephin in the ED. Patient is NOT medically ready for discharge     # AHRF  # CHFe, C/F cardiorenal syndrome  # Combined systolic and diastolic heart failure  # Profound hypoalbuminemia  -24-hour UO 2.8 L cc.  Received 80 mg IV diuresis during the day yesterday, 60 mg IV Lasix overnight 2/2 acute hypoxia and worsening respiratory status.  Oxygen requirements increased to 4 L nasal cannula this morning.  Required nonrebreather last night  -Creatinine equivocal today at 2.39  -Received Solu-Medrol last night given concern for?  COPD flare: Likely contributing to poor respiratory status as steroids likely increasing volume overload  -Last TTE 11/2024: EF 42%, global hypokinesis LV with minor regional variations, indeterminate LV DF, severely reduced RV SF, mildly  enlarged RV, moderately dilated LA and RA, RVSP 46, severely dilated IVC, moderately increased septal and moderately increased posterior left ventricular wall thickness  -Will consider repeat TTE to assess chamber functionality  -Given persistent respiratory distress despite diuresis for volume overload, consult nephrology for recommendations regarding Lasix gtt.  -Nephrology: Plan for Lasix gtt at 10 mg/h with addition of albumin infusion: Goal 1 to 2 L diuresis per day  -RFP every 12 hours to trend renal function and electrolytes  -GDMT continued: Bisoprolol, Isordil  -Jardiance and Aldactone to be discontinued on DC, both currently held  -Palliative care following.  After discussion with patient and son, they are not wanting to entertain hospice services at this time and follow with palliative outpatient.  Still deciding disposition pending SNF versus home     # Hyperkalemia  # Hyponatremia, acute  -Admission sodium 134, currently 126 consistent with acute hyponatremia  -Suspect possible pseudohyponatremia in the setting of hyperglycemia (variable sugars with highest a.m. sugars 366), however also suspect in the setting of volume overload  -For clarity, urine electrolytes, serum and urine osms collected  -Potassium has been slowly increasing over the last several days with a.m. reading 5.5.  No hemolysis detected.  Not entertaining albuterol or Lokelma given indication for Lasix gtt.  -Nephrology consulted    # Left foot cellulitis  # Gangrenous right toes, c/f ?OME  # Poor peripheral pulses  # Chronic lower extremity wounds  -Evaluated by podiatry who obtained right foot x-ray.  Unable to view images at this time  -No leukocytosis.  ID following: Continue vancomycin (renally dosed) and Zosyn  -Wound cultures positive for abundant gram-positive cocci  -Blood cultures pending  -Prior wound cultures 11/12 positive for Pseudomonas aeruginosa, pansensitive  -Doppler required for pulses, ordered TANISHA. Holding off on  vascular consult at this time  -Cont. Wound care  -Hoping better volume control improves pain in LE    # Chronic urinary retention  -UCX without growth. Antibiotics discontinued  -Patient to follow with outpatient [facility] urology  -Evaluated urology during this hospitalization with low suspicion for prostatitis  -Patient to be discharged with Graham catheter in place given chronic retention  -To continue finasteride and tamsulosin on DC       Other chronic conditions:  # CAD s/p CABG (4/2022)  # A-fib on Eliquis  -Resume home meds with appropriate holding parameters.          Patient seen and discussed with attending physician     Aman Hurtado, DO  Internal Medicine, PGY-III

## 2025-02-15 NOTE — SIGNIFICANT EVENT
Overnight his nurse reached out to us saying patient is complaining of shortness of breath and chest pain with no radiation at 2241. When we arrive and evaluates the patient he states that he is having hard breathing with worsening shortness of breath, wheezing and he was in respiratory distress,but he denied chest pain.He also reported nausea. Otherwise he denies headache or blurring of vision.    Vitals at time of arrival: /60, , RR 30, SpO2 93%-94% on 4L NC.    On exam, generally he was in respiratory distress with use of accessory muscles of breathing and audible wheezing. No stridor.      Constitutional: He was restless and in respiratory distress with use of accessory muscles of breathing and has audible wheezing, ANO x 2 and on 4L nasal cannula.   Head and Face: Atraumatic, normocephalic   Eyes: Normal external exam, EOMI  ENT: Normal external inspection of ears and nose.  Cardiovascular:  S1/S2, no murmurs, rubs, or gallops.   Pulmonary: The chest is full of expiratory wheezes and inspiratory crackles  Abdomen: soft, mild suprapubic tenderness, nondistended, no guarding rigidity or rebound tenderness  MSK: Chronic bilateral wounds, compression wraps in place up to the knees  Neurology-ANO x 2, cranial nerves intact, no gross motor or sensory deficits were appreciated.   He was treated with breathing treatment with Duo-neb and he was put on non-rebreather face mask and received solu-medrol 40 mg IV, lasix 40 mg IV and Zofran 4 mg IV.   Labs results- Troponin 282, Initial ABG showed PH-7.27, PCO2 55, PO2 35 and normal Bicarb, the repeat ABG shows PH 7.28, PCO2 52, PO2 172 and normal bicarb.    CXR - Cardiomegaly. Interval increase in the vascular congestion. Small  bilateral pleural effusions. Bibasilar airspace opacities, may be secondary to atelectasis or pneumonia.     After 30 minutes of receiving the IV lasix, solumedrol and the breathing treatment patient became more calm and his breathing  improved ( not in distress) and patient also stated he feels much better.   Dr Fulton called and updated the son about the overnight problem and the things done for the patient.     Artemio Rivas MD  PGY-1 Internal Medicine

## 2025-02-15 NOTE — CONSULTS
Infectious Disease Consult    PATIENT NAME: Elgin Marin    MRN: [id number]  SERVICE DATE:  2/15/2025   SERVICE TIME:  11:41 AM    SIGNATURE: Ewa Ribera MD    PRIMARY CARE PHYSICIAN: Tony Hadley MD  REASON FOR CONSULT: Right toes gangrene, left foot wound infection  REQUESTING PHYSICIAN:          ASSESSMENT :   -Left foot wound infection  -Left foot cellulitis  -Right toes dry gangrene  -MRSA carrier  -History of CAD, CHF, CKD, hypertension  -Allergic to sulfa with hives    PLAN:  -Follow-up wound culture  -Continue vancomycin and Zosyn empirically  -Vancomycin dosing per pharmacy and closely monitor renal function in the setting of CKD  -Closely monitor for antibiotics side effects including rash, Diarrhea/CDI, thrombocytopenia, ISIAH, etc.      Will continue to follow.          HPI  84-year-old male with past medical history as listed below who I was asked to see for lower extremities wound infection.  The patient was sent from nursing home due to shortness of breath, found to have left foot ulcer infection and multiple right toes gangrene.  No leukocytosis.  Wound and blood cultures were sent    PAST MEDICAL HISTORY:   Past Medical History:   Diagnosis Date    Atypical chest pain 04/15/2023    Bilateral lower leg cellulitis 01/25/2024    Chest pain 03/18/2023    Chronic obstructive pulmonary disease, unspecified 11/13/2024    Fall 03/21/2023    Generalized abdominal pain 04/15/2023    Hand pain 02/11/2025    Heart failure with reduced ejection fraction 04/14/2023    History of cardiac catheterization 04/01/2022    History of diabetes mellitus 02/11/2025    Hyperlipidemia, unspecified 11/14/2022    Dyslipidemia    Localized swelling, mass and lump, lower limb, bilateral 11/21/2024    Muscle weakness 09/07/2023    Myocardial disorder (Multi) 02/11/2025    Nausea and vomiting 02/11/2025    Open wounds involving multiple regions of lower extremity 04/15/2023    Other forms of dyspnea 08/09/2018     Dyspnea on exertion    Other specified abnormal findings of blood chemistry 11/13/2024    Personal history of other diseases of the circulatory system 12/08/2014    History of hypertension    Personal history of other endocrine, nutritional and metabolic disease     History of diabetes mellitus    Personal history of other specified conditions     History of syncope    Presence of intraocular lens 07/20/2019    Pseudophakia of right eye    Presence of intraocular lens 07/20/2019    Pseudophakia of left eye    Repeated falls 11/21/2024    Tachycardia 04/04/2022    Unspecified atrial fibrillation (Multi) 11/14/2022    Atrial fibrillation    Weakness 02/11/2025     PAST SURGICAL HISTORY:   Past Surgical History:   Procedure Laterality Date    CATARACT EXTRACTION      CHOLECYSTECTOMY  09/09/2015    Cholecystectomy    CORNEAL TRANSPLANT      CORONARY ANGIOPLASTY WITH STENT PLACEMENT  10/06/2015    Cath Stent Placement    CORONARY ARTERY BYPASS GRAFT  10/06/2015    CABG    OTHER SURGICAL HISTORY  09/09/2015    Wrist Carpectomy    ROTATOR CUFF REPAIR  09/09/2015    Rotator Cuff Repair     FAMILY HISTORY: No family history on file.  SOCIAL HISTORY:   Social History     Tobacco Use    Smoking status: Never    Smokeless tobacco: Never   Substance Use Topics    Alcohol use: Never    Drug use: Never     CURRENT ALLERGIES:   Allergies as of 02/10/2025 - Reviewed 02/10/2025   Allergen Reaction Noted    Metoprolol Hives 01/22/2024    Oxycodone-aspirin Dizziness, Syncope, and Nausea/vomiting 01/12/2024    Sulfa (sulfonamide antibiotics) Hives 01/21/2024    Sulfur Swelling 01/10/2025    Tramadol Hallucinations 11/15/2024    Lisinopril Cough 01/22/2024    Warfarin Unknown 01/12/2024     MEDICATIONS:    Current Facility-Administered Medications:     acetaminophen (Tylenol) tablet 650 mg, 650 mg, oral, q6h PRN, Aman Hurtado DO, 650 mg at 02/15/25 1134    apixaban (Eliquis) tablet 2.5 mg, 2.5 mg, oral, BID, Aman Hurtado DO, 2.5 mg  at 02/15/25 0916    bisoprolol (Zebeta) tablet 5 mg, 5 mg, oral, Daily, Aman Hurtado DO, 5 mg at 02/15/25 0916    dextrose 50 % injection 12.5 g, 12.5 g, intravenous, q15 min PRN, Artemio Rivas MD    dextrose 50 % injection 25 g, 25 g, intravenous, q15 min PRN, Artemio Rivas MD    [Held by provider] empagliflozin (Jardiance) tablet 25 mg, 25 mg, oral, Daily, Aman Hurtado DO    finasteride (Proscar) tablet 5 mg, 5 mg, oral, Daily, Aman Hurtado DO, 5 mg at 02/15/25 0916    glucagon (Glucagen) injection 1 mg, 1 mg, intramuscular, q15 min PRN, Artemio Rivas MD    glucagon (Glucagen) injection 1 mg, 1 mg, intramuscular, q15 min PRN, Artemio Rivas MD    [Held by provider] insulin glargine (Lantus) injection 10 Units, 10 Units, subcutaneous, BID, Aman Hurtado DO, 10 Units at 02/11/25 1340    insulin lispro injection 0-10 Units, 0-10 Units, subcutaneous, q4h, Aman Hurtado DO, 2 Units at 02/15/25 0909    ipratropium-albuteroL (Duo-Neb) 0.5-2.5 mg/3 mL nebulizer solution 3 mL, 3 mL, nebulization, q6h, Aman Hurtado DO, 3 mL at 02/15/25 0852    ipratropium-albuteroL (Duo-Neb) 0.5-2.5 mg/3 mL nebulizer solution 3 mL, 3 mL, nebulization, q2h PRN, Aman Hurtado DO    isosorbide dinitrate (Isordil) tablet 10 mg, 10 mg, oral, BID, Aman Hurtado DO, 10 mg at 02/15/25 0916    latanoprost (Xalatan) 0.005 % ophthalmic solution 1 drop, 1 drop, Both Eyes, Nightly, Aman Hurtado DO, 1 drop at 02/14/25 2135    oxygen (O2) therapy, , inhalation, Continuous - Inhalation, Margi Pérez MD, Last Rate: 240,000 mL/hr at 02/15/25 0918, 4 L/min at 02/15/25 0918    oxygen (O2) therapy, , inhalation, Continuous - Inhalation, Artemio Rivas MD, Last Rate: 240,000 mL/hr at 02/15/25 0800, 4 L/min at 02/15/25 0800    pantoprazole (ProtoNix) EC tablet 40 mg, 40 mg, oral, Nightly, Aman Hurtado DO, 40 mg at 02/14/25 2134    piperacillin-tazobactam (Zosyn) 3.375 g in dextrose (iso) IV 50 mL, 3.375 g, intravenous, q8h,  Leda Hunter, Last Rate: 0 mL/hr at 02/15/25 0531, 3.375 g at 02/15/25 0916    [Held by provider] spironolactone (Aldactone) tablet 12.5 mg, 12.5 mg, oral, q24h BRE, Aman Hurtado, , 12.5 mg at 02/12/25 0917    tamsulosin (Flomax) 24 hr capsule 0.4 mg, 0.4 mg, oral, Daily, Aman Hurtado DO, 0.4 mg at 02/15/25 0916    [Held by provider] torsemide (Demadex) tablet 80 mg, 80 mg, oral, Daily, Aman Hurtado DO    vancomycin (Vancocin) pharmacy to dose - pharmacy monitoring, , miscellaneous, Daily PRN, Aman Hurtado DO    venlafaxine (Effexor) tablet 37.5 mg, 37.5 mg, oral, Once, Artemio Rivas MD         PHYSICAL EXAM:  Patient Vitals for the past 24 hrs:   BP Temp Temp src Pulse Resp SpO2 Weight   02/15/25 0918 -- -- -- -- -- 98 % --   02/15/25 0800 100/54 35.9 °C (96.6 °F) Temporal 80 18 98 % --   02/15/25 0700 -- -- -- -- -- -- 86.4 kg (190 lb 7.6 oz)   02/15/25 0428 97/51 36.2 °C (97.2 °F) Temporal 72 16 96 % --   02/15/25 0100 -- -- -- -- -- 95 % --   02/15/25 0015 126/61 36.2 °C (97.2 °F) Temporal 104 22 100 % --   02/14/25 2310 134/76 -- -- (!) 115 26 94 % --   02/14/25 2305 151/64 -- -- 108 26 98 % --   02/14/25 2300 146/81 36.3 °C (97.3 °F) Temporal 89 24 96 % --   02/14/25 1941 94/59 36.7 °C (98.1 °F) Temporal 71 16 98 % --   02/14/25 1549 113/57 36.9 °C (98.4 °F) -- 72 19 99 % --   02/14/25 1300 106/56 36.8 °C (98.2 °F) -- 74 18 98 % --     Body mass index is 27.33 kg/m².    Lower extremities pictures as below              Labs:  Lab Results   Component Value Date    WBC 8.2 02/15/2025    HGB 11.1 (L) 02/15/2025    HCT 37.6 (L) 02/15/2025    MCV 88 02/15/2025    PLT 92 (L) 02/15/2025     Lab Results   Component Value Date    GLUCOSE 181 (H) 02/15/2025    CALCIUM 7.7 (L) 02/15/2025     (L) 02/15/2025    K 5.5 (H) 02/15/2025    CO2 24 02/15/2025    CL 95 (L) 02/15/2025    BUN 55 (H) 02/15/2025    CREATININE 2.39 (H) 02/15/2025   ESR: --  Lab Results   Component Value Date    SEDRATE 33 (H)  "02/14/2025     Lab Results   Component Value Date    CRP 3.06 (H) 02/14/2025     Lab Results   Component Value Date    ALT 7 (L) 02/10/2025    AST 18 02/10/2025    ALKPHOS 84 02/10/2025    BILITOT 1.0 02/10/2025       DATA:   Diagnostic tests reviewed for today's visit:    Labs this admission reviewed  Imagings this admission reviewed  Cultures: Reviewed        Thank you so much for this consultation         Ewa Ribera MD.   Infectious Disease Attending        This note was partially created using voice recognition software and is inherently subject to errors including those of syntax and \"sound-alike\" substitutions which may escape proofreading. In such instances, original meaning may be extrapolated by contextual derivation       "

## 2025-02-15 NOTE — NURSING NOTE
2230: Pt sitting in chair, states of sudden onset of sharp chest pain radiating to back with SOB. O2 sat 95% on 2LN, RR 20.   2240:Teaching service notifed  2245: Pt assisted back to bed with 2 assist and serastep, tolerated well. EKG obtained.             Dr ELANA Rivas in to see pt, pt. Pt now with labored breathing, and audible             Wheezing,   2250: Dr ANGELICA Pérez in to see pt. Aerosol treatment given, IV lasix, solumedrol and zofran  2315: pt placed on nonrebreather per RT  2330: CXR and ABG obtained. Drs remain at bedside.  2345: pt 100% on 15L nonrebreather mask. Denies CP or SOB at present. Resp remain            Labored.  0035: Pt resting with decreased labored breathing, denies CP or Sob at present..  0045: Pt has mask removed, blowing near chin with O2 sat of 88%. Nonrebreather removed and O2 placed at 4lnc, sats at 98%. Pt medicated for back and leg pain.

## 2025-02-15 NOTE — CONSULTS
INPATIENT NEPHROLOGY CONSULT NOTE        CONSULT: Nephrology SERVICE    PATIENT NAME: Elgin Marin    MRN: [id number]  DATE of SERVICE: February 15, 2025  TIME of SERVICE: 12:22 PM      REASON FOR CONSULT: ISIAH, hypervolemia   REQUESTING PHYSICIAN: Dr. Doyle  PRIMARY CARE PHYSICIAN: Tony Hadley MD    Covering for Dr. Stauffer    HPI:  Mr. Marin is a 84 y.o. male who presented to [facility] February 11, 2025 for evaluation of worsening shortness of breath, worsening bilateral lower extremity swelling, dysuria.    Patient with past medical history significant for chronic kidney disease stage IIIb with baseline serum creatinine of 2.1 mg deciliter, EGFR 30 mL/min follows with Dr. Stauffer, history of coronary artery disease status post CABG April 2022, atrial fibrillation on Eliquis, chronic combined systolic and diastolic heart failure, hypertension, hyperlipidemia.     This admission patient presented to [facility] with multiple complaints including abdominal pain with dysuria diagnosed with acute urinary retention, acute urinary tract infection required Graham catheter insertion with improvement abdominal pain, acute on chronic hypoxic respiratory failure managed with IV furosemide however remains with significant hypervolemia.  Course complicated with electrolyte abnormalities including hyponatremia with sodium level dropped to 126 from 131.  Hyperkalemia potassium up to 5.7, hypochloremia chloride 93, worsening kidney function, worsening hyperglycemia.   Patient seen and evaluated at bedside.  Reports pain and discomfort from lower extremity swelling and cellulitis.  Status post blood transfusion.  Graham catheter in place.      ASSESSMENT AND PLAN:  Acute kidney injury superimposed on chronic kidney disease stage IIIb:  -- Baseline serum creatinine around 2 mg deciliter with a GFR of 30 mL/min per 1.73 M2, follows with  primary nephrologist Dr. Stauffer.  -- Serum creatinine up trended to 2.8 mg deciliter BUN of 61 GFR dropped to 23 mL/min per 1.73 m2.   --Acute kidney injury is likely hemodynamically mediated due to decreased effective circulatory volume from acute heart failure exacerbation, with a probable infectious component.    Hyperkalemia: Likely secondary to ISIAH, venous congestion  -Spironolactone, Jardiance on hold     Hyponatremia likely hypervolemic, diuretics related    Bilateral lower extremity cellulitis left worse than right wound culture positive for gram-positive cocci    Relative hypotension:  -- Spironolactone, Jardiance on hold    Acute urinary retention managed with Graham catheter placement.     Kidney cyst 1.1 cm left kidney, benign, to continue active surveillance as an outpatient.    Plan:  1.  To check urine electrolytes, to quantify protein to creatinine ratio  2.  To start IV Lasix drip at 10 mg/h  3.  Profound hypoalbuminemia, third spacing to combine with IV albumin infusion at 30 mL/h  4.  To dose vancomycin to daily trough  5.  Regarding hyperkalemia management to add Lokelma for potassium more than 5.5.  6.  Worsening hyperglycemia likely secondary to IV Lasix drip.  Insulin management per primary care team truly appreciate recommendations.  7.  Strict input and output to guide volume management  8.  Relative hypotension, holding parameters added to Imdur.  Spironolactone on hold  Will follow, thank you!    I sincerely, thank you Dr. Doyle for this opportunity to participate in the care of your patient, I will follow from Nephrology point view!    PAST MEDICAL HISTORY:    Past Medical History:   Diagnosis Date    Atypical chest pain 04/15/2023    Bilateral lower leg cellulitis 01/25/2024    Chest pain 03/18/2023    Chronic obstructive pulmonary disease, unspecified 11/13/2024    Fall 03/21/2023    Generalized abdominal pain 04/15/2023    Hand pain 02/11/2025    Heart failure with reduced ejection  fraction 04/14/2023    History of cardiac catheterization 04/01/2022    History of diabetes mellitus 02/11/2025    Hyperlipidemia, unspecified 11/14/2022    Dyslipidemia    Localized swelling, mass and lump, lower limb, bilateral 11/21/2024    Muscle weakness 09/07/2023    Myocardial disorder (Multi) 02/11/2025    Nausea and vomiting 02/11/2025    Open wounds involving multiple regions of lower extremity 04/15/2023    Other forms of dyspnea 08/09/2018    Dyspnea on exertion    Other specified abnormal findings of blood chemistry 11/13/2024    Personal history of other diseases of the circulatory system 12/08/2014    History of hypertension    Personal history of other endocrine, nutritional and metabolic disease     History of diabetes mellitus    Personal history of other specified conditions     History of syncope    Presence of intraocular lens 07/20/2019    Pseudophakia of right eye    Presence of intraocular lens 07/20/2019    Pseudophakia of left eye    Repeated falls 11/21/2024    Tachycardia 04/04/2022    Unspecified atrial fibrillation (Multi) 11/14/2022    Atrial fibrillation    Weakness 02/11/2025       PAST SURGICAL HISTORY:    Past Surgical History:   Procedure Laterality Date    CATARACT EXTRACTION      CHOLECYSTECTOMY  09/09/2015    Cholecystectomy    CORNEAL TRANSPLANT      CORONARY ANGIOPLASTY WITH STENT PLACEMENT  10/06/2015    Cath Stent Placement    CORONARY ARTERY BYPASS GRAFT  10/06/2015    CABG    OTHER SURGICAL HISTORY  09/09/2015    Wrist Carpectomy    ROTATOR CUFF REPAIR  09/09/2015    Rotator Cuff Repair       FAMILY HISTORY:  No family history on file.    SOCIAL HISTORY:    Social History     Tobacco Use    Smoking status: Never    Smokeless tobacco: Never   Substance Use Topics    Alcohol use: Never    Drug use: Never       MEDICATIONS:  Prior to Admission Medications:  Medications Prior to Admission   Medication Sig Dispense Refill Last Dose/Taking    apixaban (Eliquis) 2.5 mg tablet  Take 1 tablet (2.5 mg) by mouth 2 times a day. 60 tablet 3 2/10/2025 Evening    bisoprolol (Zebeta) 5 mg tablet Take 1 tablet (5 mg) by mouth once daily.   Past Week    chlorhexidine (Hibiclens) 4 % external liquid Apply 1 Application topically once daily as needed for wound care.   Past Week    empagliflozin (Jardiance) 25 mg Take 1 tablet (25 mg) by mouth once daily.   Past Week    finasteride (Proscar) 5 mg tablet Take 1 tablet (5 mg) by mouth once daily.   Past Week    insulin glargine (Lantus U-100 Insulin) 100 unit/mL injection Inject 15 Units under the skin 2 times a day.   Past Week    insulin lispro 100 unit/mL injection Inject 4-6 Units under the skin 3 times a day before meals. 4 units with breakfast, 4 units with lunch, and 6 units with dinner   Past Week    latanoprost (Xalatan) 0.005 % ophthalmic solution Administer 1 drop into both eyes once daily at bedtime.   Past Week    pantoprazole (ProtoNix) 40 mg EC tablet Take 1 tablet (40 mg) by mouth once daily at bedtime.   2/10/2025 Evening    spironolactone (Aldactone) 25 mg tablet Take 0.5 tablets (12.5 mg) by mouth once every 24 hours. 15 tablet 0 Past Week    tamsulosin (Flomax) 0.4 mg 24 hr capsule Take 1 capsule (0.4 mg) by mouth once daily. 30 capsule 0 Past Week    terbinafine (LamISIL) 1 % cream Apply 1 Application topically 2 times a day.   Past Week    torsemide 40 mg tablet Take 80 mg by mouth once daily. 60 tablet 0 Past Week    white petrolatum (Aquaphor) 41 % ointment ointment Apply 1 Application topically once daily. 7 g 0 Past Week    acetaminophen (Tylenol) 500 mg tablet Take 2 tablets (1,000 mg) by mouth 2 times a day as needed for mild pain (1 - 3).   Unknown    hydrALAZINE (Apresoline) 10 mg tablet Take 1 tablet (10 mg) by mouth 2 times a day. Hold for SBP <110 (Patient not taking: Reported on 2/11/2025) 60 tablet 0 Not Taking    insulin glargine (Lantus U-100 Insulin) 100 unit/mL injection Inject 10 Units under the skin once daily in  "the morning. (Patient not taking: Reported on 2/11/2025) 3 mL 0 Not Taking    insulin lispro 100 unit/mL injection Inject 0-10 Units under the skin 4 times a day before meals. Take as directed per insulin instructions. (Patient not taking: Reported on 2/11/2025)   Not Taking    ipratropium-albuteroL (Duo-Neb) 0.5-2.5 mg/3 mL nebulizer solution Take 3 mL by nebulization 3 times a day. (Patient not taking: Reported on 2/11/2025) 300 mL 0 Not Taking    ipratropium-albuteroL (Duo-Neb) 0.5-2.5 mg/3 mL nebulizer solution Take 3 mL by nebulization every 2 hours if needed for shortness of breath. (Patient not taking: Reported on 2/11/2025)   Not Taking    isosorbide dinitrate (Isordil) 10 mg tablet Take 1 tablet (10 mg) by mouth 2 times a day. Hold for SBP <110 Do not fill before November 22, 2024. (Patient not taking: Reported on 2/11/2025) 60 tablet 0 Not Taking    NIFEdipine ER (Adalat CC) 30 mg 24 hr tablet Take 1 tablet (30 mg) by mouth once daily in the morning. Take before meals. Do not crush, chew, or split. Hold for SBP <110 (Patient not taking: Reported on 2/11/2025)   Not Taking    oxygen (O2) gas therapy Inhale 1 each continuously.   Unknown    pen needle, diabetic 31 gauge x 5/16\" needle Use to inject insulin 1 to 4 times daily as directed 100 each 1 Unknown       INPATIENT MEDICATIONS:    Current Facility-Administered Medications:     acetaminophen (Tylenol) tablet 650 mg, 650 mg, oral, q6h PRN, Aman Hurtado DO, 650 mg at 02/15/25 1134    albumin human 5 % infusion 25 g, 25 g, intravenous, Once, Pascual Parada MD    apixaban (Eliquis) tablet 2.5 mg, 2.5 mg, oral, BID, Aman Hurtado DO, 2.5 mg at 02/15/25 0916    bisoprolol (Zebeta) tablet 5 mg, 5 mg, oral, Daily, Aman Hurtado DO, 5 mg at 02/15/25 0916    dextrose 50 % injection 12.5 g, 12.5 g, intravenous, q15 min PRN, Artemio Rivas MD    dextrose 50 % injection 25 g, 25 g, intravenous, q15 min PRN, Artemio Rivas MD    [Held by provider] " empagliflozin (Jardiance) tablet 25 mg, 25 mg, oral, Daily, Aman Hurtado DO    finasteride (Proscar) tablet 5 mg, 5 mg, oral, Daily, Aman Hurtado DO, 5 mg at 02/15/25 0916    furosemide (Lasix) 500 mg in 50 mL (10 mg/mL) infusion, 10 mg/hr, intravenous, Continuous, Pascual Parada MD    glucagon (Glucagen) injection 1 mg, 1 mg, intramuscular, q15 min PRN, Artemio Rivas MD    glucagon (Glucagen) injection 1 mg, 1 mg, intramuscular, q15 min PRN, Artemio Rivas MD    [Held by provider] insulin glargine (Lantus) injection 10 Units, 10 Units, subcutaneous, BID, Aman Hurtado DO, 10 Units at 02/11/25 1340    insulin lispro injection 0-10 Units, 0-10 Units, subcutaneous, q4h, Aman Hurtado DO, 2 Units at 02/15/25 0909    ipratropium-albuteroL (Duo-Neb) 0.5-2.5 mg/3 mL nebulizer solution 3 mL, 3 mL, nebulization, q6h, Aman Hurtado DO, 3 mL at 02/15/25 0852    ipratropium-albuteroL (Duo-Neb) 0.5-2.5 mg/3 mL nebulizer solution 3 mL, 3 mL, nebulization, q2h PRN, Aman Hurtado DO    isosorbide dinitrate (Isordil) tablet 10 mg, 10 mg, oral, BID, Aman Hurtado DO, 10 mg at 02/15/25 0916    latanoprost (Xalatan) 0.005 % ophthalmic solution 1 drop, 1 drop, Both Eyes, Nightly, Aman Hurtado DO, 1 drop at 02/14/25 2135    oxygen (O2) therapy, , inhalation, Continuous - Inhalation, Margi Pérez MD, Last Rate: 240,000 mL/hr at 02/15/25 0918, 4 L/min at 02/15/25 0918    oxygen (O2) therapy, , inhalation, Continuous - Inhalation, Artemio Rivas MD, Last Rate: 240,000 mL/hr at 02/15/25 0800, 4 L/min at 02/15/25 0800    pantoprazole (ProtoNix) EC tablet 40 mg, 40 mg, oral, Nightly, Aman Hurtado DO, 40 mg at 02/14/25 2134    piperacillin-tazobactam (Zosyn) 3.375 g in dextrose (iso) IV 50 mL, 3.375 g, intravenous, q8h, Leda Hunter, Last Rate: 0 mL/hr at 02/15/25 0531, 3.375 g at 02/15/25 0916    [Held by provider] spironolactone (Aldactone) tablet 12.5 mg, 12.5 mg, oral, q24h BRE, Aman Hurtado DO, 12.5 mg  at 02/12/25 0917    tamsulosin (Flomax) 24 hr capsule 0.4 mg, 0.4 mg, oral, Daily, Aman Hurtado DO, 0.4 mg at 02/15/25 0916    [Held by provider] torsemide (Demadex) tablet 80 mg, 80 mg, oral, Daily, Aman Hurtado DO    vancomycin (Vancocin) pharmacy to dose - pharmacy monitoring, , miscellaneous, Daily PRN, Aman Hurtado DO    venlafaxine (Effexor) tablet 37.5 mg, 37.5 mg, oral, Once, Artemio Rivas MD    ALLERGIES:  Allergies   Allergen Reactions    Metoprolol Hives    Oxycodone-Aspirin Dizziness, Syncope and Nausea/vomiting    Sulfa (Sulfonamide Antibiotics) Hives    Lisinopril Cough    Sulfur Swelling    Tramadol Hallucinations    Warfarin Unknown       COMPLETE REVIEW OF SYSTEMS:    A comprehensive 14 point review of systems was obtained.  Constitutional: +for fatigue and weakness  HENT: Negative for congestion and sore throat.    Eyes: Negative for pain and visual disturbance.  Respiratory: Positive for cough and shortness of breath.    Cardiovascular: Negative for chest pain and palpitations.  Gastrointestinal: Negative for abdominal pain, diarrhea and vomiting.  Genitourinary: Graham catheter in place  Musculoskeletal: Negative for back pain and myalgias.  Skin: Lower extremity erythema noted  Neurological: Negative for weakness and headaches.  Hematological: Negative for adenopathy. Does not bruise/bleed easily.  Psychiatric/Behavioral: Negative for agitation and confusion.  All other reviewed and negative other than HPI.    PHYSICAL EXAM: Physical exam performed.  Patient Vitals for the past 24 hrs:   BP Temp Temp src Pulse Resp SpO2 Weight   02/15/25 1200 114/67 36.4 °C (97.5 °F) Temporal 88 18 95 % --   02/15/25 0918 -- -- -- -- -- 98 % --   02/15/25 0800 100/54 35.9 °C (96.6 °F) Temporal 80 18 98 % --   02/15/25 0700 -- -- -- -- -- -- 86.4 kg (190 lb 7.6 oz)   02/15/25 0428 97/51 36.2 °C (97.2 °F) Temporal 72 16 96 % --   02/15/25 0100 -- -- -- -- -- 95 % --   02/15/25 0015 126/61 36.2 °C (97.2 °F)  Temporal 104 22 100 % --   02/14/25 2310 134/76 -- -- (!) 115 26 94 % --   02/14/25 2305 151/64 -- -- 108 26 98 % --   02/14/25 2300 146/81 36.3 °C (97.3 °F) Temporal 89 24 96 % --   02/14/25 1941 94/59 36.7 °C (98.1 °F) Temporal 71 16 98 % --   02/14/25 1549 113/57 36.9 °C (98.4 °F) -- 72 19 99 % --   02/14/25 1300 106/56 36.8 °C (98.2 °F) -- 74 18 98 % --     Body mass index is 27.33 kg/m².    CONSTITUTIONAL:  Vital signs reviewed.  Relative hypotension requiring oxygen  GENERAL: Well developed, well nourished.   SKIN: Bilateral lower  erythema  HEAD/SINUSES: Atraumatic  EYES: PERRLA, + pallor  OROPHARYNX: Dry mucous membranes  NECK: +  jugulovenous distention, No carotid bruits, Carotid pulse normal contour, Supple  LUNGS: Diminished air entry bilaterally, coarse rhonchi and rales bilaterally  CARDIAC: distant S1 and S2; no rubs, murmurs, or gallops  ABDOMEN: Abdomen soft, non-tender, BS normal, distended  EXTREMITIES: Good capillary refill, +3-4 edema.  NEUROLOGIC: Awake, alert and oriented ×3, No focal deficit  HEMATOLOGIC/Lymphatic/Immunologic: No abnormal bleeding, echymosis, inflammation. No cervical or supraclavicular lymphadenopathy.  : Graham catheter in place    Intake/Output last 3 shifts:  I/O last 3 completed shifts:  In: 1990 (23.1 mL/kg) [P.O.:1640; IV Piggyback:350]  Out: 3300 (38.3 mL/kg) [Urine:3300 (1.1 mL/kg/hr)]  Weight: 86.2 kg     Diagnostic tests reviewed for today's visit:    CBC, Coags, RNP, Mg, Phos   Results from last 7 days   Lab Units 02/15/25  0705   WBC AUTO x10*3/uL 8.2   RBC AUTO x10*6/uL 4.27*   HEMOGLOBIN g/dL 11.1*   HEMATOCRIT % 37.6*     Results from last 7 days   Lab Units 02/15/25  0705 02/11/25  0524 02/10/25  2051   SODIUM mmol/L 126*   < > 134*   POTASSIUM mmol/L 5.5*   < > 4.8   CHLORIDE mmol/L 95*   < > 102   CO2 mmol/L 24   < > 23   BUN mg/dL 55*   < > 49*   CREATININE mg/dL 2.39*   < > 2.13*   CALCIUM mg/dL 7.7*   < > 8.5*   PHOSPHORUS mg/dL 4.4   < >  --   "  MAGNESIUM mg/dL 2.06   < >  --    BILIRUBIN TOTAL mg/dL  --   --  1.0   ALT U/L  --   --  7*   AST U/L  --   --  18    < > = values in this interval not displayed.     Results from last 7 days   Lab Units 02/10/25  2234   COLOR U  Yellow   APPEARANCE U  Clear   PH U  5.0   SPEC GRAV UR  1.011   PROTEIN U mg/dL NEGATIVE   BLOOD UR mg/dL 0.03 (TRACE)*   NITRITE U  NEGATIVE   WBC UR /HPF >50*     Ref Range & Units 1 d ago 3 mo ago 1 yr ago 2 yr ago   POCT pH, Arterial  7.38 - 7.42 pH 7.27 Low       POCT pCO2, Arterial  38 - 42 mm Hg 55 High       POCT pO2, Arterial  85 - 95 mm Hg 35 Low Panic       POCT SO2, Arterial  94 - 100 % 59 Low       POCT Oxy Hemoglobin, Arterial  94.0 - 98.0 % 57.1 Low       POCT Hematocrit Calculated, Arterial  41.0 - 52.0 % 36.0 Low       POCT Sodium, Arterial  136 - 145 mmol/L 122 Low       POCT Potassium, Arterial  3.5 - 5.3 mmol/L 5.2      POCT Chloride, Arterial  98 - 107 mmol/L 93 Low       POCT Ionized Calcium, Arterial  1.10 - 1.33 mmol/L 1.08 Low       POCT Glucose, Arterial  74 - 99 mg/dL 220 High       POCT Lactate, Arterial  0.4 - 2.0 mmol/L 1.4      POCT Base Excess, Arterial  -2.0 - 3.0 mmol/L -2.3 Low       POCT HCO3 Calculated, Arterial  22.0 - 26.0 mmol/L 25.3      POCT Hemoglobin, Arterial  13.5 - 17.5 g/dL 12.1 Low       POCT Anion Gap, Arterial  10 - 25 mmo/L 9 Low            Abnormal   (2+) Few Polymorphonuclear leukocytes   (4+) Abundant Gram positive cocci        IMAGING: CXR reviewed in  images.      SIGNATURE: Pascual Parada MD  Nephrology and Hypertension  [address] [place]  Office phone: [phone]  FAX: [phone]      This note was partially created using voice recognition software and is inherently subject to errors including those of syntax and \"sound-alike\" substitutions which may escape proofreading.  In such instances, original meaning may be extrapolated by contextual derivation.  "

## 2025-02-15 NOTE — PROGRESS NOTES
Vancomycin Dosing by Pharmacy- FOLLOW UP    Elgin Marin is a 84 y.o. year old male who Pharmacy has been consulted for vancomycin dosing for bone and joint infection. Based on the patient's indication and renal status this patient is being dosed based on a goal trough/random level of 15-20.     Renal function is currently improving. Pt has CKD. Scr is slightly improving.     Was given 1500 mg x 1 on  @ 2143.     Most recent random level: 8 mcg/mL    Visit Vitals  /51 (BP Location: Right arm, Patient Position: Lying)   Pulse 69   Temp 36 °C (96.8 °F) (Temporal)   Resp 18        Lab Results   Component Value Date    CREATININE 2.67 (H) 02/15/2025    CREATININE 2.39 (H) 02/15/2025    CREATININE 2.38 (H) 2025    CREATININE 2.39 (H) 2025        Patient weight is as follows:   Vitals:    02/15/25 0700   Weight: 86.4 kg (190 lb 7.6 oz)       Cultures:  No results found for the encounter in last 14 days.       I/O last 3 completed shifts:  In: 1990 (23.1 mL/kg) [P.O.:1640; IV Piggyback:350]  Out: 3300 (38.3 mL/kg) [Urine:3300 (1.1 mL/kg/hr)]  Weight: 86.2 kg   I/O during current shift:  I/O this shift:  In: 1142.6 [P.O.:240; I.V.:2.6; Blood:900]  Out: -     Temp (24hrs), Av.2 °C (97.2 °F), Min:35.9 °C (96.6 °F), Max:36.7 °C (98.1 °F)      Assessment/Plan    Level is below goal of 15-20. Will give 750 mg x 1.   The next level will be obtained on  at 1800. May be obtained sooner if clinically indicated.   Will continue to monitor renal function daily while on vancomycin and order serum creatinine at least every 48 hours if not already ordered.  Follow for continued vancomycin needs, clinical response, and signs/symptoms of toxicity.       Maged Ward, FadumoD

## 2025-02-15 NOTE — CARE PLAN
The patient's goals for the shift include GET SOME REST    The clinical goals for the shift include Pt will maintaine o2 sats at or above 92% on O2 this shift    Goals progressing, safety maintained. Pt resting with mild labored resp, O2 in place 4LNC. Graham draining clear yellow

## 2025-02-16 ENCOUNTER — APPOINTMENT (OUTPATIENT)
Dept: CARDIOLOGY | Facility: HOSPITAL | Age: 85
DRG: 291 | End: 2025-02-16
Payer: MEDICARE

## 2025-02-16 VITALS
DIASTOLIC BLOOD PRESSURE: 65 MMHG | SYSTOLIC BLOOD PRESSURE: 128 MMHG | TEMPERATURE: 97.7 F | HEIGHT: 70 IN | BODY MASS INDEX: 28.53 KG/M2 | HEART RATE: 69 BPM | OXYGEN SATURATION: 100 % | WEIGHT: 199.3 LBS | RESPIRATION RATE: 19 BRPM

## 2025-02-16 LAB
ALBUMIN SERPL BCP-MCNC: 2.9 G/DL (ref 3.4–5)
ALBUMIN SERPL BCP-MCNC: 2.9 G/DL (ref 3.4–5)
ANION GAP SERPL CALC-SCNC: 12 MMOL/L (ref 10–20)
ANION GAP SERPL CALC-SCNC: 12 MMOL/L (ref 10–20)
ATRIAL RATE: 108 BPM
BACTERIA BLD CULT: NORMAL
BACTERIA BLD CULT: NORMAL
BACTERIA SPEC CULT: ABNORMAL
BACTERIA SPEC CULT: ABNORMAL
BUN SERPL-MCNC: 64 MG/DL (ref 6–23)
BUN SERPL-MCNC: 68 MG/DL (ref 6–23)
CALCIUM SERPL-MCNC: 7.4 MG/DL (ref 8.6–10.3)
CALCIUM SERPL-MCNC: 7.6 MG/DL (ref 8.6–10.3)
CHLORIDE SERPL-SCNC: 92 MMOL/L (ref 98–107)
CHLORIDE SERPL-SCNC: 95 MMOL/L (ref 98–107)
CK SERPL-CCNC: 68 U/L (ref 0–325)
CO2 SERPL-SCNC: 26 MMOL/L (ref 21–32)
CO2 SERPL-SCNC: 28 MMOL/L (ref 21–32)
CREAT SERPL-MCNC: 2.5 MG/DL (ref 0.5–1.3)
CREAT SERPL-MCNC: 2.66 MG/DL (ref 0.5–1.3)
EGFRCR SERPLBLD CKD-EPI 2021: 23 ML/MIN/1.73M*2
EGFRCR SERPLBLD CKD-EPI 2021: 24 ML/MIN/1.73M*2
ERYTHROCYTE [DISTWIDTH] IN BLOOD BY AUTOMATED COUNT: 14.7 % (ref 11.5–14.5)
GLUCOSE BLD MANUAL STRIP-MCNC: 193 MG/DL (ref 74–99)
GLUCOSE BLD MANUAL STRIP-MCNC: 198 MG/DL (ref 74–99)
GLUCOSE BLD MANUAL STRIP-MCNC: 216 MG/DL (ref 74–99)
GLUCOSE BLD MANUAL STRIP-MCNC: 224 MG/DL (ref 74–99)
GLUCOSE BLD MANUAL STRIP-MCNC: 227 MG/DL (ref 74–99)
GLUCOSE BLD MANUAL STRIP-MCNC: 236 MG/DL (ref 74–99)
GLUCOSE SERPL-MCNC: 218 MG/DL (ref 74–99)
GLUCOSE SERPL-MCNC: 240 MG/DL (ref 74–99)
GRAM STN SPEC: ABNORMAL
GRAM STN SPEC: ABNORMAL
HCT VFR BLD AUTO: 35.3 % (ref 41–52)
HGB BLD-MCNC: 10.3 G/DL (ref 13.5–17.5)
MAGNESIUM SERPL-MCNC: 2.16 MG/DL (ref 1.6–2.4)
MCH RBC QN AUTO: 25.8 PG (ref 26–34)
MCHC RBC AUTO-ENTMCNC: 29.2 G/DL (ref 32–36)
MCV RBC AUTO: 89 FL (ref 80–100)
NRBC BLD-RTO: 0 /100 WBCS (ref 0–0)
PHOSPHATE SERPL-MCNC: 4.7 MG/DL (ref 2.5–4.9)
PHOSPHATE SERPL-MCNC: 4.9 MG/DL (ref 2.5–4.9)
PLATELET # BLD AUTO: 84 X10*3/UL (ref 150–450)
POTASSIUM SERPL-SCNC: 4.6 MMOL/L (ref 3.5–5.3)
POTASSIUM SERPL-SCNC: 4.9 MMOL/L (ref 3.5–5.3)
Q ONSET: 216 MS
QRS COUNT: 18 BEATS
QRS DURATION: 102 MS
QT INTERVAL: 336 MS
QTC CALCULATION(BAZETT): 454 MS
QTC FREDERICIA: 411 MS
R AXIS: 128 DEGREES
RBC # BLD AUTO: 3.99 X10*6/UL (ref 4.5–5.9)
SODIUM SERPL-SCNC: 127 MMOL/L (ref 136–145)
SODIUM SERPL-SCNC: 128 MMOL/L (ref 136–145)
T AXIS: -86 DEGREES
T OFFSET: 384 MS
VENTRICULAR RATE: 110 BPM
WBC # BLD AUTO: 4.2 X10*3/UL (ref 4.4–11.3)

## 2025-02-16 PROCEDURE — 2500000004 HC RX 250 GENERAL PHARMACY W/ HCPCS (ALT 636 FOR OP/ED)

## 2025-02-16 PROCEDURE — 99233 SBSQ HOSP IP/OBS HIGH 50: CPT | Performed by: PODIATRIST

## 2025-02-16 PROCEDURE — 83735 ASSAY OF MAGNESIUM: CPT

## 2025-02-16 PROCEDURE — 2500000002 HC RX 250 W HCPCS SELF ADMINISTERED DRUGS (ALT 637 FOR MEDICARE OP, ALT 636 FOR OP/ED)

## 2025-02-16 PROCEDURE — 85027 COMPLETE CBC AUTOMATED: CPT

## 2025-02-16 PROCEDURE — 2500000005 HC RX 250 GENERAL PHARMACY W/O HCPCS

## 2025-02-16 PROCEDURE — 84100 ASSAY OF PHOSPHORUS: CPT

## 2025-02-16 PROCEDURE — 80069 RENAL FUNCTION PANEL: CPT

## 2025-02-16 PROCEDURE — 2500000004 HC RX 250 GENERAL PHARMACY W/ HCPCS (ALT 636 FOR OP/ED): Mod: JZ | Performed by: INTERNAL MEDICINE

## 2025-02-16 PROCEDURE — P9045 ALBUMIN (HUMAN), 5%, 250 ML: HCPCS | Mod: JZ | Performed by: INTERNAL MEDICINE

## 2025-02-16 PROCEDURE — 2500000001 HC RX 250 WO HCPCS SELF ADMINISTERED DRUGS (ALT 637 FOR MEDICARE OP)

## 2025-02-16 PROCEDURE — 2500000004 HC RX 250 GENERAL PHARMACY W/ HCPCS (ALT 636 FOR OP/ED): Performed by: INTERNAL MEDICINE

## 2025-02-16 PROCEDURE — 94640 AIRWAY INHALATION TREATMENT: CPT

## 2025-02-16 PROCEDURE — 82550 ASSAY OF CK (CPK): CPT | Performed by: INTERNAL MEDICINE

## 2025-02-16 PROCEDURE — 1200000002 HC GENERAL ROOM WITH TELEMETRY DAILY

## 2025-02-16 PROCEDURE — 82947 ASSAY GLUCOSE BLOOD QUANT: CPT

## 2025-02-16 PROCEDURE — 36415 COLL VENOUS BLD VENIPUNCTURE: CPT

## 2025-02-16 PROCEDURE — 99232 SBSQ HOSP IP/OBS MODERATE 35: CPT | Performed by: STUDENT IN AN ORGANIZED HEALTH CARE EDUCATION/TRAINING PROGRAM

## 2025-02-16 RX ORDER — LINEZOLID 2 MG/ML
600 INJECTION, SOLUTION INTRAVENOUS EVERY 12 HOURS
Status: DISCONTINUED | OUTPATIENT
Start: 2025-02-16 | End: 2025-02-16

## 2025-02-16 RX ORDER — INSULIN LISPRO 100 [IU]/ML
0-5 INJECTION, SOLUTION INTRAVENOUS; SUBCUTANEOUS NIGHTLY
Status: DISCONTINUED | OUTPATIENT
Start: 2025-02-16 | End: 2025-02-21 | Stop reason: HOSPADM

## 2025-02-16 RX ORDER — INSULIN LISPRO 100 [IU]/ML
0-10 INJECTION, SOLUTION INTRAVENOUS; SUBCUTANEOUS
Status: DISCONTINUED | OUTPATIENT
Start: 2025-02-16 | End: 2025-02-21 | Stop reason: HOSPADM

## 2025-02-16 RX ORDER — ALBUMIN HUMAN 50 G/1000ML
25 SOLUTION INTRAVENOUS ONCE
Status: COMPLETED | OUTPATIENT
Start: 2025-02-16 | End: 2025-02-17

## 2025-02-16 RX ORDER — INSULIN LISPRO 100 [IU]/ML
0-10 INJECTION, SOLUTION INTRAVENOUS; SUBCUTANEOUS
Status: DISCONTINUED | OUTPATIENT
Start: 2025-02-16 | End: 2025-02-16

## 2025-02-16 RX ORDER — INSULIN LISPRO 100 [IU]/ML
2 INJECTION, SOLUTION INTRAVENOUS; SUBCUTANEOUS
Status: DISCONTINUED | OUTPATIENT
Start: 2025-02-16 | End: 2025-02-21 | Stop reason: HOSPADM

## 2025-02-16 RX ORDER — FUROSEMIDE 20 MG/1
20 TABLET ORAL ONCE
Status: DISCONTINUED | OUTPATIENT
Start: 2025-02-16 | End: 2025-02-16

## 2025-02-16 RX ORDER — INSULIN GLARGINE 100 [IU]/ML
10 INJECTION, SOLUTION SUBCUTANEOUS EVERY 24 HOURS
Status: DISCONTINUED | OUTPATIENT
Start: 2025-02-16 | End: 2025-02-21 | Stop reason: HOSPADM

## 2025-02-16 RX ADMIN — Medication 4 L/MIN: at 20:57

## 2025-02-16 RX ADMIN — INSULIN LISPRO 2 UNITS: 100 INJECTION, SOLUTION INTRAVENOUS; SUBCUTANEOUS at 08:29

## 2025-02-16 RX ADMIN — IPRATROPIUM BROMIDE AND ALBUTEROL SULFATE 3 ML: 2.5; .5 SOLUTION RESPIRATORY (INHALATION) at 20:34

## 2025-02-16 RX ADMIN — INSULIN LISPRO 2 UNITS: 100 INJECTION, SOLUTION INTRAVENOUS; SUBCUTANEOUS at 18:29

## 2025-02-16 RX ADMIN — FINASTERIDE 5 MG: 5 TABLET, FILM COATED ORAL at 08:40

## 2025-02-16 RX ADMIN — APIXABAN 2.5 MG: 2.5 TABLET, FILM COATED ORAL at 20:53

## 2025-02-16 RX ADMIN — Medication 4 L/MIN: at 08:33

## 2025-02-16 RX ADMIN — IPRATROPIUM BROMIDE AND ALBUTEROL SULFATE 3 ML: 2.5; .5 SOLUTION RESPIRATORY (INHALATION) at 02:29

## 2025-02-16 RX ADMIN — PIPERACILLIN SODIUM AND TAZOBACTAM SODIUM 3.38 G: 3; .375 INJECTION, SOLUTION INTRAVENOUS at 18:28

## 2025-02-16 RX ADMIN — SODIUM ZIRCONIUM CYCLOSILICATE 10 G: 10 POWDER, FOR SUSPENSION ORAL at 08:40

## 2025-02-16 RX ADMIN — IPRATROPIUM BROMIDE AND ALBUTEROL SULFATE 3 ML: 2.5; .5 SOLUTION RESPIRATORY (INHALATION) at 13:44

## 2025-02-16 RX ADMIN — PANTOPRAZOLE SODIUM 40 MG: 40 TABLET, DELAYED RELEASE ORAL at 20:53

## 2025-02-16 RX ADMIN — INSULIN GLARGINE 10 UNITS: 100 INJECTION, SOLUTION SUBCUTANEOUS at 18:35

## 2025-02-16 RX ADMIN — INSULIN LISPRO 4 UNITS: 100 INJECTION, SOLUTION INTRAVENOUS; SUBCUTANEOUS at 04:20

## 2025-02-16 RX ADMIN — ATORVASTATIN CALCIUM 40 MG: 40 TABLET, FILM COATED ORAL at 20:53

## 2025-02-16 RX ADMIN — APIXABAN 2.5 MG: 2.5 TABLET, FILM COATED ORAL at 08:40

## 2025-02-16 RX ADMIN — Medication 4 L/MIN: at 08:32

## 2025-02-16 RX ADMIN — PIPERACILLIN SODIUM AND TAZOBACTAM SODIUM 3.38 G: 3; .375 INJECTION, SOLUTION INTRAVENOUS at 01:52

## 2025-02-16 RX ADMIN — INSULIN LISPRO 2 UNITS: 100 INJECTION, SOLUTION INTRAVENOUS; SUBCUTANEOUS at 20:54

## 2025-02-16 RX ADMIN — LATANOPROST 1 DROP: 50 SOLUTION OPHTHALMIC at 20:55

## 2025-02-16 RX ADMIN — INSULIN LISPRO 4 UNITS: 100 INJECTION, SOLUTION INTRAVENOUS; SUBCUTANEOUS at 12:33

## 2025-02-16 RX ADMIN — BISOPROLOL FUMARATE 5 MG: 5 TABLET ORAL at 08:40

## 2025-02-16 RX ADMIN — SODIUM BICARBONATE 50 MEQ: 84 INJECTION INTRAVENOUS at 01:10

## 2025-02-16 RX ADMIN — INSULIN LISPRO 4 UNITS: 100 INJECTION, SOLUTION INTRAVENOUS; SUBCUTANEOUS at 00:06

## 2025-02-16 RX ADMIN — PIPERACILLIN SODIUM AND TAZOBACTAM SODIUM 3.38 G: 3; .375 INJECTION, SOLUTION INTRAVENOUS at 10:15

## 2025-02-16 RX ADMIN — Medication 4 L/MIN: at 20:58

## 2025-02-16 RX ADMIN — DAPTOMYCIN 500 MG: 500 INJECTION, POWDER, LYOPHILIZED, FOR SOLUTION INTRAVENOUS at 10:16

## 2025-02-16 RX ADMIN — ALBUMIN (HUMAN) 25 G: 12.5 SOLUTION INTRAVENOUS at 12:43

## 2025-02-16 RX ADMIN — ISOSORBIDE DINITRATE 10 MG: 10 TABLET ORAL at 15:19

## 2025-02-16 RX ADMIN — TAMSULOSIN HYDROCHLORIDE 0.4 MG: 0.4 CAPSULE ORAL at 08:40

## 2025-02-16 RX ADMIN — ISOSORBIDE DINITRATE 10 MG: 10 TABLET ORAL at 08:40

## 2025-02-16 ASSESSMENT — COGNITIVE AND FUNCTIONAL STATUS - GENERAL
DAILY ACTIVITIY SCORE: 14
STANDING UP FROM CHAIR USING ARMS: A LOT
TURNING FROM BACK TO SIDE WHILE IN FLAT BAD: A LITTLE
DRESSING REGULAR LOWER BODY CLOTHING: A LOT
MOBILITY SCORE: 13
CLIMB 3 TO 5 STEPS WITH RAILING: TOTAL
PERSONAL GROOMING: A LITTLE
MOVING TO AND FROM BED TO CHAIR: A LOT
DRESSING REGULAR LOWER BODY CLOTHING: A LOT
HELP NEEDED FOR BATHING: A LOT
DAILY ACTIVITIY SCORE: 14
TOILETING: A LOT
STANDING UP FROM CHAIR USING ARMS: A LOT
MOVING FROM LYING ON BACK TO SITTING ON SIDE OF FLAT BED WITH BEDRAILS: A LITTLE
WALKING IN HOSPITAL ROOM: A LOT
HELP NEEDED FOR BATHING: A LOT
TURNING FROM BACK TO SIDE WHILE IN FLAT BAD: A LITTLE
DRESSING REGULAR UPPER BODY CLOTHING: A LOT
CLIMB 3 TO 5 STEPS WITH RAILING: TOTAL
PERSONAL GROOMING: A LOT
WALKING IN HOSPITAL ROOM: A LOT
MOVING FROM LYING ON BACK TO SITTING ON SIDE OF FLAT BED WITH BEDRAILS: A LITTLE
MOVING TO AND FROM BED TO CHAIR: A LOT
EATING MEALS: A LITTLE
MOBILITY SCORE: 13
TOILETING: A LOT
DRESSING REGULAR UPPER BODY CLOTHING: A LOT

## 2025-02-16 ASSESSMENT — PAIN SCALES - GENERAL
PAINLEVEL_OUTOF10: 0 - NO PAIN
PAINLEVEL_OUTOF10: 0 - NO PAIN

## 2025-02-16 NOTE — PROGRESS NOTES
Elgin Marin is a 84 y.o. male on day 5 of admission presenting with Acute on chronic systolic heart failure.    Subjective   No acute events overnight.    Patient seen and examined this morning.  He is sitting upright at side of bed anticipating his breakfast.  He is currently on 4 L nasal cannula without conversational dyspnea or respiratory distress.  He continues to complain of intermittent shortness of breath, however denies cough, angina, fevers, worsening abdominal pain, paresthesia.  He is tolerating p.o. well without side effects from his antibiotics including GI upset or rash.    Objective     Physical Exam  VITALS: I have reviewed the vital signs.   GENERAL: Elderly, chronically ill adult male.  Resting comfortably in bed.  On 4 L nasal cannula. Comfortable appearing without tachypnea, worsening conversational dyspnea.  Overall, clinically unchanged since yesterday with the exception of improved lung aeration in all fields.  NEURO: Alert and oriented x3, able to answer questions and follow commands. Moves all extremities. Face is symmetric and expressive. No tremor.  EYES: PERRL. No scleral icterus or conjunctival injection. No discharge.   HENT: Normocephalic, atraumatic. Hearing is grossly intact, though he is [place]. Nares grossly patent and without discharge. Mucous membranes moist.    CARDIO: Rhythm regular. Normal rate. No murmur, rub, or gallop.  Persistent 2-3+ pitting edema bilaterally to the thighs with Ace bandage wrapped.  PULM: Lungs grossly clear to auscultation this morning.  No appreciable crackles, slightly diminished aeration lower lung fields. No wheezes, rales, or rhonchi.  Mild conversational dyspnea. No splinting, stridor, or accessory muscle use.    GI: Abdomen is soft and non-distended. No tenderness to palpation. No guarding or rigidity. Normoactive bowel sounds.   SKIN: Warm and dry. Normal turgor.  Bilateral lower extremities grossly edematous with chronic lower extremity  "wounds.  Gangrenous appearing toes on bilateral lower extremity.  Left dorsal foot with macerated wound and now broken open fluid collection.  No obvious pus formation.  PSYCH: Mood, affect, and interaction is appropriate to the setting. Not internally stimulated.  Last Recorded Vitals  Blood pressure 108/57, pulse 74, temperature 36 °C (96.8 °F), temperature source Temporal, resp. rate 18, height 1.778 m (5' 10\"), weight 86.4 kg (190 lb 7.6 oz), SpO2 100%.  Intake/Output last 3 Shifts:  I/O last 3 completed shifts:  In: 1504.5 (17.4 mL/kg) [P.O.:240; I.V.:14.5 (0.2 mL/kg); Blood:900; IV Piggyback:350]  Out: 4300 (49.8 mL/kg) [Urine:4300 (1.4 mL/kg/hr)]  Weight: 86.4 kg     Relevant Results  Scheduled medications  albumin human, 25 g, intravenous, Once  apixaban, 2.5 mg, oral, BID  atorvastatin, 40 mg, oral, Nightly  bisoprolol, 5 mg, oral, Daily  daptomycin, 6 mg/kg, intravenous, q48h  [Held by provider] empagliflozin, 25 mg, oral, Daily  finasteride, 5 mg, oral, Daily  [Held by provider] insulin glargine, 10 Units, subcutaneous, BID  insulin glargine, 10 Units, subcutaneous, q24h  insulin lispro, 0-10 Units, subcutaneous, TID AC  insulin lispro, 0-5 Units, subcutaneous, Nightly  insulin lispro, 2 Units, subcutaneous, TID AC  ipratropium-albuteroL, 3 mL, nebulization, q6h  isosorbide dinitrate, 10 mg, oral, BID  latanoprost, 1 drop, Both Eyes, Nightly  oxygen, , inhalation, Continuous - Inhalation  oxygen, , inhalation, Continuous - Inhalation  pantoprazole, 40 mg, oral, Nightly  piperacillin-tazobactam, 3.375 g, intravenous, q8h  sodium zirconium cyclosilicate, 10 g, oral, Daily  [Held by provider] spironolactone, 12.5 mg, oral, q24h BRE  tamsulosin, 0.4 mg, oral, Daily  [Held by provider] torsemide, 80 mg, oral, Daily      Continuous medications  furosemide, 10 mg/hr, Last Rate: 10 mg/hr (02/16/25 0503)      PRN medications  PRN medications: acetaminophen, dextrose, dextrose, glucagon, glucagon, " ipratropium-albuteroL  Results from last 7 days   Lab Units 02/16/25  0602 02/15/25  0705 02/14/25  0556   WBC AUTO x10*3/uL 4.2* 8.2 6.0   RBC AUTO x10*6/uL 3.99* 4.27* 4.31*   HEMOGLOBIN g/dL 10.3* 11.1* 11.0*     Results from last 7 days   Lab Units 02/16/25  0602 02/15/25  2204 02/15/25  1647 02/15/25  1446 02/15/25  0705 02/14/25  0556 02/11/25  0524 02/10/25  2057   SODIUM mmol/L 128* 126* 124*  --  126* 128*   < > 134*   POTASSIUM mmol/L 4.9 5.5* 5.7*   < > 5.5* 5.3   < > 4.8   CHLORIDE mmol/L 95* 94* 93*  --  95* 97*   < > 102   CO2 mmol/L 26 25 23  --  24 25   < > 23   BUN mg/dL 64* 64* 61*  --  55* 54*   < > 49*   CREATININE mg/dL 2.50* 2.70* 2.67*  --  2.39* 2.38*   < > 2.13*   CALCIUM mg/dL 7.6* 7.5* 7.4*  --  7.7* 7.7*   < > 8.5*   PHOSPHORUS mg/dL 4.9 5.0* 4.8  --  4.4 4.5   < >  --    MAGNESIUM mg/dL 2.16  --   --   --  2.06 2.35   < >  --    BILIRUBIN TOTAL mg/dL  --   --   --   --   --   --   --  1.0   ALT U/L  --   --   --   --   --   --   --  7*   AST U/L  --   --   --   --   --   --   --  18    < > = values in this interval not displayed.       Vascular US Ankle Brachial Index (TANISHA) Without Exercise    Result Date: 2/15/2025            [facility] [address] [place] [address]     Tel [phone] Fax [phone]  Vascular Lab Report  [facility] US ANKLE BRACHIAL INDEX (TANISHA) WITHOUT EXERCISE Patient Name:      NORRIS Stringer Physician:  [id number] Olinda Rudolph MD, RPVI Study Date:        2/15/2025            Ordering Provider:  [id number] AMNA WARREN MRN/PID:           [id number]             Fellow: Accession#:        [id number]         Technologist:       Fang Owens RVT Date of Birth/Age: 1940 / 84 years Technologist 2: Gender:            M                    Encounter#:          [id number] Admission Status:  Inpatient            Location Performed: [facility]  Diagnosis/ICD: 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  **CRITICAL RESULT** Critical Result: Mariluz Doyle DO was notified of the test results on 2/15/2025 at 2:15PM by Fang Owens T.  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 Right pressures of >220 mmHg suggest no compressibility of vessels and may make absolute Segmental Limb Pressures (SLP) unreliable. Decreased digital perfusion noted. Monophasic flow is noted in the right posterior tibial artery and right dorsalis pedis artery. Multiphasic flow is noted in the right common femoral artery. Dampened PPG tracing of the great toe with abnormal TBI. Level of disease based on waveforms and tracings due to non-compressible vessels. Left Lower PVR: Evidence of moderate arterial occlusive disease in the left lower extremity at rest. Left pressures of >220 mmHg suggest no compressibility of vessels and may make absolute Segmental Limb Pressures (SLP) unreliable. Decreased digital perfusion noted. Monophasic flow is noted in the left dorsalis pedis artery and left posterior tibial artery. Multiphasic flow is noted in the left common femoral artery. Dampened PPG tracing of the great toe with abnormal TBI. Level of disease based on waveforms and tracings due to non-compressible vessels. Left brachial pressure not obtained due to multiple IV lines.  Imaging & Doppler Findings:  RIGHT Lower PVR                Pressures Ratios Right Posterior Tibial (Ankle) 234 mmHg  2.02 Right Dorsalis Pedis (Ankle)   254 mmHg  2.19 Right Digit (Great Toe)        39 mmHg   0.34   LEFT Lower PVR                Pressures Ratios Left Posterior  Tibial (Ankle) 254 mmHg  2.19 Left Dorsalis Pedis (Ankle)   254 mmHg  2.19 Left Digit (Great Toe)        60 mmHg   0.52                      Right Brachial Pressure 116 mmHg   [id number] Olinda Rudolph MD, RPVI Electronically signed by [id number] Olinda Rudolph MD, REBEKAH on 2/15/2025 at 3:28:07 PM  ** Final **     XR chest 1 view    Result Date: 2/15/2025  Interpreted By:  Meaghan Ba, STUDY: XR CHEST 1 VIEW;  2/14/2025 11:59 pm   INDICATION: Signs/Symptoms:Hypoxia     COMPARISON: Chest x-ray 02/10/2025   ACCESSION NUMBER(S): [id number]   ORDERING CLINICIAN: AMNA WARREN   TECHNIQUE: Portable upright frontal view of the chest was obtained .   FINDINGS: The heart is enlarged. The patient is status post open heart surgery. There is interval increase in the vascular congestion.   There are small bilateral pleural effusions. There are bibasilar airspace opacities. No pneumothorax.   Suture anchors are noted at the right humeral head.       1.  Cardiomegaly. Interval increase in the vascular congestion. Small bilateral pleural effusions. Bibasilar airspace opacities, may be secondary to atelectasis or pneumonia.       MACRO: None.   Signed by: Meaghan Ba 2/15/2025 1:20 AM Dictation workstation:   [id number]    XR foot right 3+ views    Result Date: 2/14/2025  These images are not reportable by radiology and will not be interpreted by  Radiologists.      Assessment/Plan   Assessment & Plan  Acute on chronic systolic heart failure    CAP (community acquired pneumonia) due to Chlamydia species    Mr Elgin Marin is an 84 year old male patient with previous medical history of CAD status post CABG in April 2022, atrial fibrillation on Eliquis, chronic combined systolic and diastolic heart failure, HTN, CKD with baseline Cr ~2.0,dyslipidemia, cholecystectomy (10/2015) admitted with a diagnosis of UTI. He received rocephin in the ED. Patient is NOT medically ready for discharge     # AHRF  # CHFe, C/F cardiorenal  syndrome, improving  # Combined systolic and diastolic heart failure  # Profound hypoalbuminemia  -24-hour UO 2.7 L.  Remains on 4 L nasal cannula  -Creatinine peaked and downtrending to 2.5 from 2.7  -Last TTE 11/2024: EF 42%, global hypokinesis LV with minor regional variations, indeterminate LV DF, severely reduced RV SF, mildly enlarged RV, moderately dilated LA and RA, RVSP 46, severely dilated IVC, moderately increased septal and moderately increased posterior left ventricular wall thickness  -Pending repeat TTE  -Nephrology: Continuing Lasix gtt. at 10 mg/h.  Addition of IV albumin infusions goal 1 to 2 L diuresis per day  -Cardiology consulted for additional volume status management  -Continue to watch pancytopenia thrombocytopenia given patient is on Zosyn  -RFP every 12 hours to trend renal function and electrolytes  -GDMT continued: Bisoprolol, Isordil  -Jardiance and Aldactone to be discontinued on DC, both currently held  -Clinical status remains guarded     # Hyperglycemia  Hyperglycemia likely 2/2 cortisol surge in the setting of infection  -Was on Lantus 10 units nightly and SSI  -Received a total of 32 units of Lantus last night with a.m. sugars 224  -Add 2 units lispro 3 times daily with meals  -Continue moderate SSI with meals.  Added mild SSI nightly    # Hyperkalemia, resolved  # Hyponatremia, acute improving  -Admission sodium 134, currently 128 consistent with acute hyponatremia  -Suspect possibly in the setting of hypervolemia  -Potassium corrected overnight with Lokelma, sodium bicarb, and Kayexalate  -Continue to trend twice daily RFP's while on Lasix gtt.     # Left foot wound  # Gangrenous right toes, c/f ?OME  # Poor peripheral pulses  # Chronic lower extremity wounds  -Evaluated by podiatry who obtained right foot x-ray.  Can review images, will reach out to radiology for read  -ID following: On daptomycin and Zosyn  -Wound cultures positive for Staphylococcus aureus and Corynebacterium  striatum  -Blood cultures NTD  -Prior wound cultures 11/12 positive for Pseudomonas aeruginosa, pansensitive  -Doppler required for pulses ordered.  Moderate occlusive disease bilaterally.  Poor surgical candidate  -Continuing Eliquis and added statin  -Cont. Wound care     # Chronic urinary retention  -UCX without growth. Antibiotics discontinued  -Patient to follow with outpatient [facility] urology  -Evaluated urology during this hospitalization with low suspicion for prostatitis  -Patient to be discharged with Graham catheter in place given chronic retention  -To continue finasteride and tamsulosin on DC        Other chronic conditions:  # CAD s/p CABG (4/2022)  # A-fib on Eliquis  -Resume home meds with appropriate holding parameters.           Patient seen and discussed with attending physician     Aman Hurtado, DO  Internal Medicine, PGY-III

## 2025-02-16 NOTE — PROGRESS NOTES
Spiritual Care Visit  Spiritual Care Request    Reason for Visit:  Routine Visit: Introduction  Continue Visiting: Yes     Request Received From:       Focus of Care:  Visited With: Patient         Refer to :          Spiritual Care Assessment    Spiritual Assessment:                      Care Provided:  Intended Effects: Establish rapport and connectedness, Jeaneth affirmation, Build relationship of care and support, Demonstrate caring and concern    Sense of Community and or Adventist Affiliation:  Muslim         Addressed Needs/Concerns and/or Devika Through:          Outcome:  Outcome of Spiritual Care Visit: Unknown     Advance Directives:         Spiritual Care Annotation    Annotation:  The patient was and asked me to return.

## 2025-02-16 NOTE — CARE PLAN
The patient's goals for the shift include GET SOME REST    The clinical goals for the shift include remain free of falls and injury    Problem: Skin  Goal: Decreased wound size/increased tissue granulation at next dressing change  Outcome: Progressing  Goal: Participates in plan/prevention/treatment measures  Outcome: Progressing  Goal: Prevent/manage excess moisture  Outcome: Progressing  Goal: Prevent/minimize sheer/friction injuries  Outcome: Progressing  Goal: Promote/optimize nutrition  Outcome: Progressing  Goal: Promote skin healing  Outcome: Progressing     Problem: Respiratory  Goal: Clear secretions with interventions this shift  Outcome: Progressing  Goal: Minimize anxiety/maximize coping throughout shift  Outcome: Progressing  Goal: Minimal/no exertional discomfort or dyspnea this shift  Outcome: Progressing  Goal: No signs of respiratory distress (eg. Use of accessory muscles. Peds grunting)  Outcome: Progressing  Goal: Patent airway maintained this shift  Outcome: Progressing  Goal: Verbalize decreased shortness of breath this shift  Outcome: Progressing  Goal: Wean oxygen to maintain O2 saturation per order/standard this shift  Outcome: Progressing  Goal: Increase self care and/or family involvement in next 24 hours  Outcome: Progressing     Problem: Fall/Injury  Goal: Not fall by end of shift  Outcome: Progressing  Goal: Be free from injury by end of the shift  Outcome: Progressing  Goal: Verbalize understanding of personal risk factors for fall in the hospital  Outcome: Progressing  Goal: Verbalize understanding of risk factor reduction measures to prevent injury from fall in the home  Outcome: Progressing  Goal: Use assistive devices by end of the shift  Outcome: Progressing  Goal: Pace activities to prevent fatigue by end of the shift  Outcome: Progressing     Problem: Pain - Adult  Goal: Verbalizes/displays adequate comfort level or baseline comfort level  Outcome: Progressing     Problem: Safety  - Adult  Goal: Free from fall injury  Outcome: Progressing     Problem: Discharge Planning  Goal: Discharge to home or other facility with appropriate resources  Outcome: Progressing     Problem: Chronic Conditions and Co-morbidities  Goal: Patient's chronic conditions and co-morbidity symptoms are monitored and maintained or improved  Outcome: Progressing     Problem: Nutrition  Goal: Nutrient intake appropriate for maintaining nutritional needs  Outcome: Progressing     Problem: Heart Failure  Goal: Improved gas exchange this shift  Outcome: Progressing  Goal: Improved urinary output this shift  Outcome: Progressing  Goal: Reduction in peripheral edema within 24 hours  Outcome: Progressing  Goal: Report improvement of dyspnea/breathlessness this shift  Outcome: Progressing  Goal: Weight from fluid excess reduced over 2-3 days, then stabilize  Outcome: Progressing  Goal: Increase self care and/or family involvement in 24 hours  Outcome: Progressing

## 2025-02-16 NOTE — PROGRESS NOTES
INPATIENT NEPHROLOGY CONSULT PROGRESS NOTE      Patient Name: Elgin Marin MRN: [id number]  DATE of SERVICE: February 16, 2025  TIME of SERVICE: 10:57 AM  CONSULTING SERVICE: Nephrology    REASON for CONSULT: ISIAH, hypervolemia, hyperkalemia     Covering for Dr. Stauffer    SUBJECTIVE:  Seen and evaluated at bedside sitting at the edge of the bed eating lunch.  Continues to endorse bilateral lower extremity swelling and pain.  Ace wrap's in place.  Evaluated by podiatry team with concern regarding poor peripheral circulation.  Serum creatinine plateauing today versus with slight improvement down to 2.5 from 2.7, BUN of 64, EGFR 24, albumin 2.9 from 2.7.  Sodium level 128 from 126, glucose level improved.  pancytopenia noted with platelet count to dropping likely secondary to IV Zosyn     SUMMARY OF STAY:  Mr. Marin is a 84 y.o. male who presented to [facility] February 11, 2025 for evaluation of worsening shortness of breath, worsening bilateral lower extremity swelling, dysuria.  Diagnosed with bilateral lower extremity cellulitis, acute systolic heart failure exacerbation, acute kidney injury with electrolyte imbalance.      Patient with past medical history significant for chronic kidney disease stage IIIb with baseline serum creatinine of 2.1 mg deciliter, EGFR 30 mL/min follows with Dr. Stauffer, history of coronary artery disease status post CABG April 2022, atrial fibrillation on Eliquis, chronic combined systolic and diastolic heart failure, hypertension, hyperlipidemia.     ASSESSMENT:  Acute kidney injury superimposed on chronic kidney disease stage IIIb:   -- Multifactorial likely component of cardiorenal syndrome, acute decompensated systolic heart failure exacerbation with decreased effective circulatory volume, infectious component due to bilateral lower extremity cellulitis, third spacing in the setting of profound hypoalbuminemia in  addition to component of hemodynamic mediated injury.  Acute urinary retention requiring Graham catheter  -- Baseline serum creatinine around 2 mg deciliter with a GFR of 30 mL/min per 1.73 M2, follows with primary nephrologist Dr. Stauffer.  -- Serum creatinine up trended to 2.8 mg deciliter BUN of 61 GFR dropped to 23 mL/min per 1.73 m2.     Acute on chronic systolic heart failure exacerbation:  -- Last EF 42%  --Repeat echocardiogram pending    Hyperkalemia: Likely secondary to ISIAH, venous congestion  -Spironolactone, Jardiance on hold      Hyponatremia likely hypervolemic, diuretics related     Bilateral lower extremity cellulitis left worse than right wound culture positive for gram-positive cocci     Relative hypotension:  -- Spironolactone, Jardiance on hold     Acute urinary retention managed with Graham catheter placement.      Kidney cyst 1.1 cm left kidney, benign, to continue active surveillance as an outpatient.    PLAN:  To continue IV Lasix drip at 10 mg/h, truly appreciate primary care team help with hyperglycemia management.  To combine IV furosemide with IV albumin infusion to minimize third spacing and augment diuresis.  Pancytopenia, worsening thrombocytopenia concern for Zosyn induced thrombocytopenia.  Relative hypotension.  Holding parameters added to Imdur.  Spironolactone and Jardiance remains on hold.  Hyperkalemia corrected, potassium down to 4.9 post Lokelma  Hyponatremia improving sodium level of 128 from 124  Metabolic acidosis corrected bicarb 26  Strict input and output to guide diuresis.  Graham catheter in place  To continue Ace wrap.  Podiatry note reviewed and appreciated patient with poor circulation    Will follow, thank you!    Medications:    Current Facility-Administered Medications:     acetaminophen (Tylenol) tablet 650 mg, 650 mg, oral, q6h PRN, Aman Hurtado, DO, 650 mg at 02/15/25 1134    apixaban (Eliquis) tablet 2.5 mg, 2.5 mg, oral, BID, Aman Hurtado DO, 2.5 mg at  02/16/25 0840    atorvastatin (Lipitor) tablet 40 mg, 40 mg, oral, Nightly, Aman Hurtado, DO, 40 mg at 02/15/25 2030    bisoprolol (Zebeta) tablet 5 mg, 5 mg, oral, Daily, Aman Hurtado DO, 5 mg at 02/16/25 0840    DAPTOmycin (Cubicin) 500 mg in sodium chloride 0.9% 50 mL IV, 6 mg/kg, intravenous, q48h, Ewa Ribera MD, Last Rate: 120 mL/hr at 02/16/25 1016, 500 mg at 02/16/25 1016    dextrose 50 % injection 12.5 g, 12.5 g, intravenous, q15 min PRN, Artemio Rivas MD    dextrose 50 % injection 25 g, 25 g, intravenous, q15 min PRN, Artemio Rivas MD    [Held by provider] empagliflozin (Jardiance) tablet 25 mg, 25 mg, oral, Daily, Aman Hurtado DO    finasteride (Proscar) tablet 5 mg, 5 mg, oral, Daily, Aman Hurtado DO, 5 mg at 02/16/25 0840    furosemide (Lasix) 500 mg in 50 mL (10 mg/mL) infusion, 10 mg/hr, intravenous, Continuous, Pascual Parada MD, Last Rate: 1 mL/hr at 02/16/25 0503, 10 mg/hr at 02/16/25 0503    glucagon (Glucagen) injection 1 mg, 1 mg, intramuscular, q15 min PRN, Artemio Rivas MD    glucagon (Glucagen) injection 1 mg, 1 mg, intramuscular, q15 min PRN, Artemio Rivas MD    [Held by provider] insulin glargine (Lantus) injection 10 Units, 10 Units, subcutaneous, BID, Aman Hurtado DO, 10 Units at 02/11/25 1340    insulin glargine (Lantus) injection 10 Units, 10 Units, subcutaneous, q24h, Margi Pérez MD    insulin lispro injection 0-10 Units, 0-10 Units, subcutaneous, q4h, Aman Hurtado DO, 2 Units at 02/16/25 0829    ipratropium-albuteroL (Duo-Neb) 0.5-2.5 mg/3 mL nebulizer solution 3 mL, 3 mL, nebulization, q6h, Aman Hurtado DO, 3 mL at 02/16/25 0229    ipratropium-albuteroL (Duo-Neb) 0.5-2.5 mg/3 mL nebulizer solution 3 mL, 3 mL, nebulization, q2h PRN, Aman Hurtado DO    isosorbide dinitrate (Isordil) tablet 10 mg, 10 mg, oral, BID, Aman Hurtado DO, 10 mg at 02/16/25 0840    latanoprost (Xalatan) 0.005 % ophthalmic solution 1 drop, 1 drop, Both Eyes, Nightly,  Aman Hurtado DO, 1 drop at 02/15/25 2030    oxygen (O2) therapy, , inhalation, Continuous - Inhalation, Margi Pérez MD, Last Rate: 240,000 mL/hr at 02/16/25 0832, 4 L/min at 02/16/25 0832    oxygen (O2) therapy, , inhalation, Continuous - Inhalation, Artemio Rivas MD, Last Rate: 240,000 mL/hr at 02/16/25 0833, 4 L/min at 02/16/25 0833    pantoprazole (ProtoNix) EC tablet 40 mg, 40 mg, oral, Nightly, Aman Hurtado DO, 40 mg at 02/15/25 2029    piperacillin-tazobactam (Zosyn) 3.375 g in dextrose (iso) IV 50 mL, 3.375 g, intravenous, q8h, Leda Hunter, Last Rate: 0 mL/hr at 02/15/25 2201, 3.375 g at 02/16/25 1015    sodium zirconium cyclosilicate (Lokelma) packet 10 g, 10 g, oral, Daily, Margi Pérez MD, 10 g at 02/16/25 0840    [Held by provider] spironolactone (Aldactone) tablet 12.5 mg, 12.5 mg, oral, q24h BRE, Aman Hurtado DO, 12.5 mg at 02/12/25 0917    tamsulosin (Flomax) 24 hr capsule 0.4 mg, 0.4 mg, oral, Daily, Aman Hurtado DO, 0.4 mg at 02/16/25 0840    [Held by provider] torsemide (Demadex) tablet 80 mg, 80 mg, oral, Daily, Aman Hurtado DO    venlafaxine (Effexor) tablet 37.5 mg, 37.5 mg, oral, Once, Artemio Rivas MD    PERTINENT ROS:  GENERAL:  positive for fatigue, poor appetite.  No fever/chills  RESPIRATORY:  positive for shortness of breath.  Negative for cough, wheezing.  CARDIOVASCULAR:   Negative for chest pain or palpitation.  GI:  Negative for abdominal pain, diarrhea, heartburn, nausea, vomiting  : Graham catheter in place    Physical Exam:  Vital signs in last 24 hours:  Temp:  [36 °C (96.8 °F)-36.5 °C (97.7 °F)] 36.1 °C (97 °F)  Heart Rate:  [63-88] 74  Resp:  [18-20] 18  BP: (104-114)/(51-67) 109/58  FiO2 (%):  [36 %-37 %] 37 %    General: Awake, cooperative, in mild discomfort due to swelling and pain in both lower extremity.  HEENT:  NCAT,  mucous membranes moist and pink  NECK:  Elevated JVD, no carotid bruit, supple, no cervical mass or thyromegaly  LUNGS;   Diminished breath sounds, fine Rales  CV:  Distant, regular rate and rhythm, no murmurs  ABDOMEN:  abdomen soft, nontender, BS normal, no masses or organomegaly  EDEMA:  +3-4 lower extremity edema/dependent edema with ulcers and open wounds  SKIN: Bilateral lower extremity cellulitis with open wounds      Intake/Output last 3 shifts:  I/O last 3 completed shifts:  In: 1504.5 (17.4 mL/kg) [P.O.:240; I.V.:14.5 (0.2 mL/kg); Blood:900; IV Piggyback:350]  Out: 4300 (49.8 mL/kg) [Urine:4300 (1.4 mL/kg/hr)]  Weight: 86.4 kg     DATA:  Diagnotic tests reviewed for Todays visit:  Results from last 7 days   Lab Units 02/16/25  0602   WBC AUTO x10*3/uL 4.2*   RBC AUTO x10*6/uL 3.99*   HEMOGLOBIN g/dL 10.3*   HEMATOCRIT % 35.3*     Results from last 7 days   Lab Units 02/16/25  0602 02/11/25  0524 02/10/25  2057   SODIUM mmol/L 128*   < > 134*   POTASSIUM mmol/L 4.9   < > 4.8   CHLORIDE mmol/L 95*   < > 102   CO2 mmol/L 26   < > 23   BUN mg/dL 64*   < > 49*   CREATININE mg/dL 2.50*   < > 2.13*   CALCIUM mg/dL 7.6*   < > 8.5*   PHOSPHORUS mg/dL 4.9   < >  --    MAGNESIUM mg/dL 2.16   < >  --    BILIRUBIN TOTAL mg/dL  --   --  1.0   ALT U/L  --   --  7*   AST U/L  --   --  18    < > = values in this interval not displayed.     Results from last 7 days   Lab Units 02/10/25  2234   COLOR U  Yellow   APPEARANCE U  Clear   PH U  5.0   SPEC GRAV UR  1.011   PROTEIN U mg/dL NEGATIVE   BLOOD UR mg/dL 0.03 (TRACE)*   NITRITE U  NEGATIVE   WBC UR /HPF >50*     Echocardiogram  cONCLUSIONS:   1. The left ventricular systolic function is mildly decreased, with a Strong's biplane calculated ejection fraction of 42%.   2. There is global hypokinesis of the left ventricle with minor regional variations.   3. Left ventricular diastolic filling was indeterminate with an elevated left atrial pressure.   4. There is severely reduced right ventricular systolic function.   5. Mildly enlarged right ventricle.   6. The left atrium is moderately  dilated.   7. The right atrium is moderately dilated.   8. Mild to moderate tricuspid regurgitation.   9. The estimated RVSP is 46 mm.  10. There is aortic sclerosis with normal leaflet mobility.  11. The inferior vena cava appears severely dilated.  12. There is moderately increased septal and moderately increased posterior left ventricular wall thickness.  IMAGING: CXR reviewed in  images      SIGNATURE: Pascual Parada MD  Nephrology and Hypertension  [address] [place]  Office phone: [phone]  FAX: [phone]    This note was partially generated using the Dragon voice recognition system, and there may be some incorrect words, spelling's and punctuation that were not noted in checking the note before saving.

## 2025-02-16 NOTE — PROGRESS NOTES
INPATIENT PROGRESS NOTES    PATIENT NAME: Elgin Marin    MRN: [id number]  SERVICE DATE:  2/16/2025   SERVICE TIME:  7:28 AM    SIGNATURE: Ewa Ribera MD      ASSESSMENT :   -Left foot wound infection  -Follow-up wound culture--> [facility]   -Left foot cellulitis  -Right toes dry gangrene  -MRSA carrier  -History of CAD, CHF, CKD, hypertension  -Allergic to sulfa with hives     PLAN:  -Switch Vancomycin to Daptomycin to avoid worsening renal failure  -Continue Zosyn empirically  -Closely monitor for antibiotics side effects including rash, Diarrhea/CDI, thrombocytopenia, ISIAH, etc.           SUBJECTIVE  Afebrile  No events overnight       OBJECTIVE  PHYSICAL EXAM:   Patient Vitals for the past 24 hrs:   BP Temp Temp src Pulse Resp SpO2   02/16/25 0229 -- -- -- -- -- 96 %   02/16/25 0000 104/56 36.5 °C (97.7 °F) Temporal 75 18 99 %   02/15/25 2159 -- -- -- -- -- 99 %   02/15/25 2000 108/56 36 °C (96.8 °F) Temporal 63 20 100 %   02/15/25 1600 109/51 36 °C (96.8 °F) Temporal 69 18 95 %   02/15/25 1200 114/67 36.4 °C (97.5 °F) Temporal 88 18 95 %   02/15/25 0918 -- -- -- -- -- 98 %   02/15/25 0800 100/54 35.9 °C (96.6 °F) Temporal 80 18 98 %   Feet pics as below                       Labs:  Lab Results   Component Value Date    WBC 4.2 (L) 02/16/2025    HGB 10.3 (L) 02/16/2025    HCT 35.3 (L) 02/16/2025    MCV 89 02/16/2025    PLT 84 (L) 02/16/2025     Lab Results   Component Value Date    GLUCOSE 262 (H) 02/15/2025    CALCIUM 7.5 (L) 02/15/2025     (L) 02/15/2025    K 5.5 (H) 02/15/2025    CO2 25 02/15/2025    CL 94 (L) 02/15/2025    BUN 64 (H) 02/15/2025    CREATININE 2.70 (H) 02/15/2025   ESR: --  Lab Results   Component Value Date    SEDRATE 33 (H) 02/14/2025     Lab Results   Component Value Date    CRP 3.06 (H) 02/14/2025     Lab Results   Component Value Date    ALT 7 (L) 02/10/2025    AST 18 02/10/2025    ALKPHOS 84 02/10/2025    BILITOT 1.0 02/10/2025         DATA:   Diagnostic tests reviewed for  "today's visit:    Labs this admission reviewed  Imagings this admission reviewed  Cultures: Reviewed        Ewa Ribera MD.   Infectious Disease Attending            This note was partially created using voice recognition software and is inherently subject to errors including those of syntax and \"sound-alike\" substitutions which may escape proofreading. In such instances, original meaning may be extrapolated by contextual derivation       "

## 2025-02-16 NOTE — PROGRESS NOTES
"Elgin Marin is a 84 y.o. male on day 5 of admission presenting with Acute on chronic systolic heart failure.    Subjective   Patient is seen bedside for follow-up of bilateral foot and leg wounds.  He relates no new pedal complaints at this time.       Physical Exam    Objective     REVIEW OF SYSTEMS  GENERAL:  Negative for malaise, significant weight loss, fever    Objective:   Vasc: DP and PT pulses are non-palpable bilateral.  CFT is delayed bilateral.     Neuro:    Decreased light touch noted in the bilaterally    Derm: Right foot: Hemorrhagic bulla noted to the distal aspect of the right hallux.  This is stable in nature.  Purple demarcation noted to the level of the MPJ to digits 2, 3 and 4 on the right.  There is serous drainage between the fourth and fifth interspaces.  Macerated tissue dorsally to digits 3 and 4.  Left foot: There is a deroofed blister noted to the dorsal aspect of the foot.  Serous drainage is noted.  Bilateral feet show dependent rubor.  There is no fluctuance or crepitus.  No evidence of obvious abscess      Ortho: Muscle strength is 5/5 for all pedal groups tested.  Lower      Last Recorded Vitals  Blood pressure 109/58, pulse 74, temperature 36.1 °C (97 °F), temperature source Temporal, resp. rate 18, height 1.778 m (5' 10\"), weight 86.4 kg (190 lb 7.6 oz), SpO2 100%.    Intake/Output last 3 Shifts:  I/O last 3 completed shifts:  In: 1504.5 (17.4 mL/kg) [P.O.:240; I.V.:14.5 (0.2 mL/kg); Blood:900; IV Piggyback:350]  Out: 4300 (49.8 mL/kg) [Urine:4300 (1.4 mL/kg/hr)]  Weight: 86.4 kg     Relevant Results  Results for orders placed or performed during the hospital encounter of 02/10/25 (from the past 24 hours)   POCT GLUCOSE   Result Value Ref Range    POCT Glucose 366 (H) 74 - 99 mg/dL   Urine electrolytes   Result Value Ref Range    Sodium, Urine Random 35 mmol/L    Sodium/Creatinine Ratio 79 Not established. mmol/g Creat    Potassium, Urine Random 21 mmol/L    " Potassium/Creatinine Ratio 47 Not established mmol/g Creat    Chloride, Urine Random 35 mmol/L    Chloride/Creatinine Ratio 79 23 - 275 mmol/g creat    Creatinine, Urine Random 44.4 20.0 - 370.0 mg/dL   Osmolality, urine   Result Value Ref Range    Osmolality, Urine Random 401 200 - 1,200 mOsm/kg   POCT GLUCOSE   Result Value Ref Range    POCT Glucose 395 (H) 74 - 99 mg/dL   Potassium   Result Value Ref Range    Potassium 5.5 (H) 3.5 - 5.3 mmol/L   Vancomycin   Result Value Ref Range    Vancomycin 8.0 5.0 - 20.0 ug/mL   Renal function panel   Result Value Ref Range    Glucose 415 (H) 74 - 99 mg/dL    Sodium 124 (L) 136 - 145 mmol/L    Potassium 5.7 (H) 3.5 - 5.3 mmol/L    Chloride 93 (L) 98 - 107 mmol/L    Bicarbonate 23 21 - 32 mmol/L    Anion Gap 14 10 - 20 mmol/L    Urea Nitrogen 61 (H) 6 - 23 mg/dL    Creatinine 2.67 (H) 0.50 - 1.30 mg/dL    eGFR 23 (L) >60 mL/min/1.73m*2    Calcium 7.4 (L) 8.6 - 10.3 mg/dL    Phosphorus 4.8 2.5 - 4.9 mg/dL    Albumin 2.5 (L) 3.4 - 5.0 g/dL   POCT GLUCOSE   Result Value Ref Range    POCT Glucose 366 (H) 74 - 99 mg/dL   POCT GLUCOSE   Result Value Ref Range    POCT Glucose 314 (H) 74 - 99 mg/dL   Renal function panel   Result Value Ref Range    Glucose 262 (H) 74 - 99 mg/dL    Sodium 126 (L) 136 - 145 mmol/L    Potassium 5.5 (H) 3.5 - 5.3 mmol/L    Chloride 94 (L) 98 - 107 mmol/L    Bicarbonate 25 21 - 32 mmol/L    Anion Gap 13 10 - 20 mmol/L    Urea Nitrogen 64 (H) 6 - 23 mg/dL    Creatinine 2.70 (H) 0.50 - 1.30 mg/dL    eGFR 22 (L) >60 mL/min/1.73m*2    Calcium 7.5 (L) 8.6 - 10.3 mg/dL    Phosphorus 5.0 (H) 2.5 - 4.9 mg/dL    Albumin 2.7 (L) 3.4 - 5.0 g/dL   POCT GLUCOSE   Result Value Ref Range    POCT Glucose 227 (H) 74 - 99 mg/dL   POCT GLUCOSE   Result Value Ref Range    POCT Glucose 224 (H) 74 - 99 mg/dL   CBC   Result Value Ref Range    WBC 4.2 (L) 4.4 - 11.3 x10*3/uL    nRBC 0.0 0.0 - 0.0 /100 WBCs    RBC 3.99 (L) 4.50 - 5.90 x10*6/uL    Hemoglobin 10.3 (L) 13.5 - 17.5  g/dL    Hematocrit 35.3 (L) 41.0 - 52.0 %    MCV 89 80 - 100 fL    MCH 25.8 (L) 26.0 - 34.0 pg    MCHC 29.2 (L) 32.0 - 36.0 g/dL    RDW 14.7 (H) 11.5 - 14.5 %    Platelets 84 (L) 150 - 450 x10*3/uL   Magnesium   Result Value Ref Range    Magnesium 2.16 1.60 - 2.40 mg/dL   Renal function panel   Result Value Ref Range    Glucose 218 (H) 74 - 99 mg/dL    Sodium 128 (L) 136 - 145 mmol/L    Potassium 4.9 3.5 - 5.3 mmol/L    Chloride 95 (L) 98 - 107 mmol/L    Bicarbonate 26 21 - 32 mmol/L    Anion Gap 12 10 - 20 mmol/L    Urea Nitrogen 64 (H) 6 - 23 mg/dL    Creatinine 2.50 (H) 0.50 - 1.30 mg/dL    eGFR 24 (L) >60 mL/min/1.73m*2    Calcium 7.6 (L) 8.6 - 10.3 mg/dL    Phosphorus 4.9 2.5 - 4.9 mg/dL    Albumin 2.9 (L) 3.4 - 5.0 g/dL   Creatine Kinase   Result Value Ref Range    Creatine Kinase 68 0 - 325 U/L          TANISHA/PVR studies reviewed: Moderate arterial occlusive disease bilaterally is noted.    X-ray of the right foot independently reviewed.  Impression: Vascular calcifications are seen.  Osteopenia is seen.  No soft tissue emphysema    Assessment/Plan   Assessment & Plan  Acute on chronic systolic heart failure    CAP (community acquired pneumonia) due to Chlamydia species    The patient was seen and examined.  Dressings were changed.  His skin changes and pedal blistering is due to his vascular status.  It does appear wet on the right side with serous drainage noted between the digits.  No fluctuance, crepitus or drainable abscess currently.  X-rays reviewed and there is no soft tissue emphysema seen.  Continue dressing changes for now.  I have switched dressing changes to Betadine on the right to try and dry out some of the serous changes.  Appreciate infectious disease recommendations.  Unfortunately, with the patient's lack of blood flow I am not sure any wound debridements would heal.  We will continue to follow.     Debra Pantoja DPM

## 2025-02-16 NOTE — PROGRESS NOTES
02/16/25 1055   Discharge Planning   Home or Post Acute Services Post acute facilities (Rehab/SNF/etc)   Type of Post Acute Facility Services Skilled nursing   Expected Discharge Disposition SNF   Does the patient need discharge transport arranged? Yes   RoundTrip coordination needed? Yes   Has discharge transport been arranged? No     Spoke to patient's son, Duane, on the phone to discuss SNF choices. Duane states they have chosen 1. [facility] and 2. [facility]. Message sent to [facility] to request to send new SNF referrals.     1425: [facility] can accept. Waiting on reply from [facility] which is [facility].

## 2025-02-16 NOTE — PROGRESS NOTES
Vancomycin Dosing by Pharmacy- Cessation of Therapy    Consult to pharmacy for vancomycin dosing has been discontinued by the prescriber, pharmacy will sign off at this time.    Please call pharmacy if there are further questions or re-enter a consult if vancomycin is resumed.     Leda Hunter, PharmD

## 2025-02-17 ENCOUNTER — APPOINTMENT (OUTPATIENT)
Dept: CARDIOLOGY | Facility: HOSPITAL | Age: 85
DRG: 291 | End: 2025-02-17
Payer: MEDICARE

## 2025-02-17 LAB
ALBUMIN SERPL BCP-MCNC: 2.8 G/DL (ref 3.4–5)
ALBUMIN SERPL BCP-MCNC: 3.2 G/DL (ref 3.4–5)
ANION GAP SERPL CALC-SCNC: 12 MMOL/L (ref 10–20)
ANION GAP SERPL CALC-SCNC: 14 MMOL/L (ref 10–20)
BACTERIA SPEC CULT: ABNORMAL
BACTERIA SPEC CULT: ABNORMAL
BUN SERPL-MCNC: 66 MG/DL (ref 6–23)
BUN SERPL-MCNC: 68 MG/DL (ref 6–23)
CALCIUM SERPL-MCNC: 7.2 MG/DL (ref 8.6–10.3)
CALCIUM SERPL-MCNC: 7.5 MG/DL (ref 8.6–10.3)
CHLORIDE SERPL-SCNC: 90 MMOL/L (ref 98–107)
CHLORIDE SERPL-SCNC: 92 MMOL/L (ref 98–107)
CO2 SERPL-SCNC: 29 MMOL/L (ref 21–32)
CO2 SERPL-SCNC: 30 MMOL/L (ref 21–32)
CREAT SERPL-MCNC: 2.52 MG/DL (ref 0.5–1.3)
CREAT SERPL-MCNC: 2.59 MG/DL (ref 0.5–1.3)
EGFRCR SERPLBLD CKD-EPI 2021: 23 ML/MIN/1.73M*2
EGFRCR SERPLBLD CKD-EPI 2021: 24 ML/MIN/1.73M*2
EJECTION FRACTION APICAL 4 CHAMBER: 49.5
EJECTION FRACTION: 45 %
ERYTHROCYTE [DISTWIDTH] IN BLOOD BY AUTOMATED COUNT: 14.7 % (ref 11.5–14.5)
GLUCOSE BLD MANUAL STRIP-MCNC: 160 MG/DL (ref 74–99)
GLUCOSE BLD MANUAL STRIP-MCNC: 200 MG/DL (ref 74–99)
GLUCOSE SERPL-MCNC: 178 MG/DL (ref 74–99)
GLUCOSE SERPL-MCNC: 201 MG/DL (ref 74–99)
GRAM STN SPEC: ABNORMAL
GRAM STN SPEC: ABNORMAL
HCT VFR BLD AUTO: 36.7 % (ref 41–52)
HGB BLD-MCNC: 10.7 G/DL (ref 13.5–17.5)
LEFT ATRIUM VOLUME AREA LENGTH INDEX BSA: 35 ML/M2
LEFT VENTRICLE INTERNAL DIMENSION DIASTOLE: 4.3 CM (ref 3.5–6)
LV EJECTION FRACTION BIPLANE: 44 %
MAGNESIUM SERPL-MCNC: 1.99 MG/DL (ref 1.6–2.4)
MCH RBC QN AUTO: 25.6 PG (ref 26–34)
MCHC RBC AUTO-ENTMCNC: 29.2 G/DL (ref 32–36)
MCV RBC AUTO: 88 FL (ref 80–100)
MITRAL VALVE E/A RATIO: 4.12
NRBC BLD-RTO: 0 /100 WBCS (ref 0–0)
PHOSPHATE SERPL-MCNC: 3.1 MG/DL (ref 2.5–4.9)
PHOSPHATE SERPL-MCNC: 4.2 MG/DL (ref 2.5–4.9)
PLATELET # BLD AUTO: 93 X10*3/UL (ref 150–450)
POTASSIUM SERPL-SCNC: 3.8 MMOL/L (ref 3.5–5.3)
POTASSIUM SERPL-SCNC: 4.2 MMOL/L (ref 3.5–5.3)
RBC # BLD AUTO: 4.18 X10*6/UL (ref 4.5–5.9)
RIGHT VENTRICLE FREE WALL PEAK S': 5.22 CM/S
RIGHT VENTRICLE PEAK SYSTOLIC PRESSURE: 80 MMHG
SODIUM SERPL-SCNC: 129 MMOL/L (ref 136–145)
SODIUM SERPL-SCNC: 130 MMOL/L (ref 136–145)
TRICUSPID ANNULAR PLANE SYSTOLIC EXCURSION: 1.2 CM
WBC # BLD AUTO: 7.5 X10*3/UL (ref 4.4–11.3)

## 2025-02-17 PROCEDURE — 93308 TTE F-UP OR LMTD: CPT | Performed by: INTERNAL MEDICINE

## 2025-02-17 PROCEDURE — 2500000005 HC RX 250 GENERAL PHARMACY W/O HCPCS

## 2025-02-17 PROCEDURE — 82947 ASSAY GLUCOSE BLOOD QUANT: CPT

## 2025-02-17 PROCEDURE — P9045 ALBUMIN (HUMAN), 5%, 250 ML: HCPCS | Mod: JZ | Performed by: INTERNAL MEDICINE

## 2025-02-17 PROCEDURE — 80069 RENAL FUNCTION PANEL: CPT

## 2025-02-17 PROCEDURE — 2500000002 HC RX 250 W HCPCS SELF ADMINISTERED DRUGS (ALT 637 FOR MEDICARE OP, ALT 636 FOR OP/ED)

## 2025-02-17 PROCEDURE — 99233 SBSQ HOSP IP/OBS HIGH 50: CPT

## 2025-02-17 PROCEDURE — 2500000004 HC RX 250 GENERAL PHARMACY W/ HCPCS (ALT 636 FOR OP/ED): Mod: JZ | Performed by: INTERNAL MEDICINE

## 2025-02-17 PROCEDURE — 94640 AIRWAY INHALATION TREATMENT: CPT

## 2025-02-17 PROCEDURE — 36415 COLL VENOUS BLD VENIPUNCTURE: CPT

## 2025-02-17 PROCEDURE — 83735 ASSAY OF MAGNESIUM: CPT

## 2025-02-17 PROCEDURE — 1200000002 HC GENERAL ROOM WITH TELEMETRY DAILY

## 2025-02-17 PROCEDURE — 99222 1ST HOSP IP/OBS MODERATE 55: CPT

## 2025-02-17 PROCEDURE — 2500000004 HC RX 250 GENERAL PHARMACY W/ HCPCS (ALT 636 FOR OP/ED)

## 2025-02-17 PROCEDURE — 99221 1ST HOSP IP/OBS SF/LOW 40: CPT | Performed by: PODIATRIST

## 2025-02-17 PROCEDURE — 85027 COMPLETE CBC AUTOMATED: CPT

## 2025-02-17 PROCEDURE — 2500000001 HC RX 250 WO HCPCS SELF ADMINISTERED DRUGS (ALT 637 FOR MEDICARE OP)

## 2025-02-17 PROCEDURE — 93308 TTE F-UP OR LMTD: CPT

## 2025-02-17 RX ORDER — POTASSIUM CHLORIDE 20 MEQ/1
20 TABLET, EXTENDED RELEASE ORAL ONCE
Status: COMPLETED | OUTPATIENT
Start: 2025-02-17 | End: 2025-02-17

## 2025-02-17 RX ORDER — GUAIFENESIN 600 MG/1
600 TABLET, EXTENDED RELEASE ORAL 2 TIMES DAILY PRN
Status: DISCONTINUED | OUTPATIENT
Start: 2025-02-17 | End: 2025-02-21 | Stop reason: HOSPADM

## 2025-02-17 RX ORDER — ALBUMIN HUMAN 50 G/1000ML
25 SOLUTION INTRAVENOUS ONCE
Status: COMPLETED | OUTPATIENT
Start: 2025-02-17 | End: 2025-02-18

## 2025-02-17 RX ADMIN — Medication 4 L/MIN: at 09:47

## 2025-02-17 RX ADMIN — INSULIN LISPRO 2 UNITS: 100 INJECTION, SOLUTION INTRAVENOUS; SUBCUTANEOUS at 09:48

## 2025-02-17 RX ADMIN — INSULIN LISPRO 2 UNITS: 100 INJECTION, SOLUTION INTRAVENOUS; SUBCUTANEOUS at 09:59

## 2025-02-17 RX ADMIN — LATANOPROST 1 DROP: 50 SOLUTION OPHTHALMIC at 20:43

## 2025-02-17 RX ADMIN — INSULIN LISPRO 1 UNITS: 100 INJECTION, SOLUTION INTRAVENOUS; SUBCUTANEOUS at 20:41

## 2025-02-17 RX ADMIN — ATORVASTATIN CALCIUM 40 MG: 40 TABLET, FILM COATED ORAL at 20:41

## 2025-02-17 RX ADMIN — Medication 3 L/MIN: at 20:43

## 2025-02-17 RX ADMIN — IPRATROPIUM BROMIDE AND ALBUTEROL SULFATE 3 ML: 2.5; .5 SOLUTION RESPIRATORY (INHALATION) at 19:45

## 2025-02-17 RX ADMIN — POTASSIUM CHLORIDE 20 MEQ: 1500 TABLET, EXTENDED RELEASE ORAL at 20:41

## 2025-02-17 RX ADMIN — PANTOPRAZOLE SODIUM 40 MG: 40 TABLET, DELAYED RELEASE ORAL at 20:41

## 2025-02-17 RX ADMIN — INSULIN GLARGINE 10 UNITS: 100 INJECTION, SOLUTION SUBCUTANEOUS at 20:42

## 2025-02-17 RX ADMIN — IPRATROPIUM BROMIDE AND ALBUTEROL SULFATE 3 ML: 2.5; .5 SOLUTION RESPIRATORY (INHALATION) at 01:42

## 2025-02-17 RX ADMIN — PIPERACILLIN SODIUM AND TAZOBACTAM SODIUM 3.38 G: 3; .375 INJECTION, SOLUTION INTRAVENOUS at 09:47

## 2025-02-17 RX ADMIN — GUAIFENESIN 600 MG: 600 TABLET ORAL at 15:26

## 2025-02-17 RX ADMIN — SODIUM ZIRCONIUM CYCLOSILICATE 10 G: 10 POWDER, FOR SUSPENSION ORAL at 09:47

## 2025-02-17 RX ADMIN — PIPERACILLIN SODIUM AND TAZOBACTAM SODIUM 3.38 G: 3; .375 INJECTION, SOLUTION INTRAVENOUS at 01:12

## 2025-02-17 RX ADMIN — Medication 3 L/MIN: at 19:47

## 2025-02-17 RX ADMIN — IPRATROPIUM BROMIDE AND ALBUTEROL SULFATE 3 ML: 2.5; .5 SOLUTION RESPIRATORY (INHALATION) at 14:04

## 2025-02-17 RX ADMIN — INSULIN LISPRO 2 UNITS: 100 INJECTION, SOLUTION INTRAVENOUS; SUBCUTANEOUS at 17:39

## 2025-02-17 RX ADMIN — ISOSORBIDE DINITRATE 10 MG: 10 TABLET ORAL at 15:26

## 2025-02-17 RX ADMIN — Medication 4 L/MIN: at 08:00

## 2025-02-17 RX ADMIN — IPRATROPIUM BROMIDE AND ALBUTEROL SULFATE 3 ML: 2.5; .5 SOLUTION RESPIRATORY (INHALATION) at 22:23

## 2025-02-17 RX ADMIN — IPRATROPIUM BROMIDE AND ALBUTEROL SULFATE 3 ML: 2.5; .5 SOLUTION RESPIRATORY (INHALATION) at 10:16

## 2025-02-17 RX ADMIN — APIXABAN 2.5 MG: 2.5 TABLET, FILM COATED ORAL at 20:41

## 2025-02-17 RX ADMIN — ALBUMIN (HUMAN) 25 G: 12.5 SOLUTION INTRAVENOUS at 15:24

## 2025-02-17 RX ADMIN — APIXABAN 2.5 MG: 2.5 TABLET, FILM COATED ORAL at 09:47

## 2025-02-17 RX ADMIN — FINASTERIDE 5 MG: 5 TABLET, FILM COATED ORAL at 09:47

## 2025-02-17 RX ADMIN — TAMSULOSIN HYDROCHLORIDE 0.4 MG: 0.4 CAPSULE ORAL at 09:47

## 2025-02-17 RX ADMIN — FUROSEMIDE 10 MG/HR: 10 INJECTION, SOLUTION INTRAMUSCULAR; INTRAVENOUS at 01:11

## 2025-02-17 RX ADMIN — INSULIN LISPRO 2 UNITS: 100 INJECTION, SOLUTION INTRAVENOUS; SUBCUTANEOUS at 17:37

## 2025-02-17 ASSESSMENT — COGNITIVE AND FUNCTIONAL STATUS - GENERAL
DRESSING REGULAR LOWER BODY CLOTHING: A LOT
TURNING FROM BACK TO SIDE WHILE IN FLAT BAD: A LITTLE
MOVING FROM LYING ON BACK TO SITTING ON SIDE OF FLAT BED WITH BEDRAILS: A LITTLE
MOVING TO AND FROM BED TO CHAIR: A LOT
PERSONAL GROOMING: A LITTLE
EATING MEALS: A LITTLE
TOILETING: A LOT
PERSONAL GROOMING: A LITTLE
STANDING UP FROM CHAIR USING ARMS: A LOT
HELP NEEDED FOR BATHING: A LITTLE
WALKING IN HOSPITAL ROOM: A LOT
STANDING UP FROM CHAIR USING ARMS: A LOT
DAILY ACTIVITIY SCORE: 14
DRESSING REGULAR UPPER BODY CLOTHING: A LOT
DRESSING REGULAR UPPER BODY CLOTHING: A LITTLE
DRESSING REGULAR LOWER BODY CLOTHING: A LOT
TURNING FROM BACK TO SIDE WHILE IN FLAT BAD: A LITTLE
MOBILITY SCORE: 13
TOILETING: A LITTLE
DAILY ACTIVITIY SCORE: 18
MOVING FROM LYING ON BACK TO SITTING ON SIDE OF FLAT BED WITH BEDRAILS: A LITTLE
WALKING IN HOSPITAL ROOM: A LOT
HELP NEEDED FOR BATHING: A LOT
CLIMB 3 TO 5 STEPS WITH RAILING: TOTAL
MOBILITY SCORE: 13
CLIMB 3 TO 5 STEPS WITH RAILING: TOTAL
MOVING TO AND FROM BED TO CHAIR: A LOT

## 2025-02-17 ASSESSMENT — PAIN SCALES - GENERAL
PAINLEVEL_OUTOF10: 0 - NO PAIN
PAINLEVEL_OUTOF10: 0 - NO PAIN

## 2025-02-17 NOTE — PROGRESS NOTES
INPATIENT NEPHROLOGY CONSULT PROGRESS NOTE      Patient Name: Elgin Marin MRN: [id number]  DATE of SERVICE: February 17, 2025  TIME of SERVICE: 1:42 PM  CONSULTING SERVICE: Nephrology    REASON for CONSULT: ISIAH, hypervolemia, hyperkalemia     Covering for Dr. Stauffer    SUBJECTIVE:  Seen and evaluated at bedside.  Sitting at the edge of the bed.  Patient daughter visiting at bedside.  Continues to endorse pain and swelling in bilateral legs.  Urine output 3.1 L for last 12 hours.    SUMMARY OF STAY:  Mr. Marin is a 84 y.o. male who presented to [facility] February 11, 2025 for evaluation of worsening shortness of breath, worsening bilateral lower extremity swelling, dysuria.  Diagnosed with bilateral lower extremity cellulitis, acute systolic heart failure exacerbation, acute kidney injury with electrolyte imbalance.      Patient with past medical history significant for chronic kidney disease stage IIIb with baseline serum creatinine of 2.1 mg deciliter, EGFR 30 mL/min follows with Dr. Stauffer, history of coronary artery disease status post CABG April 2022, atrial fibrillation on Eliquis, chronic combined systolic and diastolic heart failure, hypertension, hyperlipidemia.     ASSESSMENT:  Acute kidney injury superimposed on chronic kidney disease stage IIIb:   -- Multifactorial likely component of cardiorenal syndrome, acute decompensated systolic heart failure exacerbation with decreased effective circulatory volume, infectious component due to bilateral lower extremity cellulitis, third spacing in the setting of profound hypoalbuminemia in addition to component of hemodynamic mediated injury.  Acute urinary retention requiring Graham catheter  -- Baseline serum creatinine around 2 mg deciliter with a GFR of 30 mL/min per 1.73 M2, follows with primary nephrologist Dr. Stauffer.  -- Serum creatinine up trended to 2.8 mg deciliter BUN of 61 GFR  dropped to 23 mL/min per 1.73 m2.     Acute on chronic systolic heart failure exacerbation:  -- Last EF 42%  --Repeat echocardiogram pending    Hyperkalemia: Likely secondary to ISIAH, venous congestion  -Spironolactone, Jardiance on hold      Hyponatremia likely hypervolemic, diuretics related     Bilateral lower extremity cellulitis left worse than right wound culture positive for gram-positive cocci     Relative hypotension:  -- Spironolactone, Jardiance on hold     Acute urinary retention managed with Graham catheter placement.      Kidney cyst 1.1 cm left kidney, benign, to continue active surveillance as an outpatient.    PLAN:  Adequate response to diuretics urine output 3.1 L for the past 12 hours, to continue IV furosemide plan of care discussed with cardiology team earlier as planning to add metolazone have based on his adequate response to furosemide drip I will hold on adding metolazone for today.  Renal parameters remain stable serum creatinine 2.5 mg deciliter EGFR 23 BUN 68.  To combine IV furosemide with IV albumin infusion to minimize third spacing and augment diuresis.  Pancytopenia, worsening thrombocytopenia concern for Zosyn induced thrombocytopenia.  Relative hypotension.  Holding parameters added to Imdur.  Spironolactone and Jardiance remains on hold.  Hyperkalemia corrected, potassium down to 4.2.  To hold further Lokelma and added as needed for potassium more than 5.5  Hyponatremia improving sodium level of 130 from 124  Metabolic alkalosis likely contraction related bicarb up to 30.  Strict input and output to guide diuresis.  Graham catheter in place  To continue Ace wrap.  Podiatry note reviewed and appreciated patient with poor circulation    Will follow, thank you!    Medications:    Current Facility-Administered Medications:     acetaminophen (Tylenol) tablet 650 mg, 650 mg, oral, q6h PRN, Aman Hurtado, , 650 mg at 02/15/25 1134    apixaban (Eliquis) tablet 2.5 mg, 2.5 mg, oral, BID,  Aman Hurtado, DO, 2.5 mg at 02/17/25 0947    atorvastatin (Lipitor) tablet 40 mg, 40 mg, oral, Nightly, Aman Hurtado DO, 40 mg at 02/16/25 2053    bisoprolol (Zebeta) tablet 5 mg, 5 mg, oral, Daily, Aman Hurtado DO, 5 mg at 02/16/25 0840    DAPTOmycin (Cubicin) 500 mg in sodium chloride 0.9% 50 mL IV, 6 mg/kg, intravenous, q48h, Ewa Ribera MD, Stopped at 02/16/25 1058    dextrose 50 % injection 12.5 g, 12.5 g, intravenous, q15 min PRN, Artemio Rivas MD    dextrose 50 % injection 25 g, 25 g, intravenous, q15 min PRN, Artemio Rivas MD    [Held by provider] empagliflozin (Jardiance) tablet 25 mg, 25 mg, oral, Daily, Aman Hurtado DO    finasteride (Proscar) tablet 5 mg, 5 mg, oral, Daily, Aman Hurtado DO, 5 mg at 02/17/25 0947    furosemide (Lasix) 500 mg in 50 mL (10 mg/mL) infusion, 10 mg/hr, intravenous, Continuous, Pasucal Parada MD, Last Rate: 1 mL/hr at 02/17/25 1122, 10 mg/hr at 02/17/25 1122    glucagon (Glucagen) injection 1 mg, 1 mg, intramuscular, q15 min PRN, Artemio Rivas MD    glucagon (Glucagen) injection 1 mg, 1 mg, intramuscular, q15 min PRN, Artemio Rivas MD    guaiFENesin (Mucinex) 12 hr tablet 600 mg, 600 mg, oral, BID PRN, Aman Hurtado DO    [Held by provider] insulin glargine (Lantus) injection 10 Units, 10 Units, subcutaneous, BID, Aman Hurtado DO, 10 Units at 02/11/25 1340    insulin glargine (Lantus) injection 10 Units, 10 Units, subcutaneous, q24h, Margi Pérez MD, 10 Units at 02/16/25 1835    insulin lispro injection 0-10 Units, 0-10 Units, subcutaneous, TID AC, Aman Hurtado, DO, 2 Units at 02/17/25 0948    insulin lispro injection 0-5 Units, 0-5 Units, subcutaneous, Nightly, Aman Hurtado DO, 2 Units at 02/16/25 2054    insulin lispro injection 2 Units, 2 Units, subcutaneous, TID AC, Aman Hurtado DO, 2 Units at 02/17/25 0959    ipratropium-albuteroL (Duo-Neb) 0.5-2.5 mg/3 mL nebulizer solution 3 mL, 3 mL, nebulization, q6h, Aman Hurtado DO, 3 mL at  02/17/25 0142    ipratropium-albuteroL (Duo-Neb) 0.5-2.5 mg/3 mL nebulizer solution 3 mL, 3 mL, nebulization, q2h PRN, Aman Hurtado DO, 3 mL at 02/17/25 1016    isosorbide dinitrate (Isordil) tablet 10 mg, 10 mg, oral, BID, Aman Hurtado DO, 10 mg at 02/16/25 1519    latanoprost (Xalatan) 0.005 % ophthalmic solution 1 drop, 1 drop, Both Eyes, Nightly, Aman Hurtado DO, 1 drop at 02/16/25 2055    oxygen (O2) therapy, , inhalation, Continuous - Inhalation, Margi Pérez MD, Last Rate: 240,000 mL/hr at 02/17/25 0800, 4 L/min at 02/17/25 0800    oxygen (O2) therapy, , inhalation, Continuous - Inhalation, Artemio Rivas MD, Last Rate: 240,000 mL/hr at 02/17/25 0947, 4 L/min at 02/17/25 0947    pantoprazole (ProtoNix) EC tablet 40 mg, 40 mg, oral, Nightly, Aman Hurtado DO, 40 mg at 02/16/25 2053    perflutren lipid microspheres (Definity) injection 0.5-10 mL of dilution, 0.5-10 mL of dilution, intravenous, Once in imaging, Aman Hurtado DO    perflutren protein A microsphere (Optison) injection 0.5 mL, 0.5 mL, intravenous, Once in imaging, Aman Hurtado DO    sodium zirconium cyclosilicate (Lokelma) packet 10 g, 10 g, oral, Daily, Margi Pérez MD, 10 g at 02/17/25 0947    [Held by provider] spironolactone (Aldactone) tablet 12.5 mg, 12.5 mg, oral, q24h BRE, Aman Hurtado DO, 12.5 mg at 02/12/25 0917    sulfur hexafluoride microsphr (Lumason) injection 24.28 mg, 2 mL, intravenous, Once in imaging, Aman Hurtado DO    tamsulosin (Flomax) 24 hr capsule 0.4 mg, 0.4 mg, oral, Daily, Aman Hurtado DO, 0.4 mg at 02/17/25 0947    [Held by provider] torsemide (Demadex) tablet 80 mg, 80 mg, oral, Daily, Aman Hurtado DO    PERTINENT ROS:  GENERAL:  positive for fatigue, poor appetite.  No fever/chills  RESPIRATORY:  positive for shortness of breath.  Negative for cough, wheezing.  CARDIOVASCULAR:   Negative for chest pain or palpitation.  GI:  Negative for abdominal pain, diarrhea, heartburn, nausea,  vomiting  : Graham catheter in place    Physical Exam:  Vital signs in last 24 hours:  Temp:  [36 °C (96.8 °F)-36.8 °C (98.2 °F)] 36.8 °C (98.2 °F)  Heart Rate:  [69-93] 87  Resp:  [18-21] 19  BP: ()/(56-65) 99/56  FiO2 (%):  [0 %-36 %] 0 %    General: Awake, cooperative, in mild discomfort due to swelling and pain in both lower extremity.  HEENT:  NCAT,  mucous membranes moist and pink  NECK:  Elevated JVD, no carotid bruit, supple, no cervical mass or thyromegaly  LUNGS;  Diminished breath sounds, fine Rales  CV:  Distant, regular rate and rhythm, no murmurs  ABDOMEN:  abdomen soft, nontender, BS normal, no masses or organomegaly  EDEMA:  +3-4 lower extremity edema/dependent edema with ulcers and open wounds  SKIN: Bilateral lower extremity cellulitis with open wounds      Intake/Output last 3 shifts:  I/O last 3 completed shifts:  In: 866.7 (9.7 mL/kg) [P.O.:720; I.V.:36.7 (0.4 mL/kg); IV Piggyback:110]  Out: 4300 (48.1 mL/kg) [Urine:4300 (1.3 mL/kg/hr)]  Weight: 89.4 kg     DATA:  Diagnotic tests reviewed for Todays visit:  Results from last 7 days   Lab Units 02/17/25  0636   WBC AUTO x10*3/uL 7.5   RBC AUTO x10*6/uL 4.18*   HEMOGLOBIN g/dL 10.7*   HEMATOCRIT % 36.7*     Results from last 7 days   Lab Units 02/17/25  0636 02/11/25  0524 02/10/25  2057   SODIUM mmol/L 130*   < > 134*   POTASSIUM mmol/L 4.2   < > 4.8   CHLORIDE mmol/L 92*   < > 102   CO2 mmol/L 30   < > 23   BUN mg/dL 68*   < > 49*   CREATININE mg/dL 2.59*   < > 2.13*   CALCIUM mg/dL 7.5*   < > 8.5*   PHOSPHORUS mg/dL 4.2   < >  --    MAGNESIUM mg/dL 1.99   < >  --    BILIRUBIN TOTAL mg/dL  --   --  1.0   ALT U/L  --   --  7*   AST U/L  --   --  18    < > = values in this interval not displayed.     Results from last 7 days   Lab Units 02/10/25  8487   COLOR U  Yellow   APPEARANCE U  Clear   PH U  5.0   SPEC GRAV UR  1.011   PROTEIN U mg/dL NEGATIVE   BLOOD UR mg/dL 0.03 (TRACE)*   NITRITE U  NEGATIVE   WBC UR /HPF >50*      Echocardiogram  cONCLUSIONS:   1. The left ventricular systolic function is mildly decreased, with a Strong's biplane calculated ejection fraction of 42%.   2. There is global hypokinesis of the left ventricle with minor regional variations.   3. Left ventricular diastolic filling was indeterminate with an elevated left atrial pressure.   4. There is severely reduced right ventricular systolic function.   5. Mildly enlarged right ventricle.   6. The left atrium is moderately dilated.   7. The right atrium is moderately dilated.   8. Mild to moderate tricuspid regurgitation.   9. The estimated RVSP is 46 mm.  10. There is aortic sclerosis with normal leaflet mobility.  11. The inferior vena cava appears severely dilated.  12. There is moderately increased septal and moderately increased posterior left ventricular wall thickness.  IMAGING: CXR reviewed in  images      SIGNATURE: Pascual Parada MD  Nephrology and Hypertension  [address] [place]  Office phone: [phone]  FAX: [phone]    This note was partially generated using the Dragon voice recognition system, and there may be some incorrect words, spelling's and punctuation that were not noted in checking the note before saving.

## 2025-02-17 NOTE — PROGRESS NOTES
Elgin Marin is a 84 y.o. male on day 6 of admission presenting with Acute on chronic systolic heart failure.      Palliative Care re-consulted to discuss Hospice options with pt/family.     5p  Met with pt bedside, daughter joined conversation by phone. Discussed goals and Hospice transition. Pt agreeable to meet with [facility]. Daughter unavailable to meet tmrw.  Plan to take pt home on Hospice. [facility], awaiting Hospice mtg time confirmation. Support provided, will follow.     [facility] Weds 2/19 at 930/10a, [facility] RN to meet with pt bedside, daughter to join mtg by phone.    Skyla Tijerina, LISAW

## 2025-02-17 NOTE — PROGRESS NOTES
Met with pt.  Following his conversation with cardiology, he is now interested in learning about hospice.  Informed attending and requested a new palliative care consult.  CT will continue to follow.

## 2025-02-17 NOTE — PROGRESS NOTES
Podiatry Progress Note      Elgin Marin is a 84 y.o. male on day 6 of admission presenting with Acute on chronic systolic heart failure.    Subjective   Patient is feeling much better.  He is more alert today.       Objective   No complaints of pain.  Medication:   Scheduled medications  apixaban, 2.5 mg, oral, BID  atorvastatin, 40 mg, oral, Nightly  bisoprolol, 5 mg, oral, Daily  daptomycin, 6 mg/kg, intravenous, q48h  [Held by provider] empagliflozin, 25 mg, oral, Daily  finasteride, 5 mg, oral, Daily  [Held by provider] insulin glargine, 10 Units, subcutaneous, BID  insulin glargine, 10 Units, subcutaneous, q24h  insulin lispro, 0-10 Units, subcutaneous, TID AC  insulin lispro, 0-5 Units, subcutaneous, Nightly  insulin lispro, 2 Units, subcutaneous, TID AC  ipratropium-albuteroL, 3 mL, nebulization, q6h  isosorbide dinitrate, 10 mg, oral, BID  latanoprost, 1 drop, Both Eyes, Nightly  oxygen, , inhalation, Continuous - Inhalation  oxygen, , inhalation, Continuous - Inhalation  pantoprazole, 40 mg, oral, Nightly  perflutren lipid microspheres, 0.5-10 mL of dilution, intravenous, Once in imaging  perflutren protein A microsphere, 0.5 mL, intravenous, Once in imaging  piperacillin-tazobactam, 3.375 g, intravenous, q8h  sodium zirconium cyclosilicate, 10 g, oral, Daily  [Held by provider] spironolactone, 12.5 mg, oral, q24h BRE  sulfur hexafluoride microsphr, 2 mL, intravenous, Once in imaging  tamsulosin, 0.4 mg, oral, Daily  [Held by provider] torsemide, 80 mg, oral, Daily      Continuous medications  furosemide, 10 mg/hr, Last Rate: 10 mg/hr (02/17/25 0558)      PRN medications  PRN medications: acetaminophen, dextrose, dextrose, glucagon, glucagon, ipratropium-albuteroL    Physical Exam  Vasc: Right foot is actually warmer.  Patient still has nonpalpable pulses.  Capillary refill is back to normal.    Neuro: Significant decrease in light touch sensation  Derm: Patient's left foot wound has significantly  "improved.  Devitalized tissue is removed to reveal clean beefy base.  Cellulitis is resolving.  Toe wounds on right foot have improved.  Area of demarcation has slowly decreased.  Ortho: Ambulatory with assistance.    Last Recorded Vitals  Blood pressure 101/59, pulse 93, temperature 36.5 °C (97.7 °F), temperature source Temporal, resp. rate 21, height 1.778 m (5' 10\"), weight 89.4 kg (197 lb 3 oz), SpO2 98%.    Intake/Output last 3 Shifts:  I/O last 3 completed shifts:  In: 866.7 (9.7 mL/kg) [P.O.:720; I.V.:36.7 (0.4 mL/kg); IV Piggyback:110]  Out: 4300 (48.1 mL/kg) [Urine:4300 (1.3 mL/kg/hr)]  Weight: 89.4 kg     Relevant Results    Results for orders placed or performed during the hospital encounter of 02/10/25 (from the past 96 hours)   POCT GLUCOSE   Result Value Ref Range    POCT Glucose 86 74 - 99 mg/dL   POCT GLUCOSE   Result Value Ref Range    POCT Glucose 172 (H) 74 - 99 mg/dL   POCT GLUCOSE   Result Value Ref Range    POCT Glucose 193 (H) 74 - 99 mg/dL   POCT GLUCOSE   Result Value Ref Range    POCT Glucose 153 (H) 74 - 99 mg/dL   POCT GLUCOSE   Result Value Ref Range    POCT Glucose 134 (H) 74 - 99 mg/dL   Renal Function Panel   Result Value Ref Range    Glucose 134 (H) 74 - 99 mg/dL    Sodium 128 (L) 136 - 145 mmol/L    Potassium 5.3 3.5 - 5.3 mmol/L    Chloride 97 (L) 98 - 107 mmol/L    Bicarbonate 25 21 - 32 mmol/L    Anion Gap 11 10 - 20 mmol/L    Urea Nitrogen 54 (H) 6 - 23 mg/dL    Creatinine 2.38 (H) 0.50 - 1.30 mg/dL    eGFR 26 (L) >60 mL/min/1.73m*2    Calcium 7.7 (L) 8.6 - 10.3 mg/dL    Phosphorus 4.5 2.5 - 4.9 mg/dL    Albumin 2.7 (L) 3.4 - 5.0 g/dL   Magnesium   Result Value Ref Range    Magnesium 2.35 1.60 - 2.40 mg/dL   CBC   Result Value Ref Range    WBC 6.0 4.4 - 11.3 x10*3/uL    nRBC 0.0 0.0 - 0.0 /100 WBCs    RBC 4.31 (L) 4.50 - 5.90 x10*6/uL    Hemoglobin 11.0 (L) 13.5 - 17.5 g/dL    Hematocrit 38.1 (L) 41.0 - 52.0 %    MCV 88 80 - 100 fL    MCH 25.5 (L) 26.0 - 34.0 pg    MCHC 28.9 (L) " 32.0 - 36.0 g/dL    RDW 14.8 (H) 11.5 - 14.5 %    Platelets 113 (L) 150 - 450 x10*3/uL   Sedimentation Rate   Result Value Ref Range    Sedimentation Rate 33 (H) 0 - 20 mm/h   POCT GLUCOSE   Result Value Ref Range    POCT Glucose 136 (H) 74 - 99 mg/dL   POCT GLUCOSE   Result Value Ref Range    POCT Glucose 182 (H) 74 - 99 mg/dL   POCT GLUCOSE   Result Value Ref Range    POCT Glucose 194 (H) 74 - 99 mg/dL   Tissue/Wound Culture/Smear    Specimen: Wound/Tissue; Tissue/Biopsy   Result Value Ref Range    Tissue/Wound Culture/Smear (A)      (4+) Abundant Methicillin Resistant Staphylococcus aureus (MRSA)    Tissue/Wound Culture/Smear (4+) Abundant Corynebacterium striatum group (A)     Gram Stain (2+) Few Polymorphonuclear leukocytes (A)     Gram Stain (4+) Abundant Gram positive cocci (A)        Susceptibility    Methicillin Resistant Staphylococcus aureus (MRSA) - MICROSCAN     Oxacillin  Resistant ug/ml     Trimethoprim/Sulfamethoxazole  Susceptible ug/ml     Tetracycline  Resistant ug/ml     Clindamycin  Resistant ug/ml     Erythromycin  Resistant ug/ml     Vancomycin  Susceptible ug/ml    Corynebacterium striatum group - GRADIENT DIFFUSION     Penicillin  Resistant ug/ml     Vancomycin  Susceptible ug/ml     Ciprofloxacin  Resistant ug/ml     Ceftriaxone  Resistant ug/ml     Tetracycline  Resistant ug/ml     Gentamicin  Susceptible ug/ml   Blood Culture    Specimen: Peripheral Venipuncture; Blood culture   Result Value Ref Range    Blood Culture No growth at 2 days    Blood Culture    Specimen: Peripheral Venipuncture; Blood culture   Result Value Ref Range    Blood Culture No growth at 2 days    POCT GLUCOSE   Result Value Ref Range    POCT Glucose 240 (H) 74 - 99 mg/dL   C-reactive protein   Result Value Ref Range    C-Reactive Protein 3.06 (H) <1.00 mg/dL   PST Top   Result Value Ref Range    Extra Tube Hold for add-ons.    Troponin I, High Sensitivity   Result Value Ref Range    Troponin I, High Sensitivity 282  (HH) 0 - 20 ng/L   POCT GLUCOSE   Result Value Ref Range    POCT Glucose 194 (H) 74 - 99 mg/dL   Blood Gas Arterial Full Panel   Result Value Ref Range    POCT pH, Arterial 7.27 (L) 7.38 - 7.42 pH    POCT pCO2, Arterial 55 (H) 38 - 42 mm Hg    POCT pO2, Arterial 35 (LL) 85 - 95 mm Hg    POCT SO2, Arterial 59 (L) 94 - 100 %    POCT Oxy Hemoglobin, Arterial 57.1 (L) 94.0 - 98.0 %    POCT Hematocrit Calculated, Arterial 36.0 (L) 41.0 - 52.0 %    POCT Sodium, Arterial 122 (L) 136 - 145 mmol/L    POCT Potassium, Arterial 5.2 3.5 - 5.3 mmol/L    POCT Chloride, Arterial 93 (L) 98 - 107 mmol/L    POCT Ionized Calcium, Arterial 1.08 (L) 1.10 - 1.33 mmol/L    POCT Glucose, Arterial 220 (H) 74 - 99 mg/dL    POCT Lactate, Arterial 1.4 0.4 - 2.0 mmol/L    POCT Base Excess, Arterial -2.3 (L) -2.0 - 3.0 mmol/L    POCT HCO3 Calculated, Arterial 25.3 22.0 - 26.0 mmol/L    POCT Hemoglobin, Arterial 12.1 (L) 13.5 - 17.5 g/dL    POCT Anion Gap, Arterial 9 (L) 10 - 25 mmo/L    Patient Temperature      FiO2 15 %    Critical Called By BRAD BOCANEGRA     Critical Called To YAMILEX SHANNON     Critical Call Time 2333     Critical Read Back Y     Site of Arterial Puncture Radial Left    Blood Gas Arterial   Result Value Ref Range    POCT pH, Arterial 7.28 (L) 7.38 - 7.42 pH    POCT pCO2, Arterial 52 (H) 38 - 42 mm Hg    POCT pO2, Arterial 172 (H) 85 - 95 mm Hg    POCT SO2, Arterial 100 94 - 100 %    POCT Oxy Hemoglobin, Arterial 96.9 94.0 - 98.0 %    POCT Base Excess, Arterial -3.0 (L) -2.0 - 3.0 mmol/L    POCT HCO3 Calculated, Arterial 24.4 22.0 - 26.0 mmol/L    Patient Temperature      FiO2 15 %   POCT GLUCOSE   Result Value Ref Range    POCT Glucose 139 (H) 74 - 99 mg/dL   CBC   Result Value Ref Range    WBC 8.2 4.4 - 11.3 x10*3/uL    nRBC 0.0 0.0 - 0.0 /100 WBCs    RBC 4.27 (L) 4.50 - 5.90 x10*6/uL    Hemoglobin 11.1 (L) 13.5 - 17.5 g/dL    Hematocrit 37.6 (L) 41.0 - 52.0 %    MCV 88 80 - 100 fL    MCH 26.0 26.0 - 34.0 pg    MCHC 29.5 (L) 32.0 - 36.0  g/dL    RDW 15.0 (H) 11.5 - 14.5 %    Platelets 92 (L) 150 - 450 x10*3/uL   Magnesium   Result Value Ref Range    Magnesium 2.06 1.60 - 2.40 mg/dL   Renal Function Panel   Result Value Ref Range    Glucose 181 (H) 74 - 99 mg/dL    Sodium 126 (L) 136 - 145 mmol/L    Potassium 5.5 (H) 3.5 - 5.3 mmol/L    Chloride 95 (L) 98 - 107 mmol/L    Bicarbonate 24 21 - 32 mmol/L    Anion Gap 13 10 - 20 mmol/L    Urea Nitrogen 55 (H) 6 - 23 mg/dL    Creatinine 2.39 (H) 0.50 - 1.30 mg/dL    eGFR 26 (L) >60 mL/min/1.73m*2    Calcium 7.7 (L) 8.6 - 10.3 mg/dL    Phosphorus 4.4 2.5 - 4.9 mg/dL    Albumin 2.7 (L) 3.4 - 5.0 g/dL   Morphology   Result Value Ref Range    RBC Morphology See Below     Ovalocytes Few     Teardrop Cells Few     Acanthocytes Few     Basophilic Stippling Present    Osmolality   Result Value Ref Range    Osmolality, Serum 289 280 - 300 mOsm/kg   POCT GLUCOSE   Result Value Ref Range    POCT Glucose 183 (H) 74 - 99 mg/dL   POCT GLUCOSE   Result Value Ref Range    POCT Glucose 366 (H) 74 - 99 mg/dL   Urine electrolytes   Result Value Ref Range    Sodium, Urine Random 35 mmol/L    Sodium/Creatinine Ratio 79 Not established. mmol/g Creat    Potassium, Urine Random 21 mmol/L    Potassium/Creatinine Ratio 47 Not established mmol/g Creat    Chloride, Urine Random 35 mmol/L    Chloride/Creatinine Ratio 79 23 - 275 mmol/g creat    Creatinine, Urine Random 44.4 20.0 - 370.0 mg/dL   Osmolality, urine   Result Value Ref Range    Osmolality, Urine Random 401 200 - 1,200 mOsm/kg   POCT GLUCOSE   Result Value Ref Range    POCT Glucose 395 (H) 74 - 99 mg/dL   Potassium   Result Value Ref Range    Potassium 5.5 (H) 3.5 - 5.3 mmol/L   Vancomycin   Result Value Ref Range    Vancomycin 8.0 5.0 - 20.0 ug/mL   Renal function panel   Result Value Ref Range    Glucose 415 (H) 74 - 99 mg/dL    Sodium 124 (L) 136 - 145 mmol/L    Potassium 5.7 (H) 3.5 - 5.3 mmol/L    Chloride 93 (L) 98 - 107 mmol/L    Bicarbonate 23 21 - 32 mmol/L     Anion Gap 14 10 - 20 mmol/L    Urea Nitrogen 61 (H) 6 - 23 mg/dL    Creatinine 2.67 (H) 0.50 - 1.30 mg/dL    eGFR 23 (L) >60 mL/min/1.73m*2    Calcium 7.4 (L) 8.6 - 10.3 mg/dL    Phosphorus 4.8 2.5 - 4.9 mg/dL    Albumin 2.5 (L) 3.4 - 5.0 g/dL   POCT GLUCOSE   Result Value Ref Range    POCT Glucose 366 (H) 74 - 99 mg/dL   POCT GLUCOSE   Result Value Ref Range    POCT Glucose 314 (H) 74 - 99 mg/dL   Renal function panel   Result Value Ref Range    Glucose 262 (H) 74 - 99 mg/dL    Sodium 126 (L) 136 - 145 mmol/L    Potassium 5.5 (H) 3.5 - 5.3 mmol/L    Chloride 94 (L) 98 - 107 mmol/L    Bicarbonate 25 21 - 32 mmol/L    Anion Gap 13 10 - 20 mmol/L    Urea Nitrogen 64 (H) 6 - 23 mg/dL    Creatinine 2.70 (H) 0.50 - 1.30 mg/dL    eGFR 22 (L) >60 mL/min/1.73m*2    Calcium 7.5 (L) 8.6 - 10.3 mg/dL    Phosphorus 5.0 (H) 2.5 - 4.9 mg/dL    Albumin 2.7 (L) 3.4 - 5.0 g/dL   POCT GLUCOSE   Result Value Ref Range    POCT Glucose 227 (H) 74 - 99 mg/dL   POCT GLUCOSE   Result Value Ref Range    POCT Glucose 224 (H) 74 - 99 mg/dL   CBC   Result Value Ref Range    WBC 4.2 (L) 4.4 - 11.3 x10*3/uL    nRBC 0.0 0.0 - 0.0 /100 WBCs    RBC 3.99 (L) 4.50 - 5.90 x10*6/uL    Hemoglobin 10.3 (L) 13.5 - 17.5 g/dL    Hematocrit 35.3 (L) 41.0 - 52.0 %    MCV 89 80 - 100 fL    MCH 25.8 (L) 26.0 - 34.0 pg    MCHC 29.2 (L) 32.0 - 36.0 g/dL    RDW 14.7 (H) 11.5 - 14.5 %    Platelets 84 (L) 150 - 450 x10*3/uL   Magnesium   Result Value Ref Range    Magnesium 2.16 1.60 - 2.40 mg/dL   Renal function panel   Result Value Ref Range    Glucose 218 (H) 74 - 99 mg/dL    Sodium 128 (L) 136 - 145 mmol/L    Potassium 4.9 3.5 - 5.3 mmol/L    Chloride 95 (L) 98 - 107 mmol/L    Bicarbonate 26 21 - 32 mmol/L    Anion Gap 12 10 - 20 mmol/L    Urea Nitrogen 64 (H) 6 - 23 mg/dL    Creatinine 2.50 (H) 0.50 - 1.30 mg/dL    eGFR 24 (L) >60 mL/min/1.73m*2    Calcium 7.6 (L) 8.6 - 10.3 mg/dL    Phosphorus 4.9 2.5 - 4.9 mg/dL    Albumin 2.9 (L) 3.4 - 5.0 g/dL   Creatine  Kinase   Result Value Ref Range    Creatine Kinase 68 0 - 325 U/L   POCT GLUCOSE   Result Value Ref Range    POCT Glucose 193 (H) 74 - 99 mg/dL   POCT GLUCOSE   Result Value Ref Range    POCT Glucose 236 (H) 74 - 99 mg/dL   POCT GLUCOSE   Result Value Ref Range    POCT Glucose 198 (H) 74 - 99 mg/dL   Renal function panel   Result Value Ref Range    Glucose 240 (H) 74 - 99 mg/dL    Sodium 127 (L) 136 - 145 mmol/L    Potassium 4.6 3.5 - 5.3 mmol/L    Chloride 92 (L) 98 - 107 mmol/L    Bicarbonate 28 21 - 32 mmol/L    Anion Gap 12 10 - 20 mmol/L    Urea Nitrogen 68 (H) 6 - 23 mg/dL    Creatinine 2.66 (H) 0.50 - 1.30 mg/dL    eGFR 23 (L) >60 mL/min/1.73m*2    Calcium 7.4 (L) 8.6 - 10.3 mg/dL    Phosphorus 4.7 2.5 - 4.9 mg/dL    Albumin 2.9 (L) 3.4 - 5.0 g/dL   POCT GLUCOSE   Result Value Ref Range    POCT Glucose 216 (H) 74 - 99 mg/dL   Magnesium   Result Value Ref Range    Magnesium 1.99 1.60 - 2.40 mg/dL   CBC   Result Value Ref Range    WBC 7.5 4.4 - 11.3 x10*3/uL    nRBC 0.0 0.0 - 0.0 /100 WBCs    RBC 4.18 (L) 4.50 - 5.90 x10*6/uL    Hemoglobin 10.7 (L) 13.5 - 17.5 g/dL    Hematocrit 36.7 (L) 41.0 - 52.0 %    MCV 88 80 - 100 fL    MCH 25.6 (L) 26.0 - 34.0 pg    MCHC 29.2 (L) 32.0 - 36.0 g/dL    RDW 14.7 (H) 11.5 - 14.5 %    Platelets 93 (L) 150 - 450 x10*3/uL   Renal function panel   Result Value Ref Range    Glucose 201 (H) 74 - 99 mg/dL    Sodium 130 (L) 136 - 145 mmol/L    Potassium 4.2 3.5 - 5.3 mmol/L    Chloride 92 (L) 98 - 107 mmol/L    Bicarbonate 30 21 - 32 mmol/L    Anion Gap 12 10 - 20 mmol/L    Urea Nitrogen 68 (H) 6 - 23 mg/dL    Creatinine 2.59 (H) 0.50 - 1.30 mg/dL    eGFR 23 (L) >60 mL/min/1.73m*2    Calcium 7.5 (L) 8.6 - 10.3 mg/dL    Phosphorus 4.2 2.5 - 4.9 mg/dL    Albumin 3.2 (L) 3.4 - 5.0 g/dL        Vascular US Ankle Brachial Index (TANISHA) Without Exercise    Result Date: 2/15/2025            [facility] [place] [id number] [place] [address]     Tel [phone] Fax [phone]   Vascular Lab Report  [facility] US ANKLE BRACHIAL INDEX (TANISHA) WITHOUT EXERCISE Patient Name:      NORRIS SCHUMACHER AKBARCHALO     [place] Physician:  [id number] Olinda Rudolph MD, RPVI Study Date:        2/15/2025            Ordering Provider:  [id number] AMNA DOYLE MRN/PID:           [id number]             Fellow: Accession#:        [id number]         Technologist:       Fang Owens RVT Date of Birth/Age: 1940 / 84 years Technologist 2: Gender:            M                    Encounter#:         [id number] Admission Status:  Inpatient            Location Performed: [facility]  Diagnosis/ICD: 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  **CRITICAL RESULT** Critical Result: Amna Doyle DO was notified of the test results on 2/15/2025 at 2:15PM by Fang Owens RVT.  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 Right pressures of >220 mmHg suggest no compressibility of vessels and may make absolute Segmental Limb Pressures (SLP) unreliable. Decreased digital perfusion noted. Monophasic flow is noted in the right posterior tibial artery and right dorsalis pedis artery. Multiphasic flow is noted in the right common femoral artery. Dampened PPG tracing of the great toe with abnormal TBI. Level of disease based on waveforms and tracings due to non-compressible vessels. Left Lower PVR: Evidence of moderate arterial occlusive disease in the left lower extremity at rest. Left pressures of >220 mmHg suggest no compressibility of vessels and may make absolute Segmental Limb Pressures (SLP) unreliable.  Decreased digital perfusion noted. Monophasic flow is noted in the left dorsalis pedis artery and left posterior tibial artery. Multiphasic flow is noted in the left common femoral artery. Dampened PPG tracing of the great toe with abnormal TBI. Level of disease based on waveforms and tracings due to non-compressible vessels. Left brachial pressure not obtained due to multiple IV lines.  Imaging & Doppler Findings:  RIGHT Lower PVR                Pressures Ratios Right Posterior Tibial (Ankle) 234 mmHg  2.02 Right Dorsalis Pedis (Ankle)   254 mmHg  2.19 Right Digit (Great Toe)        39 mmHg   0.34   LEFT Lower PVR                Pressures Ratios Left Posterior Tibial (Ankle) 254 mmHg  2.19 Left Dorsalis Pedis (Ankle)   254 mmHg  2.19 Left Digit (Great Toe)        60 mmHg   0.52                      Right Brachial Pressure 116 mmHg   [id number] Olinda Rudolph MD, RPVI Electronically signed by [id number] Olinda Rudolph MD, RPVI on 2/15/2025 at 3:28:07 PM  ** Final **       Assessment/Plan   Assessment & Plan  Acute on chronic systolic heart failure    CAP (community acquired pneumonia) due to Chlamydia species    Patient has improved cellulitis with improved foot ulcers.  Peripheral vascular disease is stable.   At this time, the blister is deroofed to reveal partial-thickness defect on the left foot.  Legs are rewrapped.  Continue local care.  I am fine with discharge when patient is medically stable.    I spent 30 minutes in the professional and overall care of this patient.      Maya Quevedo DPM

## 2025-02-17 NOTE — CONSULTS
"Reason For Consult  CHF exacerbation    History Of Present Illness  Elgin Marin is a 84 y.o. male presenting with medical history significant for CAD status post CABG in April 2022, A-fib on Eliquis, chronic combined systolic and diastolic heart failure echo in November 16, 2024 shows LVEF 42% with global hypokinesis of the LV, hypertension, CKD baseline creatinine 2.0, hyperlipidemia, cholecystectomy in 2015 who initially presented with shortness of breath, lower abdominal pain, dysuria.  Cardiology consulted for worsening shortness of breath and heart failure exacerbation.    Patient does have a recent admission from November 15, 2024 to November 21, 2024 for acute on chronic combined systolic and diastolic heart failure exacerbation with a chronic troponinemia.  He did show improvement after receiving bronchodilator therapy, steroids, IV antibiotics, IV diuresis and was discharged to SNF with transition to p.o. diuretics.    EKG shows patient in A-fib with rate 110, QRS duration 102, QTc 454.    At the bedside, patient is not participating with active history taking answering \"how am I supposed to know?\"  When questioned whether he has worsening chest pain, shortness of breath, worsening overall symptoms.    Past Medical History  He has a past medical history of Atypical chest pain (04/15/2023), Bilateral lower leg cellulitis (01/25/2024), Chest pain (03/18/2023), Chronic obstructive pulmonary disease, unspecified (11/13/2024), Fall (03/21/2023), Generalized abdominal pain (04/15/2023), Hand pain (02/11/2025), Heart failure with reduced ejection fraction (04/14/2023), History of cardiac catheterization (04/01/2022), History of diabetes mellitus (02/11/2025), Hyperlipidemia, unspecified (11/14/2022), Localized swelling, mass and lump, lower limb, bilateral (11/21/2024), Muscle weakness (09/07/2023), Myocardial disorder (Multi) (02/11/2025), Nausea and vomiting (02/11/2025), Open wounds involving multiple " "regions of lower extremity (04/15/2023), Other forms of dyspnea (08/09/2018), Other specified abnormal findings of blood chemistry (11/13/2024), Personal history of other diseases of the circulatory system (12/08/2014), Personal history of other endocrine, nutritional and metabolic disease, Personal history of other specified conditions, Presence of intraocular lens (07/20/2019), Presence of intraocular lens (07/20/2019), Repeated falls (11/21/2024), Tachycardia (04/04/2022), Unspecified atrial fibrillation (Multi) (11/14/2022), and Weakness (02/11/2025).    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     Social History  He reports that he has never smoked. He has never used smokeless tobacco. He reports that he does not drink alcohol and does not use drugs.    Family History  No family history on file.     Allergies  Metoprolol, Oxycodone-aspirin, Sulfa (sulfonamide antibiotics), Lisinopril, Sulfur, Tramadol, and Warfarin    Review of Systems  Unable to obtain ROS due to lack of patient participation     Physical Exam  Physical Exam     Visit Vitals  BP 99/56 (BP Location: Right arm, Patient Position: Lying)   Pulse 87   Temp 36.8 °C (98.2 °F) (Temporal)   Resp 19   Ht 1.778 m (5' 10\")   Wt 89.4 kg (197 lb 3 oz)   SpO2 96%   BMI 28.29 kg/m²   Smoking Status Never   BSA 2.1 m²        General: NAD. NCAT.  Cardiovascular: RRR. No MRG. S1/S2 wnl. Elevated JVD  Respiratory: bibasilar crackles  MSK: ROM x 4. CTLS non-tender.   Extremities: 3+ bilateral edema covered in bandages. Cap refill < 2 sec.   Neuro: no focal deficits  Psych: Mood wnl.     Last Recorded Vitals  Blood pressure 101/59, pulse 93, temperature 36.5 °C (97.7 °F), temperature source Temporal, resp. rate " "21, height 1.778 m (5' 10\"), weight 89.4 kg (197 lb 3 oz), SpO2 97%.    Relevant Results  Scheduled medications  apixaban, 2.5 mg, oral, BID  atorvastatin, 40 mg, oral, Nightly  bisoprolol, 5 mg, oral, Daily  daptomycin, 6 mg/kg, intravenous, q48h  [Held by provider] empagliflozin, 25 mg, oral, Daily  finasteride, 5 mg, oral, Daily  [Held by provider] insulin glargine, 10 Units, subcutaneous, BID  insulin glargine, 10 Units, subcutaneous, q24h  insulin lispro, 0-10 Units, subcutaneous, TID AC  insulin lispro, 0-5 Units, subcutaneous, Nightly  insulin lispro, 2 Units, subcutaneous, TID AC  ipratropium-albuteroL, 3 mL, nebulization, q6h  isosorbide dinitrate, 10 mg, oral, BID  latanoprost, 1 drop, Both Eyes, Nightly  oxygen, , inhalation, Continuous - Inhalation  oxygen, , inhalation, Continuous - Inhalation  pantoprazole, 40 mg, oral, Nightly  perflutren lipid microspheres, 0.5-10 mL of dilution, intravenous, Once in imaging  perflutren protein A microsphere, 0.5 mL, intravenous, Once in imaging  piperacillin-tazobactam, 3.375 g, intravenous, q8h  sodium zirconium cyclosilicate, 10 g, oral, Daily  [Held by provider] spironolactone, 12.5 mg, oral, q24h BRE  sulfur hexafluoride microsphr, 2 mL, intravenous, Once in imaging  tamsulosin, 0.4 mg, oral, Daily  [Held by provider] torsemide, 80 mg, oral, Daily      Continuous medications  furosemide, 10 mg/hr, Last Rate: 10 mg/hr (02/17/25 1122)      PRN medications  PRN medications: acetaminophen, dextrose, dextrose, glucagon, glucagon, guaiFENesin, ipratropium-albuteroL  Results for orders placed or performed during the hospital encounter of 02/10/25 (from the past 24 hours)   POCT GLUCOSE   Result Value Ref Range    POCT Glucose 236 (H) 74 - 99 mg/dL   POCT GLUCOSE   Result Value Ref Range    POCT Glucose 198 (H) 74 - 99 mg/dL   Renal function panel   Result Value Ref Range    Glucose 240 (H) 74 - 99 mg/dL    Sodium 127 (L) 136 - 145 mmol/L    Potassium 4.6 3.5 - 5.3 " mmol/L    Chloride 92 (L) 98 - 107 mmol/L    Bicarbonate 28 21 - 32 mmol/L    Anion Gap 12 10 - 20 mmol/L    Urea Nitrogen 68 (H) 6 - 23 mg/dL    Creatinine 2.66 (H) 0.50 - 1.30 mg/dL    eGFR 23 (L) >60 mL/min/1.73m*2    Calcium 7.4 (L) 8.6 - 10.3 mg/dL    Phosphorus 4.7 2.5 - 4.9 mg/dL    Albumin 2.9 (L) 3.4 - 5.0 g/dL   POCT GLUCOSE   Result Value Ref Range    POCT Glucose 216 (H) 74 - 99 mg/dL   Magnesium   Result Value Ref Range    Magnesium 1.99 1.60 - 2.40 mg/dL   CBC   Result Value Ref Range    WBC 7.5 4.4 - 11.3 x10*3/uL    nRBC 0.0 0.0 - 0.0 /100 WBCs    RBC 4.18 (L) 4.50 - 5.90 x10*6/uL    Hemoglobin 10.7 (L) 13.5 - 17.5 g/dL    Hematocrit 36.7 (L) 41.0 - 52.0 %    MCV 88 80 - 100 fL    MCH 25.6 (L) 26.0 - 34.0 pg    MCHC 29.2 (L) 32.0 - 36.0 g/dL    RDW 14.7 (H) 11.5 - 14.5 %    Platelets 93 (L) 150 - 450 x10*3/uL   Renal function panel   Result Value Ref Range    Glucose 201 (H) 74 - 99 mg/dL    Sodium 130 (L) 136 - 145 mmol/L    Potassium 4.2 3.5 - 5.3 mmol/L    Chloride 92 (L) 98 - 107 mmol/L    Bicarbonate 30 21 - 32 mmol/L    Anion Gap 12 10 - 20 mmol/L    Urea Nitrogen 68 (H) 6 - 23 mg/dL    Creatinine 2.59 (H) 0.50 - 1.30 mg/dL    eGFR 23 (L) >60 mL/min/1.73m*2    Calcium 7.5 (L) 8.6 - 10.3 mg/dL    Phosphorus 4.2 2.5 - 4.9 mg/dL    Albumin 3.2 (L) 3.4 - 5.0 g/dL   Transthoracic Echo (TTE) Limited   Result Value Ref Range    BSA 2.1 m2     ECG 12 lead    Result Date: 2/16/2025  Atrial fibrillation Right axis deviation ST & T wave abnormality, consider inferolateral ischemia Abnormal ECG When compared with ECG of 10-FEB-2025 21:45, Current undetermined rhythm precludes rhythm comparison, needs review Criteria for Septal infarct are no longer Present Baseline wander Confirmed by Pierre Veras ([id number]) on 2/16/2025 3:22:29 PM    Vascular US Ankle Brachial Index (TANISHA) Without Exercise    Result Date: 2/15/2025            [facility] [place] [address] [place] [address]     Tel [phone]  Fax [phone]  Vascular Lab Report  VASC US ANKLE BRACHIAL INDEX (TANISHA) WITHOUT EXERCISE Patient Name:      NORRIS ENDY VINSON     Reading Physician:  [id number] Olinda Rudolph MD, RPVI Study Date:        2/15/2025            Ordering Provider:  [id number] AMNA DOYLE MRN/PID:           [id number]             Fellow: Accession#:        [id number]         Technologist:       Fang Oewns RVT Date of Birth/Age: 1940 / 84 years Technologist 2: Gender:            M                    Encounter#:         [id number] Admission Status:  Inpatient            Location Performed: [facility]  Diagnosis/ICD: 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  **CRITICAL RESULT** Critical Result: Amna Doyle DO was notified of the test results on 2/15/2025 at 2:15PM by Fang Owens RVT.  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 Right pressures of >220 mmHg suggest no compressibility of vessels and may make absolute Segmental Limb Pressures (SLP) unreliable. Decreased digital perfusion noted. Monophasic flow is noted in the right posterior tibial artery and right dorsalis pedis artery. Multiphasic flow is noted in the right common femoral artery. Dampened PPG tracing of the great toe with abnormal TBI. Level of disease based on waveforms and tracings due to non-compressible vessels. Left Lower PVR: Evidence of moderate arterial occlusive disease in the left lower extremity at rest. Left pressures of >220 mmHg suggest no compressibility of vessels and may make absolute Segmental Limb Pressures  (SLP) unreliable. Decreased digital perfusion noted. Monophasic flow is noted in the left dorsalis pedis artery and left posterior tibial artery. Multiphasic flow is noted in the left common femoral artery. Dampened PPG tracing of the great toe with abnormal TBI. Level of disease based on waveforms and tracings due to non-compressible vessels. Left brachial pressure not obtained due to multiple IV lines.  Imaging & Doppler Findings:  RIGHT Lower PVR                Pressures Ratios Right Posterior Tibial (Ankle) 234 mmHg  2.02 Right Dorsalis Pedis (Ankle)   254 mmHg  2.19 Right Digit (Great Toe)        39 mmHg   0.34   LEFT Lower PVR                Pressures Ratios Left Posterior Tibial (Ankle) 254 mmHg  2.19 Left Dorsalis Pedis (Ankle)   254 mmHg  2.19 Left Digit (Great Toe)        60 mmHg   0.52                      Right Brachial Pressure 116 mmHg   [id number] Olinda Rudolph MD, RPVI Electronically signed by [id number] Olinda Rudolph MD, RPVI on 2/15/2025 at 3:28:07 PM  ** Final **     XR chest 1 view    Result Date: 2/15/2025  Interpreted By:  Meaghan aB, STUDY: XR CHEST 1 VIEW;  2/14/2025 11:59 pm   INDICATION: Signs/Symptoms:Hypoxia     COMPARISON: Chest x-ray 02/10/2025   ACCESSION NUMBER(S): [id number]   ORDERING CLINICIAN: AMNA WARREN   TECHNIQUE: Portable upright frontal view of the chest was obtained .   FINDINGS: The heart is enlarged. The patient is status post open heart surgery. There is interval increase in the vascular congestion.   There are small bilateral pleural effusions. There are bibasilar airspace opacities. No pneumothorax.   Suture anchors are noted at the right humeral head.       1.  Cardiomegaly. Interval increase in the vascular congestion. Small bilateral pleural effusions. Bibasilar airspace opacities, may be secondary to atelectasis or pneumonia.       MACRO: None.   Signed by: Meaghan Ba 2/15/2025 1:20 AM Dictation workstation:   [id number]    XR foot right 3+  views    Result Date: 2/14/2025  These images are not reportable by radiology and will not be interpreted by  Radiologists.    ECG 12 lead    Result Date: 2/12/2025  Atrial fibrillation Rightward axis Low voltage QRS Septal infarct (cited on or before 10-FEB-2025) T wave abnormality, consider inferolateral ischemia Abnormal ECG When compared with ECG of 10-FEB-2025 21:36, Change in R wave progression Confirmed by Twan Solomon ([id number]) on 2/12/2025 2:13:58 PM    XR chest 1 view    Result Date: 2/10/2025  Interpreted By:  Duane Clark, STUDY: XR CHEST 1 VIEW;  2/10/2025 10:12 pm   INDICATION: Signs/Symptoms:Chest Pain.   COMPARISON: 01/20/2025   ACCESSION NUMBER(S): [id number]   ORDERING CLINICIAN: GAUTAM KOCH   FINDINGS: Mild cardiomegaly and vascular congestion. Probable small effusions. No pneumothorax.       CHF.   MACRO: None   Signed by: Duane Clark 2/10/2025 11:52 PM Dictation workstation:   [id number]    ECG 12 lead    Result Date: 1/20/2025  Atrial fibrillation Right axis deviation ST & T wave abnormality, consider inferior ischemia Abnormal ECG When compared with ECG of 15-NOV-2024 07:51, Criteria for Septal infarct are no longer Present    XR chest 1 view    Result Date: 1/20/2025  STUDY: Chest Radiograph;  01/20/2025 at 12:51 hours. INDICATION: Cardiac. COMPARISON: Chest, single portable view obtained on 11/15/2024 at 06:24 hours. ACCESSION NUMBER(S): [id number] ORDERING CLINICIAN: LUPIS MELISSA TECHNIQUE:  Frontal chest was obtained at 12:51 hours. FINDINGS: The patient status post median sternotomy. CARDIOMEDIASTINAL SILHOUETTE: Cardiomediastinal silhouette is normal in size and configuration.  LUNGS: There is pulmonary vascular congestion.  There are bilateral interstitial infiltrates most likely representing edema.  There is a right pleural effusion. ABDOMEN: No remarkable upper abdominal findings.  BONES: No acute osseous changes.    Pulmonary vascular congestion with interstitial  infiltrates with right pleural effusion.  Signed by Vasquez Tomlinson MD         Assessment/Plan     1, Acute on chronic systolic and diastolic heart failure exacerbation - continue diuresis with lasix gtt, agree with metolazone per nephrology recommendations. Discussed with patient that with his frequent hospitalizations, he does qualify for hospice care and that is a service he should consider a conversation with. He is of course free to decline the services they offer and continue to pursue medical management.   2. Atrial fibrillation on eliquis - continue his eliquis  3. HTN - Blood pressure has been on the low-normal spectrum. Can continue to hold off on antihypertensive regimen and reevaluate with euvolemic status achieved.   4. HLD - continue statin    Discussed with attending physician Dr. Perry Cali D.O.  PGY-2 Internal medicine resident

## 2025-02-17 NOTE — PROGRESS NOTES
Elgin Marin is a 84 y.o. male on day 6 of admission presenting with Acute on chronic systolic heart failure.    Subjective   No acute events overnight.    Patient seen and examined this morning.  He is complaining of increased cough, which is not productive.  That he has had this cough since admission and is not worsening.  He remains dyspneic even with his nasal cannula on, and upon entering the room on late morning rounds, the patient has nasal cannula behind his head.  He was noted to be what first appeared 79% on room air with moderate waveform, and incremented to 94% on 4 L nasal cannula with nasal cannula back in the nares.  Discussion had with patient regarding his options regarding his current plan of care.  Patient is anticipating speaking with his home cardiologist, Dr. Amador for further insight into how he should make his decision regarding pursuing full care versus transitioning to comfort.  Patient describes a very complicated social situation at home where his wife is completely bedbound, his son is currently sick, and that his son is the full-time care provider for both of his parents.  Patient having a very difficult time after discussion with him deciding the plan of care moving forward.  Outside of this discussion, he is tolerating p.o. intake appropriately.  He is voiding well with Graham catheter in place.  No new change in his symptoms.    Objective     Physical Exam  VITALS: I have reviewed the vital signs.   GENERAL: Elderly, chronically ill adult male.  Resting comfortably in bed.  On 4 L nasal cannula.   Conversational dyspnea persist even with nasal cannula on  NEURO: Alert and oriented x3, able to answer questions and follow commands. Moves all extremities. Face is symmetric and expressive. No tremor.  EYES: PERRL. No scleral icterus or conjunctival injection. No discharge.   HENT: Normocephalic, atraumatic. Hearing is grossly intact, though he is [place]. Nares grossly patent and  "without discharge. Mucous membranes moist.    CARDIO: Rhythm regular. Normal rate. No murmur, rub, or gallop.  Persistent 2-3+ pitting edema bilaterally to the thighs with Ace bandage wrapped.  PULM: Lungs grossly clear to auscultation this morning.   Very mild low, slightly diminished aeration lower lung fields. No wheezes, rales, or rhonchi.  Mild conversational dyspnea. No splinting, stridor, or accessory muscle use.  24-hour UO is 3.1 L on Lasix gtt.   GI: Abdomen is soft and non-distended. No tenderness to palpation. No guarding or rigidity. Normoactive bowel sounds.   SKIN: Warm and dry. Normal turgor.  Bilateral lower extremities grossly edematous with chronic lower extremity wounds.  Gangrenous appearing toes on bilateral lower extremity.  bilateral lower extremities wrapped in Ace bandage with Kerlix  PSYCH: Mood, affect, and interaction is appropriate to the setting. Not internally stimulated.    Last Recorded Vitals  Blood pressure 99/56, pulse 87, temperature 36.8 °C (98.2 °F), temperature source Temporal, resp. rate 19, height 1.778 m (5' 10\"), weight 89.4 kg (197 lb 3 oz), SpO2 96%.  Intake/Output last 3 Shifts:  I/O last 3 completed shifts:  In: 866.7 (9.7 mL/kg) [P.O.:720; I.V.:36.7 (0.4 mL/kg); IV Piggyback:110]  Out: 4300 (48.1 mL/kg) [Urine:4300 (1.3 mL/kg/hr)]  Weight: 89.4 kg     Relevant Results  Scheduled medications  albumin human, 25 g, intravenous, Once  apixaban, 2.5 mg, oral, BID  atorvastatin, 40 mg, oral, Nightly  bisoprolol, 5 mg, oral, Daily  daptomycin, 6 mg/kg, intravenous, q48h  [Held by provider] empagliflozin, 25 mg, oral, Daily  finasteride, 5 mg, oral, Daily  [Held by provider] insulin glargine, 10 Units, subcutaneous, BID  insulin glargine, 10 Units, subcutaneous, q24h  insulin lispro, 0-10 Units, subcutaneous, TID AC  insulin lispro, 0-5 Units, subcutaneous, Nightly  insulin lispro, 2 Units, subcutaneous, TID AC  ipratropium-albuteroL, 3 mL, nebulization, q6h  isosorbide " dinitrate, 10 mg, oral, BID  latanoprost, 1 drop, Both Eyes, Nightly  oxygen, , inhalation, Continuous - Inhalation  oxygen, , inhalation, Continuous - Inhalation  pantoprazole, 40 mg, oral, Nightly  perflutren lipid microspheres, 0.5-10 mL of dilution, intravenous, Once in imaging  perflutren protein A microsphere, 0.5 mL, intravenous, Once in imaging  [Held by provider] spironolactone, 12.5 mg, oral, q24h BRE  sulfur hexafluoride microsphr, 2 mL, intravenous, Once in imaging  tamsulosin, 0.4 mg, oral, Daily  [Held by provider] torsemide, 80 mg, oral, Daily      Continuous medications  furosemide, 10 mg/hr, Last Rate: 10 mg/hr (02/17/25 1539)      PRN medications  PRN medications: acetaminophen, dextrose, dextrose, glucagon, glucagon, guaiFENesin, ipratropium-albuteroL  Results from last 7 days   Lab Units 02/17/25  0636 02/16/25  0602 02/15/25  0705   WBC AUTO x10*3/uL 7.5 4.2* 8.2   RBC AUTO x10*6/uL 4.18* 3.99* 4.27*   HEMOGLOBIN g/dL 10.7* 10.3* 11.1*     Results from last 7 days   Lab Units 02/17/25  0636 02/16/25  1800 02/16/25  0602 02/15/25  1446 02/15/25  0705 02/11/25  0524 02/10/25  2057   SODIUM mmol/L 130* 127* 128*   < > 126*   < > 134*   POTASSIUM mmol/L 4.2 4.6 4.9   < > 5.5*   < > 4.8   CHLORIDE mmol/L 92* 92* 95*   < > 95*   < > 102   CO2 mmol/L 30 28 26   < > 24   < > 23   BUN mg/dL 68* 68* 64*   < > 55*   < > 49*   CREATININE mg/dL 2.59* 2.66* 2.50*   < > 2.39*   < > 2.13*   CALCIUM mg/dL 7.5* 7.4* 7.6*   < > 7.7*   < > 8.5*   PHOSPHORUS mg/dL 4.2 4.7 4.9   < > 4.4   < >  --    MAGNESIUM mg/dL 1.99  --  2.16  --  2.06   < >  --    BILIRUBIN TOTAL mg/dL  --   --   --   --   --   --  1.0   ALT U/L  --   --   --   --   --   --  7*   AST U/L  --   --   --   --   --   --  18    < > = values in this interval not displayed.       Transthoracic Echo (TTE) Limited    Result Date: 2/17/2025            [facility] [address] [facility], [facility] [address]    Tel [phone] Fax [phone]  TRANSTHORACIC ECHOCARDIOGRAM REPORT Patient Name:       NORRIS VINSON     Reading Physician:    [id number] Shubham Booker MD Study Date:         2/17/2025            Ordering Provider:    [id number] AMNA WARREN MRN/PID:            [id number]             Fellow: Accession#:         [id number]         Nurse: Date of Birth/Age:  1940 / 84 years Sonographer:          Florence Hernandez Gender Assigned at  M                    Additional Staff: Birth: Height:             177.80 cm            Admit Date: Weight:             89.36 kg             Admission Status:     Inpatient -                                                                Priority                                                                discharge BSA / BMI:          2.07 m2 / 28.27      Department Location:  [facility] Echo Lab                     kg/m2 Blood Pressure: 101 /59 mmHg Study Type:    TRANSTHORACIC ECHO (TTE) LIMITED Diagnosis/ICD: Acute on chronic combined systolic (congestive) and diastolic                (congestive) heart failure (CHF)-I50.43 Indication:    Congestive Heart Failure CPT Codes:     Echo Limited-13429; Doppler Limited-56972; Color Doppler-87167 Patient History: Diabetes:          Yes Pertinent History: A-Fib, CAD, HTN, Hyperlipidemia and CHF. Study Detail: The following Echo studies were performed: 2D, M-Mode, Doppler and               color flow.  PHYSICIAN INTERPRETATION: Left Ventricle: Left ventricular ejection fraction is mildly decreased, by visual estimate at 45%. There is moderate to severe concentric left ventricular hypertrophy. There is global hypokinesis of the left ventricle with minor regional variations. The left ventricular cavity size was not assessed. There is moderately increased septal and moderately increased posterior left ventricular wall thickness. There is left ventricular  concentric remodeling. The interventricular septum is flattened in systole and diastole, consistent with right ventricular pressure and volume overload. Spectral Doppler shows a Grade II (pseudonormal pattern) of left ventricular diastolic filling with an elevated left atrial pressure. Left Atrium: The left atrial size is severely dilated. Right Ventricle: The right ventricle is moderately enlarged. There is mildly reduced right ventricular systolic function. Right Atrium: The right atrial size was not assessed. Aortic Valve: The aortic valve was not assessed. Aortic valve regurgitation was not assessed. Mitral Valve: The mitral valve is mildly thickened. There is mild mitral annular calcification. There is trace mitral valve regurgitation. Tricuspid Valve: The tricuspid valve was not assessed. There is moderate tricuspid regurgitation. The Doppler estimated RVSP is severely elevated at 80.0 mmHg. Pulmonic Valve: The pulmonic valve was not assessed. The pulmonic valve regurgitation was not assessed. Pericardium: Pericardial effusion was not assessed. Aorta: The aortic root was not assessed. Systemic Veins: The inferior vena cava appears moderately dilated.  CONCLUSIONS:  1. Left ventricular ejection fraction is mildly decreased, by visual estimate at 45%.  2. There is global hypokinesis of the left ventricle with minor regional variations.  3. Spectral Doppler shows a Grade II (pseudonormal pattern) of left ventricular diastolic filling with an elevated left atrial pressure.  4. Right ventricular volume and pressure overload.  5. There is mildly reduced right ventricular systolic function.  6. Moderately enlarged right ventricle.  7. The left atrial size is severely dilated.  8. Moderate tricuspid regurgitation.  9. Severely elevated right ventricular systolic pressure. 10. The inferior vena cava appears moderately dilated. 11. There is moderately increased septal and moderately increased posterior left ventricular  wall thickness. QUANTITATIVE DATA SUMMARY:  2D MEASUREMENTS:             Normal Ranges: LAs:             5.20 cm     (2.7-4.0cm) IVSd:            1.60 cm     (0.6-1.1cm) LVPWd:           1.60 cm     (0.6-1.1cm) LVIDd:           4.30 cm     (3.9-5.9cm) LVIDs:           3.10 cm LV Mass Index:   137.6 g/m2 LVEDV Index:     21.85 ml/m2 LV % FS          27.9 %  LEFT ATRIUM:                  Normal Ranges: LA Vol A4C:        62.9 ml    (22+/-6mL/m2) LA Vol A2C:        81.1 ml LA Vol BP:         72.5 ml LA Vol Index A4C:  30.3ml/m2 LA Vol Index A2C:  39.1 ml/m2 LA Vol Index BP:   35.0 ml/m2 LA Area A4C:       20.3 cm2 LA Area A2C:       23.4 cm2 LA Major Axis A4C: 5.6 cm LA Major Axis A2C: 5.7 cm LA Volume Index:   33.0 ml/m2 LA Vol A4C:        56.9 ml LA Vol A2C:        80.3 ml LA Vol Index BSA:  33.1 ml/m2  RIGHT ATRIUM:                 Normal Ranges: RA Vol A4C:        103.7 ml   (8.3-19.5ml) RA Vol Index A4C:  50.0 ml/m2 RA Area A4C:       28.2 cm2 RA Major Axis A4C: 6.5 cm  LV SYSTOLIC FUNCTION:                      Normal Ranges: EF-A4C View:    49 % (>=55%) EF-A2C View:    38 % EF-Biplane:     44 % EF-Visual:      45 % LV EF Reported: 45 %  LV DIASTOLIC FUNCTION:           Normal Ranges: MV Peak E:             1.14 m/s  (0.7-1.2 m/s) MV Peak A:             0.28 m/s  (0.42-0.7 m/s) E/A Ratio:             4.12      (1.0-2.2) MV e'                  0.039 m/s (>8.0) MV lateral e'          0.04 m/s MV medial e'           0.03 m/s E/e' Ratio:            29.53     (<8.0)  MITRAL VALVE:          Normal Ranges: MV DT:        154 msec (150-240msec)  RIGHT VENTRICLE: RV Basal 4.80 cm RV Mid   4.30 cm RV Major 8.3 cm TAPSE:   12.1 mm RV s'    0.05 m/s  TRICUSPID VALVE/RVSP:          Normal Ranges: Peak TR Velocity:     4.03 m/s Est. RA Pressure:     15 mmHg RV Syst Pressure:     80 mmHg  (< 30mmHg) IVC Diam:             2.90 cm  PULMONIC VALVE:          Normal Ranges: PV Accel Time:  77 msec  (>120ms) PV Max Klayan:     0.9 m/s   (0.6-0.9m/s) PV Max PG:      3.1 mmHg  [id number] Shubham Booker MD Electronically signed on 2/17/2025 at 2:46:08 PM  ** Final **       Assessment/Plan   Assessment & Plan  Acute on chronic systolic heart failure    CAP (community acquired pneumonia) due to Chlamydia species      Mr Elgin Marin is an 84 year old male patient with previous medical history of CAD status post CABG in April 2022, atrial fibrillation on Eliquis, chronic combined systolic and diastolic heart failure, HTN, CKD with baseline Cr ~2.0,dyslipidemia, cholecystectomy (10/2015) admitted with a diagnosis of UTI. He received rocephin in the ED. Patient is NOT medically ready for discharge     # AHRF  # CHFe, C/F cardiorenal syndrome, improving  # Combined systolic and diastolic heart failure  # Profound hypoalbuminemia  -24-hour UO  3.1 L, with net -8.6 L since admission.  Remains on 4 L nasal cannula  -Today's creatinine 2.59 equivalent to yesterday (baseline per nephrology around 2)  -Last TTE 11/2024: EF 42%, global hypokinesis LV with minor regional variations, indeterminate LV DF, severely reduced RV SF, mildly enlarged RV, moderately dilated LA and RA, RVSP 46, severely dilated IVC, moderately increased septal and moderately increased posterior left ventricular wall thickness  - TTE 2/15: EF 45%, global hypokinesis LV with minor regional variations, grade 2 diastolic dysfunction, RV volume and pressure overload, mild reduced RV SF, moderately enlarged RV, severely dilated LA, moderate TR, severely elevated  RVSP 80, moderately dilated IVC, moderately increased septal and moderately increased posterior left ventricular wall thickness  -Nephrology: Continuing Lasix gtt. at 10 mg/h.  Addition of IV albumin infusions goal 1 to 2 L diuresis per day.  Discussion had with cardiology today with nephrology: Holding off on metolazone today  -Cardiology consulted for additional volume status management: Pending formal recommendations however  -RFP every  12 hours to trend renal function and electrolytes  -GDMT continued: Bisoprolol, Isordil  -Jardiance and Aldactone to be discontinued on DC, both currently held  -Clinical status remains guarded. Palliative care reconsulted as patient wishing to entertain hospice after discussion with home cardiologist     # Hyperglycemia  Hyperglycemia likely 2/2 cortisol surge in the setting of infection  -Blood sugar stables in the high 100s/low mid 200s  -Continue Lantus 10 units nightly with  scheduled 2 units lispro 3 times daily with meals.  Continue SSI  in between     # Hyperkalemia, resolved  # Hyponatremia, acute improving  -Admission sodium 134, currently 130  -Suspect possibly in the setting of hypervolemia  -Potassium corrected overnight with Lokelma, sodium bicarb, and Kayexalate  -Continue to trend twice daily RFP's while on Lasix gtt     # Left foot wound  # Gangrenous right toes, c/f ?OME  # Poor peripheral pulses  # Chronic lower extremity wounds  -Evaluated by podiatry who obtained right foot x-ray  -Wound cultures positive for  MRSA and Corynebacterium striatum  -ID following: On daptomycin.  Zosyn discontinued  -Blood cultures NTD  -Prior wound cultures 11/12 positive for Pseudomonas aeruginosa, pansensitive  -Doppler required for pulses ordered.  Moderate occlusive disease bilaterally.  Poor surgical candidate  -Continuing Eliquis and added statin.  Will need ASA on DC given elevated ASCVD risk  -Cont. Wound care     # Chronic urinary retention  -UCX without growth. Antibiotics discontinued  -Patient to follow with outpatient [facility] urology  -Evaluated urology during this hospitalization with low suspicion for prostatitis  -Patient to be discharged with Graham catheter in place given chronic retention  -To continue finasteride and tamsulosin on DC        Other chronic conditions:  # CAD s/p CABG (4/2022)  # A-fib on Eliquis  -Resume home meds with appropriate holding parameters.           Patient seen and discussed  with attending physician     Aman Hurtado, DO  Internal Medicine, PGY-III    I saw and evaluated the patient. I personally obtained the key and critical portions of the history and physical exam or was physically present for key and critical portions performed by the resident/fellow. I reviewed the resident/fellow's documentation and discussed the patient with the resident/fellow. I agree with the resident/fellow's medical decision making as documented in the note.      84 YOM admitted with CHFE and developed cardiorenal syndrome now on a lasix gtt, kidney function slowly improving. Due to frequent hospitalizations and comorbidities on GOC held. Discussed extensively about his illness, and prognosis, and discussed aggressive treatment options vs comfort care. Patient is still considering treatment options.  Updated his cardiologist, Dr. Amador, who  plans on discussing prognosis and treatment options this afternoon.  Continue aggressive care at this point, palliative care also consulted.      Complexity high, anticipate > 2 additional [place] stays    Ken Escalante, DO  Internal Medicine

## 2025-02-17 NOTE — PROGRESS NOTES
Spiritual Care Visit  Spiritual Care Request    Reason for Visit:        Request Received From:       Focus of Care:  Visited With: Patient not available         Refer to :          Spiritual Care Assessment    Spiritual Assessment:                      Care Provided:       Sense of Community and or Mosque Affiliation:  Baptism         Addressed Needs/Concerns and/or Devika Through:          Outcome:        Advance Directives:         Spiritual Care Annotation    Annotation:  The patient was busy when I tried to visit. I will try again.

## 2025-02-17 NOTE — CARE PLAN
The patient's goals for the shift include   Problem: Skin  Goal: Decreased wound size/increased tissue granulation at next dressing change  Outcome: Progressing  Goal: Participates in plan/prevention/treatment measures  Outcome: Progressing  Goal: Prevent/manage excess moisture  Outcome: Progressing  Goal: Prevent/minimize sheer/friction injuries  Outcome: Progressing  Goal: Promote/optimize nutrition  Outcome: Progressing  Goal: Promote skin healing  Outcome: Progressing     Problem: Respiratory  Goal: Clear secretions with interventions this shift  Outcome: Progressing  Goal: Minimize anxiety/maximize coping throughout shift  Outcome: Progressing  Goal: Minimal/no exertional discomfort or dyspnea this shift  Outcome: Progressing  Goal: No signs of respiratory distress (eg. Use of accessory muscles. Peds grunting)  Outcome: Progressing  Goal: Patent airway maintained this shift  Outcome: Progressing  Goal: Verbalize decreased shortness of breath this shift  Outcome: Progressing  Goal: Wean oxygen to maintain O2 saturation per order/standard this shift  Outcome: Progressing  Goal: Increase self care and/or family involvement in next 24 hours  Outcome: Progressing     Problem: Fall/Injury  Goal: Not fall by end of shift  Outcome: Progressing  Goal: Be free from injury by end of the shift  Outcome: Progressing  Goal: Verbalize understanding of personal risk factors for fall in the hospital  Outcome: Progressing  Goal: Verbalize understanding of risk factor reduction measures to prevent injury from fall in the home  Outcome: Progressing  Goal: Use assistive devices by end of the shift  Outcome: Progressing  Goal: Pace activities to prevent fatigue by end of the shift  Outcome: Progressing     Problem: Pain - Adult  Goal: Verbalizes/displays adequate comfort level or baseline comfort level  Outcome: Progressing     Problem: Safety - Adult  Goal: Free from fall injury  Outcome: Progressing     Problem: Discharge  Planning  Goal: Discharge to home or other facility with appropriate resources  Outcome: Progressing     Problem: Chronic Conditions and Co-morbidities  Goal: Patient's chronic conditions and co-morbidity symptoms are monitored and maintained or improved  Outcome: Progressing     Problem: Nutrition  Goal: Nutrient intake appropriate for maintaining nutritional needs  Outcome: Progressing     Problem: Heart Failure  Goal: Improved gas exchange this shift  Outcome: Progressing  Goal: Improved urinary output this shift  Outcome: Progressing  Goal: Reduction in peripheral edema within 24 hours  Outcome: Progressing  Goal: Report improvement of dyspnea/breathlessness this shift  Outcome: Progressing  Goal: Weight from fluid excess reduced over 2-3 days, then stabilize  Outcome: Progressing  Goal: Increase self care and/or family involvement in 24 hours  Outcome: Progressing       The clinical goals for the shift include pt will remain free of respiratory distress this shift

## 2025-02-17 NOTE — PROGRESS NOTES
INPATIENT PROGRESS NOTES    PATIENT NAME: Elgin Marin    MRN: [id number]  SERVICE DATE:  2/17/2025   SERVICE TIME:  11:32 AM    SIGNATURE: Ewa Ribera MD      ASSESSMENT :   -Left foot wound infection, slowly improving  -Wound culture--> MRSA and Corynebacterium striatum  -Left foot cellulitis  -Right toes dry gangrene  -MRSA carrier  -History of CAD, CHF, CKD, hypertension  -Allergic to sulfa with hives     PLAN:  -Discontinue Zosyn  -Continue daptomycin, CPK within normal limits  -Closely monitor for antibiotics side effects including rash, Diarrhea/CDI, thrombocytopenia, ISIAH, etc.           SUBJECTIVE  Afebrile  No events overnight       OBJECTIVE  PHYSICAL EXAM:   Patient Vitals for the past 24 hrs:   BP Temp Temp src Pulse Resp SpO2 Weight   02/17/25 1016 -- -- -- -- -- 97 % --   02/17/25 0500 -- -- -- -- -- -- 89.4 kg (197 lb 3 oz)   02/17/25 0400 101/59 36.5 °C (97.7 °F) Temporal 93 21 98 % --   02/17/25 0142 -- -- -- -- -- 98 % --   02/16/25 2034 -- -- -- -- -- 100 % --   02/16/25 2000 128/65 36.5 °C (97.7 °F) Temporal 69 19 99 % --   02/16/25 1754 -- -- -- -- -- -- 90.4 kg (199 lb 4.7 oz)   02/16/25 1600 108/57 36 °C (96.8 °F) Temporal 74 18 100 % --   Feet pics as below                       Labs:  Lab Results   Component Value Date    WBC 7.5 02/17/2025    HGB 10.7 (L) 02/17/2025    HCT 36.7 (L) 02/17/2025    MCV 88 02/17/2025    PLT 93 (L) 02/17/2025     Lab Results   Component Value Date    GLUCOSE 201 (H) 02/17/2025    CALCIUM 7.5 (L) 02/17/2025     (L) 02/17/2025    K 4.2 02/17/2025    CO2 30 02/17/2025    CL 92 (L) 02/17/2025    BUN 68 (H) 02/17/2025    CREATININE 2.59 (H) 02/17/2025   ESR: --  Lab Results   Component Value Date    SEDRATE 33 (H) 02/14/2025     Lab Results   Component Value Date    CRP 3.06 (H) 02/14/2025     Lab Results   Component Value Date    ALT 7 (L) 02/10/2025    AST 18 02/10/2025    ALKPHOS 84 02/10/2025    BILITOT 1.0 02/10/2025         DATA:   Diagnostic tests  "reviewed for today's visit:    Labs this admission reviewed  Imagings this admission reviewed  Cultures: Reviewed        Ewa Ribera MD.   Infectious Disease Attending            This note was partially created using voice recognition software and is inherently subject to errors including those of syntax and \"sound-alike\" substitutions which may escape proofreading. In such instances, original meaning may be extrapolated by contextual derivation       "

## 2025-02-18 LAB
ALBUMIN SERPL BCP-MCNC: 3 G/DL (ref 3.4–5)
ALBUMIN SERPL BCP-MCNC: 3.1 G/DL (ref 3.4–5)
ANION GAP SERPL CALC-SCNC: 12 MMOL/L (ref 10–20)
ANION GAP SERPL CALC-SCNC: 12 MMOL/L (ref 10–20)
BUN SERPL-MCNC: 60 MG/DL (ref 6–23)
BUN SERPL-MCNC: 62 MG/DL (ref 6–23)
CALCIUM SERPL-MCNC: 7.2 MG/DL (ref 8.6–10.3)
CALCIUM SERPL-MCNC: 7.3 MG/DL (ref 8.6–10.3)
CHLORIDE SERPL-SCNC: 89 MMOL/L (ref 98–107)
CHLORIDE SERPL-SCNC: 91 MMOL/L (ref 98–107)
CO2 SERPL-SCNC: 31 MMOL/L (ref 21–32)
CO2 SERPL-SCNC: 32 MMOL/L (ref 21–32)
CREAT SERPL-MCNC: 2.17 MG/DL (ref 0.5–1.3)
CREAT SERPL-MCNC: 2.26 MG/DL (ref 0.5–1.3)
EGFRCR SERPLBLD CKD-EPI 2021: 28 ML/MIN/1.73M*2
EGFRCR SERPLBLD CKD-EPI 2021: 29 ML/MIN/1.73M*2
ERYTHROCYTE [DISTWIDTH] IN BLOOD BY AUTOMATED COUNT: 15 % (ref 11.5–14.5)
GLUCOSE BLD MANUAL STRIP-MCNC: 128 MG/DL (ref 74–99)
GLUCOSE BLD MANUAL STRIP-MCNC: 140 MG/DL (ref 74–99)
GLUCOSE BLD MANUAL STRIP-MCNC: 168 MG/DL (ref 74–99)
GLUCOSE BLD MANUAL STRIP-MCNC: 89 MG/DL (ref 74–99)
GLUCOSE SERPL-MCNC: 129 MG/DL (ref 74–99)
GLUCOSE SERPL-MCNC: 87 MG/DL (ref 74–99)
HCT VFR BLD AUTO: 33.2 % (ref 41–52)
HGB BLD-MCNC: 9.7 G/DL (ref 13.5–17.5)
MAGNESIUM SERPL-MCNC: 1.86 MG/DL (ref 1.6–2.4)
MCH RBC QN AUTO: 25.1 PG (ref 26–34)
MCHC RBC AUTO-ENTMCNC: 29.2 G/DL (ref 32–36)
MCV RBC AUTO: 86 FL (ref 80–100)
NRBC BLD-RTO: 0 /100 WBCS (ref 0–0)
PHOSPHATE SERPL-MCNC: 3.5 MG/DL (ref 2.5–4.9)
PHOSPHATE SERPL-MCNC: 3.6 MG/DL (ref 2.5–4.9)
PLATELET # BLD AUTO: 91 X10*3/UL (ref 150–450)
POTASSIUM SERPL-SCNC: 3.4 MMOL/L (ref 3.5–5.3)
POTASSIUM SERPL-SCNC: 3.4 MMOL/L (ref 3.5–5.3)
RBC # BLD AUTO: 3.86 X10*6/UL (ref 4.5–5.9)
SODIUM SERPL-SCNC: 130 MMOL/L (ref 136–145)
SODIUM SERPL-SCNC: 131 MMOL/L (ref 136–145)
WBC # BLD AUTO: 6.3 X10*3/UL (ref 4.4–11.3)

## 2025-02-18 PROCEDURE — 99233 SBSQ HOSP IP/OBS HIGH 50: CPT

## 2025-02-18 PROCEDURE — 85027 COMPLETE CBC AUTOMATED: CPT

## 2025-02-18 PROCEDURE — 83735 ASSAY OF MAGNESIUM: CPT

## 2025-02-18 PROCEDURE — 2500000002 HC RX 250 W HCPCS SELF ADMINISTERED DRUGS (ALT 637 FOR MEDICARE OP, ALT 636 FOR OP/ED)

## 2025-02-18 PROCEDURE — 80069 RENAL FUNCTION PANEL: CPT

## 2025-02-18 PROCEDURE — 80069 RENAL FUNCTION PANEL: CPT | Performed by: STUDENT IN AN ORGANIZED HEALTH CARE EDUCATION/TRAINING PROGRAM

## 2025-02-18 PROCEDURE — 99232 SBSQ HOSP IP/OBS MODERATE 35: CPT

## 2025-02-18 PROCEDURE — 82947 ASSAY GLUCOSE BLOOD QUANT: CPT

## 2025-02-18 PROCEDURE — 2500000004 HC RX 250 GENERAL PHARMACY W/ HCPCS (ALT 636 FOR OP/ED)

## 2025-02-18 PROCEDURE — 36415 COLL VENOUS BLD VENIPUNCTURE: CPT

## 2025-02-18 PROCEDURE — 2500000005 HC RX 250 GENERAL PHARMACY W/O HCPCS: Performed by: STUDENT IN AN ORGANIZED HEALTH CARE EDUCATION/TRAINING PROGRAM

## 2025-02-18 PROCEDURE — 2500000004 HC RX 250 GENERAL PHARMACY W/ HCPCS (ALT 636 FOR OP/ED): Performed by: INTERNAL MEDICINE

## 2025-02-18 PROCEDURE — 99231 SBSQ HOSP IP/OBS SF/LOW 25: CPT | Performed by: PODIATRIST

## 2025-02-18 PROCEDURE — 1200000002 HC GENERAL ROOM WITH TELEMETRY DAILY

## 2025-02-18 PROCEDURE — P9045 ALBUMIN (HUMAN), 5%, 250 ML: HCPCS | Mod: JZ | Performed by: INTERNAL MEDICINE

## 2025-02-18 PROCEDURE — 94640 AIRWAY INHALATION TREATMENT: CPT

## 2025-02-18 PROCEDURE — 2500000001 HC RX 250 WO HCPCS SELF ADMINISTERED DRUGS (ALT 637 FOR MEDICARE OP)

## 2025-02-18 PROCEDURE — 36415 COLL VENOUS BLD VENIPUNCTURE: CPT | Performed by: STUDENT IN AN ORGANIZED HEALTH CARE EDUCATION/TRAINING PROGRAM

## 2025-02-18 RX ORDER — FLUTICASONE PROPIONATE 50 MCG
2 SPRAY, SUSPENSION (ML) NASAL DAILY
Status: DISCONTINUED | OUTPATIENT
Start: 2025-02-18 | End: 2025-02-21 | Stop reason: HOSPADM

## 2025-02-18 RX ORDER — BUMETANIDE 0.25 MG/ML
2 INJECTION, SOLUTION INTRAMUSCULAR; INTRAVENOUS EVERY 12 HOURS
Status: DISCONTINUED | OUTPATIENT
Start: 2025-02-18 | End: 2025-02-18

## 2025-02-18 RX ORDER — POTASSIUM CHLORIDE 1.5 G/1.58G
60 POWDER, FOR SOLUTION ORAL ONCE
Status: COMPLETED | OUTPATIENT
Start: 2025-02-18 | End: 2025-02-18

## 2025-02-18 RX ORDER — ALBUMIN HUMAN 50 G/1000ML
25 SOLUTION INTRAVENOUS ONCE
Status: COMPLETED | OUTPATIENT
Start: 2025-02-18 | End: 2025-02-19

## 2025-02-18 RX ADMIN — Medication 4 L/MIN: at 08:01

## 2025-02-18 RX ADMIN — FINASTERIDE 5 MG: 5 TABLET, FILM COATED ORAL at 08:31

## 2025-02-18 RX ADMIN — DAPTOMYCIN 500 MG: 500 INJECTION, POWDER, LYOPHILIZED, FOR SOLUTION INTRAVENOUS at 08:54

## 2025-02-18 RX ADMIN — ALBUMIN (HUMAN) 25 G: 12.5 SOLUTION INTRAVENOUS at 21:30

## 2025-02-18 RX ADMIN — ATORVASTATIN CALCIUM 40 MG: 40 TABLET, FILM COATED ORAL at 20:42

## 2025-02-18 RX ADMIN — ISOSORBIDE DINITRATE 10 MG: 10 TABLET ORAL at 08:31

## 2025-02-18 RX ADMIN — ISOSORBIDE DINITRATE 10 MG: 10 TABLET ORAL at 13:44

## 2025-02-18 RX ADMIN — APIXABAN 2.5 MG: 2.5 TABLET, FILM COATED ORAL at 08:31

## 2025-02-18 RX ADMIN — BISOPROLOL FUMARATE 5 MG: 5 TABLET ORAL at 08:31

## 2025-02-18 RX ADMIN — TAMSULOSIN HYDROCHLORIDE 0.4 MG: 0.4 CAPSULE ORAL at 08:31

## 2025-02-18 RX ADMIN — POTASSIUM CHLORIDE 60 MEQ: 1.5 POWDER, FOR SOLUTION ORAL at 18:31

## 2025-02-18 RX ADMIN — Medication 4 L/MIN: at 19:00

## 2025-02-18 RX ADMIN — PANTOPRAZOLE SODIUM 40 MG: 40 TABLET, DELAYED RELEASE ORAL at 20:42

## 2025-02-18 RX ADMIN — IPRATROPIUM BROMIDE AND ALBUTEROL SULFATE 3 ML: 2.5; .5 SOLUTION RESPIRATORY (INHALATION) at 01:36

## 2025-02-18 RX ADMIN — APIXABAN 2.5 MG: 2.5 TABLET, FILM COATED ORAL at 20:42

## 2025-02-18 RX ADMIN — SALINE NASAL SPRAY 1 SPRAY: 1.5 SOLUTION NASAL at 20:41

## 2025-02-18 RX ADMIN — INSULIN LISPRO 2 UNITS: 100 INJECTION, SOLUTION INTRAVENOUS; SUBCUTANEOUS at 16:42

## 2025-02-18 RX ADMIN — IPRATROPIUM BROMIDE AND ALBUTEROL SULFATE 3 ML: 2.5; .5 SOLUTION RESPIRATORY (INHALATION) at 04:38

## 2025-02-18 RX ADMIN — INSULIN LISPRO 2 UNITS: 100 INJECTION, SOLUTION INTRAVENOUS; SUBCUTANEOUS at 20:43

## 2025-02-18 RX ADMIN — SALINE NASAL SPRAY 1 SPRAY: 1.5 SOLUTION NASAL at 11:19

## 2025-02-18 RX ADMIN — SALINE NASAL SPRAY 1 SPRAY: 1.5 SOLUTION NASAL at 14:58

## 2025-02-18 RX ADMIN — IPRATROPIUM BROMIDE AND ALBUTEROL SULFATE 3 ML: 2.5; .5 SOLUTION RESPIRATORY (INHALATION) at 07:59

## 2025-02-18 RX ADMIN — BUMETANIDE 0.25 MG/HR: 0.25 INJECTION INTRAMUSCULAR; INTRAVENOUS at 20:45

## 2025-02-18 RX ADMIN — FLUTICASONE PROPIONATE 2 SPRAY: 50 SPRAY, METERED NASAL at 14:58

## 2025-02-18 RX ADMIN — FUROSEMIDE 10 MG/HR: 10 INJECTION, SOLUTION INTRAMUSCULAR; INTRAVENOUS at 09:17

## 2025-02-18 RX ADMIN — IPRATROPIUM BROMIDE AND ALBUTEROL SULFATE 3 ML: 2.5; .5 SOLUTION RESPIRATORY (INHALATION) at 13:08

## 2025-02-18 RX ADMIN — INSULIN GLARGINE 10 UNITS: 100 INJECTION, SOLUTION SUBCUTANEOUS at 20:43

## 2025-02-18 RX ADMIN — IPRATROPIUM BROMIDE AND ALBUTEROL SULFATE 3 ML: 2.5; .5 SOLUTION RESPIRATORY (INHALATION) at 19:35

## 2025-02-18 RX ADMIN — GUAIFENESIN 600 MG: 600 TABLET ORAL at 08:31

## 2025-02-18 RX ADMIN — INSULIN LISPRO 2 UNITS: 100 INJECTION, SOLUTION INTRAVENOUS; SUBCUTANEOUS at 11:19

## 2025-02-18 ASSESSMENT — COGNITIVE AND FUNCTIONAL STATUS - GENERAL
PERSONAL GROOMING: A LITTLE
DRESSING REGULAR UPPER BODY CLOTHING: A LITTLE
MOVING TO AND FROM BED TO CHAIR: A LOT
MOVING FROM LYING ON BACK TO SITTING ON SIDE OF FLAT BED WITH BEDRAILS: A LITTLE
WALKING IN HOSPITAL ROOM: A LOT
MOBILITY SCORE: 13
HELP NEEDED FOR BATHING: A LITTLE
TURNING FROM BACK TO SIDE WHILE IN FLAT BAD: A LITTLE
TOILETING: A LITTLE
CLIMB 3 TO 5 STEPS WITH RAILING: TOTAL
STANDING UP FROM CHAIR USING ARMS: A LOT
DAILY ACTIVITIY SCORE: 17
DRESSING REGULAR LOWER BODY CLOTHING: A LOT
EATING MEALS: A LITTLE

## 2025-02-18 ASSESSMENT — PAIN SCALES - GENERAL: PAINLEVEL_OUTOF10: 0 - NO PAIN

## 2025-02-18 NOTE — PROGRESS NOTES
"Elgin Marin is a 84 y.o. male on day 7 of admission presenting with Acute on chronic systolic heart failure.    Subjective   No chest pain.  Reports that he has trouble breathing when he is switching positions.       Objective     Physical Exam  General: NAD. NCAT.  Cardiovascular: RRR. No MRG. S1/S2 wnl. Elevated JVD  Respiratory: bibasilar crackles  MSK: ROM x 4. CTLS non-tender.   Extremities: 2+ bilateral edema covered in bandages. Cap refill < 2 sec.   Neuro: no focal deficits  Psych: Mood wnl.   Last Recorded Vitals  Blood pressure 122/61, pulse 92, temperature 36 °C (96.8 °F), temperature source Temporal, resp. rate 18, height 1.778 m (5' 10\"), weight 89.7 kg (197 lb 12 oz), SpO2 98%.  Intake/Output last 3 Shifts:  I/O last 3 completed shifts:  In: 2334.1 (26 mL/kg) [P.O.:1200; I.V.:34.1 (0.4 mL/kg); Blood:500; IV Piggyback:600]  Out: 6250 (69.7 mL/kg) [Urine:6250 (1.9 mL/kg/hr)]  Weight: 89.7 kg     Relevant Results                 Scheduled medications  apixaban, 2.5 mg, oral, BID  atorvastatin, 40 mg, oral, Nightly  bisoprolol, 5 mg, oral, Daily  daptomycin, 6 mg/kg, intravenous, q48h  [Held by provider] empagliflozin, 25 mg, oral, Daily  finasteride, 5 mg, oral, Daily  fluticasone, 2 spray, Each Nostril, Daily  [Held by provider] insulin glargine, 10 Units, subcutaneous, BID  insulin glargine, 10 Units, subcutaneous, q24h  insulin lispro, 0-10 Units, subcutaneous, TID AC  insulin lispro, 0-5 Units, subcutaneous, Nightly  insulin lispro, 2 Units, subcutaneous, TID AC  ipratropium-albuteroL, 3 mL, nebulization, q6h  isosorbide dinitrate, 10 mg, oral, BID  latanoprost, 1 drop, Both Eyes, Nightly  pantoprazole, 40 mg, oral, Nightly  perflutren lipid microspheres, 0.5-10 mL of dilution, intravenous, Once in imaging  perflutren protein A microsphere, 0.5 mL, intravenous, Once in imaging  sodium chloride, 1 spray, Each Nostril, TID  [Held by provider] spironolactone, 12.5 mg, oral, q24h BRE  sulfur " hexafluoride microsphr, 2 mL, intravenous, Once in imaging  tamsulosin, 0.4 mg, oral, Daily  [Held by provider] torsemide, 80 mg, oral, Daily      Continuous medications  furosemide, 10 mg/hr, Last Rate: 10 mg/hr (02/18/25 1151)      PRN medications  PRN medications: acetaminophen, dextrose, dextrose, glucagon, glucagon, guaiFENesin, ipratropium-albuteroL, oxygen  Results for orders placed or performed during the hospital encounter of 02/10/25 (from the past 24 hours)   POCT GLUCOSE   Result Value Ref Range    POCT Glucose 160 (H) 74 - 99 mg/dL   Renal function panel   Result Value Ref Range    Glucose 178 (H) 74 - 99 mg/dL    Sodium 129 (L) 136 - 145 mmol/L    Potassium 3.8 3.5 - 5.3 mmol/L    Chloride 90 (L) 98 - 107 mmol/L    Bicarbonate 29 21 - 32 mmol/L    Anion Gap 14 10 - 20 mmol/L    Urea Nitrogen 66 (H) 6 - 23 mg/dL    Creatinine 2.52 (H) 0.50 - 1.30 mg/dL    eGFR 24 (L) >60 mL/min/1.73m*2    Calcium 7.2 (L) 8.6 - 10.3 mg/dL    Phosphorus 3.1 2.5 - 4.9 mg/dL    Albumin 2.8 (L) 3.4 - 5.0 g/dL   POCT GLUCOSE   Result Value Ref Range    POCT Glucose 200 (H) 74 - 99 mg/dL   CBC   Result Value Ref Range    WBC 6.3 4.4 - 11.3 x10*3/uL    nRBC 0.0 0.0 - 0.0 /100 WBCs    RBC 3.86 (L) 4.50 - 5.90 x10*6/uL    Hemoglobin 9.7 (L) 13.5 - 17.5 g/dL    Hematocrit 33.2 (L) 41.0 - 52.0 %    MCV 86 80 - 100 fL    MCH 25.1 (L) 26.0 - 34.0 pg    MCHC 29.2 (L) 32.0 - 36.0 g/dL    RDW 15.0 (H) 11.5 - 14.5 %    Platelets 91 (L) 150 - 450 x10*3/uL   Magnesium   Result Value Ref Range    Magnesium 1.86 1.60 - 2.40 mg/dL   Renal function panel   Result Value Ref Range    Glucose 87 74 - 99 mg/dL    Sodium 131 (L) 136 - 145 mmol/L    Potassium 3.4 (L) 3.5 - 5.3 mmol/L    Chloride 91 (L) 98 - 107 mmol/L    Bicarbonate 31 21 - 32 mmol/L    Anion Gap 12 10 - 20 mmol/L    Urea Nitrogen 62 (H) 6 - 23 mg/dL    Creatinine 2.26 (H) 0.50 - 1.30 mg/dL    eGFR 28 (L) >60 mL/min/1.73m*2    Calcium 7.3 (L) 8.6 - 10.3 mg/dL    Phosphorus 3.6 2.5 -  4.9 mg/dL    Albumin 3.0 (L) 3.4 - 5.0 g/dL   POCT GLUCOSE   Result Value Ref Range    POCT Glucose 89 74 - 99 mg/dL   POCT GLUCOSE   Result Value Ref Range    POCT Glucose 128 (H) 74 - 99 mg/dL     Transthoracic Echo (TTE) Limited    Result Date: 2/17/2025            [facility] [place] [address] [address] [place] [address]    Tel [phone] Fax [phone] TRANSTHORACIC ECHOCARDIOGRAM REPORT Patient Name:       NORRIS Stringer Physician:    [id number] Shubham Booker MD Study Date:         2/17/2025            Ordering Provider:    [id number] AMNA WARREN MRN/PID:            [id number]             Fellow: Accession#:         [id number]         Nurse: Date of Birth/Age:  1940 / 84 years Sonographer:          Florence Hernandez Gender Assigned at  M                    Additional Staff: Birth: Height:             177.80 cm            Admit Date: Weight:             89.36 kg             Admission Status:     Inpatient -                                                                Priority                                                                discharge BSA / BMI:          2.07 m2 / 28.27      Department Location:  [facility] Echo Lab                     kg/m2 Blood Pressure: 101 /59 mmHg Study Type:    TRANSTHORACIC ECHO (TTE) LIMITED Diagnosis/ICD: Acute on chronic combined systolic (congestive) and diastolic                (congestive) heart failure (CHF)-I50.43 Indication:    Congestive Heart Failure CPT Codes:     Echo Limited-02915; Doppler Limited-74258; Color Doppler-25486 Patient History: Diabetes:          Yes Pertinent History: A-Fib, CAD, HTN, Hyperlipidemia and CHF. Study Detail: The following Echo studies were performed: 2D, M-Mode, Doppler and               color flow.  PHYSICIAN INTERPRETATION: Left Ventricle: Left ventricular ejection fraction is mildly  decreased, by visual estimate at 45%. There is moderate to severe concentric left ventricular hypertrophy. There is global hypokinesis of the left ventricle with minor regional variations. The left ventricular cavity size was not assessed. There is moderately increased septal and moderately increased posterior left ventricular wall thickness. There is left ventricular concentric remodeling. The interventricular septum is flattened in systole and diastole, consistent with right ventricular pressure and volume overload. Spectral Doppler shows a Grade II (pseudonormal pattern) of left ventricular diastolic filling with an elevated left atrial pressure. Left Atrium: The left atrial size is severely dilated. Right Ventricle: The right ventricle is moderately enlarged. There is mildly reduced right ventricular systolic function. Right Atrium: The right atrial size was not assessed. Aortic Valve: The aortic valve was not assessed. Aortic valve regurgitation was not assessed. Mitral Valve: The mitral valve is mildly thickened. There is mild mitral annular calcification. There is trace mitral valve regurgitation. Tricuspid Valve: The tricuspid valve was not assessed. There is moderate tricuspid regurgitation. The Doppler estimated RVSP is severely elevated at 80.0 mmHg. Pulmonic Valve: The pulmonic valve was not assessed. The pulmonic valve regurgitation was not assessed. Pericardium: Pericardial effusion was not assessed. Aorta: The aortic root was not assessed. Systemic Veins: The inferior vena cava appears moderately dilated.  CONCLUSIONS:  1. Left ventricular ejection fraction is mildly decreased, by visual estimate at 45%.  2. There is global hypokinesis of the left ventricle with minor regional variations.  3. Spectral Doppler shows a Grade II (pseudonormal pattern) of left ventricular diastolic filling with an elevated left atrial pressure.  4. Right ventricular volume and pressure overload.  5. There is mildly  reduced right ventricular systolic function.  6. Moderately enlarged right ventricle.  7. The left atrial size is severely dilated.  8. Moderate tricuspid regurgitation.  9. Severely elevated right ventricular systolic pressure. 10. The inferior vena cava appears moderately dilated. 11. There is moderately increased septal and moderately increased posterior left ventricular wall thickness. QUANTITATIVE DATA SUMMARY:  2D MEASUREMENTS:             Normal Ranges: LAs:             5.20 cm     (2.7-4.0cm) IVSd:            1.60 cm     (0.6-1.1cm) LVPWd:           1.60 cm     (0.6-1.1cm) LVIDd:           4.30 cm     (3.9-5.9cm) LVIDs:           3.10 cm LV Mass Index:   137.6 g/m2 LVEDV Index:     21.85 ml/m2 LV % FS          27.9 %  LEFT ATRIUM:                  Normal Ranges: LA Vol A4C:        62.9 ml    (22+/-6mL/m2) LA Vol A2C:        81.1 ml LA Vol BP:         72.5 ml LA Vol Index A4C:  30.3ml/m2 LA Vol Index A2C:  39.1 ml/m2 LA Vol Index BP:   35.0 ml/m2 LA Area A4C:       20.3 cm2 LA Area A2C:       23.4 cm2 LA Major Axis A4C: 5.6 cm LA Major Axis A2C: 5.7 cm LA Volume Index:   33.0 ml/m2 LA Vol A4C:        56.9 ml LA Vol A2C:        80.3 ml LA Vol Index BSA:  33.1 ml/m2  RIGHT ATRIUM:                 Normal Ranges: RA Vol A4C:        103.7 ml   (8.3-19.5ml) RA Vol Index A4C:  50.0 ml/m2 RA Area A4C:       28.2 cm2 RA Major Axis A4C: 6.5 cm  LV SYSTOLIC FUNCTION:                      Normal Ranges: EF-A4C View:    49 % (>=55%) EF-A2C View:    38 % EF-Biplane:     44 % EF-Visual:      45 % LV EF Reported: 45 %  LV DIASTOLIC FUNCTION:           Normal Ranges: MV Peak E:             1.14 m/s  (0.7-1.2 m/s) MV Peak A:             0.28 m/s  (0.42-0.7 m/s) E/A Ratio:             4.12      (1.0-2.2) MV e'                  0.039 m/s (>8.0) MV lateral e'          0.04 m/s MV medial e'           0.03 m/s E/e' Ratio:            29.53     (<8.0)  MITRAL VALVE:          Normal Ranges: MV DT:        154 msec (150-240msec)  RIGHT  VENTRICLE: RV Basal 4.80 cm RV Mid   4.30 cm RV Major 8.3 cm TAPSE:   12.1 mm RV s'    0.05 m/s  TRICUSPID VALVE/RVSP:          Normal Ranges: Peak TR Velocity:     4.03 m/s Est. RA Pressure:     15 mmHg RV Syst Pressure:     80 mmHg  (< 30mmHg) IVC Diam:             2.90 cm  PULMONIC VALVE:          Normal Ranges: PV Accel Time:  77 msec  (>120ms) PV Max Kalyan:     0.9 m/s  (0.6-0.9m/s) PV Max PG:      3.1 mmHg  [id number] Shubham Booker MD Electronically signed on 2/17/2025 at 2:46:08 PM  ** Final **     ECG 12 lead    Result Date: 2/16/2025  Atrial fibrillation Right axis deviation ST & T wave abnormality, consider inferolateral ischemia Abnormal ECG When compared with ECG of 10-FEB-2025 21:45, Current undetermined rhythm precludes rhythm comparison, needs review Criteria for Septal infarct are no longer Present Baseline wander Confirmed by Pierre Veras ([id number]) on 2/16/2025 3:22:29 PM    Vascular US Ankle Brachial Index (TANISHA) Without Exercise    Result Date: 2/15/2025            [facility] [address] [id number]     Tel [phone] Fax [phone]  Vascular Lab Report  VASC US ANKLE BRACHIAL INDEX (TANISHA) WITHOUT EXERCISE Patient Name:      NORRIS Stringer Physician:  [id number] Olinda Rudolph MD, RPVI Study Date:        2/15/2025            Ordering Provider:  [id number] AMNA WARREN MRN/PID:           [id number]             Fellow: Accession#:        [id number]         Technologist:       Fang Owens RVT Date of Birth/Age: 1940 / 84 years Technologist 2: Gender:            M                    Encounter#:         [id number] Admission Status:  Inpatient            Location Performed: [facility]  Diagnosis/ICD: 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  **CRITICAL RESULT** Critical Result: Mariluz Doyle DO was notified of the test results on 2/15/2025 at 2:15PM by Fang Owens RVT.  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 Right pressures of >220 mmHg suggest no compressibility of vessels and may make absolute Segmental Limb Pressures (SLP) unreliable. Decreased digital perfusion noted. Monophasic flow is noted in the right posterior tibial artery and right dorsalis pedis artery. Multiphasic flow is noted in the right common femoral artery. Dampened PPG tracing of the great toe with abnormal TBI. Level of disease based on waveforms and tracings due to non-compressible vessels. Left Lower PVR: Evidence of moderate arterial occlusive disease in the left lower extremity at rest. Left pressures of >220 mmHg suggest no compressibility of vessels and may make absolute Segmental Limb Pressures (SLP) unreliable. Decreased digital perfusion noted. Monophasic flow is noted in the left dorsalis pedis artery and left posterior tibial artery. Multiphasic flow is noted in the left common femoral artery. Dampened PPG tracing of the great toe with abnormal TBI. Level of disease based on waveforms and tracings due to non-compressible vessels. Left brachial pressure not obtained due to multiple IV lines.  Imaging & Doppler Findings:  RIGHT Lower PVR                Pressures Ratios Right Posterior Tibial (Ankle) 234 mmHg  2.02 Right Dorsalis Pedis (Ankle)   254 mmHg  2.19 Right Digit (Great Toe)        39 mmHg   0.34   LEFT Lower PVR                Pressures Ratios Left Posterior Tibial (Ankle) 254 mmHg  2.19 Left Dorsalis Pedis (Ankle)   254 mmHg  2.19 Left Digit (Great Toe)        60 mmHg   0.52                      Right Brachial Pressure 116 mmHg   [id number]  Olinda Rudolph MD, RPVI Electronically signed by [id number] Olinda Rudolph MD, RPVI on 2/15/2025 at 3:28:07 PM  ** Final **     XR chest 1 view    Result Date: 2/15/2025  Interpreted By:  Meaghan Ba, STUDY: XR CHEST 1 VIEW;  2/14/2025 11:59 pm   INDICATION: Signs/Symptoms:Hypoxia     COMPARISON: Chest x-ray 02/10/2025   ACCESSION NUMBER(S): [id number]   ORDERING CLINICIAN: AMNA WARREN   TECHNIQUE: Portable upright frontal view of the chest was obtained .   FINDINGS: The heart is enlarged. The patient is status post open heart surgery. There is interval increase in the vascular congestion.   There are small bilateral pleural effusions. There are bibasilar airspace opacities. No pneumothorax.   Suture anchors are noted at the right humeral head.       1.  Cardiomegaly. Interval increase in the vascular congestion. Small bilateral pleural effusions. Bibasilar airspace opacities, may be secondary to atelectasis or pneumonia.       MACRO: None.   Signed by: Meaghan Ba 2/15/2025 1:20 AM Dictation workstation:   [id number]    XR foot right 3+ views    Result Date: 2/14/2025  These images are not reportable by radiology and will not be interpreted by  Radiologists.    ECG 12 lead    Result Date: 2/12/2025  Atrial fibrillation Rightward axis Low voltage QRS Septal infarct (cited on or before 10-FEB-2025) T wave abnormality, consider inferolateral ischemia Abnormal ECG When compared with ECG of 10-FEB-2025 21:36, Change in R wave progression Confirmed by Twan Solomon ([id number]) on 2/12/2025 2:13:58 PM    XR chest 1 view    Result Date: 2/10/2025  Interpreted By:  Duane Clark, STUDY: XR CHEST 1 VIEW;  2/10/2025 10:12 pm   INDICATION: Signs/Symptoms:Chest Pain.   COMPARISON: 01/20/2025   ACCESSION NUMBER(S): [id number]   ORDERING CLINICIAN: GAUTAM KOCH   FINDINGS: Mild cardiomegaly and vascular congestion. Probable small effusions. No pneumothorax.       CHF.   MACRO: None   Signed by: Duane Clark 2/10/2025  11:52 PM Dictation workstation:   [id number]    ECG 12 lead    Result Date: 1/20/2025  Atrial fibrillation Right axis deviation ST & T wave abnormality, consider inferior ischemia Abnormal ECG When compared with ECG of 15-NOV-2024 07:51, Criteria for Septal infarct are no longer Present    XR chest 1 view    Result Date: 1/20/2025  STUDY: Chest Radiograph;  01/20/2025 at 12:51 hours. INDICATION: Cardiac. COMPARISON: Chest, single portable view obtained on 11/15/2024 at 06:24 hours. ACCESSION NUMBER(S): [id number] ORDERING CLINICIAN: LUPIS MELISSA TECHNIQUE:  Frontal chest was obtained at 12:51 hours. FINDINGS: The patient status post median sternotomy. CARDIOMEDIASTINAL SILHOUETTE: Cardiomediastinal silhouette is normal in size and configuration.  LUNGS: There is pulmonary vascular congestion.  There are bilateral interstitial infiltrates most likely representing edema.  There is a right pleural effusion. ABDOMEN: No remarkable upper abdominal findings.  BONES: No acute osseous changes.    Pulmonary vascular congestion with interstitial infiltrates with right pleural effusion.  Signed by Vasquez Tomlinson MD               Assessment/Plan   Assessment & Plan  Acute on chronic systolic heart failure    CAP (community acquired pneumonia) due to Chlamydia species    1, Acute on chronic systolic and diastolic heart failure exacerbation - continue diuresis with lasix gtt, he has been having good response and his kidney function shows improvement. Agree with hospice discussion, patient is of course free to decline after meeting with them.   2. Atrial fibrillation on eliquis - continue his eliquis  3. HTN - Blood pressure has been on the low-normal spectrum. Can continue to hold off on antihypertensive regimen and reevaluate with euvolemic status achieved.   4. HLD - continue statin     Discussed with attending physician Dr. Perry Cali D.O.  PGY-2 Internal medicine resident

## 2025-02-18 NOTE — CARE PLAN
The patient's goals for the shift include   Problem: Respiratory  Goal: Clear secretions with interventions this shift  Outcome: Progressing  Goal: Minimize anxiety/maximize coping throughout shift  Outcome: Progressing  Goal: Minimal/no exertional discomfort or dyspnea this shift  Outcome: Progressing  Goal: No signs of respiratory distress (eg. Use of accessory muscles. Peds grunting)  Outcome: Progressing  Goal: Patent airway maintained this shift  Outcome: Progressing  Goal: Verbalize decreased shortness of breath this shift  Outcome: Progressing  Goal: Wean oxygen to maintain O2 saturation per order/standard this shift  Outcome: Progressing  Goal: Increase self care and/or family involvement in next 24 hours  Outcome: Progressing     Problem: Fall/Injury  Goal: Not fall by end of shift  Outcome: Progressing  Goal: Be free from injury by end of the shift  Outcome: Progressing  Goal: Verbalize understanding of personal risk factors for fall in the hospital  Outcome: Progressing  Goal: Verbalize understanding of risk factor reduction measures to prevent injury from fall in the home  Outcome: Progressing  Goal: Use assistive devices by end of the shift  Outcome: Progressing  Goal: Pace activities to prevent fatigue by end of the shift  Outcome: Progressing     Problem: Pain - Adult  Goal: Verbalizes/displays adequate comfort level or baseline comfort level  Outcome: Progressing     Problem: Safety - Adult  Goal: Free from fall injury  Outcome: Progressing     Problem: Discharge Planning  Goal: Discharge to home or other facility with appropriate resources  Outcome: Progressing     Problem: Chronic Conditions and Co-morbidities  Goal: Patient's chronic conditions and co-morbidity symptoms are monitored and maintained or improved  Outcome: Progressing     Problem: Nutrition  Goal: Nutrient intake appropriate for maintaining nutritional needs  Outcome: Progressing     Problem: Heart Failure  Goal: Improved gas  exchange this shift  Outcome: Progressing  Goal: Improved urinary output this shift  Outcome: Progressing  Goal: Reduction in peripheral edema within 24 hours  Outcome: Progressing  Goal: Report improvement of dyspnea/breathlessness this shift  Outcome: Progressing  Goal: Weight from fluid excess reduced over 2-3 days, then stabilize  Outcome: Progressing  Goal: Increase self care and/or family involvement in 24 hours  Outcome: Progressing       The clinical goals for the shift include pt will remain hemodynamically stable this shift

## 2025-02-18 NOTE — PROGRESS NOTES
INPATIENT NEPHROLOGY CONSULT PROGRESS NOTE      Patient Name: Elgin Marin MRN: [id number]  DATE of SERVICE: February 18, 2025  TIME of SERVICE: 4:15 PM  CONSULTING SERVICE: Nephrology    REASON for CONSULT: ISIAH, hypervolemia, hyperkalemia     Covering for Dr. Stauffer    SUBJECTIVE:  Seen and evaluated at bedside.  Sitting in bed.  Lower extremity edema slightly better however both legs  to touch.  Ace wrap's in place.  Serum creatinine  improved down to 2.2 from 2.5.  GFR up to 28.  Sodium 131 improving from sodium 129, potassium 3.4 likely due to diuresis.  Receiving Lasix drip.  Apparently  patient reports fullness in his ears to primary care team today.    SUMMARY OF STAY:  Mr. Marin is a 84 y.o. male who presented to [facility] February 11, 2025 for evaluation of worsening shortness of breath, worsening bilateral lower extremity swelling, dysuria.  Diagnosed with bilateral lower extremity cellulitis, acute systolic heart failure exacerbation, acute kidney injury with electrolyte imbalance.      Patient with past medical history significant for chronic kidney disease stage IIIb with baseline serum creatinine of 2.1 mg deciliter, EGFR 30 mL/min follows with Dr. Stauffer, history of coronary artery disease status post CABG April 2022, atrial fibrillation on Eliquis, chronic combined systolic and diastolic heart failure, hypertension, hyperlipidemia.     ASSESSMENT:  Acute kidney injury superimposed on chronic kidney disease stage IIIb:   -- Multifactorial likely component of cardiorenal syndrome, acute decompensated systolic heart failure exacerbation with decreased effective circulatory volume, infectious component due to bilateral lower extremity cellulitis, third spacing in the setting of profound hypoalbuminemia in addition to component of hemodynamic mediated injury.  Acute urinary retention requiring Graham catheter  --  Baseline serum creatinine around 2 mg deciliter with a GFR of 30 mL/min per 1.73 M2, follows with primary nephrologist Dr. Stauffer.  -- ISIAH improving serum creatinine down to 2.2 mg sitter BUN of 62 and EGFR 28.  Diuresis with IV Lasix drip    Acute on chronic systolic heart failure exacerbation:  -- Last EF 42%  --Repeat echocardiogram pending    Hyperkalemia: Likely secondary to ISIAH, venous congestion  -Spironolactone, Jardiance on hold      Hyponatremia likely hypervolemic, diuretics related     Bilateral lower extremity cellulitis left worse than right wound culture positive for gram-positive cocci     Relative hypotension:  -- Spironolactone, Jardiance on hold     Acute urinary retention managed with Graham catheter placement.      Kidney cyst 1.1 cm left kidney, benign, to continue active surveillance as an outpatient.    PLAN:  Adequate response to IV Lasix drip,  urine output 4.5 L for the past 24 hours.  Concern for ototoxicity, Lasix drip at the dose of 240 mg in 24 hours associated with high risk of ototoxicity.  Patient developed fullness in the ears, so far no tinnitus.  To discontinue Lasix drip to switch to bumetanide since it is more concentrated with less volume distribution and will require less dose to achieve same results.   To combine IV bumetanide with IV albumin infusion to minimize third spacing and augment diuresis.  Anemia, thrombocytopenia stable platelet count around 91, hemoglobin stable at 9.7  Relative hypotension.  Holding parameters added to Imdur.  Spironolactone and Jardiance remains on hold.  Hypokalemia, diuretics related, repleted.   Hyponatremia improving sodium level of 131 from 124  Metabolic alkalosis likely contraction related bicarb up to 30.  Strict input and output to guide diuresis.  Graham catheter in place  To continue Ace wrap.  Podiatry note reviewed and appreciated patient with poor circulation.  To monitor glucose level of Lasix drip.    Will follow, thank  you!    Medications:    Current Facility-Administered Medications:     acetaminophen (Tylenol) tablet 650 mg, 650 mg, oral, q6h PRN, Aman Hurtado DO, 650 mg at 02/15/25 1134    albumin human 5 % infusion 25 g, 25 g, intravenous, Once, Pascual Parada MD    apixaban (Eliquis) tablet 2.5 mg, 2.5 mg, oral, BID, Aman Hurtado DO, 2.5 mg at 02/18/25 0831    atorvastatin (Lipitor) tablet 40 mg, 40 mg, oral, Nightly, Aman Hurtado DO, 40 mg at 02/17/25 2041    bisoprolol (Zebeta) tablet 5 mg, 5 mg, oral, Daily, Aman Hurtado DO, 5 mg at 02/18/25 0831    bumetanide (Bumex) injection 2 mg, 2 mg, intravenous, q12h, Pascual Parada MD    DAPTOmycin (Cubicin) 500 mg in sodium chloride 0.9% 50 mL IV, 6 mg/kg, intravenous, q48h, Ewa Ribera MD, Stopped at 02/18/25 0930    dextrose 50 % injection 12.5 g, 12.5 g, intravenous, q15 min PRN, Artemio Rivas MD    dextrose 50 % injection 25 g, 25 g, intravenous, q15 min PRN, Artemio Rivas MD    [Held by provider] empagliflozin (Jardiance) tablet 25 mg, 25 mg, oral, Daily, Aman Hurtado DO    finasteride (Proscar) tablet 5 mg, 5 mg, oral, Daily, Aman Hurtado DO, 5 mg at 02/18/25 0831    fluticasone (Flonase) nasal spray 2 spray, 2 spray, Each Nostril, Daily, Jerry Farr MD, 2 spray at 02/18/25 1458    glucagon (Glucagen) injection 1 mg, 1 mg, intramuscular, q15 min PRN, Artemio Rivas MD    glucagon (Glucagen) injection 1 mg, 1 mg, intramuscular, q15 min PRN, Artemio Rivas MD    guaiFENesin (Mucinex) 12 hr tablet 600 mg, 600 mg, oral, BID PRN, Aman Hurtado DO, 600 mg at 02/18/25 0831    [Held by provider] insulin glargine (Lantus) injection 10 Units, 10 Units, subcutaneous, BID, Aman Hurtado DO, 10 Units at 02/11/25 1340    insulin glargine (Lantus) injection 10 Units, 10 Units, subcutaneous, q24h, Margi Pérez MD, 10 Units at 02/17/25 2042    insulin lispro injection 0-10 Units, 0-10 Units, subcutaneous, TID AC, Aman Hurtado DO, 2 Units at  02/17/25 1737    insulin lispro injection 0-5 Units, 0-5 Units, subcutaneous, Nightly, Aman Hurtado DO, 1 Units at 02/17/25 2041    insulin lispro injection 2 Units, 2 Units, subcutaneous, TID AC, Aman Hurtado DO, 2 Units at 02/18/25 1642    ipratropium-albuteroL (Duo-Neb) 0.5-2.5 mg/3 mL nebulizer solution 3 mL, 3 mL, nebulization, q6h, Aman Hurtado DO, 3 mL at 02/18/25 1308    ipratropium-albuteroL (Duo-Neb) 0.5-2.5 mg/3 mL nebulizer solution 3 mL, 3 mL, nebulization, q2h PRN, Aman Hurtado DO, 3 mL at 02/18/25 0438    isosorbide dinitrate (Isordil) tablet 10 mg, 10 mg, oral, BID, Aman Hurtado DO, 10 mg at 02/18/25 1344    latanoprost (Xalatan) 0.005 % ophthalmic solution 1 drop, 1 drop, Both Eyes, Nightly, Aman Hurtado DO, 1 drop at 02/17/25 2043    oxygen (O2) therapy, , inhalation, Continuous PRN - O2/gases, Ann Pinto MD, Last Rate: 240,000 mL/hr at 02/18/25 0801, 4 L/min at 02/18/25 0801    pantoprazole (ProtoNix) EC tablet 40 mg, 40 mg, oral, Nightly, Aman Hurtado DO, 40 mg at 02/17/25 2041    perflutren lipid microspheres (Definity) injection 0.5-10 mL of dilution, 0.5-10 mL of dilution, intravenous, Once in imaging, Aman Hurtado DO    perflutren protein A microsphere (Optison) injection 0.5 mL, 0.5 mL, intravenous, Once in imaging, Aman Hurtado DO    sodium chloride (Ocean) 0.65 % nasal spray 1 spray, 1 spray, Each Nostril, TID, Jerry Farr MD, 1 spray at 02/18/25 1028    [Held by provider] spironolactone (Aldactone) tablet 12.5 mg, 12.5 mg, oral, q24h BRE, Aman Hurtado DO, 12.5 mg at 02/12/25 0917    sulfur hexafluoride microsphr (Lumason) injection 24.28 mg, 2 mL, intravenous, Once in imaging, Aman Hurtado DO    tamsulosin (Flomax) 24 hr capsule 0.4 mg, 0.4 mg, oral, Daily, Aman Hurtado DO, 0.4 mg at 02/18/25 0831    [Held by provider] torsemide (Demadex) tablet 80 mg, 80 mg, oral, Daily, Aman Hurtado DO    PERTINENT ROS:  GENERAL:  positive for fatigue, poor  appetite.  No fever/chills  RESPIRATORY:  positive for shortness of breath.  Negative for cough, wheezing.  CARDIOVASCULAR:   Negative for chest pain or palpitation.  GI:  Negative for abdominal pain, diarrhea, heartburn, nausea, vomiting  : Graham catheter in place    Physical Exam:  Vital signs in last 24 hours:  Temp:  [36 °C (96.8 °F)-37 °C (98.6 °F)] 37 °C (98.6 °F)  Heart Rate:  [78-98] 88  Resp:  [16-20] 18  BP: ()/(49-71) 98/49  FiO2 (%):  [30 %-36 %] 36 %    General: Awake, cooperative, in mild discomfort due to swelling and pain in both lower extremity.  HEENT:  NCAT,  mucous membranes moist and pink  NECK:  Elevated JVD, no carotid bruit, supple, no cervical mass or thyromegaly  LUNGS;  Diminished breath sounds, fine Rales  CV:  Distant, regular rate and rhythm, no murmurs  ABDOMEN:  abdomen soft, nontender, BS normal, no masses or organomegaly  EDEMA:  +3-4 lower extremity edema/dependent edema with ulcers and open wounds  SKIN: Bilateral lower extremity cellulitis with open wounds      Intake/Output last 3 shifts:  I/O last 3 completed shifts:  In: 2334.1 (26 mL/kg) [P.O.:1200; I.V.:34.1 (0.4 mL/kg); Blood:500; IV Piggyback:600]  Out: 6250 (69.7 mL/kg) [Urine:6250 (1.9 mL/kg/hr)]  Weight: 89.7 kg     DATA:  Diagnotic tests reviewed for Todays visit:  Results from last 7 days   Lab Units 02/18/25  0623   WBC AUTO x10*3/uL 6.3   RBC AUTO x10*6/uL 3.86*   HEMOGLOBIN g/dL 9.7*   HEMATOCRIT % 33.2*     Results from last 7 days   Lab Units 02/18/25  0623   SODIUM mmol/L 131*   POTASSIUM mmol/L 3.4*   CHLORIDE mmol/L 91*   CO2 mmol/L 31   BUN mg/dL 62*   CREATININE mg/dL 2.26*   CALCIUM mg/dL 7.3*   PHOSPHORUS mg/dL 3.6   MAGNESIUM mg/dL 1.86           Echocardiogram  cONCLUSIONS:   1. The left ventricular systolic function is mildly decreased, with a Strong's biplane calculated ejection fraction of 42%.   2. There is global hypokinesis of the left ventricle with minor regional variations.   3. Left  ventricular diastolic filling was indeterminate with an elevated left atrial pressure.   4. There is severely reduced right ventricular systolic function.   5. Mildly enlarged right ventricle.   6. The left atrium is moderately dilated.   7. The right atrium is moderately dilated.   8. Mild to moderate tricuspid regurgitation.   9. The estimated RVSP is 46 mm.  10. There is aortic sclerosis with normal leaflet mobility.  11. The inferior vena cava appears severely dilated.  12. There is moderately increased septal and moderately increased posterior left ventricular wall thickness.  IMAGING: CXR reviewed in  images      SIGNATURE: Pascual Parada MD  Nephrology and Hypertension  [address] [facility] [place]  Office phone: [phone]  FAX: [phone]    This note was partially generated using the Dragon voice recognition system, and there may be some incorrect words, spelling's and punctuation that were not noted in checking the note before saving.

## 2025-02-18 NOTE — PROGRESS NOTES
"Elgin Marin is a 84 y.o. male on day 7 of admission presenting with Acute on chronic systolic heart failure.    Subjective   Patient seen bedside this morning for reevaluation of bilateral feet.  He was completing his breathing treatment.  No new pedal complaints       Physical Exam    Objective       Objective:   Vasc: DP and PT pulses are non-palpable bilateral.  CFT is improved    Neuro:  Light touch is diminished to the foot bilateral.      Derm: Left foot with partial-thickness wound.  Granular base.  No further skin breakdown.  Dry stable changes again noted to the right distal digits.  Maceration and serous drainage is significantly improved.        Last Recorded Vitals  Blood pressure 125/65, pulse 87, temperature 36 °C (96.8 °F), temperature source Temporal, resp. rate 19, height 1.778 m (5' 10\"), weight 89.7 kg (197 lb 12 oz), SpO2 98%.    Intake/Output last 3 Shifts:  I/O last 3 completed shifts:  In: 2334.1 (26 mL/kg) [P.O.:1200; I.V.:34.1 (0.4 mL/kg); Blood:500; IV Piggyback:600]  Out: 6250 (69.7 mL/kg) [Urine:6250 (1.9 mL/kg/hr)]  Weight: 89.7 kg     Relevant Results  Results for orders placed or performed during the hospital encounter of 02/10/25 (from the past 24 hours)   Transthoracic Echo (TTE) Limited   Result Value Ref Range    LV Biplane EF 44 %    MV E/A ratio 4.12     Tricuspid annular plane systolic excursion 1.2 cm    LA vol index A/L 35.0 ml/m2    LV EF 45 %    RV free wall pk S' 5.22 cm/s    RVSP 80.0 mmHg    LVIDd 4.30 cm    LV A4C EF 49.5    POCT GLUCOSE   Result Value Ref Range    POCT Glucose 160 (H) 74 - 99 mg/dL   Renal function panel   Result Value Ref Range    Glucose 178 (H) 74 - 99 mg/dL    Sodium 129 (L) 136 - 145 mmol/L    Potassium 3.8 3.5 - 5.3 mmol/L    Chloride 90 (L) 98 - 107 mmol/L    Bicarbonate 29 21 - 32 mmol/L    Anion Gap 14 10 - 20 mmol/L    Urea Nitrogen 66 (H) 6 - 23 mg/dL    Creatinine 2.52 (H) 0.50 - 1.30 mg/dL    eGFR 24 (L) >60 mL/min/1.73m*2    Calcium " 7.2 (L) 8.6 - 10.3 mg/dL    Phosphorus 3.1 2.5 - 4.9 mg/dL    Albumin 2.8 (L) 3.4 - 5.0 g/dL   POCT GLUCOSE   Result Value Ref Range    POCT Glucose 200 (H) 74 - 99 mg/dL   CBC   Result Value Ref Range    WBC 6.3 4.4 - 11.3 x10*3/uL    nRBC 0.0 0.0 - 0.0 /100 WBCs    RBC 3.86 (L) 4.50 - 5.90 x10*6/uL    Hemoglobin 9.7 (L) 13.5 - 17.5 g/dL    Hematocrit 33.2 (L) 41.0 - 52.0 %    MCV 86 80 - 100 fL    MCH 25.1 (L) 26.0 - 34.0 pg    MCHC 29.2 (L) 32.0 - 36.0 g/dL    RDW 15.0 (H) 11.5 - 14.5 %    Platelets 91 (L) 150 - 450 x10*3/uL   Magnesium   Result Value Ref Range    Magnesium 1.86 1.60 - 2.40 mg/dL   Renal function panel   Result Value Ref Range    Glucose 87 74 - 99 mg/dL    Sodium 131 (L) 136 - 145 mmol/L    Potassium 3.4 (L) 3.5 - 5.3 mmol/L    Chloride 91 (L) 98 - 107 mmol/L    Bicarbonate 31 21 - 32 mmol/L    Anion Gap 12 10 - 20 mmol/L    Urea Nitrogen 62 (H) 6 - 23 mg/dL    Creatinine 2.26 (H) 0.50 - 1.30 mg/dL    eGFR 28 (L) >60 mL/min/1.73m*2    Calcium 7.3 (L) 8.6 - 10.3 mg/dL    Phosphorus 3.6 2.5 - 4.9 mg/dL    Albumin 3.0 (L) 3.4 - 5.0 g/dL   POCT GLUCOSE   Result Value Ref Range    POCT Glucose 89 74 - 99 mg/dL           Assessment/Plan   Assessment & Plan  Acute on chronic systolic heart failure    CAP (community acquired pneumonia) due to Chlamydia species    Patient seen bedside 2/18/2025.  Updated pictures put in the media section.  Continue local wound care.  PVD appears stable at this time.  The patient's foot condition is improving bilaterally       Debra Pantoja DPM

## 2025-02-18 NOTE — PROGRESS NOTES
Spiritual Care Visit  Spiritual Care Request    Reason for Visit:  Routine Visit: Introduction  Continue Visiting: Yes     Request Received From:       Focus of Care:  Visited With: Patient         Refer to :          Spiritual Care Assessment    Spiritual Assessment:                      Care Provided:  Intended Effects: Meaning-making, Establish rapport and connectedness, Jeaneth affirmation, Build relationship of care and support, Helping someone feel comforted, Demonstrate caring and concern    Sense of Community and or Judaism Affiliation:  Zoroastrianism         Addressed Needs/Concerns and/or Devika Through:  Judaism Encounters  Judaism Needs: Prayer, Judaism articles  Sacramental Encounters  Communion: Patient wants communion  Communion Given Indicator: No  Sacrament of Sick-Anointing: Anointed, Patient requested anointing  Other Sacrament: I celebrated sacramental confession with him.    Outcome:  Outcome of Spiritual Care Visit: Affirmation, [place], Comfort/healing presence, Personal disclosure/revelation, Spirituality connected     Advance Directives:         Spiritual Care Annotation    Annotation:  The patient and I spoke at some length. He faces many financial difficulties and is worried about how his wife and son will survive after his death. I suggested that he ask to see the psychiatrist, for he said he wonders whether he has dementia. I suggested to his nurse that it might be wise to inform the psychiatrist.

## 2025-02-18 NOTE — PROGRESS NOTES
Elgin Marin is a 84 y.o. male on day 7 of admission presenting with Acute on chronic systolic heart failure.    Subjective   Patient was seen and examined today in the morning at bedside.  No acute events overnight.  This morning he endorses having echoes in his right ear and having significant nose congestion and have some concern of panic attack when he cannot breathe.  He was sitting in a chair comfortably and denies any worsening shortness of breath    Objective     Physical Exam  VITALS: I have reviewed the vital signs.   GENERAL: Elderly, chronically ill adult male.  Resting comfortably in bed.  On 2-3 L nasal cannula.   Conversational dyspnea persist even with nasal cannula on  NEURO: Alert and oriented x3, able to answer questions and follow commands. Moves all extremities. Face is symmetric and expressive. No tremor.  EYES: PERRL. No scleral icterus or conjunctival injection. No discharge.   HENT: Normocephalic, atraumatic. Hearing is grossly intact, though he is [place]. Nares grossly patent and without discharge. Mucous membranes moist.    CARDIO: Rhythm regular. Normal rate. No murmur, rub, or gallop.  Persistent 2-3+ pitting edema bilaterally to the thighs with Ace bandage wrapped.  PULM: Lungs grossly clear to auscultation this morning.   Very mild low, slightly diminished aeration lower lung fields. No wheezes, rales, or rhonchi.  Mild conversational dyspnea. No splinting, stridor, or accessory muscle use.  24-hour UO is 1.9 L on Lasix gtt.   GI: Abdomen is soft and non-distended. No tenderness to palpation. No guarding or rigidity. Normoactive bowel sounds.   SKIN: Warm and dry. Normal turgor.  Bilateral lower extremities grossly edematous with chronic lower extremity wounds.  Gangrenous appearing toes on bilateral lower extremity.  bilateral lower extremities wrapped in Ace bandage with Kerlix  PSYCH: Mood, affect, and interaction is appropriate to the setting. Not internally stimulated.    Last  "Recorded Vitals  Blood pressure (!) 98/49, pulse 88, temperature 37 °C (98.6 °F), temperature source Temporal, resp. rate 18, height 1.778 m (5' 10\"), weight 89.7 kg (197 lb 12 oz), SpO2 99%.  Intake/Output last 3 Shifts:  I/O last 3 completed shifts:  In: 2334.1 (26 mL/kg) [P.O.:1200; I.V.:34.1 (0.4 mL/kg); Blood:500; IV Piggyback:600]  Out: 6250 (69.7 mL/kg) [Urine:6250 (1.9 mL/kg/hr)]  Weight: 89.7 kg     Relevant Results  Scheduled medications  albumin human, 25 g, intravenous, Once  apixaban, 2.5 mg, oral, BID  atorvastatin, 40 mg, oral, Nightly  bisoprolol, 5 mg, oral, Daily  daptomycin, 6 mg/kg, intravenous, q48h  [Held by provider] empagliflozin, 25 mg, oral, Daily  finasteride, 5 mg, oral, Daily  fluticasone, 2 spray, Each Nostril, Daily  [Held by provider] insulin glargine, 10 Units, subcutaneous, BID  insulin glargine, 10 Units, subcutaneous, q24h  insulin lispro, 0-10 Units, subcutaneous, TID AC  insulin lispro, 0-5 Units, subcutaneous, Nightly  insulin lispro, 2 Units, subcutaneous, TID AC  ipratropium-albuteroL, 3 mL, nebulization, q6h  isosorbide dinitrate, 10 mg, oral, BID  latanoprost, 1 drop, Both Eyes, Nightly  pantoprazole, 40 mg, oral, Nightly  perflutren lipid microspheres, 0.5-10 mL of dilution, intravenous, Once in imaging  perflutren protein A microsphere, 0.5 mL, intravenous, Once in imaging  sodium chloride, 1 spray, Each Nostril, TID  [Held by provider] spironolactone, 12.5 mg, oral, q24h BRE  sulfur hexafluoride microsphr, 2 mL, intravenous, Once in imaging  tamsulosin, 0.4 mg, oral, Daily  [Held by provider] torsemide, 80 mg, oral, Daily      Continuous medications  furosemide, 10 mg/hr, Last Rate: 10 mg/hr (02/18/25 1722)      PRN medications  PRN medications: acetaminophen, dextrose, dextrose, glucagon, glucagon, guaiFENesin, ipratropium-albuteroL, oxygen  Results from last 7 days   Lab Units 02/18/25  0623 02/17/25  0636 02/16/25  0602   WBC AUTO x10*3/uL 6.3 7.5 4.2*   RBC AUTO " x10*6/uL 3.86* 4.18* 3.99*   HEMOGLOBIN g/dL 9.7* 10.7* 10.3*     Results from last 7 days   Lab Units 02/18/25  0623 02/17/25  1757 02/17/25  0636 02/16/25  1800 02/16/25  0602   SODIUM mmol/L 131* 129* 130*   < > 128*   POTASSIUM mmol/L 3.4* 3.8 4.2   < > 4.9   CHLORIDE mmol/L 91* 90* 92*   < > 95*   CO2 mmol/L 31 29 30   < > 26   BUN mg/dL 62* 66* 68*   < > 64*   CREATININE mg/dL 2.26* 2.52* 2.59*   < > 2.50*   CALCIUM mg/dL 7.3* 7.2* 7.5*   < > 7.6*   PHOSPHORUS mg/dL 3.6 3.1 4.2   < > 4.9   MAGNESIUM mg/dL 1.86  --  1.99  --  2.16    < > = values in this interval not displayed.       Transthoracic Echo (TTE) Limited    Result Date: 2/17/2025            [facility] [address] [facility] [address]    Tel [phone] Fax [phone] TRANSTHORACIC ECHOCARDIOGRAM REPORT Patient Name:       NORRIS Stringer Physician:    [id number] Shubham Booker MD Study Date:         2/17/2025            Ordering Provider:    [id number] AMNA WARREN MRN/PID:            [id number]             Fellow: Accession#:         [id number]         Nurse: Date of Birth/Age:  1940 / 84 years Sonographer:          Florence Hernandez Gender Assigned at  M                    Additional Staff: Birth: Height:             177.80 cm            Admit Date: Weight:             89.36 kg             Admission Status:     Inpatient -                                                                Priority                                                                discharge BSA / BMI:          2.07 m2 / 28.27      Department Location:  [facility] Echo Lab                     kg/m2 Blood Pressure: 101 /59 mmHg Study Type:    TRANSTHORACIC ECHO (TTE) LIMITED Diagnosis/ICD: Acute on chronic combined systolic (congestive) and diastolic                (congestive) heart failure (CHF)-I50.43 Indication:    Congestive  Heart Failure CPT Codes:     Echo Limited-01226; Doppler Limited-80013; Color Doppler-56710 Patient History: Diabetes:          Yes Pertinent History: A-Fib, CAD, HTN, Hyperlipidemia and CHF. Study Detail: The following Echo studies were performed: 2D, M-Mode, Doppler and               color flow.  PHYSICIAN INTERPRETATION: Left Ventricle: Left ventricular ejection fraction is mildly decreased, by visual estimate at 45%. There is moderate to severe concentric left ventricular hypertrophy. There is global hypokinesis of the left ventricle with minor regional variations. The left ventricular cavity size was not assessed. There is moderately increased septal and moderately increased posterior left ventricular wall thickness. There is left ventricular concentric remodeling. The interventricular septum is flattened in systole and diastole, consistent with right ventricular pressure and volume overload. Spectral Doppler shows a Grade II (pseudonormal pattern) of left ventricular diastolic filling with an elevated left atrial pressure. Left Atrium: The left atrial size is severely dilated. Right Ventricle: The right ventricle is moderately enlarged. There is mildly reduced right ventricular systolic function. Right Atrium: The right atrial size was not assessed. Aortic Valve: The aortic valve was not assessed. Aortic valve regurgitation was not assessed. Mitral Valve: The mitral valve is mildly thickened. There is mild mitral annular calcification. There is trace mitral valve regurgitation. Tricuspid Valve: The tricuspid valve was not assessed. There is moderate tricuspid regurgitation. The Doppler estimated RVSP is severely elevated at 80.0 mmHg. Pulmonic Valve: The pulmonic valve was not assessed. The pulmonic valve regurgitation was not assessed. Pericardium: Pericardial effusion was not assessed. Aorta: The aortic root was not assessed. Systemic Veins: The inferior vena cava appears moderately dilated.  CONCLUSIONS:   1. Left ventricular ejection fraction is mildly decreased, by visual estimate at 45%.  2. There is global hypokinesis of the left ventricle with minor regional variations.  3. Spectral Doppler shows a Grade II (pseudonormal pattern) of left ventricular diastolic filling with an elevated left atrial pressure.  4. Right ventricular volume and pressure overload.  5. There is mildly reduced right ventricular systolic function.  6. Moderately enlarged right ventricle.  7. The left atrial size is severely dilated.  8. Moderate tricuspid regurgitation.  9. Severely elevated right ventricular systolic pressure. 10. The inferior vena cava appears moderately dilated. 11. There is moderately increased septal and moderately increased posterior left ventricular wall thickness. QUANTITATIVE DATA SUMMARY:  2D MEASUREMENTS:             Normal Ranges: LAs:             5.20 cm     (2.7-4.0cm) IVSd:            1.60 cm     (0.6-1.1cm) LVPWd:           1.60 cm     (0.6-1.1cm) LVIDd:           4.30 cm     (3.9-5.9cm) LVIDs:           3.10 cm LV Mass Index:   137.6 g/m2 LVEDV Index:     21.85 ml/m2 LV % FS          27.9 %  LEFT ATRIUM:                  Normal Ranges: LA Vol A4C:        62.9 ml    (22+/-6mL/m2) LA Vol A2C:        81.1 ml LA Vol BP:         72.5 ml LA Vol Index A4C:  30.3ml/m2 LA Vol Index A2C:  39.1 ml/m2 LA Vol Index BP:   35.0 ml/m2 LA Area A4C:       20.3 cm2 LA Area A2C:       23.4 cm2 LA Major Axis A4C: 5.6 cm LA Major Axis A2C: 5.7 cm LA Volume Index:   33.0 ml/m2 LA Vol A4C:        56.9 ml LA Vol A2C:        80.3 ml LA Vol Index BSA:  33.1 ml/m2  RIGHT ATRIUM:                 Normal Ranges: RA Vol A4C:        103.7 ml   (8.3-19.5ml) RA Vol Index A4C:  50.0 ml/m2 RA Area A4C:       28.2 cm2 RA Major Axis A4C: 6.5 cm  LV SYSTOLIC FUNCTION:                      Normal Ranges: EF-A4C View:    49 % (>=55%) EF-A2C View:    38 % EF-Biplane:     44 % EF-Visual:      45 % LV EF Reported: 45 %  LV DIASTOLIC FUNCTION:            Normal Ranges: MV Peak E:             1.14 m/s  (0.7-1.2 m/s) MV Peak A:             0.28 m/s  (0.42-0.7 m/s) E/A Ratio:             4.12      (1.0-2.2) MV e'                  0.039 m/s (>8.0) MV lateral e'          0.04 m/s MV medial e'           0.03 m/s E/e' Ratio:            29.53     (<8.0)  MITRAL VALVE:          Normal Ranges: MV DT:        154 msec (150-240msec)  RIGHT VENTRICLE: RV Basal 4.80 cm RV Mid   4.30 cm RV Major 8.3 cm TAPSE:   12.1 mm RV s'    0.05 m/s  TRICUSPID VALVE/RVSP:          Normal Ranges: Peak TR Velocity:     4.03 m/s Est. RA Pressure:     15 mmHg RV Syst Pressure:     80 mmHg  (< 30mmHg) IVC Diam:             2.90 cm  PULMONIC VALVE:          Normal Ranges: PV Accel Time:  77 msec  (>120ms) PV Max Kalyan:     0.9 m/s  (0.6-0.9m/s) PV Max PG:      3.1 mmHg  [id number] Shubham Booker MD Electronically signed on 2/17/2025 at 2:46:08 PM  ** Final **       Assessment/Plan   Assessment & Plan  Acute on chronic systolic heart failure    CAP (community acquired pneumonia) due to Chlamydia species      Mr Elgin Marin is an 84 year old male patient with previous medical history of CAD status post CABG in April 2022, atrial fibrillation on Eliquis, chronic combined systolic and diastolic heart failure, HTN, CKD with baseline Cr ~2.0,dyslipidemia, cholecystectomy (10/2015) admitted with a diagnosis of UTI. He received rocephin in the ED. Patient is NOT medically ready for discharge     # AHRF  # CHFe, C/F cardiorenal syndrome, improving  # Combined systolic and diastolic heart failure  #? Concern of ototoxicity secondary to Lasix drip  # Profound hypoalbuminemia  -24-hour UO  3.1 L, with net -8.6 L since admission.  Remains on 4 L nasal cannula  -Today's creatinine 2.59 equivalent to yesterday (baseline per nephrology around 2)  -Last TTE 11/2024: EF 42%, global hypokinesis LV with minor regional variations, indeterminate LV DF, severely reduced RV SF, mildly enlarged RV, moderately dilated LA and  RA, RVSP 46, severely dilated IVC, moderately increased septal and moderately increased posterior left ventricular wall thickness  - TTE 2/15: EF 45%, global hypokinesis LV with minor regional variations, grade 2 diastolic dysfunction, RV volume and pressure overload, mild reduced RV SF, moderately enlarged RV, severely dilated LA, moderate TR, severely elevated  RVSP 80, moderately dilated IVC, moderately increased septal and moderately increased posterior left ventricular wall thickness  -[2/18] switch Lasix drip to Bumex drip  secondary to concerning  for out of toxicity  -Nephrology: dc Lasix gtt. at 10 mg/h will switch to Bumex gtt .  Addition of IV albumin infusions goal 1 to 2 L diuresis per day.  Discussion had with cardiology today with nephrology: Holding off on metolazone today  -Cardiology consulted for additional volume status management: Pending formal recommendations however  -RFP every 12 hours to trend renal function and electrolytes  -GDMT continued: Bisoprolol, Isordil  -Jardiance and Aldactone to be discontinued on DC, both currently held  -Clinical status remains guarded. Palliative care reconsulted as patient wishing to entertain hospice after discussion with home cardiologist     # Hyperglycemia  Hyperglycemia likely 2/2 cortisol surge in the setting of infection  -Blood sugar stables in the high 100s/low mid 200s  -Continue Lantus 10 units nightly with  scheduled 2 units lispro 3 times daily with meals.  Continue SSI  in between     # Hyperkalemia, resolved  # Hyponatremia, acute improving  -Admission sodium 134, currently 130  -Suspect possibly in the setting of hypervolemia  -Potassium corrected overnight with Lokelma, sodium bicarb, and Kayexalate  -Continue to trend twice daily RFP's while on Lasix gtt     # Left foot wound  # Gangrenous right toes, c/f ?OME  # Poor peripheral pulses  # Chronic lower extremity wounds  -Evaluated by podiatry who obtained right foot x-ray  -Wound cultures  positive for  MRSA and Corynebacterium striatum  -ID following: On daptomycin.  Zosyn discontinued  -Blood cultures NTD  -Prior wound cultures 11/12 positive for Pseudomonas aeruginosa, pansensitive  -Doppler required for pulses ordered.  Moderate occlusive disease bilaterally.  Poor surgical candidate  -Continuing Eliquis and added statin.  Will need ASA on DC given elevated ASCVD risk  -Cont. Wound care     # Chronic urinary retention  -UCX without growth. Antibiotics discontinued  -Patient to follow with outpatient [facility] urology  -Evaluated urology during this hospitalization with low suspicion for prostatitis  -Patient to be discharged with Graham catheter in place given chronic retention  -To continue finasteride and tamsulosin on DC        Other chronic conditions:  # CAD s/p CABG (4/2022)  # A-fib on Eliquis  -Resume home meds with appropriate holding parameters.      Dispo: Anticipate hospice meeting tomorrow      Assessment and plan discussed with my attending.   Jerry Farr MD   Internal Medicine, PGY-2 .

## 2025-02-18 NOTE — CARE PLAN
The patient's goals for the shift include GET SOME REST    The clinical goals for the shift include remain free of falls    Problem: Skin  Goal: Decreased wound size/increased tissue granulation at next dressing change  2/18/2025 0505 by Ewa Bartlett RN  Outcome: Progressing  2/18/2025 0505 by Ewa Bartlett RN  Outcome: Progressing  Goal: Participates in plan/prevention/treatment measures  2/18/2025 0505 by Ewa Bartlett RN  Outcome: Progressing  2/18/2025 0505 by Ewa Bartlett RN  Outcome: Progressing  Goal: Prevent/manage excess moisture  2/18/2025 0505 by Ewa Bartlett RN  Outcome: Progressing  2/18/2025 0505 by Ewa Bartlett RN  Outcome: Progressing  Goal: Prevent/minimize sheer/friction injuries  2/18/2025 0505 by Ewa Bartlett RN  Outcome: Progressing  2/18/2025 0505 by Ewa Bartlett RN  Outcome: Progressing  Goal: Promote/optimize nutrition  2/18/2025 0505 by Ewa Bartlett RN  Outcome: Progressing  2/18/2025 0505 by Ewa Bartlett RN  Outcome: Progressing  Goal: Promote skin healing  2/18/2025 0505 by Ewa Bartlett RN  Outcome: Progressing  2/18/2025 0505 by Ewa Bartlett RN  Outcome: Progressing     Problem: Respiratory  Goal: Clear secretions with interventions this shift  2/18/2025 0505 by Ewa Bartlett RN  Outcome: Progressing  2/18/2025 0505 by Ewa Bartlett RN  Outcome: Progressing  Goal: Minimize anxiety/maximize coping throughout shift  2/18/2025 0505 by Ewa Bartlett RN  Outcome: Progressing  2/18/2025 0505 by Ewa Bartlett RN  Outcome: Progressing  Goal: Minimal/no exertional discomfort or dyspnea this shift  2/18/2025 0505 by Ewa Bartlett RN  Outcome: Progressing  2/18/2025 0505 by Ewa Bartlett RN  Outcome: Progressing  Goal: No signs of respiratory distress (eg. Use of accessory muscles. Peds grunting)  2/18/2025 0505 by Ewa Bartlett RN  Outcome: Progressing  2/18/2025 0505 by Ewa Bartlett RN  Outcome: Progressing  Goal: Patent airway maintained this  shift  2/18/2025 0505 by Ewa Bartlett RN  Outcome: Progressing  2/18/2025 0505 by Ewa Bartlett RN  Outcome: Progressing  Goal: Verbalize decreased shortness of breath this shift  2/18/2025 0505 by Ewa Bartlett RN  Outcome: Progressing  2/18/2025 0505 by Ewa Bartlett RN  Outcome: Progressing  Goal: Wean oxygen to maintain O2 saturation per order/standard this shift  2/18/2025 0505 by Ewa Bartlett RN  Outcome: Progressing  2/18/2025 0505 by Ewa Bartlett RN  Outcome: Progressing  Goal: Increase self care and/or family involvement in next 24 hours  2/18/2025 0505 by Ewa Bartlett RN  Outcome: Progressing  2/18/2025 0505 by Ewa Bartlett RN  Outcome: Progressing     Problem: Fall/Injury  Goal: Not fall by end of shift  2/18/2025 0505 by Ewa Bartlett RN  Outcome: Progressing  2/18/2025 0505 by Ewa Bartlett RN  Outcome: Progressing  Goal: Be free from injury by end of the shift  Outcome: Progressing  Goal: Verbalize understanding of personal risk factors for fall in the hospital  Outcome: Progressing  Goal: Verbalize understanding of risk factor reduction measures to prevent injury from fall in the home  Outcome: Progressing  Goal: Use assistive devices by end of the shift  Outcome: Progressing  Goal: Pace activities to prevent fatigue by end of the shift  Outcome: Progressing     Problem: Pain - Adult  Goal: Verbalizes/displays adequate comfort level or baseline comfort level  Outcome: Progressing     Problem: Safety - Adult  Goal: Free from fall injury  Outcome: Progressing     Problem: Discharge Planning  Goal: Discharge to home or other facility with appropriate resources  Outcome: Progressing     Problem: Chronic Conditions and Co-morbidities  Goal: Patient's chronic conditions and co-morbidity symptoms are monitored and maintained or improved  Outcome: Progressing     Problem: Nutrition  Goal: Nutrient intake appropriate for maintaining nutritional needs  Outcome: Progressing     Problem:  Heart Failure  Goal: Improved gas exchange this shift  Outcome: Progressing  Goal: Improved urinary output this shift  Outcome: Progressing  Goal: Reduction in peripheral edema within 24 hours  Outcome: Progressing  Goal: Report improvement of dyspnea/breathlessness this shift  Outcome: Progressing  Goal: Weight from fluid excess reduced over 2-3 days, then stabilize  Outcome: Progressing  Goal: Increase self care and/or family involvement in 24 hours  Outcome: Progressing

## 2025-02-18 NOTE — NURSING NOTE
Pt and daughter agreeable to hospice at this time. [facility] Hospice meeting scheduled for tomorrow, Weds 2/19 at 930/10a, [facility] RN to meet with pt bedside, daughter to join mtg by phone.     Will follow

## 2025-02-18 NOTE — PROGRESS NOTES
INPATIENT PROGRESS NOTES    PATIENT NAME: Elgin Marin    MRN: [id number]  SERVICE DATE:  2/18/2025   SERVICE TIME:  1:25 PM    SIGNATURE: Ewa Ribera MD      ASSESSMENT :   -Left foot wound infection-->improving  -Wound culture--> MRSA and Corynebacterium striatum  -Left foot cellulitis  -Right toes dry gangrene  -MRSA carrier  -History of CAD, CHF, CKD, hypertension  -Allergic to sulfa with hives     PLAN:  -Continue daptomycin, CPK within normal limits  -Day #5 of antibiotics treatment for MRSA infection  -Closely monitor for antibiotics side effects including rash, Diarrhea/CDI, thrombocytopenia, ISIAH, etc.           SUBJECTIVE  Afebrile  No events overnight       OBJECTIVE  PHYSICAL EXAM:   Patient Vitals for the past 24 hrs:   BP Temp Temp src Pulse Resp SpO2 Weight   02/18/25 1308 -- -- -- -- -- 98 % --   02/18/25 0801 -- -- -- -- -- 98 % --   02/18/25 0800 125/65 36 °C (96.8 °F) Temporal 87 19 100 % --   02/18/25 0759 -- -- -- -- -- 98 % --   02/18/25 0500 -- -- -- -- -- -- 89.7 kg (197 lb 12 oz)   02/18/25 0435 129/71 36.8 °C (98.2 °F) Temporal 98 20 93 % --   02/18/25 0138 -- -- -- -- -- 96 % --   02/18/25 0000 121/62 37 °C (98.6 °F) Temporal 88 16 97 % --   02/17/25 1947 -- -- -- -- -- 98 % --   02/17/25 1941 111/63 37 °C (98.6 °F) Temporal 78 17 97 % --   02/17/25 1500 115/63 36.8 °C (98.2 °F) Temporal 90 20 96 % --   02/17/25 1404 -- -- -- -- -- 96 % --   Feet pics as below from today                      Labs:  Lab Results   Component Value Date    WBC 6.3 02/18/2025    HGB 9.7 (L) 02/18/2025    HCT 33.2 (L) 02/18/2025    MCV 86 02/18/2025    PLT 91 (L) 02/18/2025     Lab Results   Component Value Date    GLUCOSE 87 02/18/2025    CALCIUM 7.3 (L) 02/18/2025     (L) 02/18/2025    K 3.4 (L) 02/18/2025    CO2 31 02/18/2025    CL 91 (L) 02/18/2025    BUN 62 (H) 02/18/2025    CREATININE 2.26 (H) 02/18/2025   ESR: --  Lab Results   Component Value Date    SEDRATE 33 (H) 02/14/2025     Lab Results  "  Component Value Date    CRP 3.06 (H) 02/14/2025     Lab Results   Component Value Date    ALT 7 (L) 02/10/2025    AST 18 02/10/2025    ALKPHOS 84 02/10/2025    BILITOT 1.0 02/10/2025         DATA:   Diagnostic tests reviewed for today's visit:    Labs this admission reviewed  Imagings this admission reviewed  Cultures: Reviewed        Ewa Ribera MD.   Infectious Disease Attending            This note was partially created using voice recognition software and is inherently subject to errors including those of syntax and \"sound-alike\" substitutions which may escape proofreading. In such instances, original meaning may be extrapolated by contextual derivation       "

## 2025-02-19 LAB
ALBUMIN SERPL BCP-MCNC: 3 G/DL (ref 3.4–5)
ALBUMIN SERPL BCP-MCNC: 3 G/DL (ref 3.4–5)
ANION GAP SERPL CALC-SCNC: 11 MMOL/L (ref 10–20)
ANION GAP SERPL CALC-SCNC: 12 MMOL/L (ref 10–20)
BACTERIA BLD CULT: NORMAL
BACTERIA BLD CULT: NORMAL
BUN SERPL-MCNC: 58 MG/DL (ref 6–23)
BUN SERPL-MCNC: 59 MG/DL (ref 6–23)
CALCIUM SERPL-MCNC: 7.2 MG/DL (ref 8.6–10.3)
CALCIUM SERPL-MCNC: 7.2 MG/DL (ref 8.6–10.3)
CHLORIDE SERPL-SCNC: 91 MMOL/L (ref 98–107)
CHLORIDE SERPL-SCNC: 91 MMOL/L (ref 98–107)
CO2 SERPL-SCNC: 31 MMOL/L (ref 21–32)
CO2 SERPL-SCNC: 33 MMOL/L (ref 21–32)
CREAT SERPL-MCNC: 1.85 MG/DL (ref 0.5–1.3)
CREAT SERPL-MCNC: 2.07 MG/DL (ref 0.5–1.3)
EGFRCR SERPLBLD CKD-EPI 2021: 31 ML/MIN/1.73M*2
EGFRCR SERPLBLD CKD-EPI 2021: 35 ML/MIN/1.73M*2
ERYTHROCYTE [DISTWIDTH] IN BLOOD BY AUTOMATED COUNT: 14.9 % (ref 11.5–14.5)
GLUCOSE BLD MANUAL STRIP-MCNC: 118 MG/DL (ref 74–99)
GLUCOSE BLD MANUAL STRIP-MCNC: 173 MG/DL (ref 74–99)
GLUCOSE BLD MANUAL STRIP-MCNC: 175 MG/DL (ref 74–99)
GLUCOSE BLD MANUAL STRIP-MCNC: 84 MG/DL (ref 74–99)
GLUCOSE SERPL-MCNC: 157 MG/DL (ref 74–99)
GLUCOSE SERPL-MCNC: 94 MG/DL (ref 74–99)
HCT VFR BLD AUTO: 33.4 % (ref 41–52)
HGB BLD-MCNC: 10 G/DL (ref 13.5–17.5)
MAGNESIUM SERPL-MCNC: 1.72 MG/DL (ref 1.6–2.4)
MCH RBC QN AUTO: 25.7 PG (ref 26–34)
MCHC RBC AUTO-ENTMCNC: 29.9 G/DL (ref 32–36)
MCV RBC AUTO: 86 FL (ref 80–100)
NRBC BLD-RTO: 0 /100 WBCS (ref 0–0)
PHOSPHATE SERPL-MCNC: 2.9 MG/DL (ref 2.5–4.9)
PHOSPHATE SERPL-MCNC: 3.1 MG/DL (ref 2.5–4.9)
PLATELET # BLD AUTO: 97 X10*3/UL (ref 150–450)
POTASSIUM SERPL-SCNC: 3.6 MMOL/L (ref 3.5–5.3)
POTASSIUM SERPL-SCNC: 3.8 MMOL/L (ref 3.5–5.3)
RBC # BLD AUTO: 3.89 X10*6/UL (ref 4.5–5.9)
SODIUM SERPL-SCNC: 130 MMOL/L (ref 136–145)
SODIUM SERPL-SCNC: 131 MMOL/L (ref 136–145)
WBC # BLD AUTO: 7.8 X10*3/UL (ref 4.4–11.3)

## 2025-02-19 PROCEDURE — 2500000004 HC RX 250 GENERAL PHARMACY W/ HCPCS (ALT 636 FOR OP/ED)

## 2025-02-19 PROCEDURE — 1100000001 HC PRIVATE ROOM DAILY

## 2025-02-19 PROCEDURE — 2500000001 HC RX 250 WO HCPCS SELF ADMINISTERED DRUGS (ALT 637 FOR MEDICARE OP)

## 2025-02-19 PROCEDURE — 2500000002 HC RX 250 W HCPCS SELF ADMINISTERED DRUGS (ALT 637 FOR MEDICARE OP, ALT 636 FOR OP/ED): Performed by: STUDENT IN AN ORGANIZED HEALTH CARE EDUCATION/TRAINING PROGRAM

## 2025-02-19 PROCEDURE — 83735 ASSAY OF MAGNESIUM: CPT | Performed by: STUDENT IN AN ORGANIZED HEALTH CARE EDUCATION/TRAINING PROGRAM

## 2025-02-19 PROCEDURE — 2500000002 HC RX 250 W HCPCS SELF ADMINISTERED DRUGS (ALT 637 FOR MEDICARE OP, ALT 636 FOR OP/ED): Performed by: PSYCHIATRY & NEUROLOGY

## 2025-02-19 PROCEDURE — 2500000004 HC RX 250 GENERAL PHARMACY W/ HCPCS (ALT 636 FOR OP/ED): Performed by: INTERNAL MEDICINE

## 2025-02-19 PROCEDURE — 99222 1ST HOSP IP/OBS MODERATE 55: CPT | Performed by: PSYCHIATRY & NEUROLOGY

## 2025-02-19 PROCEDURE — 80069 RENAL FUNCTION PANEL: CPT | Performed by: STUDENT IN AN ORGANIZED HEALTH CARE EDUCATION/TRAINING PROGRAM

## 2025-02-19 PROCEDURE — 36415 COLL VENOUS BLD VENIPUNCTURE: CPT | Performed by: STUDENT IN AN ORGANIZED HEALTH CARE EDUCATION/TRAINING PROGRAM

## 2025-02-19 PROCEDURE — 99233 SBSQ HOSP IP/OBS HIGH 50: CPT

## 2025-02-19 PROCEDURE — 2500000002 HC RX 250 W HCPCS SELF ADMINISTERED DRUGS (ALT 637 FOR MEDICARE OP, ALT 636 FOR OP/ED)

## 2025-02-19 PROCEDURE — 82947 ASSAY GLUCOSE BLOOD QUANT: CPT

## 2025-02-19 PROCEDURE — 85027 COMPLETE CBC AUTOMATED: CPT | Performed by: STUDENT IN AN ORGANIZED HEALTH CARE EDUCATION/TRAINING PROGRAM

## 2025-02-19 PROCEDURE — 94640 AIRWAY INHALATION TREATMENT: CPT

## 2025-02-19 PROCEDURE — 2500000005 HC RX 250 GENERAL PHARMACY W/O HCPCS: Performed by: STUDENT IN AN ORGANIZED HEALTH CARE EDUCATION/TRAINING PROGRAM

## 2025-02-19 RX ORDER — RISPERIDONE 0.5 MG/1
0.25 TABLET ORAL NIGHTLY
Status: DISCONTINUED | OUTPATIENT
Start: 2025-02-19 | End: 2025-02-21 | Stop reason: HOSPADM

## 2025-02-19 RX ORDER — POTASSIUM CHLORIDE 1.5 G/1.58G
40 POWDER, FOR SOLUTION ORAL ONCE
Status: COMPLETED | OUTPATIENT
Start: 2025-02-19 | End: 2025-02-19

## 2025-02-19 RX ORDER — IPRATROPIUM BROMIDE AND ALBUTEROL SULFATE 2.5; .5 MG/3ML; MG/3ML
3 SOLUTION RESPIRATORY (INHALATION) 3 TIMES DAILY
Status: DISCONTINUED | OUTPATIENT
Start: 2025-02-19 | End: 2025-02-21 | Stop reason: HOSPADM

## 2025-02-19 RX ORDER — LANOLIN ALCOHOL/MO/W.PET/CERES
800 CREAM (GRAM) TOPICAL ONCE
Status: COMPLETED | OUTPATIENT
Start: 2025-02-19 | End: 2025-02-19

## 2025-02-19 RX ADMIN — SALINE NASAL SPRAY 1 SPRAY: 1.5 SOLUTION NASAL at 18:16

## 2025-02-19 RX ADMIN — Medication 800 MG: at 09:40

## 2025-02-19 RX ADMIN — APIXABAN 2.5 MG: 2.5 TABLET, FILM COATED ORAL at 09:24

## 2025-02-19 RX ADMIN — FINASTERIDE 5 MG: 5 TABLET, FILM COATED ORAL at 09:24

## 2025-02-19 RX ADMIN — DAPTOMYCIN 500 MG: 500 INJECTION, POWDER, LYOPHILIZED, FOR SOLUTION INTRAVENOUS at 13:19

## 2025-02-19 RX ADMIN — TAMSULOSIN HYDROCHLORIDE 0.4 MG: 0.4 CAPSULE ORAL at 09:24

## 2025-02-19 RX ADMIN — INSULIN LISPRO 2 UNITS: 100 INJECTION, SOLUTION INTRAVENOUS; SUBCUTANEOUS at 23:32

## 2025-02-19 RX ADMIN — PANTOPRAZOLE SODIUM 40 MG: 40 TABLET, DELAYED RELEASE ORAL at 23:31

## 2025-02-19 RX ADMIN — FLUTICASONE PROPIONATE 2 SPRAY: 50 SPRAY, METERED NASAL at 09:33

## 2025-02-19 RX ADMIN — POTASSIUM CHLORIDE 40 MEQ: 1.5 POWDER, FOR SOLUTION ORAL at 09:24

## 2025-02-19 RX ADMIN — Medication 4 L/MIN: at 13:31

## 2025-02-19 RX ADMIN — IPRATROPIUM BROMIDE AND ALBUTEROL SULFATE 3 ML: 2.5; .5 SOLUTION RESPIRATORY (INHALATION) at 01:00

## 2025-02-19 RX ADMIN — INSULIN GLARGINE 10 UNITS: 100 INJECTION, SOLUTION SUBCUTANEOUS at 23:32

## 2025-02-19 RX ADMIN — IPRATROPIUM BROMIDE AND ALBUTEROL SULFATE 3 ML: 2.5; .5 SOLUTION RESPIRATORY (INHALATION) at 21:00

## 2025-02-19 RX ADMIN — Medication 4 L/MIN: at 01:00

## 2025-02-19 RX ADMIN — IPRATROPIUM BROMIDE AND ALBUTEROL SULFATE 3 ML: 2.5; .5 SOLUTION RESPIRATORY (INHALATION) at 07:44

## 2025-02-19 RX ADMIN — INSULIN LISPRO 2 UNITS: 100 INJECTION, SOLUTION INTRAVENOUS; SUBCUTANEOUS at 09:24

## 2025-02-19 RX ADMIN — SALINE NASAL SPRAY 1 SPRAY: 1.5 SOLUTION NASAL at 09:33

## 2025-02-19 RX ADMIN — Medication 4 L/MIN: at 07:45

## 2025-02-19 RX ADMIN — APIXABAN 2.5 MG: 2.5 TABLET, FILM COATED ORAL at 23:32

## 2025-02-19 RX ADMIN — IPRATROPIUM BROMIDE AND ALBUTEROL SULFATE 3 ML: 2.5; .5 SOLUTION RESPIRATORY (INHALATION) at 13:30

## 2025-02-19 RX ADMIN — ACETAMINOPHEN 650 MG: 325 TABLET ORAL at 05:02

## 2025-02-19 RX ADMIN — RISPERIDONE 0.25 MG: 0.5 TABLET, FILM COATED ORAL at 23:31

## 2025-02-19 RX ADMIN — INSULIN LISPRO 2 UNITS: 100 INJECTION, SOLUTION INTRAVENOUS; SUBCUTANEOUS at 13:11

## 2025-02-19 RX ADMIN — SALINE NASAL SPRAY 1 SPRAY: 1.5 SOLUTION NASAL at 23:31

## 2025-02-19 RX ADMIN — ATORVASTATIN CALCIUM 40 MG: 40 TABLET, FILM COATED ORAL at 23:31

## 2025-02-19 ASSESSMENT — COGNITIVE AND FUNCTIONAL STATUS - GENERAL
MOVING FROM LYING ON BACK TO SITTING ON SIDE OF FLAT BED WITH BEDRAILS: A LITTLE
TURNING FROM BACK TO SIDE WHILE IN FLAT BAD: A LITTLE
HELP NEEDED FOR BATHING: A LITTLE
DRESSING REGULAR LOWER BODY CLOTHING: A LOT
WALKING IN HOSPITAL ROOM: A LOT
TOILETING: A LITTLE
DRESSING REGULAR UPPER BODY CLOTHING: A LITTLE
CLIMB 3 TO 5 STEPS WITH RAILING: TOTAL
STANDING UP FROM CHAIR USING ARMS: A LOT
MOBILITY SCORE: 13
PERSONAL GROOMING: A LITTLE
DAILY ACTIVITIY SCORE: 17
EATING MEALS: A LITTLE
MOVING TO AND FROM BED TO CHAIR: A LOT

## 2025-02-19 ASSESSMENT — PAIN - FUNCTIONAL ASSESSMENT
PAIN_FUNCTIONAL_ASSESSMENT: 0-10
PAIN_FUNCTIONAL_ASSESSMENT: 0-10

## 2025-02-19 ASSESSMENT — PAIN SCALES - GENERAL
PAINLEVEL_OUTOF10: 9
PAINLEVEL_OUTOF10: 0 - NO PAIN

## 2025-02-19 NOTE — CARE PLAN
The patient's goals for the shift include meet with psychiatrist     The clinical goals for the shift include pt will remain hemodynamically stable through the end of the shift

## 2025-02-19 NOTE — PROGRESS NOTES
INPATIENT PROGRESS NOTES    PATIENT NAME: Elgin Marin    MRN: [id number]  SERVICE DATE:  2/19/2025   SERVICE TIME:  11:54 AM    SIGNATURE: Ewa Ribera MD      ASSESSMENT :   -Left foot wound infection-->improving  -Wound culture--> MRSA and Corynebacterium striatum  -Left foot cellulitis  -Right toes dry gangrene  -MRSA carrier  -History of CAD, CHF, CKD, hypertension  -Allergic to sulfa with hives     PLAN:  -On daptomycin, CPK within normal limits  -Day #6 of antibiotics treatment for MRSA infection  -Closely monitor for antibiotics side effects including rash, Diarrhea/CDI, thrombocytopenia, ISIAH, etc.           SUBJECTIVE  Afebrile  No events overnight       OBJECTIVE  PHYSICAL EXAM:   Patient Vitals for the past 24 hrs:   BP Temp Temp src Pulse Resp SpO2   02/19/25 0921 -- -- -- 77 -- --   02/19/25 0745 -- -- -- -- -- 96 %   02/19/25 0712 92/55 36.7 °C (98.1 °F) Temporal (!) 129 25 (!) 87 %   02/19/25 0450 113/66 37.7 °C (99.9 °F) Temporal 91 (!) 28 97 %   02/19/25 0100 -- -- -- -- -- 98 %   02/18/25 2024 154/80 37.1 °C (98.8 °F) Temporal 94 (!) 30 100 %   02/18/25 1552 (!) 98/49 37 °C (98.6 °F) Temporal 88 18 99 %   02/18/25 1308 -- -- -- -- -- 98 %   02/18/25 1200 122/61 36 °C (96.8 °F) Temporal 92 18 100 %   Feet pics as below from yesterday                      Labs:  Lab Results   Component Value Date    WBC 7.8 02/19/2025    HGB 10.0 (L) 02/19/2025    HCT 33.4 (L) 02/19/2025    MCV 86 02/19/2025    PLT 97 (L) 02/19/2025     Lab Results   Component Value Date    GLUCOSE 94 02/19/2025    CALCIUM 7.2 (L) 02/19/2025     (L) 02/19/2025    K 3.6 02/19/2025    CO2 31 02/19/2025    CL 91 (L) 02/19/2025    BUN 58 (H) 02/19/2025    CREATININE 1.85 (H) 02/19/2025   ESR: --  Lab Results   Component Value Date    SEDRATE 33 (H) 02/14/2025     Lab Results   Component Value Date    CRP 3.06 (H) 02/14/2025     Lab Results   Component Value Date    ALT 7 (L) 02/10/2025    AST 18 02/10/2025    ALKPHOS 84  "02/10/2025    BILITOT 1.0 02/10/2025         DATA:   Diagnostic tests reviewed for today's visit:    Labs this admission reviewed  Imagings this admission reviewed  Cultures: Reviewed        Ewa Ribera MD.   Infectious Disease Attending            This note was partially created using voice recognition software and is inherently subject to errors including those of syntax and \"sound-alike\" substitutions which may escape proofreading. In such instances, original meaning may be extrapolated by contextual derivation       "

## 2025-02-19 NOTE — CONSULTS
Reason for consult:  Hallucination    History Of Present Illness  Elgin Marin is a 84 y.o. male presenting with AMS.  Pt is alert and oriented x 1 during the assessment  Pt able to tell me that he has been seeing things which are not there  for few days but unable to tell me the actual content of the VH  Appeared confused and anxious  Pt could not tell me the circumstance of admission  Pt was supposed to have Hospice meeting this morning  Pt denies feeling depressed    Review of Systems    Psychiatric ROS - Adult  Anxiety: General Anxiety Disorder (BALAJI)BALAJI Behaviors: difficult to control worry  Depression: negative  Delirium: acute inattention  Psychosis: negative  Jeanette: negative  Safety Issues: none  Psychiatric ROS Comment:        Past Medical History  He has a past medical history of Atypical chest pain (04/15/2023), Bilateral lower leg cellulitis (01/25/2024), Chest pain (03/18/2023), Chronic obstructive pulmonary disease, unspecified (11/13/2024), Fall (03/21/2023), Generalized abdominal pain (04/15/2023), Hand pain (02/11/2025), Heart failure with reduced ejection fraction (04/14/2023), History of cardiac catheterization (04/01/2022), History of diabetes mellitus (02/11/2025), Hyperlipidemia, unspecified (11/14/2022), Localized swelling, mass and lump, lower limb, bilateral (11/21/2024), Muscle weakness (09/07/2023), Myocardial disorder (Multi) (02/11/2025), Nausea and vomiting (02/11/2025), Open wounds involving multiple regions of lower extremity (04/15/2023), Other forms of dyspnea (08/09/2018), Other specified abnormal findings of blood chemistry (11/13/2024), Personal history of other diseases of the circulatory system (12/08/2014), Personal history of other endocrine, nutritional and metabolic disease, Personal history of other specified conditions, Presence of intraocular lens (07/20/2019), Presence of intraocular lens (07/20/2019), Repeated falls (11/21/2024), Tachycardia (04/04/2022),  "Unspecified atrial fibrillation (Multi) (11/14/2022), and Weakness (02/11/2025).    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     Social History  He reports that he has never smoked. He has never used smokeless tobacco. He reports that he does not drink alcohol and does not use drugs.     Allergies  Metoprolol, Oxycodone-aspirin, Sulfa (sulfonamide antibiotics), Lisinopril, Sulfur, Tramadol, and Warfarin    Physical Exam      Mental Status Exam  General: alert and oriented x 1  Appearance: stated age  Attitude: normal  Motor Activity: decreased PMA  Speech: low in volume  Mood: anxious  Affect: blunted  Thought Process: concrete  Thought Content: denies SI or HI, not paranoid  Thought Perception:   Cognition:   Insight: poor  Judgement: poor      Last Recorded Vitals  Blood pressure 111/57, pulse 86, temperature 36.7 °C (98.1 °F), temperature source Temporal, resp. rate 19, height 1.778 m (5' 10\"), weight 89.7 kg (197 lb 12 oz), SpO2 96%.    Relevant Results  Results for orders placed or performed during the hospital encounter of 02/10/25 (from the past 96 hours)   POCT GLUCOSE   Result Value Ref Range    POCT Glucose 366 (H) 74 - 99 mg/dL   POCT GLUCOSE   Result Value Ref Range    POCT Glucose 314 (H) 74 - 99 mg/dL   Renal function panel   Result Value Ref Range    Glucose 262 (H) 74 - 99 mg/dL    Sodium 126 (L) 136 - 145 mmol/L    Potassium 5.5 (H) 3.5 - 5.3 mmol/L    Chloride 94 (L) 98 - 107 mmol/L    Bicarbonate 25 21 - 32 mmol/L    Anion Gap 13 10 - 20 mmol/L    Urea Nitrogen 64 (H) 6 - 23 mg/dL    Creatinine 2.70 (H) 0.50 - 1.30 mg/dL    eGFR 22 (L) >60 mL/min/1.73m*2    Calcium 7.5 (L) 8.6 - 10.3 mg/dL    Phosphorus 5.0 (H) 2.5 - 4.9 mg/dL    Albumin 2.7 (L) " 3.4 - 5.0 g/dL   POCT GLUCOSE   Result Value Ref Range    POCT Glucose 227 (H) 74 - 99 mg/dL   POCT GLUCOSE   Result Value Ref Range    POCT Glucose 224 (H) 74 - 99 mg/dL   CBC   Result Value Ref Range    WBC 4.2 (L) 4.4 - 11.3 x10*3/uL    nRBC 0.0 0.0 - 0.0 /100 WBCs    RBC 3.99 (L) 4.50 - 5.90 x10*6/uL    Hemoglobin 10.3 (L) 13.5 - 17.5 g/dL    Hematocrit 35.3 (L) 41.0 - 52.0 %    MCV 89 80 - 100 fL    MCH 25.8 (L) 26.0 - 34.0 pg    MCHC 29.2 (L) 32.0 - 36.0 g/dL    RDW 14.7 (H) 11.5 - 14.5 %    Platelets 84 (L) 150 - 450 x10*3/uL   Magnesium   Result Value Ref Range    Magnesium 2.16 1.60 - 2.40 mg/dL   Renal function panel   Result Value Ref Range    Glucose 218 (H) 74 - 99 mg/dL    Sodium 128 (L) 136 - 145 mmol/L    Potassium 4.9 3.5 - 5.3 mmol/L    Chloride 95 (L) 98 - 107 mmol/L    Bicarbonate 26 21 - 32 mmol/L    Anion Gap 12 10 - 20 mmol/L    Urea Nitrogen 64 (H) 6 - 23 mg/dL    Creatinine 2.50 (H) 0.50 - 1.30 mg/dL    eGFR 24 (L) >60 mL/min/1.73m*2    Calcium 7.6 (L) 8.6 - 10.3 mg/dL    Phosphorus 4.9 2.5 - 4.9 mg/dL    Albumin 2.9 (L) 3.4 - 5.0 g/dL   Creatine Kinase   Result Value Ref Range    Creatine Kinase 68 0 - 325 U/L   POCT GLUCOSE   Result Value Ref Range    POCT Glucose 193 (H) 74 - 99 mg/dL   POCT GLUCOSE   Result Value Ref Range    POCT Glucose 236 (H) 74 - 99 mg/dL   POCT GLUCOSE   Result Value Ref Range    POCT Glucose 198 (H) 74 - 99 mg/dL   Renal function panel   Result Value Ref Range    Glucose 240 (H) 74 - 99 mg/dL    Sodium 127 (L) 136 - 145 mmol/L    Potassium 4.6 3.5 - 5.3 mmol/L    Chloride 92 (L) 98 - 107 mmol/L    Bicarbonate 28 21 - 32 mmol/L    Anion Gap 12 10 - 20 mmol/L    Urea Nitrogen 68 (H) 6 - 23 mg/dL    Creatinine 2.66 (H) 0.50 - 1.30 mg/dL    eGFR 23 (L) >60 mL/min/1.73m*2    Calcium 7.4 (L) 8.6 - 10.3 mg/dL    Phosphorus 4.7 2.5 - 4.9 mg/dL    Albumin 2.9 (L) 3.4 - 5.0 g/dL   POCT GLUCOSE   Result Value Ref Range    POCT Glucose 216 (H) 74 - 99 mg/dL   Magnesium    Result Value Ref Range    Magnesium 1.99 1.60 - 2.40 mg/dL   CBC   Result Value Ref Range    WBC 7.5 4.4 - 11.3 x10*3/uL    nRBC 0.0 0.0 - 0.0 /100 WBCs    RBC 4.18 (L) 4.50 - 5.90 x10*6/uL    Hemoglobin 10.7 (L) 13.5 - 17.5 g/dL    Hematocrit 36.7 (L) 41.0 - 52.0 %    MCV 88 80 - 100 fL    MCH 25.6 (L) 26.0 - 34.0 pg    MCHC 29.2 (L) 32.0 - 36.0 g/dL    RDW 14.7 (H) 11.5 - 14.5 %    Platelets 93 (L) 150 - 450 x10*3/uL   Renal function panel   Result Value Ref Range    Glucose 201 (H) 74 - 99 mg/dL    Sodium 130 (L) 136 - 145 mmol/L    Potassium 4.2 3.5 - 5.3 mmol/L    Chloride 92 (L) 98 - 107 mmol/L    Bicarbonate 30 21 - 32 mmol/L    Anion Gap 12 10 - 20 mmol/L    Urea Nitrogen 68 (H) 6 - 23 mg/dL    Creatinine 2.59 (H) 0.50 - 1.30 mg/dL    eGFR 23 (L) >60 mL/min/1.73m*2    Calcium 7.5 (L) 8.6 - 10.3 mg/dL    Phosphorus 4.2 2.5 - 4.9 mg/dL    Albumin 3.2 (L) 3.4 - 5.0 g/dL   Transthoracic Echo (TTE) Limited   Result Value Ref Range    LV Biplane EF 44 %    MV E/A ratio 4.12     Tricuspid annular plane systolic excursion 1.2 cm    LA vol index A/L 35.0 ml/m2    LV EF 45 %    RV free wall pk S' 5.22 cm/s    RVSP 80.0 mmHg    LVIDd 4.30 cm    LV A4C EF 49.5    POCT GLUCOSE   Result Value Ref Range    POCT Glucose 160 (H) 74 - 99 mg/dL   Renal function panel   Result Value Ref Range    Glucose 178 (H) 74 - 99 mg/dL    Sodium 129 (L) 136 - 145 mmol/L    Potassium 3.8 3.5 - 5.3 mmol/L    Chloride 90 (L) 98 - 107 mmol/L    Bicarbonate 29 21 - 32 mmol/L    Anion Gap 14 10 - 20 mmol/L    Urea Nitrogen 66 (H) 6 - 23 mg/dL    Creatinine 2.52 (H) 0.50 - 1.30 mg/dL    eGFR 24 (L) >60 mL/min/1.73m*2    Calcium 7.2 (L) 8.6 - 10.3 mg/dL    Phosphorus 3.1 2.5 - 4.9 mg/dL    Albumin 2.8 (L) 3.4 - 5.0 g/dL   POCT GLUCOSE   Result Value Ref Range    POCT Glucose 200 (H) 74 - 99 mg/dL   CBC   Result Value Ref Range    WBC 6.3 4.4 - 11.3 x10*3/uL    nRBC 0.0 0.0 - 0.0 /100 WBCs    RBC 3.86 (L) 4.50 - 5.90 x10*6/uL    Hemoglobin 9.7  (L) 13.5 - 17.5 g/dL    Hematocrit 33.2 (L) 41.0 - 52.0 %    MCV 86 80 - 100 fL    MCH 25.1 (L) 26.0 - 34.0 pg    MCHC 29.2 (L) 32.0 - 36.0 g/dL    RDW 15.0 (H) 11.5 - 14.5 %    Platelets 91 (L) 150 - 450 x10*3/uL   Magnesium   Result Value Ref Range    Magnesium 1.86 1.60 - 2.40 mg/dL   Renal function panel   Result Value Ref Range    Glucose 87 74 - 99 mg/dL    Sodium 131 (L) 136 - 145 mmol/L    Potassium 3.4 (L) 3.5 - 5.3 mmol/L    Chloride 91 (L) 98 - 107 mmol/L    Bicarbonate 31 21 - 32 mmol/L    Anion Gap 12 10 - 20 mmol/L    Urea Nitrogen 62 (H) 6 - 23 mg/dL    Creatinine 2.26 (H) 0.50 - 1.30 mg/dL    eGFR 28 (L) >60 mL/min/1.73m*2    Calcium 7.3 (L) 8.6 - 10.3 mg/dL    Phosphorus 3.6 2.5 - 4.9 mg/dL    Albumin 3.0 (L) 3.4 - 5.0 g/dL   POCT GLUCOSE   Result Value Ref Range    POCT Glucose 89 74 - 99 mg/dL   POCT GLUCOSE   Result Value Ref Range    POCT Glucose 128 (H) 74 - 99 mg/dL   POCT GLUCOSE   Result Value Ref Range    POCT Glucose 140 (H) 74 - 99 mg/dL   Renal function panel   Result Value Ref Range    Glucose 129 (H) 74 - 99 mg/dL    Sodium 130 (L) 136 - 145 mmol/L    Potassium 3.4 (L) 3.5 - 5.3 mmol/L    Chloride 89 (L) 98 - 107 mmol/L    Bicarbonate 32 21 - 32 mmol/L    Anion Gap 12 10 - 20 mmol/L    Urea Nitrogen 60 (H) 6 - 23 mg/dL    Creatinine 2.17 (H) 0.50 - 1.30 mg/dL    eGFR 29 (L) >60 mL/min/1.73m*2    Calcium 7.2 (L) 8.6 - 10.3 mg/dL    Phosphorus 3.5 2.5 - 4.9 mg/dL    Albumin 3.1 (L) 3.4 - 5.0 g/dL   POCT GLUCOSE   Result Value Ref Range    POCT Glucose 168 (H) 74 - 99 mg/dL   CBC   Result Value Ref Range    WBC 7.8 4.4 - 11.3 x10*3/uL    nRBC 0.0 0.0 - 0.0 /100 WBCs    RBC 3.89 (L) 4.50 - 5.90 x10*6/uL    Hemoglobin 10.0 (L) 13.5 - 17.5 g/dL    Hematocrit 33.4 (L) 41.0 - 52.0 %    MCV 86 80 - 100 fL    MCH 25.7 (L) 26.0 - 34.0 pg    MCHC 29.9 (L) 32.0 - 36.0 g/dL    RDW 14.9 (H) 11.5 - 14.5 %    Platelets 97 (L) 150 - 450 x10*3/uL   Magnesium   Result Value Ref Range    Magnesium 1.72  1.60 - 2.40 mg/dL   Renal function panel   Result Value Ref Range    Glucose 94 74 - 99 mg/dL    Sodium 130 (L) 136 - 145 mmol/L    Potassium 3.6 3.5 - 5.3 mmol/L    Chloride 91 (L) 98 - 107 mmol/L    Bicarbonate 31 21 - 32 mmol/L    Anion Gap 12 10 - 20 mmol/L    Urea Nitrogen 58 (H) 6 - 23 mg/dL    Creatinine 1.85 (H) 0.50 - 1.30 mg/dL    eGFR 35 (L) >60 mL/min/1.73m*2    Calcium 7.2 (L) 8.6 - 10.3 mg/dL    Phosphorus 2.9 2.5 - 4.9 mg/dL    Albumin 3.0 (L) 3.4 - 5.0 g/dL   POCT GLUCOSE   Result Value Ref Range    POCT Glucose 84 74 - 99 mg/dL   POCT GLUCOSE   Result Value Ref Range    POCT Glucose 118 (H) 74 - 99 mg/dL     Transthoracic Echo (TTE) Limited    Result Date: 2/17/2025            [facility] [place] [address] [facility] [address]    Tel [phone] Fax [phone] TRANSTHORACIC ECHOCARDIOGRAM REPORT Patient Name:       NORRIS Stringer Physician:    [id number] Shubham Booker MD Study Date:         2/17/2025            Ordering Provider:    [id number] AMNA WARREN MRN/PID:            [id number]             Fellow: Accession#:         [id number]         Nurse: Date of Birth/Age:  1940 / 84 years Sonographer:          Florence Hernandez Gender Assigned at                      Additional Staff: Birth: Height:             177.80 cm            Admit Date: Weight:             89.36 kg             Admission Status:     Inpatient -                                                                Priority                                                                discharge BSA / BMI:          2.07 m2 / 28.27      Department Location:  [facility]                     kg/m2 Blood Pressure: 101 /59 mmHg Study Type:    TRANSTHORACIC ECHO (TTE) LIMITED Diagnosis/ICD: Acute on chronic combined systolic (congestive) and diastolic                (congestive) heart failure  (CHF)-I50.43 Indication:    Congestive Heart Failure CPT Codes:     Echo Limited-68071; Doppler Limited-33007; Color Doppler-51467 Patient History: Diabetes:          Yes Pertinent History: A-Fib, CAD, HTN, Hyperlipidemia and CHF. Study Detail: The following Echo studies were performed: 2D, M-Mode, Doppler and               color flow.  PHYSICIAN INTERPRETATION: Left Ventricle: Left ventricular ejection fraction is mildly decreased, by visual estimate at 45%. There is moderate to severe concentric left ventricular hypertrophy. There is global hypokinesis of the left ventricle with minor regional variations. The left ventricular cavity size was not assessed. There is moderately increased septal and moderately increased posterior left ventricular wall thickness. There is left ventricular concentric remodeling. The interventricular septum is flattened in systole and diastole, consistent with right ventricular pressure and volume overload. Spectral Doppler shows a Grade II (pseudonormal pattern) of left ventricular diastolic filling with an elevated left atrial pressure. Left Atrium: The left atrial size is severely dilated. Right Ventricle: The right ventricle is moderately enlarged. There is mildly reduced right ventricular systolic function. Right Atrium: The right atrial size was not assessed. Aortic Valve: The aortic valve was not assessed. Aortic valve regurgitation was not assessed. Mitral Valve: The mitral valve is mildly thickened. There is mild mitral annular calcification. There is trace mitral valve regurgitation. Tricuspid Valve: The tricuspid valve was not assessed. There is moderate tricuspid regurgitation. The Doppler estimated RVSP is severely elevated at 80.0 mmHg. Pulmonic Valve: The pulmonic valve was not assessed. The pulmonic valve regurgitation was not assessed. Pericardium: Pericardial effusion was not assessed. Aorta: The aortic root was not assessed. Systemic Veins: The inferior vena cava  appears moderately dilated.  CONCLUSIONS:  1. Left ventricular ejection fraction is mildly decreased, by visual estimate at 45%.  2. There is global hypokinesis of the left ventricle with minor regional variations.  3. Spectral Doppler shows a Grade II (pseudonormal pattern) of left ventricular diastolic filling with an elevated left atrial pressure.  4. Right ventricular volume and pressure overload.  5. There is mildly reduced right ventricular systolic function.  6. Moderately enlarged right ventricle.  7. The left atrial size is severely dilated.  8. Moderate tricuspid regurgitation.  9. Severely elevated right ventricular systolic pressure. 10. The inferior vena cava appears moderately dilated. 11. There is moderately increased septal and moderately increased posterior left ventricular wall thickness. QUANTITATIVE DATA SUMMARY:  2D MEASUREMENTS:             Normal Ranges: LAs:             5.20 cm     (2.7-4.0cm) IVSd:            1.60 cm     (0.6-1.1cm) LVPWd:           1.60 cm     (0.6-1.1cm) LVIDd:           4.30 cm     (3.9-5.9cm) LVIDs:           3.10 cm LV Mass Index:   137.6 g/m2 LVEDV Index:     21.85 ml/m2 LV % FS          27.9 %  LEFT ATRIUM:                  Normal Ranges: LA Vol A4C:        62.9 ml    (22+/-6mL/m2) LA Vol A2C:        81.1 ml LA Vol BP:         72.5 ml LA Vol Index A4C:  30.3ml/m2 LA Vol Index A2C:  39.1 ml/m2 LA Vol Index BP:   35.0 ml/m2 LA Area A4C:       20.3 cm2 LA Area A2C:       23.4 cm2 LA Major Axis A4C: 5.6 cm LA Major Axis A2C: 5.7 cm LA Volume Index:   33.0 ml/m2 LA Vol A4C:        56.9 ml LA Vol A2C:        80.3 ml LA Vol Index BSA:  33.1 ml/m2  RIGHT ATRIUM:                 Normal Ranges: RA Vol A4C:        103.7 ml   (8.3-19.5ml) RA Vol Index A4C:  50.0 ml/m2 RA Area A4C:       28.2 cm2 RA Major Axis A4C: 6.5 cm  LV SYSTOLIC FUNCTION:                      Normal Ranges: EF-A4C View:    49 % (>=55%) EF-A2C View:    38 % EF-Biplane:     44 % EF-Visual:      45 % LV EF  Reported: 45 %  LV DIASTOLIC FUNCTION:           Normal Ranges: MV Peak E:             1.14 m/s  (0.7-1.2 m/s) MV Peak A:             0.28 m/s  (0.42-0.7 m/s) E/A Ratio:             4.12      (1.0-2.2) MV e'                  0.039 m/s (>8.0) MV lateral e'          0.04 m/s MV medial e'           0.03 m/s E/e' Ratio:            29.53     (<8.0)  MITRAL VALVE:          Normal Ranges: MV DT:        154 msec (150-240msec)  RIGHT VENTRICLE: RV Basal 4.80 cm RV Mid   4.30 cm RV Major 8.3 cm TAPSE:   12.1 mm RV s'    0.05 m/s  TRICUSPID VALVE/RVSP:          Normal Ranges: Peak TR Velocity:     4.03 m/s Est. RA Pressure:     15 mmHg RV Syst Pressure:     80 mmHg  (< 30mmHg) IVC Diam:             2.90 cm  PULMONIC VALVE:          Normal Ranges: PV Accel Time:  77 msec  (>120ms) PV Max Kalyan:     0.9 m/s  (0.6-0.9m/s) PV Max PG:      3.1 mmHg  [id number] Shubham Booker MD Electronically signed on 2/17/2025 at 2:46:08 PM  ** Final **     ECG 12 lead    Result Date: 2/16/2025  Atrial fibrillation Right axis deviation ST & T wave abnormality, consider inferolateral ischemia Abnormal ECG When compared with ECG of 10-FEB-2025 21:45, Current undetermined rhythm precludes rhythm comparison, needs review Criteria for Septal infarct are no longer Present Baseline wander Confirmed by Pierre Veras ([id number]) on 2/16/2025 3:22:29 PM    Vascular US Ankle Brachial Index (TANISHA) Without Exercise    Result Date: 2/15/2025            [facility] [place] [address] [place] [address]     Tel [phone] Fax [phone]  Vascular Lab Report  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  **CRITICAL RESULT** Critical Result: Mariluz Doyle DO was notified of the test results on 2/15/2025 at 2:15PM by Fang Owens RVT.  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 Right pressures of >220 mmHg suggest no compressibility of vessels and may make absolute Segmental Limb Pressures (SLP) unreliable. Decreased digital perfusion noted. Monophasic flow is noted in the right posterior tibial artery and right dorsalis pedis artery. Multiphasic flow is noted in the right common femoral artery. Dampened PPG tracing of the great toe with abnormal TBI. Level of disease based on waveforms and tracings due to non-compressible vessels. Left Lower PVR: Evidence of moderate arterial occlusive disease in the left lower extremity at rest. Left pressures of >220 mmHg suggest no compressibility of vessels and may make absolute Segmental Limb Pressures (SLP) unreliable. Decreased digital perfusion noted. Monophasic flow is noted in the left dorsalis pedis artery and left posterior tibial artery. Multiphasic flow is noted in the left common femoral artery. Dampened PPG tracing of the great toe with abnormal TBI. Level of disease based on waveforms and tracings due to non-compressible vessels. Left brachial pressure not obtained due to multiple IV lines.  Imaging & Doppler Findings:  RIGHT Lower  PVR                Pressures Ratios Right Posterior Tibial (Ankle) 234 mmHg  2.02 Right Dorsalis Pedis (Ankle)   254 mmHg  2.19 Right Digit (Great Toe)        39 mmHg   0.34   LEFT Lower PVR                Pressures Ratios Left Posterior Tibial (Ankle) 254 mmHg  2.19 Left Dorsalis Pedis (Ankle)   254 mmHg  2.19 Left Digit (Great Toe)        60 mmHg   0.52                      Right Brachial Pressure 116 mmHg   [id number] Olinda Rudolph MD, RPVI Electronically signed by [id number] Olinda Rudolph MD, REBEKAH on 2/15/2025 at 3:28:07 PM  ** Final **     XR chest 1 view    Result Date: 2/15/2025  Interpreted By:  Meaghan Ba, STUDY: XR CHEST 1 VIEW;  2/14/2025 11:59 pm   INDICATION: Signs/Symptoms:Hypoxia     COMPARISON: Chest x-ray 02/10/2025   ACCESSION NUMBER(S): [id number]   ORDERING CLINICIAN: AMNA WARREN   TECHNIQUE: Portable upright frontal view of the chest was obtained .   FINDINGS: The heart is enlarged. The patient is status post open heart surgery. There is interval increase in the vascular congestion.   There are small bilateral pleural effusions. There are bibasilar airspace opacities. No pneumothorax.   Suture anchors are noted at the right humeral head.       1.  Cardiomegaly. Interval increase in the vascular congestion. Small bilateral pleural effusions. Bibasilar airspace opacities, may be secondary to atelectasis or pneumonia.       MACRO: None.   Signed by: Meaghan Ba 2/15/2025 1:20 AM Dictation workstation:   [id number]    XR foot right 3+ views    Result Date: 2/14/2025  These images are not reportable by radiology and will not be interpreted by  Radiologists.    ECG 12 lead    Result Date: 2/12/2025  Atrial fibrillation Rightward axis Low voltage QRS Septal infarct (cited on or before 10-FEB-2025) T wave abnormality, consider inferolateral ischemia Abnormal ECG When compared with ECG of 10-FEB-2025 21:36, Change in R wave progression Confirmed by Twan Solomon ([id number]) on 2/12/2025 2:13:58  PM    XR chest 1 view    Result Date: 2/10/2025  Interpreted By:  Duane Clark, STUDY: XR CHEST 1 VIEW;  2/10/2025 10:12 pm   INDICATION: Signs/Symptoms:Chest Pain.   COMPARISON: 01/20/2025   ACCESSION NUMBER(S): [id number]   ORDERING CLINICIAN: GAUTAM KOCH   FINDINGS: Mild cardiomegaly and vascular congestion. Probable small effusions. No pneumothorax.       CHF.   MACRO: None   Signed by: Duane Clark 2/10/2025 11:52 PM Dictation workstation:   [id number]       Assessment/Plan   Assessment & Plan  Acute on chronic systolic heart failure    CAP (community acquired pneumonia) due to Chlamydia species    Delirium- multifactorial etiology  Neurocognitive disorder - to be r/o    Pt is not able to make decision reg his treatment or discharge plan which might or might not get better with the treatment of underlying medical condition contributing to AMS  Reg hallucination- which is mostl likely related to delirium, pt will benefit from short term treatment with low dose of risperdal 0.25 mg po at bedtime for 7 days. EKG reviewed. Monitor EKG  Will follow           Ernesto Katz MD

## 2025-02-19 NOTE — PROGRESS NOTES
Patient/family meeting with GAUDENCIOR at 930/10. Will follow for outcome of meeting.     1110  Hospice attempted to meet with patient but patient advised he is upset and confused. He requested to speak with psych prior to hospice discussion and will not make any decisions until he speaks with them. His daughter, Maegan, was on phone during conversation.   Psych has been consulted.    26-May-2023 05:38

## 2025-02-19 NOTE — PROGRESS NOTES
INPATIENT NEPHROLOGY CONSULT PROGRESS NOTE      Patient Name: Elgin Marin MRN: [id number]  DATE of SERVICE: February 19, 2025  TIME of SERVICE: 12:13 PM  CONSULTING SERVICE: Nephrology    REASON for CONSULT: ISIAH, hypervolemia, hyperkalemia     Covering for Dr. Stauffer    SUBJECTIVE:  Seen and evaluated at bedside.  Sitting up in the chair.  Patient reports fullness/tinnitus in right ear however he states he is able to hear fine.  Patient appears to be irritable today.  Voiced anxiety toward hospice meeting.  Renal parameters are improving serum creatinine 1.8 mg deciliter from 2.2 EGFR 35 from 29.  Hyponatremia gradually improving sodium level 130.  Hemoglobin 10 platelets 97 plateauing, remained stable.  Urine output about 4 L for the past 24 hours.  2 L so far for today    SUMMARY OF STAY:  Mr. Marin is a 84 y.o. male who presented to [facility] February 11, 2025 for evaluation of worsening shortness of breath, worsening bilateral lower extremity swelling, dysuria.  Diagnosed with bilateral lower extremity cellulitis, acute systolic heart failure exacerbation, acute kidney injury with electrolyte imbalance.      Patient with past medical history significant for chronic kidney disease stage IIIb with baseline serum creatinine of 2.1 mg deciliter, EGFR 30 mL/min follows with Dr. Stauffer, history of coronary artery disease status post CABG April 2022, atrial fibrillation on Eliquis, chronic combined systolic and diastolic heart failure, hypertension, hyperlipidemia.     ASSESSMENT:  Acute kidney injury superimposed on chronic kidney disease stage IIIb:   -- Multifactorial likely component of cardiorenal syndrome, acute decompensated systolic heart failure exacerbation with decreased effective circulatory volume, infectious component due to bilateral lower extremity cellulitis, third spacing in the setting of profound hypoalbuminemia in addition  to component of hemodynamic mediated injury.  Acute urinary retention requiring Graham catheter  -- Baseline serum creatinine around 2 mg deciliter with a GFR of 30 mL/min per 1.73 M2, follows with primary nephrologist Dr. Stauffer.  -- ISIAH improving serum creatinine down to 2.2 mg sitter BUN of 62 and EGFR 28.  Diuresis with bumetanide infusion.    Acute on chronic systolic heart failure exacerbation:  -- Last EF 42%  --Repeat echocardiogram EF 45%, global hypokinesia of left ventricle.  Moderately enlarged right ventricle    Hyperkalemia: Likely secondary to ISIAH, venous congestion  -Spironolactone, Jardiance on hold      Hyponatremia likely hypervolemic, diuretics related     Bilateral lower extremity cellulitis left worse than right wound culture positive for gram-positive cocci     Relative hypotension:  -- Spironolactone, Jardiance on hold     Acute urinary retention managed with Graham catheter placement.      Kidney cyst 1.1 cm left kidney, benign, to continue active surveillance as an outpatient.    PLAN:  Lasix drip switched to bumetanide infusion, diuretic responsive, urine output about 4 L for the past 24 hours. Currently bumetanide drip on hold due to hypotension.  Tentative plan to resume bumetanide once blood pressure more than 100 systolic  after 1 to 2 hours of IV albumin infusion.  Mild tinnitus with Lasix drip, patient with sulfa allergy.  If symptoms persist, an alternative will be to switch to ethacrynic acid.  On the other hand, patient was tolerating oral torsemide as an outpatient without symptoms.  So hopefully side effects manifests only with higher dose of diuretics.  To combine IV bumetanide with IV albumin infusion to minimize third spacing and augment diuresis.  Anemia, thrombocytopenia stable platelet count around 91, hemoglobin stable at 9.7  Relative hypotension.  Holding parameters added to Imdur.  Spironolactone and Jardiance remains on hold.  Hypokalemia, diuretics related, repleted.    Hyponatremia improving sodium level of 130 from 124  Metabolic alkalosis likely contraction related bicarb up to 30.  Strict input and output to guide diuresis.  Graham catheter in place  To continue Ace wrap.  Podiatry note reviewed and appreciated patient with poor circulation.  11.  Psych team consulted for further evaluation.     Echocardiogram demonstrated findings related to right heart failure and hence most likely his lower extremity edema will be chronic.  Current serum creatinine better than his baseline.  Tentative plan to transition from IV loop diuretics to oral in 1 to 2 days based on primary care team recommendations.    Will follow, thank you!    Medications:    Current Facility-Administered Medications:     acetaminophen (Tylenol) tablet 650 mg, 650 mg, oral, q6h PRN, Aman M Cook, DO, 650 mg at 02/19/25 0502    apixaban (Eliquis) tablet 2.5 mg, 2.5 mg, oral, BID, Aman M Cook, DO, 2.5 mg at 02/19/25 0924    atorvastatin (Lipitor) tablet 40 mg, 40 mg, oral, Nightly, Aman M Cook, DO, 40 mg at 02/18/25 2042    bisoprolol (Zebeta) tablet 5 mg, 5 mg, oral, Daily, Aman M Cook, DO, 5 mg at 02/18/25 0831    [Held by provider] bumetanide (Bumex) 25 mg in 100 mL (0.25 mg/mL) infusion, 0.25 mg/hr, intravenous, Continuous, Jerry Farr MD, Stopped at 02/19/25 0931    DAPTOmycin (Cubicin) 500 mg in sodium chloride 0.9% 50 mL IV, 6 mg/kg, intravenous, q48h, Eaw Ribera MD, Stopped at 02/18/25 0930    dextrose 50 % injection 12.5 g, 12.5 g, intravenous, q15 min PRN, Artemio Rivas MD    dextrose 50 % injection 25 g, 25 g, intravenous, q15 min PRN, Artemio Rivas MD    [Held by provider] empagliflozin (Jardiance) tablet 25 mg, 25 mg, oral, Daily, Aman M Cook, DO    finasteride (Proscar) tablet 5 mg, 5 mg, oral, Daily, Aman M Cook, DO, 5 mg at 02/19/25 0924    fluticasone (Flonase) nasal spray 2 spray, 2 spray, Each Nostril, Daily, Jerry Farr MD, 2 spray at 02/19/25 0933    glucagon  (Glucagen) injection 1 mg, 1 mg, intramuscular, q15 min PRN, Artemio Rivas MD    glucagon (Glucagen) injection 1 mg, 1 mg, intramuscular, q15 min PRN, Artemio Rivas MD    guaiFENesin (Mucinex) 12 hr tablet 600 mg, 600 mg, oral, BID PRN, Aman Hurtado DO, 600 mg at 02/18/25 0831    [Held by provider] insulin glargine (Lantus) injection 10 Units, 10 Units, subcutaneous, BID, Aman Hurtado DO, 10 Units at 02/11/25 1340    insulin glargine (Lantus) injection 10 Units, 10 Units, subcutaneous, q24h, Margi Pérez MD, 10 Units at 02/18/25 2043    insulin lispro injection 0-10 Units, 0-10 Units, subcutaneous, TID AC, Aman Hurtado DO, 2 Units at 02/17/25 1737    insulin lispro injection 0-5 Units, 0-5 Units, subcutaneous, Nightly, Aman Hurtado DO, 2 Units at 02/18/25 2043    insulin lispro injection 2 Units, 2 Units, subcutaneous, TID AC, Aman Hurtado DO, 2 Units at 02/19/25 0924    ipratropium-albuteroL (Duo-Neb) 0.5-2.5 mg/3 mL nebulizer solution 3 mL, 3 mL, nebulization, q6h, Aman Hurtado DO, 3 mL at 02/19/25 0744    ipratropium-albuteroL (Duo-Neb) 0.5-2.5 mg/3 mL nebulizer solution 3 mL, 3 mL, nebulization, q2h PRN, Aman Hurtado DO, 3 mL at 02/18/25 0438    isosorbide dinitrate (Isordil) tablet 10 mg, 10 mg, oral, BID, Aman Hurtado DO, 10 mg at 02/18/25 1344    latanoprost (Xalatan) 0.005 % ophthalmic solution 1 drop, 1 drop, Both Eyes, Nightly, Aman Hurtado DO, 1 drop at 02/17/25 2043    oxygen (O2) therapy, , inhalation, Continuous PRN - O2/gases, Ann Pinto MD, Last Rate: 240,000 mL/hr at 02/19/25 0745, 4 L/min at 02/19/25 0745    pantoprazole (ProtoNix) EC tablet 40 mg, 40 mg, oral, Nightly, Aman Hurtado DO, 40 mg at 02/18/25 2042    perflutren lipid microspheres (Definity) injection 0.5-10 mL of dilution, 0.5-10 mL of dilution, intravenous, Once in imaging, Aman Hurtado DO    perflutren protein A microsphere (Optison) injection 0.5 mL, 0.5 mL, intravenous, Once in imaging,  Aman Hurtado DO    sodium chloride (Ocean) 0.65 % nasal spray 1 spray, 1 spray, Each Nostril, TID, Jerry Farr MD, 1 spray at 02/19/25 0933    [Held by provider] spironolactone (Aldactone) tablet 12.5 mg, 12.5 mg, oral, q24h BRE, Aman Hurtado DO, 12.5 mg at 02/12/25 0917    sulfur hexafluoride microsphr (Lumason) injection 24.28 mg, 2 mL, intravenous, Once in imaging, Aman Hurtado DO    tamsulosin (Flomax) 24 hr capsule 0.4 mg, 0.4 mg, oral, Daily, Aman Hurtado DO, 0.4 mg at 02/19/25 0924    [Held by provider] torsemide (Demadex) tablet 80 mg, 80 mg, oral, Daily, Aman Hurtado DO    PERTINENT ROS:  GENERAL:  positive for fatigue, poor appetite.  No fever/chills  RESPIRATORY:  positive for shortness of breath.  Negative for cough, wheezing.  CARDIOVASCULAR:   Negative for chest pain or palpitation.  GI:  Negative for abdominal pain, diarrhea, heartburn, nausea, vomiting  : Graham catheter in place    Physical Exam:  Vital signs in last 24 hours:  Temp:  [36.7 °C (98.1 °F)-37.7 °C (99.9 °F)] 36.7 °C (98.1 °F)  Heart Rate:  [] 77  Resp:  [18-30] 25  BP: ()/(49-80) 92/55    General: Awake, cooperative, in mild discomfort due to swelling and pain in both lower extremity.  HEENT: Reports fullness in his right ear  NECK:  Elevated JVD, no carotid bruit, supple, no cervical mass or thyromegaly  LUNGS;  Diminished breath sounds, fine Rales  CV:  Distant, regular rate and rhythm, no murmurs  ABDOMEN:  abdomen soft, nontender, BS normal, no masses or organomegaly  EDEMA:  +3-4 lower extremity edema/dependent edema with ulcers and open wounds  SKIN: Bilateral lower extremity cellulitis with open wounds      Intake/Output last 3 shifts:  I/O last 3 completed shifts:  In: 1913.3 (21.3 mL/kg) [P.O.:1320; I.V.:33.3 (0.4 mL/kg); IV Piggyback:560]  Out: 5250 (58.5 mL/kg) [Urine:5250 (1.6 mL/kg/hr)]  Weight: 89.7 kg     DATA:  Diagnotic tests reviewed for Todays visit:  Results from last 7 days   Lab Units  02/19/25  0619   WBC AUTO x10*3/uL 7.8   RBC AUTO x10*6/uL 3.89*   HEMOGLOBIN g/dL 10.0*   HEMATOCRIT % 33.4*     Results from last 7 days   Lab Units 02/19/25  0619   SODIUM mmol/L 130*   POTASSIUM mmol/L 3.6   CHLORIDE mmol/L 91*   CO2 mmol/L 31   BUN mg/dL 58*   CREATININE mg/dL 1.85*   CALCIUM mg/dL 7.2*   PHOSPHORUS mg/dL 2.9   MAGNESIUM mg/dL 1.72           Echocardiogram  cONCLUSIONS:   1. The left ventricular systolic function is mildly decreased, with a Strong's biplane calculated ejection fraction of 42%.   2. There is global hypokinesis of the left ventricle with minor regional variations.   3. Left ventricular diastolic filling was indeterminate with an elevated left atrial pressure.   4. There is severely reduced right ventricular systolic function.   5. Mildly enlarged right ventricle.   6. The left atrium is moderately dilated.   7. The right atrium is moderately dilated.   8. Mild to moderate tricuspid regurgitation.   9. The estimated RVSP is 46 mm.  10. There is aortic sclerosis with normal leaflet mobility.  11. The inferior vena cava appears severely dilated.  12. There is moderately increased septal and moderately increased posterior left ventricular wall thickness.  IMAGING: CXR reviewed in  images      SIGNATURE: Pascual Parada MD  Nephrology and Hypertension  [address] [place]  Office phone: [phone]  FAX: [phone]    This note was partially generated using the Dragon voice recognition system, and there may be some incorrect words, spelling's and punctuation that were not noted in checking the note before saving.

## 2025-02-19 NOTE — PROGRESS NOTES
Physical Therapy                 Therapy Communication Note    Patient Name: Elgin Marin  MRN: [id number]  Today's Date: 2/18/2025     Discipline: Physical Therapy    Room 3117    Missed Time:   Missed Visit Reason:   Comment:       02/18/25 1510   General   PT Missed Visit Yes   Missed Visit Reason Patient refused  Pt. declined to participate at this time.

## 2025-02-19 NOTE — PROGRESS NOTES
Elgin Marin is a 84 y.o. male on day 8 of admission presenting with Acute on chronic systolic heart failure.    Subjective   Patient was seen and examined today in the morning at bedside.  No acute events overnight.  In the morning, per nursing report patient was confused agitated and demanded seeing psychiatry because he has visual hallucination.  Upon reassessment he says that he is ANO x 3 and expressed that his hallucination has been going on for sometimes but it has gotten worse.  No significant change on his clinical status.    Objective     Physical Exam  VITALS: I have reviewed the vital signs.   GENERAL: Elderly, chronically ill adult male.  Resting comfortably in bed.  On 2-3 L nasal cannula.   Conversational dyspnea persist even with nasal cannula on  NEURO: Alert and oriented x3, able to answer questions and follow commands. Moves all extremities. Face is symmetric and expressive. No tremor.  EYES: PERRL. No scleral icterus or conjunctival injection. No discharge.   HENT: Normocephalic, atraumatic. Hearing is grossly intact, though he is [place]. Nares grossly patent and without discharge. Mucous membranes moist.    CARDIO: Rhythm regular. Normal rate. No murmur, rub, or gallop.  Persistent 2-3+ pitting edema bilaterally to the thighs with Ace bandage wrapped.  PULM: Lungs grossly clear to auscultation this morning.   Very mild low, slightly diminished aeration lower lung fields. No wheezes, rales, or rhonchi.  Mild conversational dyspnea. No splinting, stridor, or accessory muscle use.  24-hour UO is 2.24 L, net -12 L since admission on Bumex gtt.   GI: Abdomen is soft and non-distended. No tenderness to palpation. No guarding or rigidity. Normoactive bowel sounds.   SKIN: Warm and dry. Normal turgor.  Bilateral lower extremities grossly edematous with chronic lower extremity wounds.  Gangrenous appearing toes on bilateral lower extremity.  bilateral lower extremities wrapped in Ace bandage with  "Kerlix  PSYCH: Mood, affect, and interaction is appropriate to the setting. Not internally stimulated.    Last Recorded Vitals  Blood pressure 111/57, pulse 86, temperature 36.7 °C (98.1 °F), temperature source Temporal, resp. rate 19, height 1.778 m (5' 10\"), weight 89.7 kg (197 lb 12 oz), SpO2 96%.  Intake/Output last 3 Shifts:  I/O last 3 completed shifts:  In: 1913.3 (21.3 mL/kg) [P.O.:1320; I.V.:33.3 (0.4 mL/kg); IV Piggyback:560]  Out: 5250 (58.5 mL/kg) [Urine:5250 (1.6 mL/kg/hr)]  Weight: 89.7 kg     Relevant Results  Scheduled medications  apixaban, 2.5 mg, oral, BID  atorvastatin, 40 mg, oral, Nightly  bisoprolol, 5 mg, oral, Daily  daptomycin, 6 mg/kg, intravenous, q24h  [Held by provider] empagliflozin, 25 mg, oral, Daily  finasteride, 5 mg, oral, Daily  fluticasone, 2 spray, Each Nostril, Daily  [Held by provider] insulin glargine, 10 Units, subcutaneous, BID  insulin glargine, 10 Units, subcutaneous, q24h  insulin lispro, 0-10 Units, subcutaneous, TID AC  insulin lispro, 0-5 Units, subcutaneous, Nightly  insulin lispro, 2 Units, subcutaneous, TID AC  ipratropium-albuteroL, 3 mL, nebulization, q6h  isosorbide dinitrate, 10 mg, oral, BID  latanoprost, 1 drop, Both Eyes, Nightly  pantoprazole, 40 mg, oral, Nightly  perflutren lipid microspheres, 0.5-10 mL of dilution, intravenous, Once in imaging  perflutren protein A microsphere, 0.5 mL, intravenous, Once in imaging  risperiDONE, 0.25 mg, oral, Nightly  sodium chloride, 1 spray, Each Nostril, TID  [Held by provider] spironolactone, 12.5 mg, oral, q24h BRE  sulfur hexafluoride microsphr, 2 mL, intravenous, Once in imaging  tamsulosin, 0.4 mg, oral, Daily  [Held by provider] torsemide, 80 mg, oral, Daily      Continuous medications  bumetanide, 0.25 mg/hr, Last Rate: 0.25 mg/hr (02/19/25 2147)      PRN medications  PRN medications: acetaminophen, dextrose, dextrose, glucagon, glucagon, guaiFENesin, ipratropium-albuteroL, oxygen  Results from last 7 days "   Lab Units 02/19/25  0619 02/18/25  0623 02/17/25  0636   WBC AUTO x10*3/uL 7.8 6.3 7.5   RBC AUTO x10*6/uL 3.89* 3.86* 4.18*   HEMOGLOBIN g/dL 10.0* 9.7* 10.7*     Results from last 7 days   Lab Units 02/19/25  0619 02/18/25  1746 02/18/25  0623 02/17/25  1757 02/17/25  0636   SODIUM mmol/L 130* 130* 131*   < > 130*   POTASSIUM mmol/L 3.6 3.4* 3.4*   < > 4.2   CHLORIDE mmol/L 91* 89* 91*   < > 92*   CO2 mmol/L 31 32 31   < > 30   BUN mg/dL 58* 60* 62*   < > 68*   CREATININE mg/dL 1.85* 2.17* 2.26*   < > 2.59*   CALCIUM mg/dL 7.2* 7.2* 7.3*   < > 7.5*   PHOSPHORUS mg/dL 2.9 3.5 3.6   < > 4.2   MAGNESIUM mg/dL 1.72  --  1.86  --  1.99    < > = values in this interval not displayed.       No results found.     Assessment/Plan   Assessment & Plan  Acute on chronic systolic heart failure    CAP (community acquired pneumonia) due to Chlamydia species      Mr Elgin Marin is an 84 year old male patient with previous medical history of CAD status post CABG in April 2022, atrial fibrillation on Eliquis, chronic combined systolic and diastolic heart failure, HTN, CKD with baseline Cr ~2.0,dyslipidemia, cholecystectomy (10/2015) admitted with a diagnosis of UTI. He received rocephin in the ED. Patient is NOT medically ready for discharge     # AHRF  # CHFe, C/F cardiorenal syndrome, improving  # Combined systolic and diastolic heart failure  #? Concern of ototoxicity secondary to Lasix drip  # Profound hypoalbuminemia  -24-hour UO  3.1 L, with net -8.6 L since admission.  Remains on 4 L nasal cannula  -Today's creatinine 2.59 equivalent to yesterday (baseline per nephrology around 2)  -Last TTE 11/2024: EF 42%, global hypokinesis LV with minor regional variations, indeterminate LV DF, severely reduced RV SF, mildly enlarged RV, moderately dilated LA and RA, RVSP 46, severely dilated IVC, moderately increased septal and moderately increased posterior left ventricular wall thickness  - TTE 2/15: EF 45%, global hypokinesis  LV with minor regional variations, grade 2 diastolic dysfunction, RV volume and pressure overload, mild reduced RV SF, moderately enlarged RV, severely dilated LA, moderate TR, severely elevated  RVSP 80, moderately dilated IVC, moderately increased septal and moderately increased posterior left ventricular wall thickness  -[2/18] switch Lasix drip to Bumex drip  secondary to concerning  for out of toxicity  -Nephrology: dc Lasix gtt. at 10 mg/h will switch to Bumex gtt .  Addition of IV albumin infusions goal 1 to 2 L diuresis per day.  Discussion had with cardiology today with nephrology: Holding off on metolazone today  -Cardiology consulted for additional volume status management: Pending formal recommendations however  -RFP every 12 hours to trend renal function and electrolytes  -GDMT continued: Bisoprolol, Isordil  -Jardiance and Aldactone to be discontinued on DC, both currently held  -Clinical status remains guarded. Palliative care reconsulted as patient wishing to entertain hospice after discussion with home cardiologist     #Multifactorial delirium and altered mental status  #Visual hallucination  #Agitation and intermittent confusion.  -Patient is ANO x 3.  Endorse prolonged history of hallucination.  -Psych consult, appreciate recommendation.  Recommended that patient cannot make decision regarding his treatment or discharge planning.  -Start low-dose risperidone 0.25 mg p.o. at bedtime for 7 days.    # Hyperglycemia  Hyperglycemia likely 2/2 cortisol surge in the setting of infection  -Blood sugar stables in the high 100s/low mid 200s  -Continue Lantus 10 units nightly with  scheduled 2 units lispro 3 times daily with meals.  Continue SSI  in between     # Hyperkalemia, resolved  # Hyponatremia, acute improving  -Admission sodium 134, currently 130  -Suspect possibly in the setting of hypervolemia  -Potassium corrected overnight with Lokelma, sodium bicarb, and Kayexalate  -Continue to trend twice  daily RFP's while on Lasix gtt     # Left foot wound  # Gangrenous right toes, c/f ?OME  # Poor peripheral pulses  # Chronic lower extremity wounds  -Evaluated by podiatry who obtained right foot x-ray  -Wound cultures positive for  MRSA and Corynebacterium striatum  -ID following: On daptomycin.  Zosyn discontinued  -Blood cultures NTD  -Prior wound cultures 11/12 positive for Pseudomonas aeruginosa, pansensitive  -Doppler required for pulses ordered.  Moderate occlusive disease bilaterally.  Poor surgical candidate  -Continuing Eliquis and added statin.  Will need ASA on DC given elevated ASCVD risk  -Cont. Wound care     # Chronic urinary retention  -UCX without growth. Antibiotics discontinued  -Patient to follow with outpatient [facility] urology  -Evaluated urology during this hospitalization with low suspicion for prostatitis  -Patient to be discharged with Graham catheter in place given chronic retention  -To continue finasteride and tamsulosin on DC        Other chronic conditions:  # CAD s/p CABG (4/2022)  # A-fib on Eliquis  -Resume home meds with appropriate holding parameters.      Dispo: Hospice meeting was delayed due to agitation/altered mental status.     Assessment and plan discussed with my attending.   Jerry Farr MD   Internal Medicine, PGY-2 .

## 2025-02-19 NOTE — CARE PLAN
Problem: Skin  Goal: Decreased wound size/increased tissue granulation at next dressing change  Outcome: Progressing  Goal: Participates in plan/prevention/treatment measures  Outcome: Progressing  Goal: Prevent/manage excess moisture  Outcome: Progressing  Goal: Prevent/minimize sheer/friction injuries  Outcome: Progressing  Flowsheets (Taken 2/19/2025 0246)  Prevent/minimize sheer/friction injuries:   HOB 30 degrees or less   Use pull sheet   Increase activity/out of bed for meals  Goal: Promote/optimize nutrition  Outcome: Progressing  Goal: Promote skin healing  Outcome: Progressing     Problem: Respiratory  Goal: Clear secretions with interventions this shift  Outcome: Progressing  Goal: Minimize anxiety/maximize coping throughout shift  Outcome: Progressing  Goal: Minimal/no exertional discomfort or dyspnea this shift  Outcome: Progressing  Goal: No signs of respiratory distress (eg. Use of accessory muscles. Peds grunting)  Outcome: Progressing  Goal: Patent airway maintained this shift  Outcome: Progressing  Goal: Verbalize decreased shortness of breath this shift  Outcome: Progressing  Goal: Wean oxygen to maintain O2 saturation per order/standard this shift  Outcome: Progressing  Goal: Increase self care and/or family involvement in next 24 hours  Outcome: Progressing     Problem: Fall/Injury  Goal: Not fall by end of shift  Outcome: Progressing  Goal: Be free from injury by end of the shift  Outcome: Progressing  Goal: Verbalize understanding of personal risk factors for fall in the hospital  Outcome: Progressing  Goal: Verbalize understanding of risk factor reduction measures to prevent injury from fall in the home  Outcome: Progressing  Goal: Use assistive devices by end of the shift  Outcome: Progressing  Goal: Pace activities to prevent fatigue by end of the shift  Outcome: Progressing     Problem: Pain - Adult  Goal: Verbalizes/displays adequate comfort level or baseline comfort level  Outcome:  Progressing     Problem: Safety - Adult  Goal: Free from fall injury  Outcome: Progressing     Problem: Discharge Planning  Goal: Discharge to home or other facility with appropriate resources  Outcome: Progressing     Problem: Chronic Conditions and Co-morbidities  Goal: Patient's chronic conditions and co-morbidity symptoms are monitored and maintained or improved  Outcome: Progressing     Problem: Nutrition  Goal: Nutrient intake appropriate for maintaining nutritional needs  Outcome: Progressing     Problem: Heart Failure  Goal: Improved gas exchange this shift  Outcome: Progressing  Goal: Improved urinary output this shift  Outcome: Progressing  Goal: Reduction in peripheral edema within 24 hours  Outcome: Progressing  Goal: Report improvement of dyspnea/breathlessness this shift  Outcome: Progressing  Goal: Weight from fluid excess reduced over 2-3 days, then stabilize  Outcome: Progressing  Goal: Increase self care and/or family involvement in 24 hours  Outcome: Progressing   The patient's goals for the shift include GET SOME REST    The clinical goals for the shift include pt will remain hemodynamically stable

## 2025-02-20 ENCOUNTER — APPOINTMENT (OUTPATIENT)
Dept: RADIOLOGY | Facility: HOSPITAL | Age: 85
DRG: 291 | End: 2025-02-20
Payer: MEDICARE

## 2025-02-20 LAB
ALBUMIN SERPL BCP-MCNC: 2.8 G/DL (ref 3.4–5)
ALBUMIN SERPL BCP-MCNC: 3 G/DL (ref 3.4–5)
ANION GAP BLDA CALCULATED.4IONS-SCNC: 3 MMO/L (ref 10–25)
ANION GAP SERPL CALC-SCNC: 12 MMOL/L (ref 10–20)
ANION GAP SERPL CALC-SCNC: 14 MMOL/L (ref 10–20)
APPARATUS: ABNORMAL
BASE EXCESS BLDA CALC-SCNC: 11.8 MMOL/L (ref -2–3)
BODY TEMPERATURE: ABNORMAL
BUN SERPL-MCNC: 58 MG/DL (ref 6–23)
BUN SERPL-MCNC: 58 MG/DL (ref 6–23)
CA-I BLDA-SCNC: 1.03 MMOL/L (ref 1.1–1.33)
CALCIUM SERPL-MCNC: 7.4 MG/DL (ref 8.6–10.3)
CALCIUM SERPL-MCNC: 7.5 MG/DL (ref 8.6–10.3)
CHLORIDE BLDA-SCNC: 92 MMOL/L (ref 98–107)
CHLORIDE SERPL-SCNC: 91 MMOL/L (ref 98–107)
CHLORIDE SERPL-SCNC: 91 MMOL/L (ref 98–107)
CO2 SERPL-SCNC: 31 MMOL/L (ref 21–32)
CO2 SERPL-SCNC: 33 MMOL/L (ref 21–32)
CREAT SERPL-MCNC: 1.78 MG/DL (ref 0.5–1.3)
CREAT SERPL-MCNC: 1.87 MG/DL (ref 0.5–1.3)
EGFRCR SERPLBLD CKD-EPI 2021: 35 ML/MIN/1.73M*2
EGFRCR SERPLBLD CKD-EPI 2021: 37 ML/MIN/1.73M*2
ERYTHROCYTE [DISTWIDTH] IN BLOOD BY AUTOMATED COUNT: 15 % (ref 11.5–14.5)
FLOW: 4 LPM
FLUAV RNA RESP QL NAA+PROBE: NOT DETECTED
FLUBV RNA RESP QL NAA+PROBE: NOT DETECTED
GLUCOSE BLD MANUAL STRIP-MCNC: 133 MG/DL (ref 74–99)
GLUCOSE BLD MANUAL STRIP-MCNC: 69 MG/DL (ref 74–99)
GLUCOSE BLD MANUAL STRIP-MCNC: 77 MG/DL (ref 74–99)
GLUCOSE BLD MANUAL STRIP-MCNC: 85 MG/DL (ref 74–99)
GLUCOSE BLDA-MCNC: 67 MG/DL (ref 74–99)
GLUCOSE SERPL-MCNC: 80 MG/DL (ref 74–99)
GLUCOSE SERPL-MCNC: 93 MG/DL (ref 74–99)
HCO3 BLDA-SCNC: 36.6 MMOL/L (ref 22–26)
HCT VFR BLD AUTO: 35.4 % (ref 41–52)
HCT VFR BLD EST: 33 % (ref 41–52)
HGB BLD-MCNC: 10.7 G/DL (ref 13.5–17.5)
HGB BLDA-MCNC: 11.1 G/DL (ref 13.5–17.5)
INHALED O2 CONCENTRATION: 36 %
LACTATE BLDA-SCNC: 0.9 MMOL/L (ref 0.4–2)
MAGNESIUM SERPL-MCNC: 1.76 MG/DL (ref 1.6–2.4)
MCH RBC QN AUTO: 25.8 PG (ref 26–34)
MCHC RBC AUTO-ENTMCNC: 30.2 G/DL (ref 32–36)
MCV RBC AUTO: 85 FL (ref 80–100)
NRBC BLD-RTO: 0 /100 WBCS (ref 0–0)
OXYHGB MFR BLDA: 90.4 % (ref 94–98)
PCO2 BLDA: 48 MM HG (ref 38–42)
PH BLDA: 7.49 PH (ref 7.38–7.42)
PHOSPHATE SERPL-MCNC: 2.9 MG/DL (ref 2.5–4.9)
PHOSPHATE SERPL-MCNC: 2.9 MG/DL (ref 2.5–4.9)
PLATELET # BLD AUTO: 113 X10*3/UL (ref 150–450)
PO2 BLDA: 58 MM HG (ref 85–95)
POTASSIUM BLDA-SCNC: 4 MMOL/L (ref 3.5–5.3)
POTASSIUM SERPL-SCNC: 3.7 MMOL/L (ref 3.5–5.3)
POTASSIUM SERPL-SCNC: 3.7 MMOL/L (ref 3.5–5.3)
RBC # BLD AUTO: 4.15 X10*6/UL (ref 4.5–5.9)
SAO2 % BLDA: 94 % (ref 94–100)
SARS-COV-2 RNA RESP QL NAA+PROBE: DETECTED
SODIUM BLDA-SCNC: 128 MMOL/L (ref 136–145)
SODIUM SERPL-SCNC: 132 MMOL/L (ref 136–145)
SODIUM SERPL-SCNC: 132 MMOL/L (ref 136–145)
WBC # BLD AUTO: 12 X10*3/UL (ref 4.4–11.3)

## 2025-02-20 PROCEDURE — 71045 X-RAY EXAM CHEST 1 VIEW: CPT

## 2025-02-20 PROCEDURE — 36415 COLL VENOUS BLD VENIPUNCTURE: CPT | Performed by: STUDENT IN AN ORGANIZED HEALTH CARE EDUCATION/TRAINING PROGRAM

## 2025-02-20 PROCEDURE — 36600 WITHDRAWAL OF ARTERIAL BLOOD: CPT

## 2025-02-20 PROCEDURE — 2500000001 HC RX 250 WO HCPCS SELF ADMINISTERED DRUGS (ALT 637 FOR MEDICARE OP)

## 2025-02-20 PROCEDURE — 82947 ASSAY GLUCOSE BLOOD QUANT: CPT

## 2025-02-20 PROCEDURE — 84132 ASSAY OF SERUM POTASSIUM: CPT

## 2025-02-20 PROCEDURE — 84132 ASSAY OF SERUM POTASSIUM: CPT | Performed by: STUDENT IN AN ORGANIZED HEALTH CARE EDUCATION/TRAINING PROGRAM

## 2025-02-20 PROCEDURE — 80069 RENAL FUNCTION PANEL: CPT | Performed by: STUDENT IN AN ORGANIZED HEALTH CARE EDUCATION/TRAINING PROGRAM

## 2025-02-20 PROCEDURE — 2500000002 HC RX 250 W HCPCS SELF ADMINISTERED DRUGS (ALT 637 FOR MEDICARE OP, ALT 636 FOR OP/ED): Performed by: PSYCHIATRY & NEUROLOGY

## 2025-02-20 PROCEDURE — 3E0DX3Z INTRODUCTION OF ANTI-INFLAMMATORY INTO MOUTH AND PHARYNX, EXTERNAL APPROACH: ICD-10-PCS | Performed by: STUDENT IN AN ORGANIZED HEALTH CARE EDUCATION/TRAINING PROGRAM

## 2025-02-20 PROCEDURE — 1200000002 HC GENERAL ROOM WITH TELEMETRY DAILY

## 2025-02-20 PROCEDURE — 83735 ASSAY OF MAGNESIUM: CPT | Performed by: STUDENT IN AN ORGANIZED HEALTH CARE EDUCATION/TRAINING PROGRAM

## 2025-02-20 PROCEDURE — 2500000005 HC RX 250 GENERAL PHARMACY W/O HCPCS

## 2025-02-20 PROCEDURE — 85027 COMPLETE CBC AUTOMATED: CPT | Performed by: STUDENT IN AN ORGANIZED HEALTH CARE EDUCATION/TRAINING PROGRAM

## 2025-02-20 PROCEDURE — 83605 ASSAY OF LACTIC ACID: CPT

## 2025-02-20 PROCEDURE — 99233 SBSQ HOSP IP/OBS HIGH 50: CPT

## 2025-02-20 PROCEDURE — 87636 SARSCOV2 & INF A&B AMP PRB: CPT

## 2025-02-20 PROCEDURE — 94640 AIRWAY INHALATION TREATMENT: CPT

## 2025-02-20 PROCEDURE — 71045 X-RAY EXAM CHEST 1 VIEW: CPT | Performed by: RADIOLOGY

## 2025-02-20 PROCEDURE — 2500000004 HC RX 250 GENERAL PHARMACY W/ HCPCS (ALT 636 FOR OP/ED)

## 2025-02-20 PROCEDURE — 2500000004 HC RX 250 GENERAL PHARMACY W/ HCPCS (ALT 636 FOR OP/ED): Mod: JZ

## 2025-02-20 PROCEDURE — XW033E5 INTRODUCTION OF REMDESIVIR ANTI-INFECTIVE INTO PERIPHERAL VEIN, PERCUTANEOUS APPROACH, NEW TECHNOLOGY GROUP 5: ICD-10-PCS | Performed by: STUDENT IN AN ORGANIZED HEALTH CARE EDUCATION/TRAINING PROGRAM

## 2025-02-20 PROCEDURE — 2500000002 HC RX 250 W HCPCS SELF ADMINISTERED DRUGS (ALT 637 FOR MEDICARE OP, ALT 636 FOR OP/ED)

## 2025-02-20 PROCEDURE — 94660 CPAP INITIATION&MGMT: CPT

## 2025-02-20 PROCEDURE — 2500000002 HC RX 250 W HCPCS SELF ADMINISTERED DRUGS (ALT 637 FOR MEDICARE OP, ALT 636 FOR OP/ED): Performed by: STUDENT IN AN ORGANIZED HEALTH CARE EDUCATION/TRAINING PROGRAM

## 2025-02-20 PROCEDURE — 2500000004 HC RX 250 GENERAL PHARMACY W/ HCPCS (ALT 636 FOR OP/ED): Performed by: INTERNAL MEDICINE

## 2025-02-20 RX ORDER — DEXAMETHASONE 6 MG/1
6 TABLET ORAL EVERY 12 HOURS SCHEDULED
Status: DISCONTINUED | OUTPATIENT
Start: 2025-02-20 | End: 2025-02-20

## 2025-02-20 RX ORDER — POTASSIUM CHLORIDE 1.5 G/1.58G
40 POWDER, FOR SOLUTION ORAL ONCE
Status: COMPLETED | OUTPATIENT
Start: 2025-02-20 | End: 2025-02-20

## 2025-02-20 RX ORDER — LANOLIN ALCOHOL/MO/W.PET/CERES
800 CREAM (GRAM) TOPICAL ONCE
Status: COMPLETED | OUTPATIENT
Start: 2025-02-20 | End: 2025-02-20

## 2025-02-20 RX ADMIN — TAMSULOSIN HYDROCHLORIDE 0.4 MG: 0.4 CAPSULE ORAL at 08:39

## 2025-02-20 RX ADMIN — RISPERIDONE 0.25 MG: 0.5 TABLET, FILM COATED ORAL at 22:01

## 2025-02-20 RX ADMIN — IPRATROPIUM BROMIDE AND ALBUTEROL SULFATE 3 ML: 2.5; .5 SOLUTION RESPIRATORY (INHALATION) at 02:12

## 2025-02-20 RX ADMIN — IPRATROPIUM BROMIDE AND ALBUTEROL SULFATE 3 ML: 2.5; .5 SOLUTION RESPIRATORY (INHALATION) at 14:02

## 2025-02-20 RX ADMIN — Medication 4 L/MIN: at 08:47

## 2025-02-20 RX ADMIN — ISOSORBIDE DINITRATE 10 MG: 10 TABLET ORAL at 08:39

## 2025-02-20 RX ADMIN — ACETAMINOPHEN 650 MG: 325 TABLET ORAL at 22:09

## 2025-02-20 RX ADMIN — APIXABAN 2.5 MG: 2.5 TABLET, FILM COATED ORAL at 08:40

## 2025-02-20 RX ADMIN — BISOPROLOL FUMARATE 5 MG: 5 TABLET ORAL at 08:39

## 2025-02-20 RX ADMIN — IPRATROPIUM BROMIDE AND ALBUTEROL SULFATE 3 ML: 2.5; .5 SOLUTION RESPIRATORY (INHALATION) at 11:42

## 2025-02-20 RX ADMIN — FLUTICASONE PROPIONATE 2 SPRAY: 50 SPRAY, METERED NASAL at 08:40

## 2025-02-20 RX ADMIN — DAPTOMYCIN 500 MG: 500 INJECTION, POWDER, LYOPHILIZED, FOR SOLUTION INTRAVENOUS at 12:17

## 2025-02-20 RX ADMIN — APIXABAN 2.5 MG: 2.5 TABLET, FILM COATED ORAL at 22:01

## 2025-02-20 RX ADMIN — IPRATROPIUM BROMIDE AND ALBUTEROL SULFATE 3 ML: 2.5; .5 SOLUTION RESPIRATORY (INHALATION) at 08:58

## 2025-02-20 RX ADMIN — ACETAMINOPHEN 650 MG: 325 TABLET ORAL at 14:46

## 2025-02-20 RX ADMIN — REMDESIVIR 200 MG: 100 INJECTION, POWDER, LYOPHILIZED, FOR SOLUTION INTRAVENOUS at 14:47

## 2025-02-20 RX ADMIN — DEXAMETHASONE 6 MG: 6 TABLET ORAL at 14:32

## 2025-02-20 RX ADMIN — PANTOPRAZOLE SODIUM 40 MG: 40 TABLET, DELAYED RELEASE ORAL at 22:01

## 2025-02-20 RX ADMIN — SALINE NASAL SPRAY 1 SPRAY: 1.5 SOLUTION NASAL at 08:40

## 2025-02-20 RX ADMIN — DEXTROSE MONOHYDRATE 12.5 G: 25 INJECTION, SOLUTION INTRAVENOUS at 11:38

## 2025-02-20 RX ADMIN — Medication 5 L/MIN: at 20:52

## 2025-02-20 RX ADMIN — FINASTERIDE 5 MG: 5 TABLET, FILM COATED ORAL at 08:39

## 2025-02-20 RX ADMIN — Medication 800 MG: at 09:40

## 2025-02-20 RX ADMIN — IPRATROPIUM BROMIDE AND ALBUTEROL SULFATE 3 ML: 2.5; .5 SOLUTION RESPIRATORY (INHALATION) at 20:52

## 2025-02-20 RX ADMIN — SALINE NASAL SPRAY 1 SPRAY: 1.5 SOLUTION NASAL at 22:03

## 2025-02-20 RX ADMIN — POTASSIUM CHLORIDE 40 MEQ: 1.5 POWDER, FOR SOLUTION ORAL at 09:03

## 2025-02-20 RX ADMIN — GUAIFENESIN 600 MG: 600 TABLET ORAL at 08:40

## 2025-02-20 RX ADMIN — SALINE NASAL SPRAY 1 SPRAY: 1.5 SOLUTION NASAL at 14:32

## 2025-02-20 RX ADMIN — ATORVASTATIN CALCIUM 40 MG: 40 TABLET, FILM COATED ORAL at 22:01

## 2025-02-20 ASSESSMENT — COGNITIVE AND FUNCTIONAL STATUS - GENERAL
TURNING FROM BACK TO SIDE WHILE IN FLAT BAD: A LITTLE
MOVING TO AND FROM BED TO CHAIR: A LOT
DAILY ACTIVITIY SCORE: 17
STANDING UP FROM CHAIR USING ARMS: A LOT
PERSONAL GROOMING: A LITTLE
DRESSING REGULAR LOWER BODY CLOTHING: A LOT
HELP NEEDED FOR BATHING: A LITTLE
CLIMB 3 TO 5 STEPS WITH RAILING: TOTAL
EATING MEALS: A LITTLE
MOVING FROM LYING ON BACK TO SITTING ON SIDE OF FLAT BED WITH BEDRAILS: A LITTLE
WALKING IN HOSPITAL ROOM: TOTAL
MOBILITY SCORE: 12
TOILETING: A LITTLE
DRESSING REGULAR UPPER BODY CLOTHING: A LITTLE

## 2025-02-20 ASSESSMENT — PAIN SCALES - GENERAL
PAINLEVEL_OUTOF10: 8
PAINLEVEL_OUTOF10: 0 - NO PAIN
PAINLEVEL_OUTOF10: 0 - NO PAIN

## 2025-02-20 ASSESSMENT — PAIN - FUNCTIONAL ASSESSMENT: PAIN_FUNCTIONAL_ASSESSMENT: 0-10

## 2025-02-20 ASSESSMENT — PAIN DESCRIPTION - LOCATION: LOCATION: FOOT

## 2025-02-20 ASSESSMENT — PAIN DESCRIPTION - ORIENTATION: ORIENTATION: RIGHT;LEFT

## 2025-02-20 NOTE — PROGRESS NOTES
Speech-Language Pathology                 Therapy Communication Note    Patient Name: Elgin Marin  MRN: [id number]  Department: [facility] 3 S  Room: [facility]7/3117-A  Today's Date: 2/20/2025     Discipline: Speech Language Pathology      Missed Visit Reason:  Patient refusal    Missed Time: Attempt [id number]    Comment: Patient seen at bedside for attempted clinical swallow evaluation. Per discussion with RN, patient c/o difficulty swallowing this morning and was made NPO. Patient has also tested positive for COVID this date. Patient denied difficulty swallowing, and refused participation in evaluation despite education. RN notified, and reported that patient is now likely to transition to hospice. Will remain available to re-attempt evaluation as needed.

## 2025-02-20 NOTE — CARE PLAN
The patient's goals for the shift include   Problem: Skin  Goal: Decreased wound size/increased tissue granulation at next dressing change  Outcome: Progressing  Flowsheets (Taken 2/20/2025 1345)  Decreased wound size/increased tissue granulation at next dressing change: Promote sleep for wound healing  Goal: Participates in plan/prevention/treatment measures  Outcome: Progressing  Flowsheets (Taken 2/20/2025 1345)  Participates in plan/prevention/treatment measures: Elevate heels  Goal: Prevent/manage excess moisture  Outcome: Progressing  Flowsheets (Taken 2/20/2025 1345)  Prevent/manage excess moisture:   Cleanse incontinence/protect with barrier cream   Follow provider orders for dressing changes  Goal: Prevent/minimize sheer/friction injuries  Outcome: Progressing  Flowsheets (Taken 2/20/2025 1345)  Prevent/minimize sheer/friction injuries: Turn/reposition every 2 hours/use positioning/transfer devices  Goal: Promote/optimize nutrition  Outcome: Progressing  Flowsheets (Taken 2/20/2025 1345)  Promote/optimize nutrition:   Consume > 50% meals/supplements   Monitor/record intake including meals  Goal: Promote skin healing  Outcome: Progressing  Flowsheets (Taken 2/20/2025 1345)  Promote skin healing: Turn/reposition every 2 hours/use positioning/transfer devices     Problem: Respiratory  Goal: Clear secretions with interventions this shift  Outcome: Progressing  Goal: Minimize anxiety/maximize coping throughout shift  Outcome: Progressing  Goal: Minimal/no exertional discomfort or dyspnea this shift  Outcome: Progressing  Goal: No signs of respiratory distress (eg. Use of accessory muscles. Peds grunting)  Outcome: Progressing  Goal: Patent airway maintained this shift  Outcome: Progressing  Goal: Verbalize decreased shortness of breath this shift  Outcome: Progressing  Goal: Wean oxygen to maintain O2 saturation per order/standard this shift  Outcome: Progressing  Goal: Increase self care and/or family  involvement in next 24 hours  Outcome: Progressing     Problem: Fall/Injury  Goal: Not fall by end of shift  Outcome: Progressing  Goal: Be free from injury by end of the shift  Outcome: Progressing  Goal: Verbalize understanding of personal risk factors for fall in the hospital  Outcome: Progressing  Goal: Verbalize understanding of risk factor reduction measures to prevent injury from fall in the home  Outcome: Progressing  Goal: Use assistive devices by end of the shift  Outcome: Progressing  Goal: Pace activities to prevent fatigue by end of the shift  Outcome: Progressing     Problem: Pain - Adult  Goal: Verbalizes/displays adequate comfort level or baseline comfort level  Outcome: Progressing     Problem: Safety - Adult  Goal: Free from fall injury  Outcome: Progressing     Problem: Discharge Planning  Goal: Discharge to home or other facility with appropriate resources  Outcome: Progressing     Problem: Chronic Conditions and Co-morbidities  Goal: Patient's chronic conditions and co-morbidity symptoms are monitored and maintained or improved  Outcome: Progressing     Problem: Nutrition  Goal: Nutrient intake appropriate for maintaining nutritional needs  Outcome: Progressing     Problem: Heart Failure  Goal: Improved gas exchange this shift  Outcome: Progressing  Goal: Improved urinary output this shift  Outcome: Progressing  Goal: Reduction in peripheral edema within 24 hours  Outcome: Progressing  Goal: Report improvement of dyspnea/breathlessness this shift  Outcome: Progressing  Goal: Weight from fluid excess reduced over 2-3 days, then stabilize  Outcome: Progressing  Goal: Increase self care and/or family involvement in 24 hours  Outcome: Progressing       The clinical goals for the shift include pt will remain free of respiratory distress this shift

## 2025-02-20 NOTE — PROGRESS NOTES
Per psych note, patient does not have capacity to make own medical decisions. Will reach out to patient's son and daughter to discuss goals of care and revisit hospice meeting.     1000  Son's phone keeps going directly to [place], left message, awaiting return call.     1056  Not able to locate number for patient's daughter, not on chart. Left another message for Duane, awaiting return call.    1132  Spoke with patients daughter, Lilia [phone]. She said she is currently handling this because Duane has been sick and is caring for their mother.   Advised that psych does not feel patient has capacity to make medical decisions at this time.   She is going to have a conversation with her brother, he is official healthcare poa, and call me back and let me know if they want to meet with hospice.     1306  Spoke with Lilia several times. She talked to her brother and poa, Duane, and they agree hospice is the best option but Duane is not able to take patient ho me at this time as he is dealing with his own health issues. Discussed possibility of [facility] and Lilia feels that is a good plan. She is aware hospice will call her to set up meeting and assess patient for [place]. She is aware Duane will have to sign hospice consents but they can email consents to him.   Referral sent to [facility] to arrange meeting with family. Awaiting time.     1518  Hospice advised they cannot reach Lilia. Placed several calls to Lilia and went directly to  but eventually was able to reach her. She advised she is at an appointment and did not have service. She will call hospice back to schedule. Hospice updated.

## 2025-02-20 NOTE — PROGRESS NOTES
"Elgin Marin is a 84 y.o. male on day 9 of admission presenting with Acute on chronic systolic heart failure.    Subjective   Patient was seen and examined today in the morning at bedside.  Overnight he has increased work of breathing for which she was placed on CPAP.  In the morning he seems lethargic but he was answering questions appropriately.  He is ANO x 3.  Patient was unable to obtain his daughter phone number.    Objective     Physical Exam  VITALS: I have reviewed the vital signs.   GENERAL: Elderly, chronically ill adult male.  Distressed in bed.  On 4 L nasal cannula.   Conversational dyspnea persist even with nasal cannula on  NEURO: Alert and oriented x3, able to answer questions and follow commands. Moves all extremities. Face is symmetric and expressive. No tremor.  EYES: PERRL. No scleral icterus or conjunctival injection. No discharge.   HENT: Normocephalic, atraumatic. Hearing is grossly intact, though he is [place]. Nares grossly patent and without discharge. Mucous membranes moist.    CARDIO: Rhythm regular. Normal rate. No murmur, rub, or gallop.  Persistent 2-3+ pitting edema bilaterally to the thighs with Ace bandage wrapped.  PULM: Equal air entry with respiratory crackles bilateral with no long wheezes or rhonchi..  24-hour UO is 1.6 L, net -15 L since admission on Bumex gtt.   GI: Abdomen is soft and non-distended. No tenderness to palpation. No guarding or rigidity. Normoactive bowel sounds.   SKIN: Warm and dry. Normal turgor.  Bilateral lower extremities grossly edematous with chronic lower extremity wounds.  Gangrenous appearing toes on bilateral lower extremity.  bilateral lower extremities wrapped in Ace bandage with Kerlix  PSYCH: Mood, affect, and interaction is appropriate to the setting. Not internally stimulated.    Last Recorded Vitals  Blood pressure 102/62, pulse 94, temperature 37.4 °C (99.3 °F), temperature source Temporal, resp. rate (!) 30, height 1.778 m (5' 10\"), weight " 88.3 kg (194 lb 10.7 oz), SpO2 93%.  Intake/Output last 3 Shifts:  I/O last 3 completed shifts:  In: 1472.8 (16.7 mL/kg) [P.O.:900; I.V.:12.8 (0.1 mL/kg); IV Piggyback:560]  Out: 5426 (61.5 mL/kg) [Urine:5425 (1.7 mL/kg/hr); Stool:1]  Weight: 88.3 kg     Relevant Results  Scheduled medications  apixaban, 2.5 mg, oral, BID  atorvastatin, 40 mg, oral, Nightly  bisoprolol, 5 mg, oral, Daily  daptomycin, 6 mg/kg, intravenous, q24h  dexAMETHasone, 6 mg, oral, q12h BRE  [Held by provider] empagliflozin, 25 mg, oral, Daily  finasteride, 5 mg, oral, Daily  fluticasone, 2 spray, Each Nostril, Daily  [Held by provider] insulin glargine, 10 Units, subcutaneous, BID  insulin glargine, 10 Units, subcutaneous, q24h  insulin lispro, 0-10 Units, subcutaneous, TID AC  insulin lispro, 0-5 Units, subcutaneous, Nightly  insulin lispro, 2 Units, subcutaneous, TID AC  ipratropium-albuteroL, 3 mL, nebulization, TID  isosorbide dinitrate, 10 mg, oral, BID  latanoprost, 1 drop, Both Eyes, Nightly  oxygen, , inhalation, Continuous - Inhalation  pantoprazole, 40 mg, oral, Nightly  perflutren lipid microspheres, 0.5-10 mL of dilution, intravenous, Once in imaging  perflutren protein A microsphere, 0.5 mL, intravenous, Once in imaging  [START ON 2/21/2025] remdesivir, 100 mg, intravenous, q24h  risperiDONE, 0.25 mg, oral, Nightly  sodium chloride, 1 spray, Each Nostril, TID  [Held by provider] spironolactone, 12.5 mg, oral, q24h BRE  sulfur hexafluoride microsphr, 2 mL, intravenous, Once in imaging  tamsulosin, 0.4 mg, oral, Daily  [Held by provider] torsemide, 80 mg, oral, Daily      Continuous medications  bumetanide, 0.25 mg/hr, Last Rate: 0.25 mg/hr (02/20/25 1459)      PRN medications  PRN medications: acetaminophen, dextrose, dextrose, glucagon, glucagon, guaiFENesin, ipratropium-albuteroL, oxygen  Results from last 7 days   Lab Units 02/20/25  0640 02/19/25  0619 02/18/25  0623   WBC AUTO x10*3/uL 12.0* 7.8 6.3   RBC AUTO x10*6/uL 4.15*  3.89* 3.86*   HEMOGLOBIN g/dL 10.7* 10.0* 9.7*     Results from last 7 days   Lab Units 02/20/25  0640 02/19/25  1708 02/19/25  0619 02/18/25  1746 02/18/25  0623   SODIUM mmol/L 132* 131* 130*   < > 131*   POTASSIUM mmol/L 3.7 3.8 3.6   < > 3.4*   CHLORIDE mmol/L 91* 91* 91*   < > 91*   CO2 mmol/L 33* 33* 31   < > 31   BUN mg/dL 58* 59* 58*   < > 62*   CREATININE mg/dL 1.78* 2.07* 1.85*   < > 2.26*   CALCIUM mg/dL 7.5* 7.2* 7.2*   < > 7.3*   PHOSPHORUS mg/dL 2.9 3.1 2.9   < > 3.6   MAGNESIUM mg/dL 1.76  --  1.72  --  1.86    < > = values in this interval not displayed.       XR chest 1 view    Result Date: 2/20/2025  Interpreted By:  Dieudonne Pacheco, STUDY: XR CHEST 1 VIEW;  2/20/2025 12:43 pm   INDICATION: Signs/Symptoms:acute hypoxia.   COMPARISON: 02/14/2025   ACCESSION NUMBER(S): [id number]   ORDERING CLINICIAN: DELLA LUCIA   FINDINGS: Post sternotomy. Moderate right and small left pleural effusions appear larger than prior exam. Interstitial prominence with diffusely increased bilateral airspace opacities. Cardiomediastinal silhouette unchanged. Aortic atherosclerosis.       Bilateral airspace opacities have increased compared to 6 days ago, pneumonia versus worsening edema. Pleural effusions are increased.   MACRO: None   Signed by: Dieudonne Pacheco 2/20/2025 1:02 PM Dictation workstation:   [id number]      Assessment/Plan   Assessment & Plan  Acute on chronic systolic heart failure    CAP (community acquired pneumonia) due to Chlamydia species      Mr Elgin Marin is an 84 year old male patient with previous medical history of CAD status post CABG in April 2022, atrial fibrillation on Eliquis, chronic combined systolic and diastolic heart failure, HTN, CKD with baseline Cr ~2.0,dyslipidemia, cholecystectomy (10/2015) admitted with a diagnosis of UTI. He received rocephin in the ED.     Update 2/20  -Patient seems more lethargic and dyspneic.  He was placed on CPAP overnight.  -Nasal swab showed  positive COVID PCR.  -Repeated chest x-ray showed bilateral airspace opacities that have increased compared 6 days ago concerning for pneumonia versus worsening edema.  -Started on COVID protocol with Decadron 6 mg for 10 days and remdesivir.    #AHRF  #CHFe, C/F cardiorenal syndrome, improving  #Combined systolic and diastolic heart failure  #?Concern of ototoxicity secondary to Lasix drip  #COVID positive  # Profound hypoalbuminemia  -24-hour UO is 1.6 L, net -15 L since admission on Bumex gtt.   -Today's creatinine 2.59 equivalent to yesterday (baseline per nephrology around 2)  -Last TTE 11/2024: EF 42%, global hypokinesis LV with minor regional variations, indeterminate LV DF, severely reduced RV SF, mildly enlarged RV, moderately dilated LA and RA, RVSP 46, severely dilated IVC, moderately increased septal and moderately increased posterior left ventricular wall thickness  - TTE 2/15: EF 45%, global hypokinesis LV with minor regional variations, grade 2 diastolic dysfunction, RV volume and pressure overload, mild reduced RV SF, moderately enlarged RV, severely dilated LA, moderate TR, severely elevated  RVSP 80, moderately dilated IVC, moderately increased septal and moderately increased posterior left ventricular wall thickness  -[2/18] switch Lasix drip to Bumex drip  secondary to concerning  for out of toxicity  -Nephrology: dc Lasix gtt. at 10 mg/h will switch to Bumex gtt .  Addition of IV albumin infusions goal 1 to 2 L diuresis per day.  Discussion had with cardiology today with nephrology: Holding off on metolazone today  -[2/20] Started on COVID protocol with Decadron and remdesivir.   -Cardiology consulted for additional volume status management: Pending formal recommendations however  -RFP every 12 hours to trend renal function and electrolytes  -GDMT continued: Bisoprolol, Isordil  -Jardiance and Aldactone to be discontinued on DC, both currently held  -[2/20] Family agreed to proceed with hospice  and hospice meeting was agreed to be on 2/21 with plan to discharge to [facility]     #Multifactorial delirium and altered mental status  #Visual hallucination  #Agitation and intermittent confusion.  -Patient is ANO x 3.  Endorse prolonged history of hallucination.  -Psych consult, appreciate recommendation.  Recommended that patient cannot make decision regarding his treatment or discharge planning.  -Start low-dose risperidone 0.25 mg p.o. at bedtime for 7 days.    # Hyperglycemia  Hyperglycemia likely 2/2 cortisol surge in the setting of infection  -Blood sugar stables in the high 100s/low mid 200s  -Continue Lantus 10 units nightly with  scheduled 2 units lispro 3 times daily with meals.  Continue SSI  in between     # Hyperkalemia, resolved  # Hyponatremia, acute improving  -Admission sodium 134, currently 130  -Suspect possibly in the setting of hypervolemia  -Potassium corrected overnight with Lokelma, sodium bicarb, and Kayexalate  -Continue to trend twice daily RFP's while on Lasix gtt     # Left foot wound  # Gangrenous right toes, c/f ?OME  # Poor peripheral pulses  # Chronic lower extremity wounds  -Evaluated by podiatry who obtained right foot x-ray  -Wound cultures positive for  MRSA and Corynebacterium striatum  -ID following: On daptomycin.  Zosyn discontinued  -Blood cultures NTD  -Prior wound cultures 11/12 positive for Pseudomonas aeruginosa, pansensitive  -Doppler required for pulses ordered.  Moderate occlusive disease bilaterally.  Poor surgical candidate  -Continuing Eliquis and added statin.  Will need ASA on DC given elevated ASCVD risk  -Cont. Wound care     # Chronic urinary retention  -UCX without growth. Antibiotics discontinued  -Patient to follow with outpatient [facility] urology  -Evaluated urology during this hospitalization with low suspicion for prostatitis  -Patient to be discharged with Graham catheter in place given chronic retention  -To continue finasteride and tamsulosin on DC         Other chronic conditions:  # CAD s/p CABG (4/2022)  # A-fib on Eliquis  -Resume home meds with appropriate holding parameters.      Dispo: Hospice meeting scheduled to be on 2/20 and plan to discharge for [facility]     Assessment and plan discussed with my attending.   Jerry Farr MD   Internal Medicine, PGY-2 .

## 2025-02-20 NOTE — CARE PLAN
Problem: Skin  Goal: Decreased wound size/increased tissue granulation at next dressing change  Outcome: Progressing  Goal: Participates in plan/prevention/treatment measures  Outcome: Progressing  Goal: Prevent/manage excess moisture  Outcome: Progressing  Goal: Prevent/minimize sheer/friction injuries  Outcome: Progressing  Flowsheets (Taken 2/20/2025 0431)  Prevent/minimize sheer/friction injuries:   Use pull sheet   HOB 30 degrees or less  Goal: Promote/optimize nutrition  Outcome: Progressing  Goal: Promote skin healing  Outcome: Progressing     Problem: Respiratory  Goal: Clear secretions with interventions this shift  Outcome: Progressing  Goal: Minimize anxiety/maximize coping throughout shift  Outcome: Progressing  Goal: Minimal/no exertional discomfort or dyspnea this shift  Outcome: Progressing  Goal: No signs of respiratory distress (eg. Use of accessory muscles. Peds grunting)  Outcome: Progressing  Goal: Patent airway maintained this shift  Outcome: Progressing  Goal: Verbalize decreased shortness of breath this shift  Outcome: Progressing  Goal: Wean oxygen to maintain O2 saturation per order/standard this shift  Outcome: Progressing  Goal: Increase self care and/or family involvement in next 24 hours  Outcome: Progressing     Problem: Fall/Injury  Goal: Not fall by end of shift  Outcome: Progressing  Goal: Be free from injury by end of the shift  Outcome: Progressing  Goal: Verbalize understanding of personal risk factors for fall in the hospital  Outcome: Progressing  Goal: Verbalize understanding of risk factor reduction measures to prevent injury from fall in the home  Outcome: Progressing  Goal: Use assistive devices by end of the shift  Outcome: Progressing  Goal: Pace activities to prevent fatigue by end of the shift  Outcome: Progressing     Problem: Pain - Adult  Goal: Verbalizes/displays adequate comfort level or baseline comfort level  Outcome: Progressing     Problem: Safety -  Adult  Goal: Free from fall injury  Outcome: Progressing     Problem: Discharge Planning  Goal: Discharge to home or other facility with appropriate resources  Outcome: Progressing     Problem: Chronic Conditions and Co-morbidities  Goal: Patient's chronic conditions and co-morbidity symptoms are monitored and maintained or improved  Outcome: Progressing     Problem: Nutrition  Goal: Nutrient intake appropriate for maintaining nutritional needs  Outcome: Progressing     Problem: Heart Failure  Goal: Improved gas exchange this shift  Outcome: Progressing  Goal: Improved urinary output this shift  Outcome: Progressing  Goal: Reduction in peripheral edema within 24 hours  Outcome: Progressing  Goal: Report improvement of dyspnea/breathlessness this shift  Outcome: Progressing  Goal: Weight from fluid excess reduced over 2-3 days, then stabilize  Outcome: Progressing  Goal: Increase self care and/or family involvement in 24 hours  Outcome: Progressing   The patient's goals for the shift include GET SOME REST    The clinical goals for the shift include pt will remain hemodynamically stable

## 2025-02-20 NOTE — PROGRESS NOTES
"Nutrition Initial Assessment:   Nutrition Assessment    Reason for Assessment: Length of stay    Patient is a 84 y.o. male presenting with Acute on chronic systolic heart failure. Pt continues on diuretic therapy and to transition to oral to prepare for discharge. Renal status noted to show improvement per chart. Hospice discussion has been encouraged. Patient has been to the ER 6 times in the past 12 months, 4 of these visits required hospital admission. Pt is NPO today pending SLP evaluation and now COVID positive.    Previous medical history of CAD status post CABG in April 2022, atrial fibrillation on Eliquis, chronic combined systolic and diastolic heart failure, HTN, CKD with baseline Cr ~2.0,dyslipidemia, cholecystectomy (10/2015) presented with shortness of breath, lower abdominal pain and dysuri     Nutrition History:  Energy Intake:  (CARDIAC diet (2/19) B-100%; (2/18) B, L, D - 100%; (2/17) B-0%, L-50%, D-0%)  Food and Nutrient History: Pt seen 3 months ago by MNT for poor intakes and significant weight loss. Now, he has been here for 10 days with variable intakes recorded by nursing, but only down 5 lbs in 3 months (if weight records accurate). He was at the [facility] where he was closely followed for his CHF and on a strict therapeutic diet. Now, he has been denied to return there so other SNF options are being considered. He has been maintained on a CARDIAC diet while here. His son is typically available to help with history, but he is home with covid.  Vitamin/Herbal Supplement Use: Pt drank GLUCERNA SHAKE last admit.       Anthropometrics:  Height: 177.8 cm (5' 10\")   Weight: 88.3 kg (194 lb 10.7 oz) (daily weight)   BMI (Calculated): 27.93  IBW/kg (Dietitian Calculated): 75.3 kg  Percent of IBW: 114.46 %       Weight History:   Wt Readings from Last 10 Encounters:   02/20/25 88.3 kg (194 lb 10.7 oz)   01/20/25 88 kg (194 lb)   01/10/25 88.9 kg (196 lb)   12/19/24 83.9 kg (185 lb)   11/15/24 90.4 " kg (199 lb 4.7 oz)   11/10/24 96 kg (211 lb 10.3 oz)   01/21/24 87.5 kg (192 lb 14.4 oz)   01/15/24 80.7 kg (178 lb)   07/10/23 80.7 kg (178 lb)   03/30/23 83.9 kg (185 lb)       Weight Change %:  Significant Weight Loss: No    Nutrition Focused Physical Exam Findings:    Subcutaneous Fat Loss:   Defer Subcutaneous Fat Loss Assessment: Defer all  Defer All Reason: Pt in isolation for COVID.  Muscle Wasting:     Edema:  Edema: +2 mild  Edema Location: 2+ bilateral edema covered in bandages.  Physical Findings:  Skin: Positive  Positive Skin Findings:  (Per chart: Gangrenous appearing toes on bilateral lower extremity. bilateral lower extremities wrapped in Ace bandage with Kerlix)  Respiratory : Negative (Acute Respiratory Failure with hypoxia now resolved.)  Mouth Findings: Dysphagia    Nutrition Significant Labs:  CBC Trend:   Results from last 7 days   Lab Units 02/20/25  0640 02/19/25  0619 02/18/25  0623 02/17/25  0636   WBC AUTO x10*3/uL 12.0* 7.8 6.3 7.5   RBC AUTO x10*6/uL 4.15* 3.89* 3.86* 4.18*   HEMOGLOBIN g/dL 10.7* 10.0* 9.7* 10.7*   HEMATOCRIT % 35.4* 33.4* 33.2* 36.7*   MCV fL 85 86 86 88   PLATELETS AUTO x10*3/uL 113* 97* 91* 93*    , BMP Trend:   Results from last 7 days   Lab Units 02/20/25  0640 02/19/25  1708 02/19/25  0619 02/18/25  1746   GLUCOSE mg/dL 93 157* 94 129*   CALCIUM mg/dL 7.5* 7.2* 7.2* 7.2*   SODIUM mmol/L 132* 131* 130* 130*   POTASSIUM mmol/L 3.7 3.8 3.6 3.4*   CO2 mmol/L 33* 33* 31 32   CHLORIDE mmol/L 91* 91* 91* 89*   BUN mg/dL 58* 59* 58* 60*   CREATININE mg/dL 1.78* 2.07* 1.85* 2.17*    , A1C:  Lab Results   Component Value Date    HGBA1C 10.0 (H) 02/12/2025   , BG POCT trend:   Results from last 7 days   Lab Units 02/20/25  1159 02/20/25  1132 02/20/25  0744 02/19/25 2000 02/19/25  1705   POCT GLUCOSE mg/dL 133* 69* 85 173* 175*    , Renal Lab Trend:   Results from last 7 days   Lab Units 02/20/25  0640 02/19/25  1708 02/19/25  0619 02/18/25  1746   POTASSIUM mmol/L 3.7 3.8  3.6 3.4*   PHOSPHORUS mg/dL 2.9 3.1 2.9 3.5   SODIUM mmol/L 132* 131* 130* 130*   MAGNESIUM mg/dL 1.76  --  1.72  --    EGFR mL/min/1.73m*2 37* 31* 35* 29*   BUN mg/dL 58* 59* 58* 60*   CREATININE mg/dL 1.78* 2.07* 1.85* 2.17*    , Lipid Panel:   Lab Results   Component Value Date    CHOL 86 04/01/2022    HDL 41.0 04/01/2022    CHHDL 2.1 04/01/2022    LDLF 29 04/01/2022    VLDL 16 04/01/2022    TRIG 82 04/01/2022        Nutrition Specific Medications:  Scheduled medications  apixaban, 2.5 mg, oral, BID  atorvastatin, 40 mg, oral, Nightly  bisoprolol, 5 mg, oral, Daily  daptomycin, 6 mg/kg, intravenous, q24h  [Held by provider] empagliflozin, 25 mg, oral, Daily  finasteride, 5 mg, oral, Daily  fluticasone, 2 spray, Each Nostril, Daily  [Held by provider] insulin glargine, 10 Units, subcutaneous, BID  insulin glargine, 10 Units, subcutaneous, q24h  insulin lispro, 0-10 Units, subcutaneous, TID AC  insulin lispro, 0-5 Units, subcutaneous, Nightly  insulin lispro, 2 Units, subcutaneous, TID AC  ipratropium-albuteroL, 3 mL, nebulization, TID  isosorbide dinitrate, 10 mg, oral, BID  latanoprost, 1 drop, Both Eyes, Nightly  oxygen, , inhalation, Continuous - Inhalation  pantoprazole, 40 mg, oral, Nightly  perflutren lipid microspheres, 0.5-10 mL of dilution, intravenous, Once in imaging  perflutren protein A microsphere, 0.5 mL, intravenous, Once in imaging  risperiDONE, 0.25 mg, oral, Nightly  sodium chloride, 1 spray, Each Nostril, TID  [Held by provider] spironolactone, 12.5 mg, oral, q24h BRE  sulfur hexafluoride microsphr, 2 mL, intravenous, Once in imaging  tamsulosin, 0.4 mg, oral, Daily  [Held by provider] torsemide, 80 mg, oral, Daily      Continuous medications  bumetanide, 0.25 mg/hr, Last Rate: 0.25 mg/hr (02/19/25 1307)      PRN medications  PRN medications: acetaminophen, dextrose, dextrose, glucagon, glucagon, guaiFENesin, ipratropium-albuteroL, oxygen      I/O:   Last BM Date: 02/18/25; Stool Appearance:  Soft (02/19/25 1126)    Dietary Orders (From admission, onward)       Start     Ordered    02/20/25 1112  NPO Diet; Effective now  Diet effective now         02/20/25 1112    02/11/25 0238  May Participate in Room Service With Assistance  ( ROOM SERVICE MAY PARTICIPATE WITH ASSISTANCE)  Once        Question:  .  Answer:  Yes    02/11/25 0237                     Estimated Needs:   Total Energy Estimated Needs in 24 hours (kCal): 2000 kCal  Method for Estimating Needs: MSJ (1569) x 1.2 x 1.1  Total Protein Estimated Needs in 24 Hours (g): 106 g  Method for Estimating 24 Hour Protein Needs: 1.2g/kg  Total Fluid Estimated Needs in 24 Hours (mL): 2000 mL           Nutrition Diagnosis   Malnutrition Diagnosis  Patient has Malnutrition Diagnosis: No    Nutrition Diagnosis  Patient has Nutrition Diagnosis: Yes  Diagnosis Status (1): New  Nutrition Diagnosis 1: Inadequate oral intake  Related to (1): NPO status  As Evidenced by (1): pt not allowed to eat until SLP sees him.  Additional Assessment Information (1): Pt with history of dysphagia.       Nutrition Interventions/Recommendations   Nutrition prescription for oral nutrition    Nutrition Recommendations:  Individualized Nutrition Prescription Provided for : Follow SLP recommendations per swallow eval and add 2-3g Sodium supplemented with ENSURE COMPACT (4-ounce) with all meals.    Nutrition Interventions/Goals:   Interventions: Medical food supplement, Meals and snacks  Meals and Snacks: Mineral-modified diet  Goal: Less than 2,000mg Sodium per day  Medical Food Supplement: Commercial beverage medical food supplement therapy  Goal: ENSURE COMPACT = 220 kcal/9g protein per 4-ounce      Education Documentation  Nutrition Related Education, taught by Sydnie Sumner RDN, LD at 2/20/2025 12:34 PM.  Learner: Patient  Readiness: Nonacceptance  Method: Explanation  Response: No Evidence of Learning  Comment: Pt has been educated by CHF Nurse Navigator in the past. He has  been staying at a facility where the caridac diet was provided. His family is aware of his need to be compliant with a therapeutic diet based on past chart notes. Will continue to follow.              Nutrition Monitoring and Evaluation   Food/Nutrient Related History Monitoring  Monitoring and Evaluation Plan: Intake / amount of food  Intake / Amount of food: Consumes at least 75% or more of meals/snacks/supplements    Anthropometric Measurements  Monitoring and Evaluation Plan: Body weight  Body Weight: Body weight - Maintain stable weight    Biochemical Data, Medical Tests and Procedures  Monitoring and Evaluation Plan: Electrolyte/renal panel  Electrolyte and Renal Panel: Electrolytes within normal limits    Physical Exam Findings  Monitoring and Evaluation Plan: Skin  Skin Finding: Impaired wound healing - Improved wound healing    Goal Status: New goal(s) identified    Time Spent (min): 30 minutes

## 2025-02-21 VITALS
BODY MASS INDEX: 27.87 KG/M2 | HEIGHT: 70 IN | WEIGHT: 194.67 LBS | HEART RATE: 75 BPM | RESPIRATION RATE: 18 BRPM | OXYGEN SATURATION: 96 % | TEMPERATURE: 96.8 F | DIASTOLIC BLOOD PRESSURE: 53 MMHG | SYSTOLIC BLOOD PRESSURE: 100 MMHG

## 2025-02-21 LAB
ALBUMIN SERPL BCP-MCNC: 2.8 G/DL (ref 3.4–5)
ANION GAP SERPL CALC-SCNC: 16 MMOL/L (ref 10–20)
BUN SERPL-MCNC: 65 MG/DL (ref 6–23)
CALCIUM SERPL-MCNC: 7.5 MG/DL (ref 8.6–10.3)
CHLORIDE SERPL-SCNC: 93 MMOL/L (ref 98–107)
CO2 SERPL-SCNC: 28 MMOL/L (ref 21–32)
CREAT SERPL-MCNC: 1.89 MG/DL (ref 0.5–1.3)
EGFRCR SERPLBLD CKD-EPI 2021: 34 ML/MIN/1.73M*2
ERYTHROCYTE [DISTWIDTH] IN BLOOD BY AUTOMATED COUNT: 15 % (ref 11.5–14.5)
GLUCOSE BLD MANUAL STRIP-MCNC: 140 MG/DL (ref 74–99)
GLUCOSE BLD MANUAL STRIP-MCNC: 142 MG/DL (ref 74–99)
GLUCOSE SERPL-MCNC: 130 MG/DL (ref 74–99)
HCT VFR BLD AUTO: 37 % (ref 41–52)
HGB BLD-MCNC: 10.7 G/DL (ref 13.5–17.5)
MAGNESIUM SERPL-MCNC: 1.91 MG/DL (ref 1.6–2.4)
MCH RBC QN AUTO: 25.8 PG (ref 26–34)
MCHC RBC AUTO-ENTMCNC: 28.9 G/DL (ref 32–36)
MCV RBC AUTO: 89 FL (ref 80–100)
NRBC BLD-RTO: 0 /100 WBCS (ref 0–0)
PHOSPHATE SERPL-MCNC: 5 MG/DL (ref 2.5–4.9)
PLATELET # BLD AUTO: 111 X10*3/UL (ref 150–450)
POTASSIUM SERPL-SCNC: 4.2 MMOL/L (ref 3.5–5.3)
RBC # BLD AUTO: 4.14 X10*6/UL (ref 4.5–5.9)
SODIUM SERPL-SCNC: 133 MMOL/L (ref 136–145)
WBC # BLD AUTO: 8.7 X10*3/UL (ref 4.4–11.3)

## 2025-02-21 PROCEDURE — 82947 ASSAY GLUCOSE BLOOD QUANT: CPT

## 2025-02-21 PROCEDURE — 94640 AIRWAY INHALATION TREATMENT: CPT

## 2025-02-21 PROCEDURE — 80069 RENAL FUNCTION PANEL: CPT | Performed by: STUDENT IN AN ORGANIZED HEALTH CARE EDUCATION/TRAINING PROGRAM

## 2025-02-21 PROCEDURE — 2500000004 HC RX 250 GENERAL PHARMACY W/ HCPCS (ALT 636 FOR OP/ED): Mod: JZ

## 2025-02-21 PROCEDURE — 99239 HOSP IP/OBS DSCHRG MGMT >30: CPT

## 2025-02-21 PROCEDURE — 2500000005 HC RX 250 GENERAL PHARMACY W/O HCPCS

## 2025-02-21 PROCEDURE — 2500000004 HC RX 250 GENERAL PHARMACY W/ HCPCS (ALT 636 FOR OP/ED)

## 2025-02-21 PROCEDURE — 2500000002 HC RX 250 W HCPCS SELF ADMINISTERED DRUGS (ALT 637 FOR MEDICARE OP, ALT 636 FOR OP/ED): Performed by: STUDENT IN AN ORGANIZED HEALTH CARE EDUCATION/TRAINING PROGRAM

## 2025-02-21 PROCEDURE — 2500000002 HC RX 250 W HCPCS SELF ADMINISTERED DRUGS (ALT 637 FOR MEDICARE OP, ALT 636 FOR OP/ED)

## 2025-02-21 PROCEDURE — 85027 COMPLETE CBC AUTOMATED: CPT | Performed by: STUDENT IN AN ORGANIZED HEALTH CARE EDUCATION/TRAINING PROGRAM

## 2025-02-21 PROCEDURE — 36415 COLL VENOUS BLD VENIPUNCTURE: CPT | Performed by: STUDENT IN AN ORGANIZED HEALTH CARE EDUCATION/TRAINING PROGRAM

## 2025-02-21 PROCEDURE — 2500000004 HC RX 250 GENERAL PHARMACY W/ HCPCS (ALT 636 FOR OP/ED): Performed by: INTERNAL MEDICINE

## 2025-02-21 PROCEDURE — P9045 ALBUMIN (HUMAN), 5%, 250 ML: HCPCS | Mod: JZ

## 2025-02-21 PROCEDURE — 83735 ASSAY OF MAGNESIUM: CPT | Performed by: STUDENT IN AN ORGANIZED HEALTH CARE EDUCATION/TRAINING PROGRAM

## 2025-02-21 PROCEDURE — 2500000001 HC RX 250 WO HCPCS SELF ADMINISTERED DRUGS (ALT 637 FOR MEDICARE OP)

## 2025-02-21 RX ORDER — FLUTICASONE PROPIONATE 50 MCG
2 SPRAY, SUSPENSION (ML) NASAL DAILY
Start: 2025-02-22 | End: 2025-03-24

## 2025-02-21 RX ORDER — ALBUMIN HUMAN 50 G/1000ML
25 SOLUTION INTRAVENOUS ONCE
Status: COMPLETED | OUTPATIENT
Start: 2025-02-21 | End: 2025-02-21

## 2025-02-21 RX ORDER — RISPERIDONE 0.25 MG/1
0.25 TABLET ORAL NIGHTLY
Start: 2025-02-21 | End: 2025-03-23

## 2025-02-21 RX ORDER — POTASSIUM CHLORIDE 20 MEQ/1
40 TABLET, EXTENDED RELEASE ORAL ONCE
Status: COMPLETED | OUTPATIENT
Start: 2025-02-21 | End: 2025-02-21

## 2025-02-21 RX ADMIN — DAPTOMYCIN 500 MG: 500 INJECTION, POWDER, LYOPHILIZED, FOR SOLUTION INTRAVENOUS at 12:46

## 2025-02-21 RX ADMIN — ALBUMIN (HUMAN) 25 G: 12.5 SOLUTION INTRAVENOUS at 06:14

## 2025-02-21 RX ADMIN — TAMSULOSIN HYDROCHLORIDE 0.4 MG: 0.4 CAPSULE ORAL at 10:06

## 2025-02-21 RX ADMIN — Medication 4 L/MIN: at 10:06

## 2025-02-21 RX ADMIN — REMDESIVIR 100 MG: 100 INJECTION, POWDER, LYOPHILIZED, FOR SOLUTION INTRAVENOUS at 13:40

## 2025-02-21 RX ADMIN — ACETAMINOPHEN 650 MG: 325 TABLET ORAL at 10:05

## 2025-02-21 RX ADMIN — POTASSIUM CHLORIDE 40 MEQ: 1500 TABLET, EXTENDED RELEASE ORAL at 10:28

## 2025-02-21 RX ADMIN — IPRATROPIUM BROMIDE AND ALBUTEROL SULFATE 3 ML: 2.5; .5 SOLUTION RESPIRATORY (INHALATION) at 14:12

## 2025-02-21 RX ADMIN — FINASTERIDE 5 MG: 5 TABLET, FILM COATED ORAL at 10:28

## 2025-02-21 RX ADMIN — APIXABAN 2.5 MG: 2.5 TABLET, FILM COATED ORAL at 10:06

## 2025-02-21 RX ADMIN — IPRATROPIUM BROMIDE AND ALBUTEROL SULFATE 3 ML: 2.5; .5 SOLUTION RESPIRATORY (INHALATION) at 07:49

## 2025-02-21 ASSESSMENT — COGNITIVE AND FUNCTIONAL STATUS - GENERAL
MOVING FROM LYING ON BACK TO SITTING ON SIDE OF FLAT BED WITH BEDRAILS: A LOT
TURNING FROM BACK TO SIDE WHILE IN FLAT BAD: A LOT
HELP NEEDED FOR BATHING: A LOT
WALKING IN HOSPITAL ROOM: A LOT
MOBILITY SCORE: 12
EATING MEALS: A LOT
MOVING TO AND FROM BED TO CHAIR: A LOT
DAILY ACTIVITIY SCORE: 12
TOILETING: A LOT
PERSONAL GROOMING: A LOT
CLIMB 3 TO 5 STEPS WITH RAILING: A LOT
DRESSING REGULAR LOWER BODY CLOTHING: A LOT
DRESSING REGULAR UPPER BODY CLOTHING: A LOT
STANDING UP FROM CHAIR USING ARMS: A LOT

## 2025-02-21 ASSESSMENT — PAIN SCALES - PAIN ASSESSMENT IN ADVANCED DEMENTIA (PAINAD)
BREATHING: NORMAL
BODYLANGUAGE: TENSE, DISTRESSED PACING, FIDGETING
FACIALEXPRESSION: SAD, FRIGHTENED, FROWN
TOTALSCORE: HOME MEDICATION;REPOSITIONED
CONSOLABILITY: DISTRACTED OR REASSURED BY VOICE/TOUCH
NEGVOCALIZATION: OCCASIONAL MOAN/GROAN, LOW SPEECH, NEGATIVE/DISAPPROVING QUALITY
TOTALSCORE: 4

## 2025-02-21 NOTE — DISCHARGE SUMMARY
"Discharge Diagnosis  Acute on chronic systolic heart failure    Issues Requiring Follow-Up  Patient signed with hospice and he will be discharged to [facility]    Discharge Meds     Medication List      START taking these medications     fluticasone 50 mcg/actuation nasal spray; Commonly known as: Flonase;   Administer 2 sprays into each nostril once daily. Shake gently. Before   first use, prime pump. After use, clean tip and replace cap.; Start taking   on: February 22, 2025   risperiDONE 0.25 mg tablet; Commonly known as: RisperDAL; Take 1 tablet   (0.25 mg) by mouth once daily at bedtime.   sodium chloride 0.65 % nasal spray; Commonly known as: Ocean; Administer   1 spray into each nostril 3 times a day.     CONTINUE taking these medications     acetaminophen 500 mg tablet; Commonly known as: Tylenol   bisoprolol 5 mg tablet; Commonly known as: Zebeta   chlorhexidine 4 % external liquid; Commonly known as: Hibiclens   finasteride 5 mg tablet; Commonly known as: Proscar   latanoprost 0.005 % ophthalmic solution; Commonly known as: Xalatan   oxygen gas therapy; Commonly known as: O2; Inhale 1 each continuously.   pantoprazole 40 mg EC tablet; Commonly known as: ProtoNix   pen needle, diabetic 31 gauge x 5/16\" needle; Use to inject insulin 1 to   4 times daily as directed   tamsulosin 0.4 mg 24 hr capsule; Commonly known as: Flomax; Take 1   capsule (0.4 mg) by mouth once daily.   terbinafine 1 % cream; Commonly known as: LamISIL   torsemide 40 mg tablet; Take 80 mg by mouth once daily.   white petrolatum 41 % ointment ointment; Commonly known as: Aquaphor;   Apply 1 Application topically once daily.     STOP taking these medications     apixaban 2.5 mg tablet; Commonly known as: Eliquis   empagliflozin 25 mg; Commonly known as: Jardiance   hydrALAZINE 10 mg tablet; Commonly known as: Apresoline   insulin lispro 100 unit/mL injection   Lantus U-100 Insulin 100 unit/mL injection; Generic drug: insulin   glargine   " NIFEdipine ER 30 mg 24 hr tablet; Commonly known as: Adalat CC   spironolactone 25 mg tablet; Commonly known as: Aldactone     ASK your doctor about these medications     * ipratropium-albuteroL 0.5-2.5 mg/3 mL nebulizer solution; Commonly   known as: Duo-Neb; Take 3 mL by nebulization 3 times a day.   * ipratropium-albuteroL 0.5-2.5 mg/3 mL nebulizer solution; Commonly   known as: Duo-Neb; Take 3 mL by nebulization every 2 hours if needed for   shortness of breath.   isosorbide dinitrate 10 mg tablet; Commonly known as: Isordil; Take 1   tablet (10 mg) by mouth 2 times a day. Hold for SBP <110 Do not fill   before November 22, 2024.  * This list has 2 medication(s) that are the same as other medications   prescribed for you. Read the directions carefully, and ask your doctor or   other care provider to review them with you.       Test Results Pending At Discharge  Pending Labs       No current pending labs.            Hospital Course  Mr. Elgin Marin, an 84-year-old male with a history of CAD (s/p CABG 2022), atrial fibrillation on Eliquis, chronic heart failure, hypertension, and CKD (baseline Cr ~2.0), presented with worsening exertional dyspnea, lower abdominal pain, and dysuria.He was recently hospitalized (11/15/24 - 11/21/24) for acute on chronic heart failure, chronic troponinemia, and possible pneumonia, treated with IV diuresis, steroids, and antibiotics before discharge to SNF. Cardiology advised against MRA, ACEI/ARB/ARNI due to renal insufficiency.    In the ED, he was tachypneic (RR 35) but otherwise hemodynamically stable. Labs showed anemia (H/H 12.1), mild hyponatremia (Na 134), stable Cr (2.13), elevated BNP (969), and troponin (190 ? 211). UA suggested UTI (WBC >50, ). CXR showed mild cardiomegaly and vascular congestion. Ceftriaxone 1g IV was started for presumed UTI.    The patient was admitted to the hospital for ten days with a complicated course, requiring evaluation by multiple  specialties, including cardiology, nephrology, infectious disease, podiatry, and psychiatry. He was treated for acute hypoxic respiratory failure secondary to CHF exacerbation with cardiorenal syndrome. Despite aggressive interventions, his condition continued to deteriorate, and cardiology determined he was appropriate for hospice care.    To optimize fluid removal, he was started on a Lasix and Bumex drip, with nephrology consulted for management of cardiorenal syndrome. Podiatry evaluated bilateral lower limb wounds, which grew MRSA and Corynebacterium, leading treatment with daptomycin and intermittent Zosyn. Psychiatry was consulted for visual hallucinations, and he was started on low-dose risperidone. His mentation fluctuated significantly, and further workup revealed COVID positivity, leading to the initiation of remdesivir.    Throughout his admission, multiple goals-of-care discussions were held with his son (POA) and daughter. Initially, the family opted for palliative care, but given his continued decline and input from multiple specialists, they ultimately decided to transition him to hospice care. A formal meeting was held on 2/21, and he was discharged to [facility].      Pertinent Physical Exam At Time of Discharge    Physical Exam  VITALS: I have reviewed the vital signs.   GENERAL: Elderly, chronically ill adult male.  Distressed in bed.  On 4 L nasal cannula.   Conversational dyspnea persist even with nasal cannula on  NEURO: Alert and oriented x3, able to answer questions and follow commands. Moves all extremities. Face is symmetric and expressive. No tremor.  EYES: PERRL. No scleral icterus or conjunctival injection. No discharge.   HENT: Normocephalic, atraumatic. Hearing is grossly intact, though he is [place]. Nares grossly patent and without discharge. Mucous membranes moist.    CARDIO: Rhythm regular. Normal rate. No murmur, rub, or gallop.  Persistent 2-3+ pitting edema bilaterally to the  thighs with Ace bandage wrapped.  PULM: Equal air entry with respiratory crackles bilateral with no long wheezes or rhonchi..  24-hour UO is 1.6 L, net -15 L since admission on Bumex gtt.   GI: Abdomen is soft and non-distended. No tenderness to palpation. No guarding or rigidity. Normoactive bowel sounds.   SKIN: Warm and dry. Normal turgor.  Bilateral lower extremities grossly edematous with chronic lower extremity wounds.  Gangrenous appearing toes on bilateral lower extremity.  bilateral lower extremities wrapped in Ace bandage with Kerlix  PSYCH: Mood, affect, and interaction is appropriate to the setting. Not internally stimulated.    Outpatient Follow-Up  Future Appointments   Date Time Provider Department Center   3/19/2025  8:30 AM Taylor Langford MD [id number] [place]   3/27/2025  2:30 PM Buddy Patel MD [id number] [place]       Assessment and plan discussed with my attending.   Jerry Farr MD   Internal Medicine, PGY-2 .

## 2025-02-21 NOTE — PROGRESS NOTES
INPATIENT NEPHROLOGY CONSULT PROGRESS NOTE      Patient Name: Elgin Marin MRN: [id number]  DATE of SERVICE: February 20, 2025  TIME of SERVICE: 02:48 PM  CONSULTING SERVICE: Nephrology    REASON for CONSULT: ISIAH, hypervolemia, hyperkalemia     Covering for Dr. Stauffer    SUBJECTIVE:  Seen and evaluated at bedside.  Reports myalgia and weakness.  Requiring 4 L of oxygen.  Diagnosed with COVID-19 infection started on Decadron and remdesivir.  Unfortunately most likely he will tolerate Decadron due to tendency to fluid retention and most likely will develop acute kidney injury again.    SUMMARY OF STAY:  Mr. Marin is a 84 y.o. male who presented to [facility] February 11, 2025 for evaluation of worsening shortness of breath, worsening bilateral lower extremity swelling, dysuria.  Diagnosed with bilateral lower extremity cellulitis, acute systolic heart failure exacerbation, acute kidney injury with electrolyte imbalance.      Patient with past medical history significant for chronic kidney disease stage IIIb with baseline serum creatinine of 2.1 mg deciliter, EGFR 30 mL/min follows with Dr. Stauffer, history of coronary artery disease status post CABG April 2022, atrial fibrillation on Eliquis, chronic combined systolic and diastolic heart failure, hypertension, hyperlipidemia.   Echocardiogram demonstrated findings related to right heart failure and hence most likely his lower extremity edema will be chronic.  Current serum creatinine better than his baseline.      ASSESSMENT:  Acute kidney injury superimposed on chronic kidney disease stage IIIb:   -- Multifactorial likely component of cardiorenal syndrome, acute decompensated systolic heart failure exacerbation with decreased effective circulatory volume, infectious component due to bilateral lower extremity cellulitis, third spacing in the setting of profound hypoalbuminemia in addition to  component of hemodynamic mediated injury.  Acute urinary retention requiring Graham catheter  -- Baseline serum creatinine around 2 mg deciliter with a GFR of 30 mL/min per 1.73 M2, follows with primary nephrologist Dr. Stauffer.  -- ISIAH improving serum creatinine down to 1.8 mg sitter eGFR up to 35    Acute on chronic systolic heart failure exacerbation:  -- Last EF 42%  --Repeat echocardiogram EF 45%, global hypokinesia of left ventricle.  Moderately enlarged right ventricle    Hyperkalemia: Likely secondary to ISIAH, venous congestion  -Spironolactone, Jardiance on hold      Hyponatremia likely hypervolemic, diuretics related     Bilateral lower extremity cellulitis left worse than right wound culture positive for MRSA     Relative hypotension:  -- Spironolactone, Jardiance on hold     Acute urinary retention managed with Graham catheter placement.      Kidney cyst 1.1 cm left kidney, benign, to continue active surveillance as an outpatient.    PLAN:  Tolerating bumetanide drip, initial plan was to discontinue bumetanide drip and to start intermittent oral bumetanide however patient was started on Decadron which will lead to fluid retention so I will keep him on bumetanide for today.    2.  Oxygen requirement around 4 L.  Patient at high risk of developing ISIAH and volume overload with Decadron.  He will benefit from discontinuing further Decadron if possible    3.  Remdesivir dosed renally    4. Strict input and output to guide diuresis.  Graham catheter in place  5. To continue Ace wrap.    Will follow, thank you!    Medications:    Current Facility-Administered Medications:     acetaminophen (Tylenol) tablet 650 mg, 650 mg, oral, q6h PRN, Aman Hurtado, DO, 650 mg at 02/20/25 1446    apixaban (Eliquis) tablet 2.5 mg, 2.5 mg, oral, BID, Aman Hurtado, DO, 2.5 mg at 02/20/25 0840    atorvastatin (Lipitor) tablet 40 mg, 40 mg, oral, Nightly, Aman Hurtado, DO, 40 mg at 02/19/25 2331    bisoprolol (Zebeta) tablet 5 mg, 5  mg, oral, Daily, Aman Hurtado DO, 5 mg at 02/20/25 0839    bumetanide (Bumex) 25 mg in 100 mL (0.25 mg/mL) infusion, 0.25 mg/hr, intravenous, Continuous, Pascual Parada MD, Last Rate: 1 mL/hr at 02/20/25 1704, 0.25 mg/hr at 02/20/25 1704    DAPTOmycin (Cubicin) 500 mg in sodium chloride 0.9% 50 mL IV, 6 mg/kg, intravenous, q24h, Ewa Ribera MD, Stopped at 02/20/25 1251    dexAMETHasone (Decadron) tablet 6 mg, 6 mg, oral, q12h BRE, Jerry Farr MD, 6 mg at 02/20/25 1432    dextrose 50 % injection 12.5 g, 12.5 g, intravenous, q15 min PRN, Artemio Rivas MD, 12.5 g at 02/20/25 1138    dextrose 50 % injection 25 g, 25 g, intravenous, q15 min PRN, Artemio Rivas MD    [Held by provider] empagliflozin (Jardiance) tablet 25 mg, 25 mg, oral, Daily, Aman Hurtado DO    finasteride (Proscar) tablet 5 mg, 5 mg, oral, Daily, Aman Hurtado DO, 5 mg at 02/20/25 0839    fluticasone (Flonase) nasal spray 2 spray, 2 spray, Each Nostril, Daily, Jerry Farr MD, 2 spray at 02/20/25 0840    glucagon (Glucagen) injection 1 mg, 1 mg, intramuscular, q15 min PRN, Artemio Rivas MD    glucagon (Glucagen) injection 1 mg, 1 mg, intramuscular, q15 min PRN, Artemio Rivas MD    guaiFENesin (Mucinex) 12 hr tablet 600 mg, 600 mg, oral, BID PRN, Aman Hurtado DO, 600 mg at 02/20/25 0840    [Held by provider] insulin glargine (Lantus) injection 10 Units, 10 Units, subcutaneous, BID, Aman Hurtado DO, 10 Units at 02/11/25 1340    insulin glargine (Lantus) injection 10 Units, 10 Units, subcutaneous, q24h, Margi Pérez MD, 10 Units at 02/19/25 2332    insulin lispro injection 0-10 Units, 0-10 Units, subcutaneous, TID AC, Aman Hurtado DO, 2 Units at 02/17/25 1737    insulin lispro injection 0-5 Units, 0-5 Units, subcutaneous, Nightly, Aman Hurtado DO, 2 Units at 02/19/25 2332    insulin lispro injection 2 Units, 2 Units, subcutaneous, TID AC, Aman Hurtado DO, 2 Units at 02/19/25 1311    ipratropium-albuteroL  (Duo-Neb) 0.5-2.5 mg/3 mL nebulizer solution 3 mL, 3 mL, nebulization, q2h PRN, Aman Hurtado DO, 3 mL at 02/20/25 1142    ipratropium-albuteroL (Duo-Neb) 0.5-2.5 mg/3 mL nebulizer solution 3 mL, 3 mL, nebulization, TID, Ann Pinto MD, 3 mL at 02/20/25 1402    isosorbide dinitrate (Isordil) tablet 10 mg, 10 mg, oral, BID, Aman Hurtado DO, 10 mg at 02/20/25 0839    latanoprost (Xalatan) 0.005 % ophthalmic solution 1 drop, 1 drop, Both Eyes, Nightly, Aman Hurtado DO, 1 drop at 02/17/25 2043    oxygen (O2) therapy, , inhalation, Continuous PRN - O2/gases, Ann Pinto MD, Last Rate: 240,000 mL/hr at 02/19/25 1331, 4 L/min at 02/19/25 1331    oxygen (O2) therapy, , inhalation, Continuous - Inhalation, Lewis Melendez DO, Last Rate: 270,000 mL/hr at 02/20/25 1142, 4.5 L/min at 02/20/25 1142    pantoprazole (ProtoNix) EC tablet 40 mg, 40 mg, oral, Nightly, Aman Hurtado DO, 40 mg at 02/19/25 2331    perflutren lipid microspheres (Definity) injection 0.5-10 mL of dilution, 0.5-10 mL of dilution, intravenous, Once in imaging, Aman Hurtado DO    perflutren protein A microsphere (Optison) injection 0.5 mL, 0.5 mL, intravenous, Once in imaging, Aman Hurtado DO    [COMPLETED] remdesivir (Veklury) 200 mg in sodium chloride 0.9% 250 mL IV, 200 mg, intravenous, Once, Stopped at 02/20/25 1518 **FOLLOWED BY** [START ON 2/21/2025] remdesivir (Veklury)  mg in 100 mL NS (ADV/MBP) - compounded, 100 mg, intravenous, q24h, Jerry RONNY SparksYordan, MD    risperiDONE (RisperDAL) tablet 0.25 mg, 0.25 mg, oral, Nightly, Ernesto Katz MD, 0.25 mg at 02/19/25 2331    sodium chloride (Ocean) 0.65 % nasal spray 1 spray, 1 spray, Each Nostril, TID, Jrery Farr MD, 1 spray at 02/20/25 1432    [Held by provider] spironolactone (Aldactone) tablet 12.5 mg, 12.5 mg, oral, q24h BRE, Aman Hurtado DO, 12.5 mg at 02/12/25 0917    sulfur hexafluoride microsphr (Lumason) injection 24.28 mg, 2 mL, intravenous, Once in imaging,  Aman Hurtado DO    tamsulosin (Flomax) 24 hr capsule 0.4 mg, 0.4 mg, oral, Daily, Aman Hurtado DO, 0.4 mg at 02/20/25 0839    [Held by provider] torsemide (Demadex) tablet 80 mg, 80 mg, oral, Daily, Aman Hurtado DO    PERTINENT ROS:  GENERAL:  positive for fatigue, poor appetite.  No fever/chills  RESPIRATORY:  positive for shortness of breath.  Negative for cough, wheezing.  CARDIOVASCULAR:   Negative for chest pain or palpitation.  GI:  Negative for abdominal pain, diarrhea, heartburn, nausea, vomiting  : Graham catheter in place    Physical Exam:  Vital signs in last 24 hours:  Temp:  [36.5 °C (97.7 °F)-38.5 °C (101.3 °F)] 37.4 °C (99.3 °F)  Heart Rate:  [] 94  Resp:  [18-34] 30  BP: (102-129)/(56-63) 102/62  FiO2 (%):  [21 %] 21 %    General: Awake, cooperative, in mild discomfort due to swelling and pain in both lower extremity.  HEENT: Reports fullness in his right ear  NECK:  Elevated JVD, no carotid bruit, supple, no cervical mass or thyromegaly  LUNGS;  Diminished breath sounds, fine Rales  CV:  Distant, regular rate and rhythm, no murmurs  ABDOMEN:  abdomen soft, nontender, BS normal, no masses or organomegaly  EDEMA:  +3-4 lower extremity edema/dependent edema with ulcers and open wounds  SKIN: Bilateral lower extremity cellulitis with open wounds      Intake/Output last 3 shifts:  I/O last 3 completed shifts:  In: 1502.3 (17 mL/kg) [P.O.:1100; I.V.:32.3 (0.4 mL/kg); IV Piggyback:370]  Out: 3726 (42.2 mL/kg) [Urine:3725 (1.2 mL/kg/hr); Stool:1]  Weight: 88.3 kg     DATA:  Diagnotic tests reviewed for Todays visit:  Results from last 7 days   Lab Units 02/20/25  0640   WBC AUTO x10*3/uL 12.0*   RBC AUTO x10*6/uL 4.15*   HEMOGLOBIN g/dL 10.7*   HEMATOCRIT % 35.4*     Results from last 7 days   Lab Units 02/20/25  1734 02/20/25  0640   SODIUM mmol/L 132* 132*   POTASSIUM mmol/L 3.7 3.7   CHLORIDE mmol/L 91* 91*   CO2 mmol/L 31 33*   BUN mg/dL 58* 58*   CREATININE mg/dL 1.87* 1.78*   CALCIUM  mg/dL 7.4* 7.5*   PHOSPHORUS mg/dL 2.9 2.9   MAGNESIUM mg/dL  --  1.76           Echocardiogram  cONCLUSIONS:   1. The left ventricular systolic function is mildly decreased, with a Strong's biplane calculated ejection fraction of 42%.   2. There is global hypokinesis of the left ventricle with minor regional variations.   3. Left ventricular diastolic filling was indeterminate with an elevated left atrial pressure.   4. There is severely reduced right ventricular systolic function.   5. Mildly enlarged right ventricle.   6. The left atrium is moderately dilated.   7. The right atrium is moderately dilated.   8. Mild to moderate tricuspid regurgitation.   9. The estimated RVSP is 46 mm.  10. There is aortic sclerosis with normal leaflet mobility.  11. The inferior vena cava appears severely dilated.  12. There is moderately increased septal and moderately increased posterior left ventricular wall thickness.  IMAGING: CXR reviewed in  images      SIGNATURE: Pascual Parada MD  Nephrology and Hypertension  [id number] [facility], [place]  Office phone: [phone]  FAX: [phone]    This note was partially generated using the Dragon voice recognition system, and there may be some incorrect words, spelling's and punctuation that were not noted in checking the note before saving.

## 2025-02-21 NOTE — DISCHARGE INSTRUCTIONS
You have been treated for volume overload with IV diuretics.  In addition on 2/20/2025 you were found to have COVID infection.  You were also evaluated by psychiatry and started on risperidone at night.  You will be discharged to an inpatient hospice facility.

## 2025-02-21 NOTE — CARE PLAN
Problem: Skin  Goal: Decreased wound size/increased tissue granulation at next dressing change  Outcome: Progressing  Goal: Participates in plan/prevention/treatment measures  Outcome: Progressing  Goal: Prevent/manage excess moisture  Outcome: Progressing  Goal: Prevent/minimize sheer/friction injuries  Outcome: Progressing  Flowsheets (Taken 2/21/2025 0249)  Prevent/minimize sheer/friction injuries: Turn/reposition every 2 hours/use positioning/transfer devices  Goal: Promote/optimize nutrition  Outcome: Progressing  Goal: Promote skin healing  Outcome: Progressing     Problem: Respiratory  Goal: Clear secretions with interventions this shift  Outcome: Progressing  Goal: Minimize anxiety/maximize coping throughout shift  Outcome: Progressing  Goal: Minimal/no exertional discomfort or dyspnea this shift  Outcome: Progressing  Goal: No signs of respiratory distress (eg. Use of accessory muscles. Peds grunting)  Outcome: Progressing  Goal: Patent airway maintained this shift  Outcome: Progressing  Goal: Verbalize decreased shortness of breath this shift  Outcome: Progressing  Goal: Wean oxygen to maintain O2 saturation per order/standard this shift  Outcome: Progressing  Goal: Increase self care and/or family involvement in next 24 hours  Outcome: Progressing     Problem: Fall/Injury  Goal: Not fall by end of shift  Outcome: Progressing  Goal: Be free from injury by end of the shift  Outcome: Progressing  Goal: Verbalize understanding of personal risk factors for fall in the hospital  Outcome: Progressing  Goal: Verbalize understanding of risk factor reduction measures to prevent injury from fall in the home  Outcome: Progressing  Goal: Use assistive devices by end of the shift  Outcome: Progressing  Goal: Pace activities to prevent fatigue by end of the shift  Outcome: Progressing     Problem: Pain - Adult  Goal: Verbalizes/displays adequate comfort level or baseline comfort level  Outcome: Progressing      Problem: Safety - Adult  Goal: Free from fall injury  Outcome: Progressing     Problem: Discharge Planning  Goal: Discharge to home or other facility with appropriate resources  Outcome: Progressing     Problem: Chronic Conditions and Co-morbidities  Goal: Patient's chronic conditions and co-morbidity symptoms are monitored and maintained or improved  Outcome: Progressing     Problem: Nutrition  Goal: Nutrient intake appropriate for maintaining nutritional needs  Outcome: Progressing     Problem: Heart Failure  Goal: Improved gas exchange this shift  Outcome: Progressing  Goal: Improved urinary output this shift  Outcome: Progressing  Goal: Reduction in peripheral edema within 24 hours  Outcome: Progressing  Goal: Report improvement of dyspnea/breathlessness this shift  Outcome: Progressing  Goal: Weight from fluid excess reduced over 2-3 days, then stabilize  Outcome: Progressing  Goal: Increase self care and/or family involvement in 24 hours  Outcome: Progressing   The patient's goals for the shift include GET SOME REST    The clinical goals for the shift include Pt will remain free from respiratory distress

## 2025-02-21 NOTE — CARE PLAN
The patient's goals for the shift include Eat     The clinical goals for the shift include Pt will remain free from respiratory distress

## 2025-02-21 NOTE — PROGRESS NOTES
Hospice is meeting with patient at 930/10 at bedside and will call patient's daughter, Lilia, because she cannot be present for meeting. Will follow for outcome of meeting.    1032  Hospice making arrangements for transfer to [facility].

## 2025-02-22 LAB
GLUCOSE BLD MANUAL STRIP-MCNC: 132 MG/DL (ref 74–99)
GLUCOSE BLD MANUAL STRIP-MCNC: 185 MG/DL (ref 74–99)
GLUCOSE BLD MANUAL STRIP-MCNC: 89 MG/DL (ref 74–99)

## 2025-02-22 NOTE — DOCUMENTATION CLARIFICATION NOTE
"    PATIENT:               NORRIS VINSON  ACCT #:                  [id number]  MRN:                       [id number]  :                       1940  ADMIT DATE:       2/10/2025 8:35 PM  DISCH DATE:        2025 4:50 PM  RESPONDING PROVIDER #:        [id number]          PROVIDER RESPONSE TEXT:    Metabolic encephalopathy  COVID, respiratory failure    CDI QUERY TEXT:    Clarification    Instruction:    Based on your assessment of the patient and the clinical information, please provide the requested documentation by clicking on the appropriate radio button and enter any additional information if prompted.    Question: Please further clarify the most likely etiology of the altered mental status as    When answering this query, please exercise your independent professional judgment. The fact that a question is being asked, does not imply that any particular answer is desired or expected.    The patient's clinical indicators include:  Clinical Information:  84M presents for SOB, urinary retention; found to have CHF exac; developed confusion and found to have COVID    Clinical Indicators:  - COVID +,   - Psych, , [place]: \"Delirium- multifactorial etiology; Neurocognitive disorder; Reg hallucination- which is mostl likely related to delirium\"  - Med, , Yordan: \"In the morning, per nursing report patient was confused agitated and demanded seeing psychiatry because he has visual hallucination.  Upon reassessment he says that he is ANO x 3 and expressed that his hallucination has been going on for sometimes but it has gotten worse...Multifactorial delirium and altered mental status; Visual hallucination; Agitation and intermittent confusion.\"  - Med, , Yordan: \"Overnight he has increased work of breathing for which she was placed on CPAP.  In the morning he seems lethargic but he was answering questions appropriately.  He is ANO x 3...Patient seems more lethargic and dyspneic.  He was placed on " "CPAP overnight. Nasal swab showed positive COVID PCR. Repeated chest x-ray showed bilateral airspace opacities that have increased compared 6 days ago concerning for pneumonia versus worsening edema. Started on COVID protocol with Decadron 6 mg for 10 days and remdesivir.\"    Treatment:  Decadron PO (2/20), Remdesivir IV (2/20-2/21), Psych consult, O2 therapy    Risk Factors:  COVID, CHF exac, respiratory failure  Options provided:  -- Metabolic encephalopathy 2/2 COVID, respiratory failure  -- Other - I will add my own diagnosis  -- Refer to Clinical Documentation Reviewer    Query created by: Ada Mensah on 2/21/2025 3:58 PM      Electronically signed by:  DELLA LUCIA MD 2/22/2025 8:22 AM          "

## 2025-03-19 ENCOUNTER — APPOINTMENT (OUTPATIENT)
Dept: VASCULAR SURGERY | Facility: CLINIC | Age: 85
End: 2025-03-19
Payer: MEDICARE

## 2025-03-24 LAB
Q ONSET: 213 MS
QRS COUNT: 11 BEATS
QRS DURATION: 98 MS
QT INTERVAL: 436 MS
QTC CALCULATION(BAZETT): 453 MS
QTC FREDERICIA: 447 MS
R AXIS: 112 DEGREES
T AXIS: 264 DEGREES
T OFFSET: 431 MS
VENTRICULAR RATE: 65 BPM

## 2025-03-27 ENCOUNTER — APPOINTMENT (OUTPATIENT)
Dept: UROLOGY | Facility: CLINIC | Age: 85
End: 2025-03-27
Payer: MEDICARE